# Patient Record
Sex: MALE | Race: WHITE | Employment: OTHER | URBAN - METROPOLITAN AREA
[De-identification: names, ages, dates, MRNs, and addresses within clinical notes are randomized per-mention and may not be internally consistent; named-entity substitution may affect disease eponyms.]

---

## 2018-01-01 PROBLEM — J18.9 PNEUMONIA: Status: ACTIVE | Noted: 2018-01-01

## 2018-04-10 ENCOUNTER — OFFICE VISIT (OUTPATIENT)
Dept: CARDIOLOGY CLINIC | Age: 83
End: 2018-04-10
Payer: MEDICARE

## 2018-04-10 VITALS
HEART RATE: 62 BPM | SYSTOLIC BLOOD PRESSURE: 108 MMHG | BODY MASS INDEX: 28.43 KG/M2 | RESPIRATION RATE: 18 BRPM | HEIGHT: 70 IN | DIASTOLIC BLOOD PRESSURE: 62 MMHG | WEIGHT: 198.6 LBS

## 2018-04-10 DIAGNOSIS — I50.22 CHRONIC SYSTOLIC CHF (CONGESTIVE HEART FAILURE) (HCC): Primary | ICD-10-CM

## 2018-04-10 DIAGNOSIS — Z78.9 STATIN INTOLERANCE: ICD-10-CM

## 2018-04-10 DIAGNOSIS — I25.10 CORONARY ARTERY DISEASE INVOLVING NATIVE CORONARY ARTERY OF NATIVE HEART WITHOUT ANGINA PECTORIS: ICD-10-CM

## 2018-04-10 DIAGNOSIS — I25.2 HISTORY OF MI (MYOCARDIAL INFARCTION): ICD-10-CM

## 2018-04-10 DIAGNOSIS — Z95.1 S/P CABG (CORONARY ARTERY BYPASS GRAFT): ICD-10-CM

## 2018-04-10 DIAGNOSIS — I73.9 PVD (PERIPHERAL VASCULAR DISEASE) (HCC): ICD-10-CM

## 2018-04-10 DIAGNOSIS — E78.00 PURE HYPERCHOLESTEROLEMIA: ICD-10-CM

## 2018-04-10 DIAGNOSIS — I72.3 ILIAC ARTERY ANEURYSM, RIGHT (HCC): ICD-10-CM

## 2018-04-10 PROCEDURE — G8427 DOCREV CUR MEDS BY ELIG CLIN: HCPCS | Performed by: INTERNAL MEDICINE

## 2018-04-10 PROCEDURE — G8598 ASA/ANTIPLAT THER USED: HCPCS | Performed by: INTERNAL MEDICINE

## 2018-04-10 PROCEDURE — 1036F TOBACCO NON-USER: CPT | Performed by: INTERNAL MEDICINE

## 2018-04-10 PROCEDURE — 99214 OFFICE O/P EST MOD 30 MIN: CPT | Performed by: INTERNAL MEDICINE

## 2018-04-10 PROCEDURE — 1123F ACP DISCUSS/DSCN MKR DOCD: CPT | Performed by: INTERNAL MEDICINE

## 2018-04-10 PROCEDURE — 4040F PNEUMOC VAC/ADMIN/RCVD: CPT | Performed by: INTERNAL MEDICINE

## 2018-04-10 PROCEDURE — 93000 ELECTROCARDIOGRAM COMPLETE: CPT | Performed by: INTERNAL MEDICINE

## 2018-04-10 PROCEDURE — G8417 CALC BMI ABV UP PARAM F/U: HCPCS | Performed by: INTERNAL MEDICINE

## 2018-06-01 ENCOUNTER — HOSPITAL ENCOUNTER (OUTPATIENT)
Dept: CT IMAGING | Age: 83
Discharge: HOME | End: 2018-06-03
Payer: MEDICARE

## 2018-06-01 DIAGNOSIS — R26.89 BALANCE DISORDER: ICD-10-CM

## 2018-06-01 PROCEDURE — 70450 CT HEAD/BRAIN W/O DYE: CPT

## 2018-06-06 ENCOUNTER — NURSE ONLY (OUTPATIENT)
Dept: NON INVASIVE DIAGNOSTICS | Age: 83
End: 2018-06-06
Payer: MEDICARE

## 2018-06-06 DIAGNOSIS — Z95.810 AUTOMATIC IMPLANTABLE CARDIOVERTER-DEFIBRILLATOR IN SITU: Primary | ICD-10-CM

## 2018-06-06 PROCEDURE — 93295 DEV INTERROG REMOTE 1/2/MLT: CPT | Performed by: INTERNAL MEDICINE

## 2018-06-06 PROCEDURE — 93296 REM INTERROG EVL PM/IDS: CPT | Performed by: INTERNAL MEDICINE

## 2018-06-08 ENCOUNTER — HOSPITAL ENCOUNTER (EMERGENCY)
Age: 83
Discharge: HOME | End: 2018-06-08
Attending: EMERGENCY MEDICINE
Payer: MEDICARE

## 2018-06-08 ENCOUNTER — APPOINTMENT (OUTPATIENT)
Dept: ULTRASOUND IMAGING | Age: 83
End: 2018-06-08
Payer: MEDICARE

## 2018-06-08 ENCOUNTER — APPOINTMENT (OUTPATIENT)
Dept: GENERAL RADIOLOGY | Age: 83
End: 2018-06-08
Payer: MEDICARE

## 2018-06-08 VITALS
HEART RATE: 62 BPM | WEIGHT: 197 LBS | TEMPERATURE: 97.5 F | BODY MASS INDEX: 28.2 KG/M2 | RESPIRATION RATE: 15 BRPM | OXYGEN SATURATION: 97 % | SYSTOLIC BLOOD PRESSURE: 125 MMHG | HEIGHT: 70 IN | DIASTOLIC BLOOD PRESSURE: 64 MMHG

## 2018-06-08 DIAGNOSIS — S39.012A LUMBAR STRAIN, INITIAL ENCOUNTER: Primary | ICD-10-CM

## 2018-06-08 LAB
ALBUMIN SERPL-MCNC: 3.6 G/DL (ref 3.5–5.2)
ALP BLD-CCNC: 43 U/L (ref 40–129)
ALT SERPL-CCNC: 11 U/L (ref 0–40)
ANION GAP SERPL CALCULATED.3IONS-SCNC: 9 MMOL/L (ref 7–16)
AST SERPL-CCNC: 14 U/L (ref 0–39)
BACTERIA: ABNORMAL /HPF
BASOPHILS ABSOLUTE: 0.02 E9/L (ref 0–0.2)
BASOPHILS RELATIVE PERCENT: 0.2 % (ref 0–2)
BILIRUB SERPL-MCNC: 0.6 MG/DL (ref 0–1.2)
BILIRUBIN URINE: NEGATIVE
BLOOD, URINE: ABNORMAL
BUN BLDV-MCNC: 22 MG/DL (ref 8–23)
CALCIUM SERPL-MCNC: 9.1 MG/DL (ref 8.6–10.2)
CHLORIDE BLD-SCNC: 104 MMOL/L (ref 98–107)
CLARITY: CLEAR
CO2: 24 MMOL/L (ref 22–29)
COLOR: YELLOW
CREAT SERPL-MCNC: 1.2 MG/DL (ref 0.7–1.2)
EOSINOPHILS ABSOLUTE: 0.15 E9/L (ref 0.05–0.5)
EOSINOPHILS RELATIVE PERCENT: 1.3 % (ref 0–6)
EPITHELIAL CELLS, UA: ABNORMAL /HPF
GFR AFRICAN AMERICAN: >60
GFR NON-AFRICAN AMERICAN: 58 ML/MIN/1.73
GLUCOSE BLD-MCNC: 102 MG/DL (ref 74–109)
GLUCOSE URINE: NEGATIVE MG/DL
HCT VFR BLD CALC: 37.4 % (ref 37–54)
HEMOGLOBIN: 12.4 G/DL (ref 12.5–16.5)
IMMATURE GRANULOCYTES #: 0.05 E9/L
IMMATURE GRANULOCYTES %: 0.4 % (ref 0–5)
KETONES, URINE: NEGATIVE MG/DL
LEUKOCYTE ESTERASE, URINE: NEGATIVE
LYMPHOCYTES ABSOLUTE: 5.2 E9/L (ref 1.5–4)
LYMPHOCYTES RELATIVE PERCENT: 45.4 % (ref 20–42)
MCH RBC QN AUTO: 29.2 PG (ref 26–35)
MCHC RBC AUTO-ENTMCNC: 33.2 % (ref 32–34.5)
MCV RBC AUTO: 88.2 FL (ref 80–99.9)
MONOCYTES ABSOLUTE: 1.03 E9/L (ref 0.1–0.95)
MONOCYTES RELATIVE PERCENT: 9 % (ref 2–12)
NEUTROPHILS ABSOLUTE: 5.01 E9/L (ref 1.8–7.3)
NEUTROPHILS RELATIVE PERCENT: 43.7 % (ref 43–80)
NITRITE, URINE: NEGATIVE
PDW BLD-RTO: 14.8 FL (ref 11.5–15)
PH UA: 5 (ref 5–9)
PLATELET # BLD: 188 E9/L (ref 130–450)
PMV BLD AUTO: 8 FL (ref 7–12)
POTASSIUM SERPL-SCNC: 4.5 MMOL/L (ref 3.5–5)
PRO-BNP: 1298 PG/ML (ref 0–450)
PROTEIN UA: NEGATIVE MG/DL
RBC # BLD: 4.24 E12/L (ref 3.8–5.8)
RBC UA: ABNORMAL /HPF (ref 0–2)
SODIUM BLD-SCNC: 137 MMOL/L (ref 132–146)
SPECIFIC GRAVITY UA: 1.02 (ref 1–1.03)
TOTAL PROTEIN: 6.7 G/DL (ref 6.4–8.3)
TROPONIN: <0.01 NG/ML (ref 0–0.03)
UROBILINOGEN, URINE: 0.2 E.U./DL
WBC # BLD: 11.5 E9/L (ref 4.5–11.5)
WBC UA: ABNORMAL /HPF (ref 0–5)

## 2018-06-08 PROCEDURE — 83880 ASSAY OF NATRIURETIC PEPTIDE: CPT

## 2018-06-08 PROCEDURE — 96374 THER/PROPH/DIAG INJ IV PUSH: CPT

## 2018-06-08 PROCEDURE — 84484 ASSAY OF TROPONIN QUANT: CPT

## 2018-06-08 PROCEDURE — 71045 X-RAY EXAM CHEST 1 VIEW: CPT

## 2018-06-08 PROCEDURE — 85025 COMPLETE CBC W/AUTO DIFF WBC: CPT

## 2018-06-08 PROCEDURE — 6360000002 HC RX W HCPCS: Performed by: STUDENT IN AN ORGANIZED HEALTH CARE EDUCATION/TRAINING PROGRAM

## 2018-06-08 PROCEDURE — 81001 URINALYSIS AUTO W/SCOPE: CPT

## 2018-06-08 PROCEDURE — 80053 COMPREHEN METABOLIC PANEL: CPT

## 2018-06-08 PROCEDURE — 76775 US EXAM ABDO BACK WALL LIM: CPT

## 2018-06-08 PROCEDURE — 99285 EMERGENCY DEPT VISIT HI MDM: CPT

## 2018-06-08 PROCEDURE — 36415 COLL VENOUS BLD VENIPUNCTURE: CPT

## 2018-06-08 RX ORDER — FUROSEMIDE 10 MG/ML
20 INJECTION INTRAMUSCULAR; INTRAVENOUS ONCE
Status: COMPLETED | OUTPATIENT
Start: 2018-06-08 | End: 2018-06-08

## 2018-06-08 RX ADMIN — FUROSEMIDE 20 MG: 10 INJECTION, SOLUTION INTRAVENOUS at 12:15

## 2018-06-08 ASSESSMENT — ENCOUNTER SYMPTOMS
ABDOMINAL PAIN: 0
VOMITING: 0
NAUSEA: 0
DIARRHEA: 0
SHORTNESS OF BREATH: 1
STRIDOR: 0
BACK PAIN: 1
WHEEZING: 0
COUGH: 0
COLOR CHANGE: 0
CONSTIPATION: 0
SORE THROAT: 0

## 2018-08-02 ENCOUNTER — HOSPITAL ENCOUNTER (OUTPATIENT)
Dept: GENERAL RADIOLOGY | Age: 83
Discharge: HOME | End: 2018-08-04
Payer: MEDICARE

## 2018-08-02 DIAGNOSIS — Z13.820 SCREENING FOR OSTEOPOROSIS: ICD-10-CM

## 2018-08-02 PROCEDURE — 77080 DXA BONE DENSITY AXIAL: CPT

## 2018-08-08 ENCOUNTER — TELEPHONE (OUTPATIENT)
Dept: NON INVASIVE DIAGNOSTICS | Age: 83
End: 2018-08-08

## 2018-09-05 ENCOUNTER — NURSE ONLY (OUTPATIENT)
Dept: NON INVASIVE DIAGNOSTICS | Age: 83
End: 2018-09-05
Payer: MEDICARE

## 2018-09-05 DIAGNOSIS — Z95.810 AUTOMATIC IMPLANTABLE CARDIOVERTER-DEFIBRILLATOR IN SITU: Primary | ICD-10-CM

## 2018-09-05 DIAGNOSIS — I47.29 NSVT (NONSUSTAINED VENTRICULAR TACHYCARDIA): ICD-10-CM

## 2018-09-05 DIAGNOSIS — I25.5 ISCHEMIC CARDIOMYOPATHY: ICD-10-CM

## 2018-09-05 PROCEDURE — 93295 DEV INTERROG REMOTE 1/2/MLT: CPT | Performed by: INTERNAL MEDICINE

## 2018-09-05 PROCEDURE — 93296 REM INTERROG EVL PM/IDS: CPT | Performed by: INTERNAL MEDICINE

## 2018-09-05 PROCEDURE — 93297 REM INTERROG DEV EVAL ICPMS: CPT | Performed by: INTERNAL MEDICINE

## 2018-09-06 ENCOUNTER — TELEPHONE (OUTPATIENT)
Dept: NON INVASIVE DIAGNOSTICS | Age: 83
End: 2018-09-06

## 2018-09-06 NOTE — TELEPHONE ENCOUNTER
----- Message from Blu Zavaleta RN sent at 9/6/2018  7:55 AM EDT -----  Successful transmission received. Please call patient and give next appointment.

## 2018-09-07 ENCOUNTER — HOSPITAL ENCOUNTER (OUTPATIENT)
Dept: CT IMAGING | Age: 83
Discharge: HOME | End: 2018-09-09
Payer: MEDICARE

## 2018-09-07 DIAGNOSIS — J84.9 ILD (INTERSTITIAL LUNG DISEASE) (HCC): ICD-10-CM

## 2018-09-07 DIAGNOSIS — J84.112 IPF (IDIOPATHIC PULMONARY FIBROSIS) (HCC): ICD-10-CM

## 2018-09-07 PROCEDURE — 71250 CT THORAX DX C-: CPT

## 2018-10-04 ENCOUNTER — OFFICE VISIT (OUTPATIENT)
Dept: VASCULAR SURGERY | Age: 83
End: 2018-10-04
Payer: MEDICARE

## 2018-10-04 DIAGNOSIS — I72.3 ILIAC ARTERY ANEURYSM, BILATERAL (HCC): ICD-10-CM

## 2018-10-04 DIAGNOSIS — I77.819 DILATATION OF AORTA (HCC): ICD-10-CM

## 2018-10-04 PROCEDURE — 99204 OFFICE O/P NEW MOD 45 MIN: CPT | Performed by: SURGERY

## 2018-10-04 PROCEDURE — G8417 CALC BMI ABV UP PARAM F/U: HCPCS | Performed by: SURGERY

## 2018-10-04 PROCEDURE — 4040F PNEUMOC VAC/ADMIN/RCVD: CPT | Performed by: SURGERY

## 2018-10-04 PROCEDURE — G8427 DOCREV CUR MEDS BY ELIG CLIN: HCPCS | Performed by: SURGERY

## 2018-10-04 PROCEDURE — 1036F TOBACCO NON-USER: CPT | Performed by: SURGERY

## 2018-10-04 PROCEDURE — G8598 ASA/ANTIPLAT THER USED: HCPCS | Performed by: SURGERY

## 2018-10-04 PROCEDURE — G8482 FLU IMMUNIZE ORDER/ADMIN: HCPCS | Performed by: SURGERY

## 2018-10-04 PROCEDURE — 1123F ACP DISCUSS/DSCN MKR DOCD: CPT | Performed by: SURGERY

## 2018-10-04 PROCEDURE — 1101F PT FALLS ASSESS-DOCD LE1/YR: CPT | Performed by: SURGERY

## 2018-10-04 NOTE — PROGRESS NOTES
- 2 Glasses of wine, 1 - 2 Cans of beer per week      Comment: socially and a glass of wine every night    Drug use: No    Sexual activity: Not on file     Other Topics Concern    Not on file     Social History Narrative    No narrative on file       Review of Systems:  Skin:  No abnormal pigmentation or rash  Eyes:  No blurring, diplopia or vision loss  Ears/Nose/Throat:  No hearing loss or vertigo  Respiratory:  No cough, pleuritic chest pain, dyspnea, or wheezing  Cardiovascular: No angina, palpitations   Gastrointestinal:  No nausea or vomiting; no abdominal pain or rectal bleeding  Musculoskeletal:  No arthritis or weakness  Neurologic:  No paralysis, paresis, paresthesia, seizures or headaches  Hematologic/Lymphatic/Immunologic:  No anemia, abnormal bleeding/bruising, fever, chills or night sweats. Endocrine:  No heat or cold intolerance. No polyphagia, polydipsia or polyuria. Physical Exam:  General appearance:  Alert, awake, oriented x 3. No distress. Skin:  Warm and dry  Head:  Normocephalic. No masses, lesions or tenderness  Eyes:  Conjunctivae appear normal; PERRL  Ears:  External ears normal  Nose/Sinuses:  Septum midline, mucosa normal; no drainage  Oropharynx:  Clear, no exudate noted  Neck:  No jugular venous distention, lymphadenopathy or thyromegaly. No evidence of carotid bruit  Lungs:  Clear to ausculation bilaterally. No rhonchi, crackles, wheezes  Heart:  Regular rate and rhythm. No rub or murmur  Abdomen:  Soft, non-tender. No masses, organomegaly. Musculoskeletal : No joint effusions, tenderness swelling    Neuro: Speech is intact. Moving all extremities. No focal motor or sensory deficits      Extremities:  Both feet are warm to touch.  The color of both feet is normal.        Pulses Right  Left    Brachial 3 3    Radial    3=normal   Femoral 2 2  2=diminished   Popliteal    1=barely palpable   Dorsalis pedis    0=absent   Posterior tibial    4=aneurysmal             Other

## 2018-12-05 ENCOUNTER — NURSE ONLY (OUTPATIENT)
Dept: NON INVASIVE DIAGNOSTICS | Age: 83
End: 2018-12-05
Payer: MEDICARE

## 2018-12-05 DIAGNOSIS — Z95.810 AUTOMATIC IMPLANTABLE CARDIOVERTER-DEFIBRILLATOR IN SITU: Primary | ICD-10-CM

## 2018-12-05 DIAGNOSIS — I25.5 ISCHEMIC CARDIOMYOPATHY: ICD-10-CM

## 2018-12-05 PROCEDURE — 93295 DEV INTERROG REMOTE 1/2/MLT: CPT | Performed by: INTERNAL MEDICINE

## 2018-12-05 PROCEDURE — 93296 REM INTERROG EVL PM/IDS: CPT | Performed by: INTERNAL MEDICINE

## 2018-12-05 PROCEDURE — 93297 REM INTERROG DEV EVAL ICPMS: CPT | Performed by: INTERNAL MEDICINE

## 2018-12-11 NOTE — PROGRESS NOTES
See [employer] [facility] report. Remote monitoring reviewed over a 90 day period. End of 90 day monitoring period date of service 12. 5.2018.

## 2018-12-12 ENCOUNTER — TELEPHONE (OUTPATIENT)
Dept: NON INVASIVE DIAGNOSTICS | Age: 83
End: 2018-12-12

## 2018-12-12 NOTE — TELEPHONE ENCOUNTER
----- Message from Jayde Ornelas RN sent at 12/11/2018  5:19 PM EST -----  Successful transmission received. Please call patient and give next appointment.

## 2019-01-10 ENCOUNTER — HOSPITAL ENCOUNTER (OUTPATIENT)
Age: 84
Discharge: HOME | End: 2019-01-10
Payer: MEDICARE

## 2019-01-10 LAB
C-REACTIVE PROTEIN: 3 MG/DL (ref 0–0.4)
SEDIMENTATION RATE, ERYTHROCYTE: 3 MM/HR (ref 0–15)

## 2019-01-10 PROCEDURE — 85651 RBC SED RATE NONAUTOMATED: CPT

## 2019-01-10 PROCEDURE — 86140 C-REACTIVE PROTEIN: CPT

## 2019-01-10 PROCEDURE — 36415 COLL VENOUS BLD VENIPUNCTURE: CPT

## 2019-02-05 ENCOUNTER — HOSPITAL ENCOUNTER (OUTPATIENT)
Age: 84
Discharge: HOME | End: 2019-02-05
Payer: MEDICARE

## 2019-02-05 DIAGNOSIS — Z51.81 THERAPEUTIC DRUG MONITORING: ICD-10-CM

## 2019-02-05 LAB
ALBUMIN SERPL-MCNC: 3.6 G/DL (ref 3.5–5.2)
ALP BLD-CCNC: 38 U/L (ref 40–129)
ALT SERPL-CCNC: 18 U/L (ref 0–40)
AST SERPL-CCNC: 24 U/L (ref 0–39)
BILIRUB SERPL-MCNC: 0.5 MG/DL (ref 0–1.2)
BILIRUBIN DIRECT: 0.2 MG/DL (ref 0–0.3)
BILIRUBIN, INDIRECT: 0.3 MG/DL (ref 0–1)
C-REACTIVE PROTEIN: 2.4 MG/DL (ref 0–0.4)
SEDIMENTATION RATE, ERYTHROCYTE: 3 MM/HR (ref 0–15)
TOTAL PROTEIN: 6.7 G/DL (ref 6.4–8.3)

## 2019-02-05 PROCEDURE — 36415 COLL VENOUS BLD VENIPUNCTURE: CPT

## 2019-02-05 PROCEDURE — 85651 RBC SED RATE NONAUTOMATED: CPT

## 2019-02-05 PROCEDURE — 80076 HEPATIC FUNCTION PANEL: CPT

## 2019-02-05 PROCEDURE — 86140 C-REACTIVE PROTEIN: CPT

## 2019-03-07 ENCOUNTER — HOSPITAL ENCOUNTER (OUTPATIENT)
Age: 84
Discharge: HOME | End: 2019-03-07
Payer: MEDICARE

## 2019-03-07 DIAGNOSIS — Z51.81 THERAPEUTIC DRUG MONITORING: ICD-10-CM

## 2019-03-07 LAB
ALBUMIN SERPL-MCNC: 3.9 G/DL (ref 3.5–5.2)
ALP BLD-CCNC: 40 U/L (ref 40–129)
ALT SERPL-CCNC: 17 U/L (ref 0–40)
AST SERPL-CCNC: 18 U/L (ref 0–39)
BILIRUB SERPL-MCNC: 0.7 MG/DL (ref 0–1.2)
BILIRUBIN DIRECT: 0.2 MG/DL (ref 0–0.3)
BILIRUBIN, INDIRECT: 0.5 MG/DL (ref 0–1)
C-REACTIVE PROTEIN: 2 MG/DL (ref 0–0.4)
SEDIMENTATION RATE, ERYTHROCYTE: 15 MM/HR (ref 0–15)
TOTAL PROTEIN: 7.4 G/DL (ref 6.4–8.3)

## 2019-03-07 PROCEDURE — 86140 C-REACTIVE PROTEIN: CPT

## 2019-03-07 PROCEDURE — 36415 COLL VENOUS BLD VENIPUNCTURE: CPT

## 2019-03-07 PROCEDURE — 85651 RBC SED RATE NONAUTOMATED: CPT

## 2019-03-07 PROCEDURE — 80076 HEPATIC FUNCTION PANEL: CPT

## 2019-03-12 ENCOUNTER — NURSE ONLY (OUTPATIENT)
Dept: NON INVASIVE DIAGNOSTICS | Age: 84
End: 2019-03-12
Payer: MEDICARE

## 2019-03-12 DIAGNOSIS — I25.5 ISCHEMIC CARDIOMYOPATHY: ICD-10-CM

## 2019-03-12 DIAGNOSIS — Z95.810 AUTOMATIC IMPLANTABLE CARDIOVERTER-DEFIBRILLATOR IN SITU: Primary | ICD-10-CM

## 2019-03-12 DIAGNOSIS — I47.29 NSVT (NONSUSTAINED VENTRICULAR TACHYCARDIA): ICD-10-CM

## 2019-03-12 PROCEDURE — 93296 REM INTERROG EVL PM/IDS: CPT | Performed by: INTERNAL MEDICINE

## 2019-03-12 PROCEDURE — 93297 REM INTERROG DEV EVAL ICPMS: CPT | Performed by: INTERNAL MEDICINE

## 2019-03-12 PROCEDURE — 93295 DEV INTERROG REMOTE 1/2/MLT: CPT | Performed by: INTERNAL MEDICINE

## 2019-03-18 ENCOUNTER — TELEPHONE (OUTPATIENT)
Dept: NON INVASIVE DIAGNOSTICS | Age: 84
End: 2019-03-18

## 2019-03-20 ENCOUNTER — OFFICE VISIT (OUTPATIENT)
Dept: NON INVASIVE DIAGNOSTICS | Age: 84
End: 2019-03-20
Payer: MEDICARE

## 2019-03-20 VITALS
BODY MASS INDEX: 27.92 KG/M2 | DIASTOLIC BLOOD PRESSURE: 62 MMHG | HEART RATE: 76 BPM | HEIGHT: 70 IN | WEIGHT: 195 LBS | SYSTOLIC BLOOD PRESSURE: 94 MMHG | RESPIRATION RATE: 16 BRPM

## 2019-03-20 DIAGNOSIS — Z95.810 ICD (IMPLANTABLE CARDIOVERTER-DEFIBRILLATOR) IN PLACE: Primary | ICD-10-CM

## 2019-03-20 PROCEDURE — 4040F PNEUMOC VAC/ADMIN/RCVD: CPT | Performed by: NURSE PRACTITIONER

## 2019-03-20 PROCEDURE — G8482 FLU IMMUNIZE ORDER/ADMIN: HCPCS | Performed by: NURSE PRACTITIONER

## 2019-03-20 PROCEDURE — 99213 OFFICE O/P EST LOW 20 MIN: CPT | Performed by: NURSE PRACTITIONER

## 2019-03-20 PROCEDURE — 93290 INTERROG DEV EVAL ICPMS IP: CPT | Performed by: NURSE PRACTITIONER

## 2019-03-20 PROCEDURE — G8427 DOCREV CUR MEDS BY ELIG CLIN: HCPCS | Performed by: NURSE PRACTITIONER

## 2019-03-20 PROCEDURE — 1123F ACP DISCUSS/DSCN MKR DOCD: CPT | Performed by: NURSE PRACTITIONER

## 2019-03-20 PROCEDURE — G8598 ASA/ANTIPLAT THER USED: HCPCS | Performed by: NURSE PRACTITIONER

## 2019-03-20 PROCEDURE — G8417 CALC BMI ABV UP PARAM F/U: HCPCS | Performed by: NURSE PRACTITIONER

## 2019-03-20 PROCEDURE — 93283 PRGRMG EVAL IMPLANTABLE DFB: CPT | Performed by: NURSE PRACTITIONER

## 2019-03-20 PROCEDURE — 1036F TOBACCO NON-USER: CPT | Performed by: NURSE PRACTITIONER

## 2019-03-20 PROCEDURE — 1101F PT FALLS ASSESS-DOCD LE1/YR: CPT | Performed by: NURSE PRACTITIONER

## 2019-03-20 RX ORDER — NINTEDANIB 150 MG/1
CAPSULE ORAL
Refills: 12 | COMMUNITY
Start: 2019-03-04 | End: 2021-01-01

## 2019-03-20 RX ORDER — CARVEDILOL 3.12 MG/1
3.12 TABLET ORAL 2 TIMES DAILY WITH MEALS
COMMUNITY
End: 2019-04-22 | Stop reason: SDUPTHER

## 2019-03-26 ENCOUNTER — TELEPHONE (OUTPATIENT)
Dept: VASCULAR SURGERY | Age: 84
End: 2019-03-26

## 2019-04-05 ENCOUNTER — HOSPITAL ENCOUNTER (OUTPATIENT)
Age: 84
Discharge: HOME | End: 2019-04-05
Payer: MEDICARE

## 2019-04-05 DIAGNOSIS — Z51.81 THERAPEUTIC DRUG MONITORING: ICD-10-CM

## 2019-04-05 LAB
ALBUMIN SERPL-MCNC: 3.9 G/DL (ref 3.5–5.2)
ALP BLD-CCNC: 43 U/L (ref 40–129)
ALT SERPL-CCNC: 17 U/L (ref 0–40)
AST SERPL-CCNC: 19 U/L (ref 0–39)
BILIRUB SERPL-MCNC: 0.7 MG/DL (ref 0–1.2)
BILIRUBIN DIRECT: 0.2 MG/DL (ref 0–0.3)
BILIRUBIN, INDIRECT: 0.5 MG/DL (ref 0–1)
TOTAL PROTEIN: 7.4 G/DL (ref 6.4–8.3)

## 2019-04-05 PROCEDURE — 80076 HEPATIC FUNCTION PANEL: CPT

## 2019-04-05 PROCEDURE — 36415 COLL VENOUS BLD VENIPUNCTURE: CPT

## 2019-06-03 ENCOUNTER — HOSPITAL ENCOUNTER (OUTPATIENT)
Age: 84
Discharge: HOME | End: 2019-06-03
Payer: MEDICARE

## 2019-06-03 LAB
C-REACTIVE PROTEIN: 0.7 MG/DL (ref 0–0.4)
SEDIMENTATION RATE, ERYTHROCYTE: 17 MM/HR (ref 0–15)

## 2019-06-03 PROCEDURE — 36415 COLL VENOUS BLD VENIPUNCTURE: CPT

## 2019-06-03 PROCEDURE — 86140 C-REACTIVE PROTEIN: CPT

## 2019-06-03 PROCEDURE — 85651 RBC SED RATE NONAUTOMATED: CPT

## 2019-06-11 ENCOUNTER — NURSE ONLY (OUTPATIENT)
Dept: NON INVASIVE DIAGNOSTICS | Age: 84
End: 2019-06-11
Payer: MEDICARE

## 2019-06-11 DIAGNOSIS — Z95.810 ICD (IMPLANTABLE CARDIOVERTER-DEFIBRILLATOR) IN PLACE: Primary | ICD-10-CM

## 2019-06-11 DIAGNOSIS — I50.22 CHRONIC SYSTOLIC CHF (CONGESTIVE HEART FAILURE) (HCC): ICD-10-CM

## 2019-06-11 DIAGNOSIS — I25.5 ISCHEMIC CARDIOMYOPATHY: ICD-10-CM

## 2019-06-11 PROCEDURE — 93297 REM INTERROG DEV EVAL ICPMS: CPT | Performed by: INTERNAL MEDICINE

## 2019-06-11 PROCEDURE — 93296 REM INTERROG EVL PM/IDS: CPT | Performed by: INTERNAL MEDICINE

## 2019-06-11 PROCEDURE — 93295 DEV INTERROG REMOTE 1/2/MLT: CPT | Performed by: INTERNAL MEDICINE

## 2019-06-14 ENCOUNTER — OFFICE VISIT (OUTPATIENT)
Dept: CARDIOLOGY CLINIC | Age: 84
End: 2019-06-14
Payer: MEDICARE

## 2019-06-14 VITALS
WEIGHT: 187 LBS | HEIGHT: 70 IN | SYSTOLIC BLOOD PRESSURE: 118 MMHG | DIASTOLIC BLOOD PRESSURE: 62 MMHG | BODY MASS INDEX: 26.77 KG/M2 | RESPIRATION RATE: 16 BRPM | HEART RATE: 65 BPM

## 2019-06-14 DIAGNOSIS — I25.5 ISCHEMIC CARDIOMYOPATHY: Primary | ICD-10-CM

## 2019-06-14 DIAGNOSIS — I50.22 CHRONIC SYSTOLIC CHF (CONGESTIVE HEART FAILURE) (HCC): ICD-10-CM

## 2019-06-14 DIAGNOSIS — I25.10 CORONARY ARTERY DISEASE INVOLVING NATIVE CORONARY ARTERY OF NATIVE HEART WITHOUT ANGINA PECTORIS: ICD-10-CM

## 2019-06-14 DIAGNOSIS — Z78.9 STATIN INTOLERANCE: ICD-10-CM

## 2019-06-14 DIAGNOSIS — I47.29 NSVT (NONSUSTAINED VENTRICULAR TACHYCARDIA): ICD-10-CM

## 2019-06-14 DIAGNOSIS — I77.819 DILATATION OF AORTA (HCC): ICD-10-CM

## 2019-06-14 DIAGNOSIS — I51.9 LV DYSFUNCTION: ICD-10-CM

## 2019-06-14 PROCEDURE — 93000 ELECTROCARDIOGRAM COMPLETE: CPT | Performed by: INTERNAL MEDICINE

## 2019-06-14 PROCEDURE — 1036F TOBACCO NON-USER: CPT | Performed by: INTERNAL MEDICINE

## 2019-06-14 PROCEDURE — G8417 CALC BMI ABV UP PARAM F/U: HCPCS | Performed by: INTERNAL MEDICINE

## 2019-06-14 PROCEDURE — G8598 ASA/ANTIPLAT THER USED: HCPCS | Performed by: INTERNAL MEDICINE

## 2019-06-14 PROCEDURE — 99214 OFFICE O/P EST MOD 30 MIN: CPT | Performed by: INTERNAL MEDICINE

## 2019-06-14 PROCEDURE — 1123F ACP DISCUSS/DSCN MKR DOCD: CPT | Performed by: INTERNAL MEDICINE

## 2019-06-14 PROCEDURE — G8427 DOCREV CUR MEDS BY ELIG CLIN: HCPCS | Performed by: INTERNAL MEDICINE

## 2019-06-14 PROCEDURE — 4040F PNEUMOC VAC/ADMIN/RCVD: CPT | Performed by: INTERNAL MEDICINE

## 2019-06-14 RX ORDER — ROSUVASTATIN CALCIUM 5 MG/1
5 TABLET, COATED ORAL DAILY
Qty: 90 TABLET | Refills: 3 | Status: SHIPPED | OUTPATIENT
Start: 2019-06-14 | End: 2020-01-21 | Stop reason: SDUPTHER

## 2019-06-14 RX ORDER — ROSUVASTATIN CALCIUM 5 MG/1
5 TABLET, COATED ORAL DAILY
COMMUNITY
End: 2019-06-14 | Stop reason: SDUPTHER

## 2019-06-14 NOTE — PROGRESS NOTES
Patient Active Problem List   Diagnosis    History of MI (myocardial infarction)    Coronary artery disease involving native coronary artery of native heart without angina pectoris    Hyperlipemia    Statin intolerance    [facility]    Ischemic cardiomyopathy    S/P CABG (coronary artery bypass graft)    Arrhythmia    Syncope    [facility]onsust[facility]    Chronic systolic CHF (congestive heart failure) ([facility])    Automatic implantable cardioverter-defibrillator in situ    Pneumonia    SOB (shortness of breath)    LV dysfunction    Dilatation of aorta ([facility])    Iliac artery aneurysm, bilateral ([facility])       Current Outpatient Medications   Medication Sig Dispense Refill    rosuvastatin (CRESTOR) 5 MG tablet Take 1 tablet by mouth daily 90 tablet 3    potassium chloride (KLOR-CON M) 20 MEQ extended release tablet Take 1 tablet by mouth daily 90 tablet 0    carvedilol (COREG) 6.25 MG tablet Take 1 tablet by mouth 2 times daily (with meals) 180 tablet 0    montelukast (SINGULAIR) 10 MG tablet Take 1 tablet by mouth nightly 90 tablet 1    furosemide (LASIX) 20 MG tablet Take 1 tablet by mouth daily 90 tablet 1    OFEV 150 MG CAPS TAKE 1 CAPSULE BY MOUTH TWICE A DAY  EVERY 12 HOURS  AS DIRECTED WITH FOOD  12    vitamin D (CHOLECALCIFEROL) 1000 UNIT TABS tablet Take 1,000 Units by mouth daily      OXYGEN Inhale 2 L into the lungs as needed      losartan (COZAAR) 25 MG tablet Take 1 tablet by mouth daily (Patient taking differently: Take 12.5 mg by mouth daily ) 90 tablet 0    albuterol (PROVENTIL) (2.5 MG/3ML) 0.083% nebulizer solution Take 3 mLs by nebulization 4 times daily DX: COPD J44.9 360 each 6    albuterol sulfate HFA (VENTOLIN HFA) 108 (90 Base) MCG/ACT inhaler Inhale 2 puffs into the lungs every 6 hours as needed for Wheezing or Shortness of Breath 54 g 3    Fluticasone Furoate-Vilanterol (BREO ELLIPTA) 200-25 MCG/INH AEPB One inhalation, once a day 84 each 3    calcium carbonate 600 MG TABS tablet Take 1 tablet by mouth daily      predniSONE (DELTASONE) 5 MG tablet Take 7.5 mg by mouth       Multiple Vitamins-Minerals (MULTIVITAMIN ADULT) TABS Take by mouth daily      docusate sodium (COLACE) 100 MG capsule Take 100 mg by mouth daily      Coenzyme Q10 (COQ10 PO) Take 100 mg by mouth daily       TURMERIC PO Take 200 mg by mouth daily       aspirin 81 MG EC tablet Take 81 mg by mouth daily. No current facility-administered medications for this visit. CC:    Patient is seen in follow up for:  1. Ischemic cardiomyopathy    2. Coronary artery disease involving native coronary artery of native heart without angina pectoris    3. LV dysfunction    4. NSVT (nonsustained ventricular tachycardia) ([facility]    5. Chronic systolic CHF (congestive heart failure) ([facility] [address]    6. Dilatation of aorta ([facility])        HPI:  Patient is doing fairly well from a cardiac standpoint. Patient denies any  chest pain, lightheadedness or dizziness. Has chronic shortness of breath secondary to idiopathic pulmonary fibrosis. Also has muscle aching secondary to polymyalgia rheumatica  Difficulties tolerating Crestor 20 mg due to muscle aching. ROS:   General: No unusual weight gain, limited exercise tolerance  Skin: No rash or itching  EENT: No vision changes or nosebleeds  Cardiovascular: No orthopnea or paroxysmal nocturnal dyspnea  Respiratory: (+) cough.  No hemoptysis  Gastrointestinal: No hematemesis or recent changes in bowel habits  Genitourinary: No hematuria, urgency or frequency  Musculoskeletal: No muscular weakness or joint swelling   Neurologic / Psychiatric: No incoordination or convulsions  Allergic / Immunologic/ Lymphatic / Endocrine: No anemia or bleeding tendency    Social History     Socioeconomic History    Marital status:      Spouse name: Not on file    Number of children: Not on file    Years of education: Not on file    Highest would prompt earlier evaluation. Follow-up office visit in 12 mos.

## 2019-06-19 ENCOUNTER — HOSPITAL ENCOUNTER (EMERGENCY)
Age: 84
Discharge: HOME | End: 2019-06-19
Attending: EMERGENCY MEDICINE
Payer: MEDICARE

## 2019-06-19 ENCOUNTER — APPOINTMENT (OUTPATIENT)
Dept: GENERAL RADIOLOGY | Age: 84
End: 2019-06-19
Payer: MEDICARE

## 2019-06-19 ENCOUNTER — APPOINTMENT (OUTPATIENT)
Dept: CT IMAGING | Age: 84
End: 2019-06-19
Payer: MEDICARE

## 2019-06-19 VITALS
TEMPERATURE: 96.7 F | OXYGEN SATURATION: 95 % | BODY MASS INDEX: 25.54 KG/M2 | HEART RATE: 74 BPM | SYSTOLIC BLOOD PRESSURE: 108 MMHG | DIASTOLIC BLOOD PRESSURE: 59 MMHG | RESPIRATION RATE: 15 BRPM | WEIGHT: 178 LBS

## 2019-06-19 DIAGNOSIS — R05.9 COUGH: Primary | ICD-10-CM

## 2019-06-19 DIAGNOSIS — Z99.81 OXYGEN DEPENDENT: ICD-10-CM

## 2019-06-19 LAB
ALBUMIN SERPL-MCNC: 3.6 G/DL (ref 3.5–5.2)
ALP BLD-CCNC: 39 U/L (ref 40–129)
ALT SERPL-CCNC: 16 U/L (ref 0–40)
ANION GAP SERPL CALCULATED.3IONS-SCNC: 10 MMOL/L (ref 7–16)
AST SERPL-CCNC: 22 U/L (ref 0–39)
BASOPHILS ABSOLUTE: 0.02 E9/L (ref 0–0.2)
BASOPHILS RELATIVE PERCENT: 0.2 % (ref 0–2)
BILIRUB SERPL-MCNC: 0.4 MG/DL (ref 0–1.2)
BUN BLDV-MCNC: 29 MG/DL (ref 8–23)
CALCIUM SERPL-MCNC: 9.5 MG/DL (ref 8.6–10.2)
CHLORIDE BLD-SCNC: 104 MMOL/L (ref 98–107)
CO2: 27 MMOL/L (ref 22–29)
CREAT SERPL-MCNC: 1.4 MG/DL (ref 0.7–1.2)
D DIMER: 541 NG/ML DDU
EOSINOPHILS ABSOLUTE: 0.09 E9/L (ref 0.05–0.5)
EOSINOPHILS RELATIVE PERCENT: 0.9 % (ref 0–6)
GFR AFRICAN AMERICAN: 58
GFR NON-AFRICAN AMERICAN: 48 ML/MIN/1.73
GLUCOSE BLD-MCNC: 107 MG/DL (ref 74–99)
HCT VFR BLD CALC: 37.8 % (ref 37–54)
HEMOGLOBIN: 12.5 G/DL (ref 12.5–16.5)
IMMATURE GRANULOCYTES #: 0.08 E9/L
IMMATURE GRANULOCYTES %: 0.8 % (ref 0–5)
LYMPHOCYTES ABSOLUTE: 2.88 E9/L (ref 1.5–4)
LYMPHOCYTES RELATIVE PERCENT: 28.2 % (ref 20–42)
MCH RBC QN AUTO: 31.1 PG (ref 26–35)
MCHC RBC AUTO-ENTMCNC: 33.1 % (ref 32–34.5)
MCV RBC AUTO: 94 FL (ref 80–99.9)
MONOCYTES ABSOLUTE: 0.72 E9/L (ref 0.1–0.95)
MONOCYTES RELATIVE PERCENT: 7 % (ref 2–12)
NEUTROPHILS ABSOLUTE: 6.43 E9/L (ref 1.8–7.3)
NEUTROPHILS RELATIVE PERCENT: 62.9 % (ref 43–80)
PDW BLD-RTO: 15.7 FL (ref 11.5–15)
PLATELET # BLD: 219 E9/L (ref 130–450)
PMV BLD AUTO: 8.3 FL (ref 7–12)
POTASSIUM REFLEX MAGNESIUM: 4.4 MMOL/L (ref 3.5–5)
PRO-BNP: 1543 PG/ML (ref 0–450)
RBC # BLD: 4.02 E12/L (ref 3.8–5.8)
SODIUM BLD-SCNC: 141 MMOL/L (ref 132–146)
TOTAL PROTEIN: 6.9 G/DL (ref 6.4–8.3)
TROPONIN: <0.01 NG/ML (ref 0–0.03)
WBC # BLD: 10.2 E9/L (ref 4.5–11.5)

## 2019-06-19 PROCEDURE — 36415 COLL VENOUS BLD VENIPUNCTURE: CPT

## 2019-06-19 PROCEDURE — 83880 ASSAY OF NATRIURETIC PEPTIDE: CPT

## 2019-06-19 PROCEDURE — 93005 ELECTROCARDIOGRAM TRACING: CPT | Performed by: EMERGENCY MEDICINE

## 2019-06-19 PROCEDURE — 6360000004 HC RX CONTRAST MEDICATION: Performed by: RADIOLOGY

## 2019-06-19 PROCEDURE — 96374 THER/PROPH/DIAG INJ IV PUSH: CPT

## 2019-06-19 PROCEDURE — 85025 COMPLETE CBC W/AUTO DIFF WBC: CPT

## 2019-06-19 PROCEDURE — 2580000003 HC RX 258: Performed by: RADIOLOGY

## 2019-06-19 PROCEDURE — 71275 CT ANGIOGRAPHY CHEST: CPT

## 2019-06-19 PROCEDURE — 6370000000 HC RX 637 (ALT 250 FOR IP): Performed by: EMERGENCY MEDICINE

## 2019-06-19 PROCEDURE — 85378 FIBRIN DEGRADE SEMIQUANT: CPT

## 2019-06-19 PROCEDURE — 80053 COMPREHEN METABOLIC PANEL: CPT

## 2019-06-19 PROCEDURE — 84484 ASSAY OF TROPONIN QUANT: CPT

## 2019-06-19 PROCEDURE — 6360000002 HC RX W HCPCS: Performed by: EMERGENCY MEDICINE

## 2019-06-19 PROCEDURE — 99285 EMERGENCY DEPT VISIT HI MDM: CPT

## 2019-06-19 PROCEDURE — 71045 X-RAY EXAM CHEST 1 VIEW: CPT

## 2019-06-19 PROCEDURE — 94640 AIRWAY INHALATION TREATMENT: CPT

## 2019-06-19 RX ORDER — BENZONATATE 100 MG/1
100 CAPSULE ORAL 3 TIMES DAILY PRN
Qty: 21 CAPSULE | Refills: 0 | Status: SHIPPED | OUTPATIENT
Start: 2019-06-19 | End: 2019-06-26

## 2019-06-19 RX ORDER — IPRATROPIUM BROMIDE AND ALBUTEROL SULFATE 2.5; .5 MG/3ML; MG/3ML
1 SOLUTION RESPIRATORY (INHALATION) ONCE
Status: COMPLETED | OUTPATIENT
Start: 2019-06-19 | End: 2019-06-19

## 2019-06-19 RX ORDER — SODIUM CHLORIDE 0.9 % (FLUSH) 0.9 %
10 SYRINGE (ML) INJECTION
Status: COMPLETED | OUTPATIENT
Start: 2019-06-19 | End: 2019-06-19

## 2019-06-19 RX ORDER — ALBUTEROL SULFATE 2.5 MG/3ML
2.5 SOLUTION RESPIRATORY (INHALATION) EVERY 6 HOURS PRN
Qty: 120 EACH | Refills: 0 | Status: SHIPPED | OUTPATIENT
Start: 2019-06-19 | End: 2019-08-06 | Stop reason: SDUPTHER

## 2019-06-19 RX ORDER — METHYLPREDNISOLONE SODIUM SUCCINATE 125 MG/2ML
125 INJECTION, POWDER, LYOPHILIZED, FOR SOLUTION INTRAMUSCULAR; INTRAVENOUS ONCE
Status: COMPLETED | OUTPATIENT
Start: 2019-06-19 | End: 2019-06-19

## 2019-06-19 RX ADMIN — METHYLPREDNISOLONE SODIUM SUCCINATE 125 MG: 125 INJECTION, POWDER, FOR SOLUTION INTRAMUSCULAR; INTRAVENOUS at 08:00

## 2019-06-19 RX ADMIN — Medication 10 ML: at 09:33

## 2019-06-19 RX ADMIN — IPRATROPIUM BROMIDE AND ALBUTEROL SULFATE 1 AMPULE: .5; 3 SOLUTION RESPIRATORY (INHALATION) at 08:10

## 2019-06-19 RX ADMIN — IOPAMIDOL 60 ML: 755 INJECTION, SOLUTION INTRAVENOUS at 09:33

## 2019-06-19 ASSESSMENT — ENCOUNTER SYMPTOMS
BACK PAIN: 0
NAUSEA: 0
SHORTNESS OF BREATH: 1
VOMITING: 0
DIARRHEA: 0
COLOR CHANGE: 0
COUGH: 1
CONSTIPATION: 0
RHINORRHEA: 0
ABDOMINAL PAIN: 0
SORE THROAT: 0
BLOOD IN STOOL: 0

## 2019-06-19 NOTE — ED PROVIDER NOTES
Patient is an 51-year-old male presenting to the emergency department with complaints of shortness of breath and cough. He says he has history of asthma and wears nasal cannula at home. He says that he usually only wears it at night and when he needs it, but when he is just sitting at home he is okay without it. He says that for the past for 5 days he has had increased shortness of breath and cough. He feels that he has needed his nasal cannula oxygen more over the past few days. He says that he is coughing up clear mucus. He takes breathing treatments at home seems to help break up some mucus make him feel better. For the past couple of days he has been taking steroids but he does not believe those have been helping. He denies any chest pain but says that over the past few days he will occasionally have a slight twitch in his right chest that would last a second at a time. This twitch is not worsened by anything in particular. He denies any hemoptysis, fever/chills, palpitations, abdominal pain, urinary symptoms, or change in bowel habits. He has not noticed any leg swelling or change in his weight. He denies any history of DVT/PE. He does not take any anticoagulation and says he only takes aspirin. Review of Systems   Constitutional: Negative for chills and fever. HENT: Negative for congestion, rhinorrhea and sore throat. Respiratory: Positive for cough and shortness of breath. Cardiovascular: Negative for chest pain and palpitations. Gastrointestinal: Negative for abdominal pain, blood in stool, constipation, diarrhea, nausea and vomiting. Genitourinary: Negative for difficulty urinating, dysuria and hematuria. Musculoskeletal: Negative for back pain and neck pain. Skin: Negative for color change, rash and wound. Neurological: Negative for dizziness, syncope, weakness, light-headedness and headaches. Psychiatric/Behavioral: Negative for confusion.        Physical Exam viral illness exacerbating his chronic lung condition. He is on a prednisone taper at home. He has azithromycin at home he was encouraged to begin taking that as well. He has albuterol nebulized at home we will give him a prescription for that in case he runs out. He was also given Tessalon Perles. He should follow closely with his pulmonologist, PCP, or return here if he has any worsening symptoms. ED Course as of Jun 19 1051 Wed Jun 19, 2019   1049 Patient ambulated and maintain his oxygen saturation on his baseline home oxygen. He felt well with ambulation.    [EM]      ED Course User Index  [EM] Renato Landrys, DO       ----------------------------------------------- PAST HISTORY --------------------------------------------  Past Medical History:  has a past medical history of Aneurysm ([facility] [address] Asthma, CAD (coronary artery disease), Cardiomyopathy ([facility] [address] CHF (congestive heart failure) ([facility] [address] Dilatation of aorta ([facility] [address] Hyperlipidemia, Iliac artery aneurysm, bilateral ([address] Inferoposterior myocardial infarction ([facility] [address] and NSVT (nonsustained ventricular tachycardia) ([facility] [address] Past Surgical History:  has a past surgical history that includes Cardiac catheterization (4-); Coronary artery bypass graft ( 4/16/2014); Coronary angioplasty (4/15/2014); and Cardiac defibrillator placement. Social History:  reports that he quit smoking about 39 years ago. His smoking use included cigarettes. He started smoking about 69 years ago. He has a 30.00 pack-year smoking history. He has never used smokeless tobacco. He reports that he drinks alcohol. He reports that he does not use drugs. Family History: family history includes High Cholesterol in his sister; Hypertension in his mother. The patients home medications have been reviewed. Allergies: Patient has no known allergies.     ------------------------------------------------ RESULTS ---------------------------------------------------    LABS:  Results for orders placed or performed during the hospital encounter of 06/19/19   CBC Auto Differential   Result Value Ref Range    WBC 10.2 4.5 - 11.5 E9/L    RBC 4.02 3.80 - 5.80 E12/L    Hemoglobin 12.5 12.5 - 16.5 g/dL    Hematocrit 37.8 37.0 - 54.0 %    MCV 94.0 80.0 - 99.9 fL    MCH 31.1 26.0 - 35.0 pg    MCHC 33.1 32.0 - 34.5 %    RDW 15.7 (H) 11.5 - 15.0 fL    Platelets 662 016 - 042 E9/L    MPV 8.3 7.0 - 12.0 fL    Neutrophils % 62.9 43.0 - 80.0 %    Immature Granulocytes % 0.8 0.0 - 5.0 %    Lymphocytes % 28.2 20.0 - 42.0 %    Monocytes % 7.0 2.0 - 12.0 %    Eosinophils % 0.9 0.0 - 6.0 %    Basophils % 0.2 0.0 - 2.0 %    Neutrophils # 6.43 1.80 - 7.30 E9/L    Immature Granulocytes # 0.08 E9/L    Lymphocytes # 2.88 1.50 - 4.00 E9/L    Monocytes # 0.72 0.10 - 0.95 E9/L    Eosinophils # 0.09 0.05 - 0.50 E9/L    Basophils # 0.02 0.00 - 0.20 E9/L   Comprehensive Metabolic Panel w/ Reflex to MG   Result Value Ref Range    Sodium 141 132 - 146 mmol/L    Potassium reflex Magnesium 4.4 3.5 - 5.0 mmol/L    Chloride 104 98 - 107 mmol/L    CO2 27 22 - 29 mmol/L    Anion Gap 10 7 - 16 mmol/L    Glucose 107 (H) 74 - 99 mg/dL    BUN 29 (H) 8 - 23 mg/dL    CREATININE 1.4 (H) 0.7 - 1.2 mg/dL    GFR Non-African American 48 >=60 mL/min/1.73    GFR African American 58     Calcium 9.5 8.6 - 10.2 mg/dL    Total Protein 6.9 6.4 - 8.3 g/dL    Alb 3.6 3.5 - 5.2 g/dL    Total Bilirubin 0.4 0.0 - 1.2 mg/dL    Alkaline Phosphatase 39 (L) 40 - 129 U/L    ALT 16 0 - 40 U/L    AST 22 0 - 39 U/L   D-Dimer, Quantitative   Result Value Ref Range    D-Dimer, Quant 541 ng/mL DDU   Troponin   Result Value Ref Range    Troponin <0.01 0.00 - 0.03 ng/mL   Brain Natriuretic Peptide   Result Value Ref Range    Pro-BNP 1,543 (H) 0 - 450 pg/mL       RADIOLOGY:    All Radiology results interpreted by Radiologist unless otherwise noted.   CTA PULMONARY W CONTRAST   Final Result   No patient's ED course included: a personal history and physicial examination, re-evaluation prior to disposition and IV medications    This patient has remained hemodynamically stable during their ED course. DISPOSITION:  Disposition: Discharge to home. Patient condition is stable. END OF PROVIDER NOTE.             Wicho Amin DO  Resident  06/19/19 1055

## 2019-06-19 NOTE — ED NOTES
Ambulated pt down the fox. O2 sat 95% on 2L and 91-92% on RA.       John Izquierdo RN  06/19/19 5317

## 2019-06-20 PROBLEM — Z79.52 LONG TERM CURRENT USE OF SYSTEMIC STEROIDS: Status: ACTIVE | Noted: 2018-07-11

## 2019-06-20 PROBLEM — J84.112 IPF (IDIOPATHIC PULMONARY FIBROSIS) (HCC): Status: ACTIVE | Noted: 2018-09-06

## 2019-06-20 PROBLEM — M35.3 PMR (POLYMYALGIA RHEUMATICA) (HCC): Status: ACTIVE | Noted: 2018-07-11

## 2019-06-20 LAB
EKG ATRIAL RATE: 72 BPM
EKG P AXIS: -23 DEGREES
EKG P-R INTERVAL: 274 MS
EKG Q-T INTERVAL: 394 MS
EKG QRS DURATION: 134 MS
EKG QTC CALCULATION (BAZETT): 431 MS
EKG R AXIS: -15 DEGREES
EKG T AXIS: 42 DEGREES
EKG VENTRICULAR RATE: 72 BPM

## 2019-06-20 PROCEDURE — 93010 ELECTROCARDIOGRAM REPORT: CPT | Performed by: INTERNAL MEDICINE

## 2019-06-25 NOTE — PROGRESS NOTES
See [employer] [facility] report. Remote monitoring reviewed over a 90 day period. End of 90 day monitoring period date of service 6.11.2019.

## 2019-07-16 ENCOUNTER — CARE COORDINATION (OUTPATIENT)
Dept: CARE COORDINATION | Age: 84
End: 2019-07-16

## 2019-07-17 ENCOUNTER — CARE COORDINATION (OUTPATIENT)
Dept: CARE COORDINATION | Age: 84
End: 2019-07-17

## 2019-08-02 ENCOUNTER — HOSPITAL ENCOUNTER (OUTPATIENT)
Age: 84
Discharge: HOME | End: 2019-08-02
Payer: MEDICARE

## 2019-08-02 LAB
ALBUMIN SERPL-MCNC: 4.2 G/DL (ref 3.5–5.2)
ALP BLD-CCNC: 40 U/L (ref 40–129)
ALT SERPL-CCNC: 13 U/L (ref 0–40)
AST SERPL-CCNC: 18 U/L (ref 0–39)
BILIRUB SERPL-MCNC: 0.7 MG/DL (ref 0–1.2)
BILIRUBIN DIRECT: 0.2 MG/DL (ref 0–0.3)
BILIRUBIN, INDIRECT: 0.5 MG/DL (ref 0–1)
C-REACTIVE PROTEIN: 0.7 MG/DL (ref 0–0.4)
SEDIMENTATION RATE, ERYTHROCYTE: 12 MM/HR (ref 0–15)
TOTAL PROTEIN: 7.4 G/DL (ref 6.4–8.3)

## 2019-08-02 PROCEDURE — 36415 COLL VENOUS BLD VENIPUNCTURE: CPT

## 2019-08-02 PROCEDURE — 80076 HEPATIC FUNCTION PANEL: CPT

## 2019-08-02 PROCEDURE — 86140 C-REACTIVE PROTEIN: CPT

## 2019-08-02 PROCEDURE — 85651 RBC SED RATE NONAUTOMATED: CPT

## 2019-08-05 ENCOUNTER — CARE COORDINATION (OUTPATIENT)
Dept: CARE COORDINATION | Age: 84
End: 2019-08-05

## 2019-08-08 ENCOUNTER — HOSPITAL ENCOUNTER (OUTPATIENT)
Age: 84
Discharge: HOME | End: 2019-08-08
Payer: MEDICARE

## 2019-08-08 DIAGNOSIS — J84.9 ILD (INTERSTITIAL LUNG DISEASE) (HCC): ICD-10-CM

## 2019-08-08 DIAGNOSIS — J84.112 IPF (IDIOPATHIC PULMONARY FIBROSIS) (HCC): ICD-10-CM

## 2019-08-08 DIAGNOSIS — J84.10 PULMONARY FIBROSIS (HCC): ICD-10-CM

## 2019-08-08 LAB
ALBUMIN SERPL-MCNC: 3.9 G/DL (ref 3.5–5.2)
ALP BLD-CCNC: 40 U/L (ref 40–129)
ALT SERPL-CCNC: 12 U/L (ref 0–40)
ANION GAP SERPL CALCULATED.3IONS-SCNC: 10 MMOL/L (ref 7–16)
AST SERPL-CCNC: 18 U/L (ref 0–39)
BASOPHILS ABSOLUTE: 0.02 E9/L (ref 0–0.2)
BASOPHILS RELATIVE PERCENT: 0.2 % (ref 0–2)
BILIRUB SERPL-MCNC: 0.7 MG/DL (ref 0–1.2)
BUN BLDV-MCNC: 22 MG/DL (ref 8–23)
CALCIUM SERPL-MCNC: 9.1 MG/DL (ref 8.6–10.2)
CHLORIDE BLD-SCNC: 107 MMOL/L (ref 98–107)
CHOLESTEROL, TOTAL: 180 MG/DL (ref 0–199)
CO2: 25 MMOL/L (ref 22–29)
CREAT SERPL-MCNC: 1.3 MG/DL (ref 0.7–1.2)
EOSINOPHILS ABSOLUTE: 0.16 E9/L (ref 0.05–0.5)
EOSINOPHILS RELATIVE PERCENT: 1.4 % (ref 0–6)
GFR AFRICAN AMERICAN: >60
GFR NON-AFRICAN AMERICAN: 53 ML/MIN/1.73
GLUCOSE BLD-MCNC: 99 MG/DL (ref 74–99)
HCT VFR BLD CALC: 38.6 % (ref 37–54)
HDLC SERPL-MCNC: 57 MG/DL
HEMOGLOBIN: 12.3 G/DL (ref 12.5–16.5)
IMMATURE GRANULOCYTES #: 0.05 E9/L
IMMATURE GRANULOCYTES %: 0.4 % (ref 0–5)
LDL CHOLESTEROL CALCULATED: 101 MG/DL (ref 0–99)
LYMPHOCYTES ABSOLUTE: 5.28 E9/L (ref 1.5–4)
LYMPHOCYTES RELATIVE PERCENT: 45.7 % (ref 20–42)
MCH RBC QN AUTO: 29.9 PG (ref 26–35)
MCHC RBC AUTO-ENTMCNC: 31.9 % (ref 32–34.5)
MCV RBC AUTO: 93.9 FL (ref 80–99.9)
MONOCYTES ABSOLUTE: 0.69 E9/L (ref 0.1–0.95)
MONOCYTES RELATIVE PERCENT: 6 % (ref 2–12)
NEUTROPHILS ABSOLUTE: 5.36 E9/L (ref 1.8–7.3)
NEUTROPHILS RELATIVE PERCENT: 46.3 % (ref 43–80)
PDW BLD-RTO: 16.2 FL (ref 11.5–15)
PLATELET # BLD: 192 E9/L (ref 130–450)
PMV BLD AUTO: 8.1 FL (ref 7–12)
POTASSIUM SERPL-SCNC: 4 MMOL/L (ref 3.5–5)
RBC # BLD: 4.11 E12/L (ref 3.8–5.8)
SODIUM BLD-SCNC: 142 MMOL/L (ref 132–146)
TOTAL PROTEIN: 7.1 G/DL (ref 6.4–8.3)
TRIGL SERPL-MCNC: 110 MG/DL (ref 0–149)
VLDLC SERPL CALC-MCNC: 22 MG/DL
WBC # BLD: 11.6 E9/L (ref 4.5–11.5)

## 2019-08-08 PROCEDURE — 85025 COMPLETE CBC W/AUTO DIFF WBC: CPT

## 2019-08-08 PROCEDURE — 80053 COMPREHEN METABOLIC PANEL: CPT

## 2019-08-08 PROCEDURE — 36415 COLL VENOUS BLD VENIPUNCTURE: CPT

## 2019-08-08 PROCEDURE — 80061 LIPID PANEL: CPT

## 2019-08-15 ENCOUNTER — CARE COORDINATION (OUTPATIENT)
Dept: CARE COORDINATION | Age: 84
End: 2019-08-15

## 2019-08-15 ASSESSMENT — ENCOUNTER SYMPTOMS: DYSPNEA ASSOCIATED WITH: EXERTION

## 2019-09-10 ENCOUNTER — NURSE ONLY (OUTPATIENT)
Dept: NON INVASIVE DIAGNOSTICS | Age: 84
End: 2019-09-10
Payer: MEDICARE

## 2019-09-10 DIAGNOSIS — Z95.810 ICD (IMPLANTABLE CARDIOVERTER-DEFIBRILLATOR) IN PLACE: Primary | ICD-10-CM

## 2019-09-10 DIAGNOSIS — I25.5 ISCHEMIC CARDIOMYOPATHY: ICD-10-CM

## 2019-09-10 DIAGNOSIS — I47.29 NSVT (NONSUSTAINED VENTRICULAR TACHYCARDIA): ICD-10-CM

## 2019-09-10 PROCEDURE — 93295 DEV INTERROG REMOTE 1/2/MLT: CPT | Performed by: INTERNAL MEDICINE

## 2019-09-10 PROCEDURE — 93296 REM INTERROG EVL PM/IDS: CPT | Performed by: INTERNAL MEDICINE

## 2019-09-10 PROCEDURE — 93297 REM INTERROG DEV EVAL ICPMS: CPT | Performed by: INTERNAL MEDICINE

## 2019-09-17 ENCOUNTER — CARE COORDINATION (OUTPATIENT)
Dept: CARE COORDINATION | Age: 84
End: 2019-09-17

## 2019-09-24 ENCOUNTER — TELEPHONE (OUTPATIENT)
Dept: NON INVASIVE DIAGNOSTICS | Age: 84
End: 2019-09-24

## 2019-09-24 NOTE — TELEPHONE ENCOUNTER
----- Message from Natividad Mcgee RN sent at 9/24/2019 11:35 AM EDT -----  Successful transmission received. Please call patient and give next appointment.

## 2019-09-26 ENCOUNTER — CARE COORDINATION (OUTPATIENT)
Dept: CARE COORDINATION | Age: 84
End: 2019-09-26

## 2019-11-05 ENCOUNTER — HOSPITAL ENCOUNTER (OUTPATIENT)
Age: 84
Discharge: HOME | End: 2019-11-05
Payer: MEDICARE

## 2019-11-05 DIAGNOSIS — J84.9 ILD (INTERSTITIAL LUNG DISEASE) (HCC): ICD-10-CM

## 2019-11-05 DIAGNOSIS — J84.10 PULMONARY FIBROSIS (HCC): ICD-10-CM

## 2019-11-05 DIAGNOSIS — J84.112 IPF (IDIOPATHIC PULMONARY FIBROSIS) (HCC): ICD-10-CM

## 2019-11-05 LAB
ALBUMIN SERPL-MCNC: 3.9 G/DL (ref 3.5–5.2)
ALP BLD-CCNC: 38 U/L (ref 40–129)
ALT SERPL-CCNC: 13 U/L (ref 0–40)
AST SERPL-CCNC: 19 U/L (ref 0–39)
BILIRUB SERPL-MCNC: 0.5 MG/DL (ref 0–1.2)
BILIRUBIN DIRECT: 0.2 MG/DL (ref 0–0.3)
BILIRUBIN, INDIRECT: 0.3 MG/DL (ref 0–1)
TOTAL PROTEIN: 7.3 G/DL (ref 6.4–8.3)

## 2019-11-05 PROCEDURE — 80076 HEPATIC FUNCTION PANEL: CPT

## 2019-11-05 PROCEDURE — 36415 COLL VENOUS BLD VENIPUNCTURE: CPT

## 2019-11-19 ENCOUNTER — TELEPHONE (OUTPATIENT)
Dept: CARDIOLOGY CLINIC | Age: 84
End: 2019-11-19

## 2019-12-10 ENCOUNTER — NURSE ONLY (OUTPATIENT)
Dept: NON INVASIVE DIAGNOSTICS | Age: 84
End: 2019-12-10
Payer: MEDICARE

## 2019-12-10 DIAGNOSIS — I50.22 CHRONIC SYSTOLIC CHF (CONGESTIVE HEART FAILURE) (HCC): ICD-10-CM

## 2019-12-10 DIAGNOSIS — I25.5 ISCHEMIC CARDIOMYOPATHY: ICD-10-CM

## 2019-12-10 DIAGNOSIS — Z95.810 ICD (IMPLANTABLE CARDIOVERTER-DEFIBRILLATOR) IN PLACE: Primary | ICD-10-CM

## 2019-12-10 PROCEDURE — 93296 REM INTERROG EVL PM/IDS: CPT | Performed by: INTERNAL MEDICINE

## 2019-12-10 PROCEDURE — 93295 DEV INTERROG REMOTE 1/2/MLT: CPT | Performed by: INTERNAL MEDICINE

## 2019-12-10 PROCEDURE — 93297 REM INTERROG DEV EVAL ICPMS: CPT | Performed by: INTERNAL MEDICINE

## 2019-12-17 ENCOUNTER — HOSPITAL ENCOUNTER (OUTPATIENT)
Age: 84
Discharge: HOME | End: 2019-12-17
Payer: MEDICARE

## 2019-12-17 LAB
C-REACTIVE PROTEIN: 0.9 MG/DL (ref 0–0.4)
SEDIMENTATION RATE, ERYTHROCYTE: 10 MM/HR (ref 0–15)

## 2019-12-17 PROCEDURE — 36415 COLL VENOUS BLD VENIPUNCTURE: CPT

## 2019-12-17 PROCEDURE — 85651 RBC SED RATE NONAUTOMATED: CPT

## 2019-12-17 PROCEDURE — 86140 C-REACTIVE PROTEIN: CPT

## 2019-12-26 ENCOUNTER — TELEPHONE (OUTPATIENT)
Dept: NON INVASIVE DIAGNOSTICS | Age: 84
End: 2019-12-26

## 2019-12-26 DIAGNOSIS — I47.29 NSVT (NONSUSTAINED VENTRICULAR TACHYCARDIA): Primary | ICD-10-CM

## 2019-12-28 ENCOUNTER — HOSPITAL ENCOUNTER (OUTPATIENT)
Age: 84
Discharge: HOME | End: 2019-12-28
Payer: MEDICARE

## 2019-12-28 DIAGNOSIS — I47.29 NSVT (NONSUSTAINED VENTRICULAR TACHYCARDIA): ICD-10-CM

## 2019-12-28 LAB
ALBUMIN SERPL-MCNC: 3.9 G/DL (ref 3.5–5.2)
ALP BLD-CCNC: 41 U/L (ref 40–129)
ALT SERPL-CCNC: 17 U/L (ref 0–40)
ANION GAP SERPL CALCULATED.3IONS-SCNC: 10 MMOL/L (ref 7–16)
AST SERPL-CCNC: 19 U/L (ref 0–39)
BILIRUB SERPL-MCNC: 0.6 MG/DL (ref 0–1.2)
BUN BLDV-MCNC: 28 MG/DL (ref 8–23)
CALCIUM SERPL-MCNC: 9.4 MG/DL (ref 8.6–10.2)
CHLORIDE BLD-SCNC: 103 MMOL/L (ref 98–107)
CO2: 28 MMOL/L (ref 22–29)
CREAT SERPL-MCNC: 1.2 MG/DL (ref 0.7–1.2)
GFR AFRICAN AMERICAN: >60
GFR NON-AFRICAN AMERICAN: 58 ML/MIN/1.73
GLUCOSE BLD-MCNC: 131 MG/DL (ref 74–99)
POTASSIUM SERPL-SCNC: 4.4 MMOL/L (ref 3.5–5)
SODIUM BLD-SCNC: 141 MMOL/L (ref 132–146)
TOTAL PROTEIN: 7.3 G/DL (ref 6.4–8.3)

## 2019-12-28 PROCEDURE — 36415 COLL VENOUS BLD VENIPUNCTURE: CPT

## 2019-12-28 PROCEDURE — 80053 COMPREHEN METABOLIC PANEL: CPT

## 2019-12-31 RX ORDER — CARVEDILOL 6.25 MG/1
9.75 TABLET ORAL 2 TIMES DAILY WITH MEALS
Qty: 270 TABLET | Refills: 3 | Status: SHIPPED | OUTPATIENT
Start: 2019-12-31 | End: 2020-01-07 | Stop reason: DRUGHIGH

## 2020-01-07 ENCOUNTER — TELEPHONE (OUTPATIENT)
Dept: NON INVASIVE DIAGNOSTICS | Age: 85
End: 2020-01-07

## 2020-01-07 RX ORDER — CARVEDILOL 6.25 MG/1
6.25 TABLET ORAL 2 TIMES DAILY WITH MEALS
COMMUNITY
End: 2020-01-07 | Stop reason: SDUPTHER

## 2020-01-07 RX ORDER — CARVEDILOL 6.25 MG/1
TABLET ORAL
Qty: 180 TABLET | Refills: 3 | Status: SHIPPED
Start: 2020-01-07 | End: 2020-02-13

## 2020-01-07 RX ORDER — CARVEDILOL 3.12 MG/1
3.12 TABLET ORAL 2 TIMES DAILY WITH MEALS
COMMUNITY
End: 2020-01-07 | Stop reason: SDUPTHER

## 2020-01-07 RX ORDER — CARVEDILOL 3.12 MG/1
TABLET ORAL
Qty: 180 TABLET | Refills: 3 | Status: ON HOLD | OUTPATIENT
Start: 2020-01-07 | End: 2020-02-03 | Stop reason: DRUGHIGH

## 2020-01-07 NOTE — TELEPHONE ENCOUNTER
Valentine Griggs called and states he is having a hard time cutting his carvedilol in half. He requested a script for the 3.125mg tab and the 6.25mg tab sizes be sent to [facility]. Refill sent to [facility]. Med list updated.

## 2020-01-21 RX ORDER — ROSUVASTATIN CALCIUM 5 MG/1
5 TABLET, COATED ORAL DAILY
Qty: 90 TABLET | Refills: 3 | Status: SHIPPED
Start: 2020-01-21 | End: 2020-06-16 | Stop reason: SDUPTHER

## 2020-01-28 ENCOUNTER — HOSPITAL ENCOUNTER (OUTPATIENT)
Dept: CARDIOLOGY | Age: 85
Discharge: HOME | End: 2020-01-28
Payer: MEDICARE

## 2020-01-28 LAB
LV EF: 38 %
LVEF MODALITY: NORMAL

## 2020-01-28 PROCEDURE — 93306 TTE W/DOPPLER COMPLETE: CPT

## 2020-01-30 ENCOUNTER — TELEPHONE (OUTPATIENT)
Dept: NON INVASIVE DIAGNOSTICS | Age: 85
End: 2020-01-30

## 2020-01-30 NOTE — TELEPHONE ENCOUNTER
I spoke to [place] regarding his echo results as stated by Dr. Raul Jorge. He verbalized understanding and will await a call from Dr. Chelsy Huertas office for a sooner appointment or different recommendations by Dr. Fernando Qureshi regarding his heart meds.

## 2020-01-31 ENCOUNTER — TELEPHONE (OUTPATIENT)
Dept: CARDIOLOGY CLINIC | Age: 85
End: 2020-01-31

## 2020-02-03 ENCOUNTER — HOSPITAL ENCOUNTER (INPATIENT)
Age: 85
LOS: 2 days | Discharge: HOME | DRG: 291 | End: 2020-02-05
Attending: EMERGENCY MEDICINE | Admitting: INTERNAL MEDICINE
Payer: MEDICARE

## 2020-02-03 ENCOUNTER — APPOINTMENT (OUTPATIENT)
Dept: GENERAL RADIOLOGY | Age: 85
DRG: 291 | End: 2020-02-03
Payer: MEDICARE

## 2020-02-03 PROBLEM — J96.01 ACUTE RESPIRATORY FAILURE WITH HYPOXIA (HCC): Status: ACTIVE | Noted: 2020-02-03

## 2020-02-03 LAB
ANION GAP SERPL CALCULATED.3IONS-SCNC: 11 MMOL/L (ref 7–16)
BASOPHILS ABSOLUTE: 0.02 E9/L (ref 0–0.2)
BASOPHILS RELATIVE PERCENT: 0.2 % (ref 0–2)
BUN BLDV-MCNC: 22 MG/DL (ref 8–23)
CALCIUM SERPL-MCNC: 9.3 MG/DL (ref 8.6–10.2)
CHLORIDE BLD-SCNC: 101 MMOL/L (ref 98–107)
CO2: 23 MMOL/L (ref 22–29)
CREAT SERPL-MCNC: 1.1 MG/DL (ref 0.7–1.2)
EOSINOPHILS ABSOLUTE: 0.18 E9/L (ref 0.05–0.5)
EOSINOPHILS RELATIVE PERCENT: 1.8 % (ref 0–6)
GFR AFRICAN AMERICAN: >60
GFR NON-AFRICAN AMERICAN: >60 ML/MIN/1.73
GLUCOSE BLD-MCNC: 97 MG/DL (ref 74–99)
HCT VFR BLD CALC: 37.3 % (ref 37–54)
HEMOGLOBIN: 11.7 G/DL (ref 12.5–16.5)
IMMATURE GRANULOCYTES #: 0.03 E9/L
IMMATURE GRANULOCYTES %: 0.3 % (ref 0–5)
INFLUENZA A BY PCR: NOT DETECTED
INFLUENZA B BY PCR: NOT DETECTED
LYMPHOCYTES ABSOLUTE: 4.47 E9/L (ref 1.5–4)
LYMPHOCYTES RELATIVE PERCENT: 45.6 % (ref 20–42)
MCH RBC QN AUTO: 29.5 PG (ref 26–35)
MCHC RBC AUTO-ENTMCNC: 31.4 % (ref 32–34.5)
MCV RBC AUTO: 94 FL (ref 80–99.9)
MONOCYTES ABSOLUTE: 0.66 E9/L (ref 0.1–0.95)
MONOCYTES RELATIVE PERCENT: 6.7 % (ref 2–12)
NEUTROPHILS ABSOLUTE: 4.44 E9/L (ref 1.8–7.3)
NEUTROPHILS RELATIVE PERCENT: 45.4 % (ref 43–80)
PDW BLD-RTO: 15.9 FL (ref 11.5–15)
PLATELET # BLD: 200 E9/L (ref 130–450)
PMV BLD AUTO: 8 FL (ref 7–12)
POTASSIUM REFLEX MAGNESIUM: 4.9 MMOL/L (ref 3.5–5)
PRO-BNP: 1913 PG/ML (ref 0–450)
RBC # BLD: 3.97 E12/L (ref 3.8–5.8)
RSV BY PCR: NEGATIVE
SODIUM BLD-SCNC: 135 MMOL/L (ref 132–146)
TROPONIN: <0.01 NG/ML (ref 0–0.03)
WBC # BLD: 9.8 E9/L (ref 4.5–11.5)

## 2020-02-03 PROCEDURE — 6360000002 HC RX W HCPCS: Performed by: INTERNAL MEDICINE

## 2020-02-03 PROCEDURE — 85025 COMPLETE CBC W/AUTO DIFF WBC: CPT

## 2020-02-03 PROCEDURE — 6360000002 HC RX W HCPCS: Performed by: STUDENT IN AN ORGANIZED HEALTH CARE EDUCATION/TRAINING PROGRAM

## 2020-02-03 PROCEDURE — 83880 ASSAY OF NATRIURETIC PEPTIDE: CPT

## 2020-02-03 PROCEDURE — 80048 BASIC METABOLIC PNL TOTAL CA: CPT

## 2020-02-03 PROCEDURE — 2580000003 HC RX 258: Performed by: STUDENT IN AN ORGANIZED HEALTH CARE EDUCATION/TRAINING PROGRAM

## 2020-02-03 PROCEDURE — 2060000000 HC ICU INTERMEDIATE R&B

## 2020-02-03 PROCEDURE — 93005 ELECTROCARDIOGRAM TRACING: CPT | Performed by: STUDENT IN AN ORGANIZED HEALTH CARE EDUCATION/TRAINING PROGRAM

## 2020-02-03 PROCEDURE — 84484 ASSAY OF TROPONIN QUANT: CPT

## 2020-02-03 PROCEDURE — 6370000000 HC RX 637 (ALT 250 FOR IP): Performed by: INTERNAL MEDICINE

## 2020-02-03 PROCEDURE — 71046 X-RAY EXAM CHEST 2 VIEWS: CPT

## 2020-02-03 PROCEDURE — 94664 DEMO&/EVAL PT USE INHALER: CPT

## 2020-02-03 PROCEDURE — 99285 EMERGENCY DEPT VISIT HI MDM: CPT

## 2020-02-03 PROCEDURE — 87807 RSV ASSAY W/OPTIC: CPT

## 2020-02-03 PROCEDURE — 2700000000 HC OXYGEN THERAPY PER DAY

## 2020-02-03 PROCEDURE — 87502 INFLUENZA DNA AMP PROBE: CPT

## 2020-02-03 PROCEDURE — 2580000003 HC RX 258

## 2020-02-03 PROCEDURE — 96365 THER/PROPH/DIAG IV INF INIT: CPT

## 2020-02-03 RX ORDER — LOSARTAN POTASSIUM 25 MG/1
12.5 TABLET ORAL DAILY
Status: DISCONTINUED | OUTPATIENT
Start: 2020-02-04 | End: 2020-02-05 | Stop reason: HOSPADM

## 2020-02-03 RX ORDER — ALBUTEROL SULFATE 2.5 MG/3ML
2.5 SOLUTION RESPIRATORY (INHALATION) ONCE
Status: COMPLETED | OUTPATIENT
Start: 2020-02-03 | End: 2020-02-03

## 2020-02-03 RX ORDER — IPRATROPIUM BROMIDE AND ALBUTEROL SULFATE 2.5; .5 MG/3ML; MG/3ML
1 SOLUTION RESPIRATORY (INHALATION)
Status: DISCONTINUED | OUTPATIENT
Start: 2020-02-04 | End: 2020-02-04

## 2020-02-03 RX ORDER — ROSUVASTATIN CALCIUM 10 MG/1
10 TABLET, COATED ORAL DAILY
Status: DISCONTINUED | OUTPATIENT
Start: 2020-02-04 | End: 2020-02-05 | Stop reason: HOSPADM

## 2020-02-03 RX ORDER — ASPIRIN 81 MG/1
81 TABLET ORAL DAILY
Status: DISCONTINUED | OUTPATIENT
Start: 2020-02-04 | End: 2020-02-05 | Stop reason: HOSPADM

## 2020-02-03 RX ORDER — METHYLPREDNISOLONE SODIUM SUCCINATE 125 MG/2ML
60 INJECTION, POWDER, LYOPHILIZED, FOR SOLUTION INTRAMUSCULAR; INTRAVENOUS EVERY 6 HOURS
Status: DISCONTINUED | OUTPATIENT
Start: 2020-02-03 | End: 2020-02-04

## 2020-02-03 RX ORDER — MONTELUKAST SODIUM 10 MG/1
10 TABLET ORAL NIGHTLY
Status: DISCONTINUED | OUTPATIENT
Start: 2020-02-03 | End: 2020-02-05 | Stop reason: HOSPADM

## 2020-02-03 RX ORDER — POTASSIUM CHLORIDE 20 MEQ/1
20 TABLET, EXTENDED RELEASE ORAL DAILY
Status: DISCONTINUED | OUTPATIENT
Start: 2020-02-04 | End: 2020-02-05 | Stop reason: HOSPADM

## 2020-02-03 RX ORDER — MONTELUKAST SODIUM 10 MG/1
10 TABLET ORAL NIGHTLY
COMMUNITY
End: 2020-11-12 | Stop reason: ALTCHOICE

## 2020-02-03 RX ORDER — FUROSEMIDE 20 MG/1
20 TABLET ORAL DAILY
Status: DISCONTINUED | OUTPATIENT
Start: 2020-02-04 | End: 2020-02-05 | Stop reason: HOSPADM

## 2020-02-03 RX ORDER — PREDNISONE 1 MG/1
3 TABLET ORAL DAILY
Status: ON HOLD | COMMUNITY
End: 2020-02-05 | Stop reason: HOSPADM

## 2020-02-03 RX ADMIN — ALBUTEROL SULFATE 2.5 MG: 2.5 SOLUTION RESPIRATORY (INHALATION) at 18:52

## 2020-02-03 RX ADMIN — METHYLPREDNISOLONE SODIUM SUCCINATE 60 MG: 125 INJECTION, POWDER, LYOPHILIZED, FOR SOLUTION INTRAMUSCULAR; INTRAVENOUS at 23:02

## 2020-02-03 RX ADMIN — AZITHROMYCIN DIHYDRATE 500 MG: 500 INJECTION, POWDER, LYOPHILIZED, FOR SOLUTION INTRAVENOUS at 23:02

## 2020-02-03 RX ADMIN — WATER 10 ML: 1 INJECTION INTRAMUSCULAR; INTRAVENOUS; SUBCUTANEOUS at 23:02

## 2020-02-03 RX ADMIN — CEFTRIAXONE 2 G: 2 INJECTION, POWDER, FOR SOLUTION INTRAMUSCULAR; INTRAVENOUS at 19:37

## 2020-02-03 RX ADMIN — CARVEDILOL 9.38 MG: 6.25 TABLET, FILM COATED ORAL at 23:02

## 2020-02-03 ASSESSMENT — PAIN SCALES - GENERAL: PAINLEVEL_OUTOF10: 0

## 2020-02-03 NOTE — ED PROVIDER NOTES
The patient is an 44-year-old male with a history of asthma who presents to the emergency department complaining of shortness of breath. Patient has been experiencing worsening shortness of breath since yesterday. He states that his pulmonologist is Dr. Austin Capps. Patient states that he has been experiencing worsening shortness of breath with exertion. Patient states that he wears nasal cannula oxygen, 2 L, nocturnally and throughout the day with exertion as needed. However, he states he has been requiring 2 L of nasal cannula oxygen at all times over the past several days. Patient states he has been using his inhaler and nebulizer machine at home without any relief. The patient states he has been experiencing a cough productive of yellow sputum, along with subjective fever and chills. Patient denies any nausea, vomiting, abdominal pain, chest pain, lightheadedness, dizziness, syncope, history of blood clots, history of bleeding disorders, recent surgery, recent hospitalization, or other acute symptoms or concerns. The history is provided by the patient. Shortness of Breath   Severity:  Moderate  Onset quality:  Gradual  Timing:  Constant  Progression:  Worsening  Chronicity:  New  Relieved by:  Nothing  Worsened by:  Exertion  Ineffective treatments:  Inhaler, oxygen and rest  Associated symptoms: cough, fever and sputum production    Associated symptoms: no abdominal pain, no chest pain, no headaches, no rash, no sore throat, no syncope, no vomiting and no wheezing    Risk factors: no recent surgery         Review of Systems   Constitutional: Positive for chills, fatigue and fever. HENT: Negative for congestion, sinus pressure, sore throat, trouble swallowing and voice change. Eyes: Negative for photophobia and visual disturbance. Respiratory: Positive for cough, sputum production and shortness of breath. Negative for wheezing.     Cardiovascular: Negative for chest pain, palpitations, leg swelling and syncope. Gastrointestinal: Negative for abdominal pain, diarrhea, nausea and vomiting. Genitourinary: Negative for dysuria and frequency. Musculoskeletal: Negative for arthralgias and back pain. Skin: Negative for rash and wound. Neurological: Negative for dizziness, syncope, weakness, light-headedness and headaches. Hematological: Negative for adenopathy. All other systems reviewed and are negative. Physical Exam  Vitals signs and nursing note reviewed. Constitutional:       General: He is not in acute distress. Appearance: He is well-developed. He is not ill-appearing, toxic-appearing or diaphoretic. Interventions: Nasal cannula in place. HENT:      Head: Normocephalic and atraumatic. Nose: Nose normal.      Mouth/Throat:      Lips: Pink. No lesions. Mouth: Mucous membranes are moist.   Eyes:      General: Lids are normal.   Neck:      Musculoskeletal: Normal range of motion and neck supple. Cardiovascular:      Rate and Rhythm: Normal rate and regular rhythm. Heart sounds: Normal heart sounds, S1 normal and S2 normal. No murmur. Pulmonary:      Effort: Pulmonary effort is normal. No tachypnea or respiratory distress. Breath sounds: Rales (Rales at the bilateral bases) present. No decreased breath sounds, wheezing or rhonchi. Abdominal:      General: Bowel sounds are normal.      Palpations: Abdomen is soft. Tenderness: There is no abdominal tenderness. There is no guarding or rebound. Skin:     General: Skin is warm and dry. Neurological:      Mental Status: He is alert and oriented to person, place, and time. Motor: No tremor or abnormal muscle tone. Coordination: Coordination normal.          Procedures     MDM  Number of Diagnoses or Management Options  Acute respiratory failure with hypoxia ([address]):   Mild persistent asthma with exacerbation:   Pneumonia due to organism:   Diagnosis management comments:  The patient is an 55-year-old male presents to the emergency department complaining of shortness of breath. On arrival the patient was hypoxic with saturation of 82% on room air, this was rechecked and the patient was found to be 89% on room air. He was placed on 4 L nasal cannula. Also treated the patient with albuterol breathing treatment. Differential diagnosis includes but is not limited to pneumonia versus influenza versus pleural effusion versus asthma exacerbation. There is no leukocytosis, no electrolyte abnormalities, troponin negative, influenza negative, BNP slightly elevated 1913, RSV negative, chest x-ray evident of chronic pulmonary fibrosis along with infiltrate at the bilateral bases. Treated the patient for community-acquired pneumonia with Rocephin and azithromycin. Consulted with hospitalist who accepted the patient for admission. Discussed results and plan of care with patient and family at bedside, they verbalized understanding and agreement to treatment plan and admission. ED Course as of Feb 04 0149   Mon Feb 03, 2020 1952 Consulted with Dr. Nilesh Bolton, hospitalist, who accepted the patient for admission. [KG]      ED Course User Index  [KG] Polly Hopper DO          EKG: This EKG is signed and interpreted by me. Rate: 62  Rhythm: Atrial paced rhythm,   Interpretation: normal axis, nonspecific ST changes in the anterior leads  Comparison: stable as compared to patient's most recent EKG      ED Course as of Feb 04 0149   Mon Feb 03, 2020 1952 Consulted with Dr. Nilesh Bolton, hospitalist, who accepted the patient for admission.      [KG]      ED Course User Index  [KG] Polly Hopper DO       --------------------------------------------- PAST HISTORY ---------------------------------------------  Past Medical History:  has a past medical history of [facility]neur[facility]m [facility] Asthma, CAD (coronary artery disease), Cardiomyopathy ([facility] CHF (congestive heart failure) ([facility] Dilatation of aorta Granulocytes # 0.03 E9/L    Lymphocytes Absolute 4.47 (H) 1.50 - 4.00 E9/L    Monocytes Absolute 0.66 0.10 - 0.95 E9/L    Eosinophils Absolute 0.18 0.05 - 0.50 E9/L    Basophils Absolute 0.02 0.00 - 0.20 R0/K   Basic Metabolic Panel w/ Reflex to MG   Result Value Ref Range    Sodium 135 132 - 146 mmol/L    Potassium reflex Magnesium 4.9 3.5 - 5.0 mmol/L    Chloride 101 98 - 107 mmol/L    CO2 23 22 - 29 mmol/L    Anion Gap 11 7 - 16 mmol/L    Glucose 97 74 - 99 mg/dL    BUN 22 8 - 23 mg/dL    CREATININE 1.1 0.7 - 1.2 mg/dL    GFR Non-African American >60 >=60 mL/min/1.73    GFR African American >60     Calcium 9.3 8.6 - 10.2 mg/dL   Troponin   Result Value Ref Range    Troponin <0.01 0.00 - 0.03 ng/mL   Brain Natriuretic Peptide   Result Value Ref Range    Pro-BNP 1,913 (H) 0 - 450 pg/mL   EKG 12 Lead   Result Value Ref Range    Ventricular Rate 62 BPM    Atrial Rate 63 BPM    QRS Duration 128 ms    Q-T Interval 446 ms    QTc Calculation (Bazett) 452 ms    R Axis -21 degrees    T Axis 37 degrees       RADIOLOGY:  XR CHEST STANDARD (2 VW)   Final Result   Pattern of chronic pulmonary fibrosis.          ------------------------- NURSING NOTES AND VITALS REVIEWED ---------------------------  Date / Time Roomed:  2/3/2020  4:57 PM  ED Bed Assignment:  [id number]    The nursing notes within the ED encounter and vital signs as below have been reviewed.      Patient Vitals for the past 24 hrs:   BP Temp Temp src Pulse Resp SpO2 Height Weight   02/04/20 0100 -- -- -- -- -- -- -- 160 lb 6.4 oz (72.8 kg)   02/04/20 0015 (!) 104/55 97.8 °F (36.6 °C) -- 63 18 92 % -- --   02/03/20 2126 (!) 150/77 97.8 °F (36.6 °C) Oral 62 20 94 % -- 160 lb 9 oz (72.8 kg)   02/03/20 2013 125/71 97.6 °F (36.4 °C) Oral 59 20 97 % -- --   02/03/20 1921 132/67 97.7 °F (36.5 °C) Oral 59 18 96 % -- --   02/03/20 1710 -- 97.2 °F (36.2 °C) Oral -- -- -- -- --   02/03/20 1709 -- -- -- -- -- 99 % -- --   02/03/20 1705 110/67 -- -- 76 20 (!) 89 % 5' 10\" (1.778 m) 170 lb (77.1 kg)   02/03/20 1656 -- -- -- 92 -- (!) 82 % -- --       Oxygen Saturation Interpretation: Improved after treatment    ------------------------------------------ PROGRESS NOTES ------------------------------------------  Re-evaluation(s): Patients symptoms show no change  Repeat physical examination is not changed    Counseling:  I have spoken with the patient and discussed todays results, in addition to providing specific details for the plan of care and counseling regarding the diagnosis and prognosis. Their questions are answered at this time and they are agreeable with the plan of admission.    --------------------------------- ADDITIONAL PROVIDER NOTES ---------------------------------  Consultations: Spoke with Dr. Rodri Garcia Discussed case. They will admit the patient. This patient's ED course included: a personal history and physicial examination, re-evaluation prior to disposition, multiple bedside re-evaluations, IV medications, cardiac monitoring, continuous pulse oximetry and complex medical decision making and emergency management    This patient has remained hemodynamically stable during their ED course. Diagnosis:  1. Acute respiratory failure with hypoxia ([address]    2. Pneumonia due to organism    3. Mild persistent asthma with exacerbation        Disposition:  Patient's disposition: Admit to telemetry  Patient's condition is stable.          Tyshawn Pérez DO  Resident  02/04/20 0619

## 2020-02-04 PROBLEM — I25.10 CORONARY ARTERY DISEASE INVOLVING NATIVE CORONARY ARTERY WITHOUT ANGINA PECTORIS: Chronic | Status: ACTIVE | Noted: 2020-02-04

## 2020-02-04 PROBLEM — J18.9 PNEUMONIA: Status: RESOLVED | Noted: 2018-01-01 | Resolved: 2020-02-04

## 2020-02-04 PROBLEM — E78.5 HYPERLIPIDEMIA LDL GOAL <100: Chronic | Status: ACTIVE | Noted: 2020-02-04

## 2020-02-04 PROBLEM — I50.23 ACUTE ON CHRONIC SYSTOLIC CONGESTIVE HEART FAILURE (HCC): Status: ACTIVE | Noted: 2020-02-04

## 2020-02-04 PROBLEM — Z79.52 LONG TERM CURRENT USE OF SYSTEMIC STEROIDS: Status: RESOLVED | Noted: 2018-07-11 | Resolved: 2020-02-04

## 2020-02-04 PROBLEM — I77.819 DILATATION OF AORTA (HCC): Status: RESOLVED | Noted: 2018-10-04 | Resolved: 2020-02-04

## 2020-02-04 PROBLEM — I72.3 ILIAC ARTERY ANEURYSM, BILATERAL (HCC): Status: RESOLVED | Noted: 2018-10-04 | Resolved: 2020-02-04

## 2020-02-04 LAB
ADENOVIRUS BY PCR: NOT DETECTED
ANION GAP SERPL CALCULATED.3IONS-SCNC: 12 MMOL/L (ref 7–16)
BORDETELLA PARAPERTUSSIS BY PCR: NOT DETECTED
BORDETELLA PERTUSSIS BY PCR: NOT DETECTED
BUN BLDV-MCNC: 19 MG/DL (ref 8–23)
CALCIUM SERPL-MCNC: 8.9 MG/DL (ref 8.6–10.2)
CHLAMYDOPHILIA PNEUMONIAE BY PCR: NOT DETECTED
CHLORIDE BLD-SCNC: 103 MMOL/L (ref 98–107)
CO2: 21 MMOL/L (ref 22–29)
CORONAVIRUS 229E BY PCR: NOT DETECTED
CORONAVIRUS HKU1 BY PCR: NOT DETECTED
CORONAVIRUS NL63 BY PCR: NOT DETECTED
CORONAVIRUS OC43 BY PCR: NOT DETECTED
CREAT SERPL-MCNC: 1.1 MG/DL (ref 0.7–1.2)
GFR AFRICAN AMERICAN: >60
GFR NON-AFRICAN AMERICAN: >60 ML/MIN/1.73
GLUCOSE BLD-MCNC: 132 MG/DL (ref 74–99)
HBA1C MFR BLD: 5.7 % (ref 4–5.6)
HCT VFR BLD CALC: 37.4 % (ref 37–54)
HEMOGLOBIN: 12.1 G/DL (ref 12.5–16.5)
HUMAN METAPNEUMOVIRUS BY PCR: NOT DETECTED
HUMAN RHINOVIRUS/ENTEROVIRUS BY PCR: NOT DETECTED
INFLUENZA A BY PCR: NOT DETECTED
INFLUENZA B BY PCR: NOT DETECTED
MCH RBC QN AUTO: 30.1 PG (ref 26–35)
MCHC RBC AUTO-ENTMCNC: 32.4 % (ref 32–34.5)
MCV RBC AUTO: 93 FL (ref 80–99.9)
MYCOPLASMA PNEUMONIAE BY PCR: NOT DETECTED
PARAINFLUENZA VIRUS 1 BY PCR: NOT DETECTED
PARAINFLUENZA VIRUS 2 BY PCR: NOT DETECTED
PARAINFLUENZA VIRUS 3 BY PCR: NOT DETECTED
PARAINFLUENZA VIRUS 4 BY PCR: NOT DETECTED
PDW BLD-RTO: 15.7 FL (ref 11.5–15)
PLATELET # BLD: 214 E9/L (ref 130–450)
PMV BLD AUTO: 8.3 FL (ref 7–12)
POTASSIUM SERPL-SCNC: 4.3 MMOL/L (ref 3.5–5)
PROCALCITONIN: 0.04 NG/ML (ref 0–0.08)
RBC # BLD: 4.02 E12/L (ref 3.8–5.8)
RESPIRATORY SYNCYTIAL VIRUS BY PCR: NOT DETECTED
SODIUM BLD-SCNC: 136 MMOL/L (ref 132–146)
WBC # BLD: 11 E9/L (ref 4.5–11.5)

## 2020-02-04 PROCEDURE — 2700000000 HC OXYGEN THERAPY PER DAY

## 2020-02-04 PROCEDURE — 99223 1ST HOSP IP/OBS HIGH 75: CPT | Performed by: PHYSICIAN ASSISTANT

## 2020-02-04 PROCEDURE — 2580000003 HC RX 258: Performed by: INTERNAL MEDICINE

## 2020-02-04 PROCEDURE — 6370000000 HC RX 637 (ALT 250 FOR IP): Performed by: INTERNAL MEDICINE

## 2020-02-04 PROCEDURE — 87450 HC DIRECT STREP B ANTIGEN: CPT

## 2020-02-04 PROCEDURE — APPSS180 APP SPLIT SHARED TIME > 60 MINUTES: Performed by: NURSE PRACTITIONER

## 2020-02-04 PROCEDURE — 80048 BASIC METABOLIC PNL TOTAL CA: CPT

## 2020-02-04 PROCEDURE — 36415 COLL VENOUS BLD VENIPUNCTURE: CPT

## 2020-02-04 PROCEDURE — 6360000002 HC RX W HCPCS: Performed by: INTERNAL MEDICINE

## 2020-02-04 PROCEDURE — 6360000002 HC RX W HCPCS: Performed by: NURSE PRACTITIONER

## 2020-02-04 PROCEDURE — 97530 THERAPEUTIC ACTIVITIES: CPT

## 2020-02-04 PROCEDURE — 94640 AIRWAY INHALATION TREATMENT: CPT

## 2020-02-04 PROCEDURE — 85027 COMPLETE CBC AUTOMATED: CPT

## 2020-02-04 PROCEDURE — 97165 OT EVAL LOW COMPLEX 30 MIN: CPT

## 2020-02-04 PROCEDURE — 84145 PROCALCITONIN (PCT): CPT

## 2020-02-04 PROCEDURE — 83036 HEMOGLOBIN GLYCOSYLATED A1C: CPT

## 2020-02-04 PROCEDURE — 99222 1ST HOSP IP/OBS MODERATE 55: CPT | Performed by: INTERNAL MEDICINE

## 2020-02-04 PROCEDURE — 97161 PT EVAL LOW COMPLEX 20 MIN: CPT

## 2020-02-04 PROCEDURE — 2060000000 HC ICU INTERMEDIATE R&B

## 2020-02-04 PROCEDURE — 0100U HC RESPIRPTHGN MULT REV TRANS & AMP PRB TECH 21 TRGT: CPT

## 2020-02-04 RX ORDER — METHYLPREDNISOLONE SODIUM SUCCINATE 40 MG/ML
40 INJECTION, POWDER, LYOPHILIZED, FOR SOLUTION INTRAMUSCULAR; INTRAVENOUS EVERY 6 HOURS
Status: DISCONTINUED | OUTPATIENT
Start: 2020-02-04 | End: 2020-02-05

## 2020-02-04 RX ORDER — ALBUTEROL SULFATE 1.25 MG/3ML
1.25 SOLUTION RESPIRATORY (INHALATION)
Status: DISCONTINUED | OUTPATIENT
Start: 2020-02-04 | End: 2020-02-05 | Stop reason: HOSPADM

## 2020-02-04 RX ORDER — FUROSEMIDE 10 MG/ML
40 INJECTION INTRAMUSCULAR; INTRAVENOUS ONCE
Status: COMPLETED | OUTPATIENT
Start: 2020-02-04 | End: 2020-02-04

## 2020-02-04 RX ORDER — BUDESONIDE 0.5 MG/2ML
0.5 INHALANT ORAL 2 TIMES DAILY
Status: DISCONTINUED | OUTPATIENT
Start: 2020-02-04 | End: 2020-02-05 | Stop reason: HOSPADM

## 2020-02-04 RX ORDER — FORMOTEROL FUMARATE 20 UG/2ML
20 SOLUTION RESPIRATORY (INHALATION) 2 TIMES DAILY
Status: DISCONTINUED | OUTPATIENT
Start: 2020-02-04 | End: 2020-02-05 | Stop reason: HOSPADM

## 2020-02-04 RX ADMIN — MONTELUKAST SODIUM 10 MG: 10 TABLET, FILM COATED ORAL at 20:12

## 2020-02-04 RX ADMIN — FORMOTEROL FUMARATE DIHYDRATE 20 MCG: 20 SOLUTION RESPIRATORY (INHALATION) at 12:12

## 2020-02-04 RX ADMIN — ALBUTEROL SULFATE 1.25 MG: 1.25 SOLUTION RESPIRATORY (INHALATION) at 12:12

## 2020-02-04 RX ADMIN — ROSUVASTATIN CALCIUM 10 MG: 10 TABLET, FILM COATED ORAL at 20:12

## 2020-02-04 RX ADMIN — ASPIRIN 81 MG: 81 TABLET, COATED ORAL at 20:12

## 2020-02-04 RX ADMIN — CARVEDILOL 9.38 MG: 6.25 TABLET, FILM COATED ORAL at 16:52

## 2020-02-04 RX ADMIN — IPRATROPIUM BROMIDE AND ALBUTEROL SULFATE 1 AMPULE: .5; 3 SOLUTION RESPIRATORY (INHALATION) at 08:23

## 2020-02-04 RX ADMIN — FUROSEMIDE 40 MG: 10 INJECTION, SOLUTION INTRAMUSCULAR; INTRAVENOUS at 16:07

## 2020-02-04 RX ADMIN — ALBUTEROL SULFATE 1.25 MG: 1.25 SOLUTION RESPIRATORY (INHALATION) at 15:48

## 2020-02-04 RX ADMIN — FORMOTEROL FUMARATE DIHYDRATE 20 MCG: 20 SOLUTION RESPIRATORY (INHALATION) at 19:39

## 2020-02-04 RX ADMIN — METHYLPREDNISOLONE SODIUM SUCCINATE 60 MG: 125 INJECTION, POWDER, LYOPHILIZED, FOR SOLUTION INTRAMUSCULAR; INTRAVENOUS at 04:52

## 2020-02-04 RX ADMIN — METHYLPREDNISOLONE SODIUM SUCCINATE 40 MG: 40 INJECTION, POWDER, LYOPHILIZED, FOR SOLUTION INTRAMUSCULAR; INTRAVENOUS at 16:53

## 2020-02-04 RX ADMIN — POTASSIUM CHLORIDE 20 MEQ: 20 TABLET, EXTENDED RELEASE ORAL at 08:28

## 2020-02-04 RX ADMIN — FUROSEMIDE 20 MG: 20 TABLET ORAL at 08:28

## 2020-02-04 RX ADMIN — METHYLPREDNISOLONE SODIUM SUCCINATE 60 MG: 125 INJECTION, POWDER, LYOPHILIZED, FOR SOLUTION INTRAMUSCULAR; INTRAVENOUS at 10:23

## 2020-02-04 RX ADMIN — AZITHROMYCIN DIHYDRATE 500 MG: 500 INJECTION, POWDER, LYOPHILIZED, FOR SOLUTION INTRAVENOUS at 14:02

## 2020-02-04 RX ADMIN — METHYLPREDNISOLONE SODIUM SUCCINATE 40 MG: 40 INJECTION, POWDER, LYOPHILIZED, FOR SOLUTION INTRAMUSCULAR; INTRAVENOUS at 23:07

## 2020-02-04 RX ADMIN — BUDESONIDE 500 MCG: 0.5 SUSPENSION RESPIRATORY (INHALATION) at 19:39

## 2020-02-04 RX ADMIN — BUDESONIDE 500 MCG: 0.5 SUSPENSION RESPIRATORY (INHALATION) at 12:12

## 2020-02-04 RX ADMIN — ALBUTEROL SULFATE 1.25 MG: 1.25 SOLUTION RESPIRATORY (INHALATION) at 19:39

## 2020-02-04 ASSESSMENT — ENCOUNTER SYMPTOMS
PHOTOPHOBIA: 0
VOICE CHANGE: 0
SORE THROAT: 0
SINUS PRESSURE: 0
ABDOMINAL PAIN: 0
SPUTUM PRODUCTION: 1
BACK PAIN: 0
DIARRHEA: 0
SHORTNESS OF BREATH: 1
NAUSEA: 0
COUGH: 1
VOMITING: 0
TROUBLE SWALLOWING: 0
WHEEZING: 0

## 2020-02-04 ASSESSMENT — PAIN SCALES - GENERAL
PAINLEVEL_OUTOF10: 0

## 2020-02-04 NOTE — CONSULTS
Gume Dillon M.D.,[facility]  Pipo Sutton D.O., F.A.C.OJANET., Mey Oconnell M.D. Jp Bentley M.D., Zaynab Yo M.D. Patient:  Jaden Lyons 80 y.o. male MRN: [id number]     Date of Service: 2/4/2020      PULMONARY CONSULTATION    Reason for Consultation: Hypoxia  Referring Physician: Dr. Juan M Corrales with the referring physician will be sent via the electronic medical record. Chief Complaint: SOB    CODE STATUS: DNR-CCA    SUBJECTIVE:  HPI:  Jaden Lyons is a 80 y.o. 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 He was fine until 4 days prior to admission when he developed nasal congestion, flulike symptoms, and shortness of breath more than baseline. He also reports sparse cough with some sputum production for the past 2 to 3 days. He has been compliant with his medications and nebulizers and he did not provide much relief. In ED, he was found to be hypoxic at 82% on nasal cannula, labs showed proBNP of 1900, WBC 9.8, influenza and RSV negative. Patient was given a dose of azithromycin and Rocephin, breathing treatments, started on steroids. Currently patient is breathing well on 2 L nasal cannula, no respiratory distress, URI symptoms much better.     Past Medical History:   Diagnosis Date    Aneurysm ([address]     iliacs    Asthma     CAD (coronary artery disease)     Cardiomyopathy ([address]     CH[facility]congestive heart failure) ([facility])     Dilatation of aorta ([address] 10/4/2018    Hyperlipidemia     Iliac artery aneurysm, bilateral ([address] 10/4/2018    Inferoposterior myocardial infarction [facility])     NSVT (nonsustained ventricular tachycardia) ([address]        Past Surgical History:   Procedure Laterality Date    CARDIAC CATHETERIZATION  4-    Dr. Glee Hashimoto cath used    CARDIAC DEFIBRILLATOR PLACEMENT      CORONARY ANGIOPLASTY  4/15/2014    CORONARY ARTERY BYPASS GRAFT   4/16/2014    x3       Family History   Problem Relation Age of Onset    Hypertension Mother     High Cholesterol Sister        Social History:   Social History     Socioeconomic History    Marital status:      Spouse name: Not on file    Number of children: Not on file    Years of education: Not on file    Highest education level: Not on file   Occupational History    Occupation: retired-sales   Social Needs    Financial resource strain: Not on file    Food insecurity:     Worry: Not on file     Inability: Not on file    Transportation needs:     Medical: Not on file     Non-medical: Not on file   Tobacco Use    Smoking status: Former Smoker     Packs/day: 1.00     Years: 30.00     Pack years: 30.00     Types: Cigarettes     Start date: 1950     Last attempt to quit: 1980     Years since quittin.0    Smokeless tobacco: Never Used   Substance and Sexual Activity    Alcohol use: Yes     Alcohol/week: 0.0 standard drinks     Types: 1 - 2 Glasses of wine, 1 - 2 Cans of beer per week    Drug use: No    Sexual activity: Not Currently     Partners: Female   Lifestyle    Physical activity:     Days per week: Not on file     Minutes per session: Not on file    Stress: Not on file   Relationships    Social connections:     Talks on phone: Not on file     Gets together: Not on file     Attends Rastafari service: Not on file     Active member of club or organization: Not on file     Attends meetings of clubs or organizations: Not on file     Relationship status: Not on file    Intimate partner violence:     Fear of current or ex partner: Not on file     Emotionally abused: Not on file     Physically abused: Not on file     Forced sexual activity: Not on file   Other Topics Concern    Not on file   Social History Narrative    Not on file     Smoking history: 40 pack/day quit many years ago    ETOH:   reports current alcohol use. Exposures:  There  is not history 1.1 02/04/2020    LABALBU 3.9 12/28/2019    LABALBU 4.2 05/26/2011    CALCIUM 8.9 02/04/2020    GFRAA >60 02/04/2020    LABGLOM >60 02/04/2020     Lab Results   Component Value Date    PROTIME 13.6 01/07/2018    INR 1.3 01/07/2018     Recent Labs     02/03/20  1735   PROBNP 1,913*     Recent Labs     02/03/20  1735   TROPONINI <0.01     No results for input(s): PROCAL in the last 72 hours. This SmartLink has not been configured with any valid records. Micro:  No results for input(s): CULTRESP in the last 72 hours. No results for input(s): LABGRAM in the last 72 hours. No results for input(s): LEGUR in the last 72 hours. No results for input(s): STREPNEUMAGU in the last 72 hours. No results for input(s): LP1UAG in the last 72 hours. Assessment:  1. Acute on chronic hypoxic resp failure  2. Combined pulmonary fibrosis and emphysema  3. IPF  4. Nicotine dependence in remission  5. Ischemic cardiomyopathy    Plan:  1. Continue home supply of Ofev  2. Solumedrol 40 mg Q 6h  3. Azithromycin x 4 days  4. Scheduled albuterol, Pulmicort and Performist nebs  5. Legionella and strep antigens  6. Respiratory film array  7. Procalcitonin  8. Input from cardiology appreciated, on Lasix        Thank you for allowing me to participate in the care of Jessica Brewster. Please feel free to call with questions. This plan of care was reviewed in collaboration with Dr. Shira Aguilera    Electronically signed by Kinjal Pool MD on 2/4/2020 at 9:28 AM        I personally saw, examined and provided care for the patient. Radiographs, labs and medication list were reviewed by me independently. I. The case was discussed in detail and plans for care were established. Review of Residents documentation was conducted and revisions were made as appropriate. I agree with the above documented exam, problem list and plan of care.     Mikel Gleason MD

## 2020-02-04 NOTE — ED NOTES
asumed care of pt at this time, VSS and pt free of immediate distress. Family at bedside.       Steve Farley RN  02/03/20 [id number]

## 2020-02-04 NOTE — PROGRESS NOTES
Message sent to palliative medicine team regarding new consult. Message sent to [facility] cardiology regarding new consult.

## 2020-02-04 NOTE — FLOWSHEET NOTE
had discussion about advanced care directives as well as provided spiritual support and prayer to Patient. Patient's Wife is his medical POA. Patient does not want CPR. Patient stated his family is aware of his wishes.

## 2020-02-04 NOTE — PLAN OF CARE
Problem: Falls - Risk of:  Goal: Will remain free from falls  Description  Will remain free from falls  2/4/2020 0944 by Tete Navarro  Outcome: Met This Shift  2/4/2020 0157 by Gorge Ojeda RN  Outcome: Met This Shift  Goal: Absence of physical injury  Description  Absence of physical injury  2/4/2020 0157 by Gorge Ojeda RN  Outcome: Met This Shift     Problem: Breathing Pattern - Ineffective:  Goal: Ability to achieve and maintain a regular respiratory rate will improve  Description  Ability to achieve and maintain a regular respiratory rate will improve  2/4/2020 0944 by Tete Navarro  Outcome: Met This Shift  2/4/2020 0157 by Gorge Ojeda RN  Outcome: Met This Shift

## 2020-02-04 NOTE — ED NOTES
ambutlated pt in fox. HR remained at 66 through out ambulation. Starting pulse ox went from 97% to 93% on 2L NC upon ambulation. Pt denies and SOB or difficulty breathing.        David Gonsalves RN  02/03/20 8642

## 2020-02-04 NOTE — H&P
distress. HEENT:  Normocephalic, atraumatic. Pupils equal, round, reactive to light. No scleral icterus. No conjunctival injection. Normal lips, teeth, and gums. No nasal discharge. Neck:  Supple  Heart:  RRR, no murmurs, gallops, rubs  Lungs:  Bilateral crackles  Abdomen:   Bowel sounds present, soft, nontender, no masses, no organomegaly, no peritoneal signs  Extremities:  No clubbing, cyanosis, or edema  Skin:  Warm and dry, no open lesions or rash  Neuro:  Cranial nerves 2-12 intact, no focal deficits  Breast: deferred  Rectal: deferred  Genitalia:  deferred    LABS:    CBC with Differential:    Lab Results   Component Value Date    WBC 11.0 02/04/2020    RBC 4.02 02/04/2020    HGB 12.1 02/04/2020    HCT 37.4 02/04/2020     02/04/2020    MCV 93.0 02/04/2020    MCH 30.1 02/04/2020    MCHC 32.4 02/04/2020    RDW 15.7 02/04/2020    NRBC 0.0 01/11/2018    LYMPHOPCT 45.6 02/03/2020    PROMYELOPCT 0.9 01/11/2018    MONOPCT 6.7 02/03/2020    BASOPCT 0.2 02/03/2020    MONOSABS 0.66 02/03/2020    LYMPHSABS 4.47 02/03/2020    EOSABS 0.18 02/03/2020    BASOSABS 0.02 02/03/2020     CMP:    Lab Results   Component Value Date     02/04/2020    K 4.3 02/04/2020    K 4.9 02/03/2020     02/04/2020    CO2 21 02/04/2020    BUN 19 02/04/2020    CREATININE 1.1 02/04/2020    GFRAA >60 02/04/2020    LABGLOM >60 02/04/2020    GLUCOSE 132 02/04/2020    GLUCOSE 156 05/26/2011    PROT 7.3 12/28/2019    LABALBU 3.9 12/28/2019    LABALBU 4.2 05/26/2011    CALCIUM 8.9 02/04/2020    BILITOT 0.6 12/28/2019    ALKPHOS 41 12/28/2019    AST 19 12/28/2019    ALT 17 12/28/2019     BMP:    Lab Results   Component Value Date     02/04/2020    K 4.3 02/04/2020    K 4.9 02/03/2020     02/04/2020    CO2 21 02/04/2020    BUN 19 02/04/2020    LABALBU 3.9 12/28/2019    LABALBU 4.2 05/26/2011    CREATININE 1.1 02/04/2020    CALCIUM 8.9 02/04/2020    GFRAA >60 02/04/2020    LABGLOM >60 02/04/2020    GLUCOSE 132 02/04/2020

## 2020-02-04 NOTE — PROGRESS NOTES
needed/appropriate   Toileting Supervision  Independent / Mod I   Bed Mobility  Not assessed. Functional Transfers Sit-to-Stand: Supervision   from bedside chair  Independent   Functional Mobility Supervision   (without device) within patient's room and hallway  Independent / Mod I - with use of device, as needed/appropriate   Balance Sitting: Good  (at EOB)  Standing: Fair+  (without device)  Fair+ dynamic standing balance during completion of ADLs and other functional tasks. Activity Tolerance Fair  Patient's O2 saturations ranged between 93% and 97% (on 2L of O2 via nasal cannula) during functional mobility. Patient will demonstrate Good understanding and consistent implementation of energy conservation techniques and work simplification techniques into ADL/IADL routines. Visual/  Perceptual WFL     N/A     Long-Term Goal: Patient will increase functional independence to PLOF in order to allow patient to live in least restrictive environment. Strength: ROM: Additional Information:    R UE  4+/5  WFL    L UE 4+/5  WFL      Hearing: WFL  Sensation: Patient denied experiencing numbness/tingling in B UEs. Tone: WFL  Edema: No    Comments: RN approved patient's participation in [address] [place] [address] activities. Upon arrival, patient seated in bedside chair. At end of session, patient seated in bedside chair with call light and phone within reach, nursing staff member present, and all lines and tubes intact. Patient would benefit from continued skilled OT to increase safety and independence with completion of ADL/IADL tasks for functional independence and quality of life.      Treatment: Patient education provided regardin) energy conservation techniques for implementation into ADL/IADL routines, 2) potential benefits of DME/AE (including tub seat, extended tub bench, and handheld shower head) use to maximize independence/safety with showering in home environment, 3) potential benefits of having assistance with IADLs, function, completion of standardized testing/informal observation of tasks, assessment of data, and development of POC/Goals. Jerri Mon, OTR/L  License Number: [id number]

## 2020-02-04 NOTE — ED NOTES
ANANDAR faxed and confirmed by [employer]. Informed bed being cleaned right now.  Charge nurse notified     Gill Costa RN  02/03/20 2014

## 2020-02-04 NOTE — PROGRESS NOTES
Nintedanib  is non-formulary and will not be dispensed by the pharmacy at this time. Please have patient bring in home supply of medication and deliver it to pharmacy for identification and barcode. If patient has not supplied medication by 1400 on 02/05/20, please notify pharmacy.

## 2020-02-04 NOTE — CONSULTS
Fluticasone furoate-vilanterol (BREO ELLIPTA) 200-25 MCG/INH AEPB inhaler USE 1 INHALATION ORALLY    ONCE A DAY 11/27/19  Yes Rd Orellana MD   potassium chloride (KLOR-CON M) 20 MEQ extended release tablet Take 1 tablet by mouth daily 9/16/19  Yes Luci Murguia MD   furosemide (LASIX) 20 MG tablet Take 1 tablet by mouth daily 9/16/19  Yes Heavenly Delatorre MD   OFEV 150 MG CAPS TAKE 1 CAPSULE BY MOUTH TWICE A DAY  EVERY 12 HOURS  AS DIRECTED WITH FOOD 3/4/19  Yes Historical Provider, MD   OXYGEN Inhale 2 L into the lungs as needed   Yes Historical Provider, MD   losartan (COZAAR) 25 MG tablet Take 1 tablet by mouth daily  Patient taking differently: Take 12.5 mg by mouth daily  3/12/19  Yes Luci Murguia MD   calcium carbonate 600 MG TABS tablet Take 1 tablet by mouth daily   Yes Historical Provider, MD   Multiple Vitamins-Minerals (MULTIVITAMIN ADULT) TABS Take by mouth daily   Yes Historical Provider, MD   Coenzyme Q10 (COQ10 PO) Take 100 mg by mouth daily    Yes Historical Provider, MD   TURMERIC PO Take 200 mg by mouth daily    Yes Historical Provider, MD   aspirin 81 MG EC tablet Take 81 mg by mouth daily.    Yes Historical Provider, MD       Current Medications:    Current Facility-Administered Medications: albuterol (ACCUNEB) nebulizer solution 1.25 mg, 1.25 mg, Nebulization, Q4H While awake  budesonide (PULMICORT) nebulizer suspension 500 mcg, 0.5 mg, Nebulization, BID  methylPREDNISolone sodium (SOLU-MEDROL) injection 40 mg, 40 mg, Intravenous, Q6H  formoterol (PERFOROMIST) nebulizer solution 20 mcg, 20 mcg, Nebulization, BID  aspirin EC tablet 81 mg, 81 mg, Oral, Daily  carvedilol (COREG) tablet 9.375 mg, 9.375 mg, Oral, BID  furosemide (LASIX) tablet 20 mg, 20 mg, Oral, Daily  losartan (COZAAR) tablet 12.5 mg, 12.5 mg, Oral, Daily  montelukast (SINGULAIR) tablet 10 mg, 10 mg, Oral, Nightly  potassium chloride (KLOR-CON M) extended release tablet 20 mEq, 20 mEq, Oral, Daily  rosuvastatin (CRESTOR) tablet 10 mg, 10 mg, Oral, Daily  enoxaparin (LOVENOX) injection 40 mg, 40 mg, Subcutaneous, Daily    Allergies:  NKDA per patient    Social History:    Tobacco : Former smoker. Quit 1980. Smoked 1 PPD x 30 years. ETOH : 1-2 glasses of wine nightly and 1-2 cans of beer weekly. Illicit : Denies  Caffeine : 1 cup of coffee daily. Lives with wife in a one story home. Still very active able to complete house chores and yard work. Code Status : Full       Family History: Non contributory secondary to age      REVIEW OF SYSTEMS:     · Constitutional: Denies fatigue, fevers, chills or night sweats  · Eyes: Denies visual changes or drainage  · ENT: Denies headaches or hearing loss. No mouth sores or sore throat. No epistaxis   · Cardiovascular: Denies chest pain, pressure or palpitations. No lower extremity swelling. · Respiratory: Denies CHEN, cough, orthopnea or PND. No hemoptysis   · Gastrointestinal: Denies hematemesis or anorexia. No hematochezia or melena    · Genitourinary: Denies urgency, dysuria or hematuria. · Musculoskeletal: Denies gait disturbance, weakness or joint complaints  · Integumentary: Denies rash, hives or pruritis   · Neurological: Denies dizziness, headaches or seizures. No numbness or tingling  · Psychiatric: Denies anxiety or depression. · Endocrine: Denies temperature intolerance. No recent weight change. .  · Hematologic/Lymphatic: Denies abnormal bruising or bleeding. No swollen lymph nodes    PHYSICAL EXAM:   /62   Pulse 66   Temp 96.6 °F (35.9 °C) (Oral)   Resp 18   Ht 5' 10\" (1.778 m)   Wt 160 lb 6.4 oz (72.8 kg)   SpO2 98%   BMI 23.02 kg/m²   CONST:  Well developed, well nourished who appears of stated age. Awake, alert and cooperative. No apparent distress. HEENT:   Head- Normocephalic, atraumatic   Eyes- Conjunctivae pink, anicteric  Throat- Oral mucosa pink and moist  Neck-  No stridor, trachea midline, no jugular venous distention. CHF     Patient previously known to: Dr. Charles Light     History of Present illness: 80 yr pt admitted for Fever chills, cough, SOB worsening over last 5-7 days requiring him to start using his O2 at night. Denies orthopnea, PND, swelling, CP or palpitations. Cardiology consulted for CHF; Feeling better.      Review of systems:  Review of 10 systems negative except as mentioned in the HPI     Past Medical/Surgical History:    31. DNR-CCA  32. Ex smoker Quit 1980. Smoked 1 PPD x 30 years  35. COPD/Pulmonary fibrosis of chronic O2  34. PMR on chronic prednisone  35. Asthma  36. ? T2DM  37. HTN  38. HLD  39. CKD  40. Hx R iliac artery aneurysm  41. Inferior STEMI 4/14/2014  42. S/p CABG(04/16/2017) and right thrombectomy (LIMA to LAD, SVG to OM, SVG to RCA with vein angioplasty of posterolateral branch of RCA)  43. Post CABG paroxysmal AF  44. TTE 05/2014:  EF 35% to 40%, mildly dilated LV and right atrium, trace MR, trace TR, left-sided pleural effusion. [address] nuclear stress test July 21, 2014:  EF 36%, no ischemia.  A large, fixed inferolateral defect. 46. NSVT and syncope with EP study noting inducible VT - Follows with Dr. Bonny Isaac S/p ICD 07/22/2014 - [employer]  47. 1/10/2015 p-BNP 2040, Bun/Cr 24/1.7  48. 5/1/2015 p-BNP 1050, Bun/Cr 26/1.3  49. 9/4/2015 p-, Bun/Cr 23/1.3  50. 6/14/2016 p-BNP 1359, Bun/Cr 24/1.2  51. 2/20/2017 p-,  Bun/Cr 25/1.2  52. 5/23/2017 p-  53. 1/7/2018 p-BNP 4164, troponin <0.01, Bun/Cr 29/1.3  54. 1/7/2018 TTE Dr Charles Light: Left ventricle is mildly enlarged. Moderate left ventricular concentric hypertrophy noted. Inferior wall hypokinesis. Mild TR. EF visually estimated at 40%. 55. 6/8/2018 p-BNP 1298, Bun/Cr 22/1.2  56. 6/14/2019 Office visit Dr Charles Light.  Trail of low dose Crestor 5 mg QD (previous reported myalgias on statin therapy)  57. 6/19/2019 p-BNP 1543, Bun/Cr 29/1.4, troponin <0.01, d-dimer 541  58. 8/8/2019 T Chol 180, Triglycerides 110, HDL 57, LDL dysfunction.   Moderately dilated left atrium by volume index.   Mild mitral regurgitation.   Mild tricuspid regurgitation.   PASP is estimated at 31 mmHg.      ----------------------------------------------------------------   Electronically signed by Servando Rios MD(Interpreting   physician) on 01/28/2020 04:54 PM   ----------------------------------------------------------------        Above ELGIN exam, assessment reviewed and reflect my work. I personally saw, examined, and evaluated the patient today.     I personally reviewed the medications, rhythm strips, pertinent labs and test reports. I directly participated in the medical-decision making, ordering pertinent tests and medication adjustment.     Physical exam:          Vitals:     02/04/20 1217   BP:     Pulse:     Resp: 18   Temp:     SpO2: 97%         In general, this is a well developed, well nourished who appears stated age. awake, alert, cooperative, no apparent distress     HEENT: eyes -conjunctivae pink,   Neck-  no stridor, no carotid bruit. no jugular venous distention   RESPIRATORY: Chest symmetrical and non-tender to palpation. No accessory muscles use. Lung auscultation - few rhonchi and fine rales  CARDIOVASCULAR:     Heart Inspection shows no noted pulsations  Heart Palpation - no palpable thrills  Heart Ausculation - Regular rate and rhythm, 1/6 systolic murmur, No s3 or rub.   + lower extremity edema, no varicosities. Distal pulses palpable, no clubbing or cyanosis   ABDOMEN: Soft, nontender,  Bowel sounds present. MS: n/a.   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           Mai Fuentes MD  2/4/2020  2:10 PM  [facility]) Cardiology

## 2020-02-04 NOTE — CARE COORDINATION
Social Work/Discharge Planning:  Social Work consult noted. Met with patient and completed initial assessment. Explained Social Work role and discussed transition of care/discharge planning. Patient lives with his wife in a one story house with three steps to enter. PTA patient independent with no adaptive device. He has a cane, nebulizer and wears two liters of oxygen at night or when exercising supplied through [address]. Patient has a home health care history with [address]. Patient PCP is Dr. Kymberly Spicer and pharmacy is [facility]. Patient plans to return home and he denies any home care needs at this time. Will continue to follow and assist with discharge planning.   Electronically signed by RINA Fischer on 2/4/2020 at 11:56 AM

## 2020-02-05 VITALS
HEART RATE: 62 BPM | SYSTOLIC BLOOD PRESSURE: 104 MMHG | BODY MASS INDEX: 22.76 KG/M2 | WEIGHT: 159 LBS | OXYGEN SATURATION: 95 % | HEIGHT: 70 IN | RESPIRATION RATE: 18 BRPM | TEMPERATURE: 97.5 F | DIASTOLIC BLOOD PRESSURE: 56 MMHG

## 2020-02-05 LAB
ANION GAP SERPL CALCULATED.3IONS-SCNC: 9 MMOL/L (ref 7–16)
BUN BLDV-MCNC: 28 MG/DL (ref 8–23)
CALCIUM SERPL-MCNC: 9 MG/DL (ref 8.6–10.2)
CHLORIDE BLD-SCNC: 105 MMOL/L (ref 98–107)
CO2: 25 MMOL/L (ref 22–29)
CREAT SERPL-MCNC: 1.2 MG/DL (ref 0.7–1.2)
EKG ATRIAL RATE: 63 BPM
EKG Q-T INTERVAL: 446 MS
EKG QRS DURATION: 128 MS
EKG QTC CALCULATION (BAZETT): 452 MS
EKG R AXIS: -21 DEGREES
EKG T AXIS: 37 DEGREES
EKG VENTRICULAR RATE: 62 BPM
GFR AFRICAN AMERICAN: >60
GFR NON-AFRICAN AMERICAN: 58 ML/MIN/1.73
GLUCOSE BLD-MCNC: 183 MG/DL (ref 74–99)
L. PNEUMOPHILA SEROGP 1 UR AG: NORMAL
POTASSIUM REFLEX MAGNESIUM: 4.5 MMOL/L (ref 3.5–5)
PRO-BNP: 1833 PG/ML (ref 0–450)
SODIUM BLD-SCNC: 139 MMOL/L (ref 132–146)
STREP PNEUMONIAE ANTIGEN, URINE: NORMAL
T4 FREE: 1.01 NG/DL (ref 0.93–1.7)
TSH SERPL DL<=0.05 MIU/L-ACNC: 0.69 UIU/ML (ref 0.27–4.2)

## 2020-02-05 PROCEDURE — 97530 THERAPEUTIC ACTIVITIES: CPT

## 2020-02-05 PROCEDURE — 94640 AIRWAY INHALATION TREATMENT: CPT

## 2020-02-05 PROCEDURE — 2580000003 HC RX 258: Performed by: INTERNAL MEDICINE

## 2020-02-05 PROCEDURE — 6360000002 HC RX W HCPCS: Performed by: INTERNAL MEDICINE

## 2020-02-05 PROCEDURE — 6370000000 HC RX 637 (ALT 250 FOR IP): Performed by: CLINICAL NURSE SPECIALIST

## 2020-02-05 PROCEDURE — 36415 COLL VENOUS BLD VENIPUNCTURE: CPT

## 2020-02-05 PROCEDURE — 6370000000 HC RX 637 (ALT 250 FOR IP): Performed by: INTERNAL MEDICINE

## 2020-02-05 PROCEDURE — 99232 SBSQ HOSP IP/OBS MODERATE 35: CPT | Performed by: INTERNAL MEDICINE

## 2020-02-05 PROCEDURE — 83880 ASSAY OF NATRIURETIC PEPTIDE: CPT

## 2020-02-05 PROCEDURE — 84439 ASSAY OF FREE THYROXINE: CPT

## 2020-02-05 PROCEDURE — 84443 ASSAY THYROID STIM HORMONE: CPT

## 2020-02-05 PROCEDURE — 93010 ELECTROCARDIOGRAM REPORT: CPT | Performed by: INTERNAL MEDICINE

## 2020-02-05 PROCEDURE — 80048 BASIC METABOLIC PNL TOTAL CA: CPT

## 2020-02-05 RX ORDER — AZITHROMYCIN 500 MG/1
500 TABLET, FILM COATED ORAL DAILY
Qty: 3 TABLET | Refills: 0 | Status: SHIPPED | OUTPATIENT
Start: 2020-02-05 | End: 2020-02-08

## 2020-02-05 RX ORDER — PREDNISONE 20 MG/1
60 TABLET ORAL DAILY
Status: DISCONTINUED | OUTPATIENT
Start: 2020-02-05 | End: 2020-02-05 | Stop reason: HOSPADM

## 2020-02-05 RX ORDER — PREDNISONE 10 MG/1
TABLET ORAL
Qty: 20 TABLET | Refills: 0 | Status: SHIPPED | OUTPATIENT
Start: 2020-02-05 | End: 2020-02-05 | Stop reason: SDUPTHER

## 2020-02-05 RX ORDER — PREDNISONE 10 MG/1
TABLET ORAL
Qty: 14 TABLET | Refills: 0 | Status: SHIPPED | OUTPATIENT
Start: 2020-02-05 | End: 2020-04-13

## 2020-02-05 RX ORDER — ALBUTEROL SULFATE 90 UG/1
2 AEROSOL, METERED RESPIRATORY (INHALATION) EVERY 6 HOURS PRN
Qty: 1 INHALER | Refills: 0 | Status: SHIPPED | OUTPATIENT
Start: 2020-02-05 | End: 2021-01-01

## 2020-02-05 RX ADMIN — ALBUTEROL SULFATE 1.25 MG: 1.25 SOLUTION RESPIRATORY (INHALATION) at 07:44

## 2020-02-05 RX ADMIN — BUDESONIDE 500 MCG: 0.5 SUSPENSION RESPIRATORY (INHALATION) at 07:44

## 2020-02-05 RX ADMIN — POTASSIUM CHLORIDE 20 MEQ: 20 TABLET, EXTENDED RELEASE ORAL at 09:47

## 2020-02-05 RX ADMIN — PREDNISONE 60 MG: 20 TABLET ORAL at 12:01

## 2020-02-05 RX ADMIN — FORMOTEROL FUMARATE DIHYDRATE 20 MCG: 20 SOLUTION RESPIRATORY (INHALATION) at 07:44

## 2020-02-05 RX ADMIN — AZITHROMYCIN DIHYDRATE 500 MG: 500 INJECTION, POWDER, LYOPHILIZED, FOR SOLUTION INTRAVENOUS at 09:57

## 2020-02-05 RX ADMIN — LOSARTAN POTASSIUM 12.5 MG: 25 TABLET, FILM COATED ORAL at 09:47

## 2020-02-05 RX ADMIN — ALBUTEROL SULFATE 1.25 MG: 1.25 SOLUTION RESPIRATORY (INHALATION) at 12:04

## 2020-02-05 RX ADMIN — ALBUTEROL SULFATE 1.25 MG: 1.25 SOLUTION RESPIRATORY (INHALATION) at 16:00

## 2020-02-05 RX ADMIN — CARVEDILOL 9.38 MG: 6.25 TABLET, FILM COATED ORAL at 16:31

## 2020-02-05 RX ADMIN — METHYLPREDNISOLONE SODIUM SUCCINATE 40 MG: 40 INJECTION, POWDER, LYOPHILIZED, FOR SOLUTION INTRAMUSCULAR; INTRAVENOUS at 09:46

## 2020-02-05 RX ADMIN — CARVEDILOL 9.38 MG: 6.25 TABLET, FILM COATED ORAL at 09:47

## 2020-02-05 RX ADMIN — METHYLPREDNISOLONE SODIUM SUCCINATE 40 MG: 40 INJECTION, POWDER, LYOPHILIZED, FOR SOLUTION INTRAMUSCULAR; INTRAVENOUS at 05:03

## 2020-02-05 RX ADMIN — FUROSEMIDE 20 MG: 20 TABLET ORAL at 09:47

## 2020-02-05 ASSESSMENT — PAIN SCALES - GENERAL: PAINLEVEL_OUTOF10: 0

## 2020-02-05 NOTE — PROGRESS NOTES
Lissa Gonzales M.D.,[facility]  Alber Natarajan D.O., FURIELOJazminI., Donovan Rader M.D. Tala Vasquez M.D., Ilene Freitas M.D. Cornelia Zavaleta D.O. Daily Pulmonary Progress Note    Patient:  Violeta Jeffrey 80 y.o. male MRN: [id number]     Date of Service: 2/5/2020      Synopsis     We are following patient for acute on chronic respiratory failure    \"CC\" SOB     Code status:[id number]      Subjective      Patient was seen and examined. He is up in chair he appears in no acute distress and he is on room air 93% saturation. He will need to use his oxygen at 2 L with any activity as he desats to mid 80s recovers on 2 L to 9 days. .  No wheezing upon auscultation. And he feels much less short of breath today. I did discuss his discharge plan he will need to continue antibiotics, taper steroid and follow-up in the office in 2 weeks with the nurse practitioner. Review of Systems:  Constitutional: Denies fever, weight loss, night sweats, and fatigue  Skin: Denies pigmentation, dark lesions, and rashes   HEENT: Denies hearing loss, tinnitus, ear drainage, epistaxis, sore throat, and hoarseness. Cardiovascular: Denies palpitations, chest pain, and chest pressure. Respiratory: Denies cough, dyspnea at rest, hemoptysis, apnea, and choking.   Gastrointestinal: Denies nausea, vomiting, poor appetite, diarrhea, heartburn or reflux  Genitourinary: Denies dysuria, frequency, urgency or hematuria  Musculoskeletal: Denies myalgias, muscle weakness, and bone pain    24-hour events:  none    Objective   Vitals: BP (!) 109/56   Pulse 67   Temp 97 °F (36.1 °C) (Axillary)   Resp 18   Ht 5' 10\" (1.778 m)   Wt 159 lb (72.1 kg)   SpO2 93%   BMI 22.81 kg/m²     I/O:    Intake/Output Summary (Last 24 hours) at 2/5/2020 1127  Last data filed at 2/5/2020 1057  Gross per 24 hour   Intake 790 ml   Output 1590 ml   Net -800 ml                        CURRENT MEDS :  Scheduled Meds:   albuterol  1.25 mg Nebulization Q4H While awake    budesonide  0.5 mg Nebulization BID    methylPREDNISolone  40 mg Intravenous Q6H    formoterol  20 mcg Nebulization BID    azithromycin  500 mg Intravenous Daily    Nintedanib Esylate  150 mg Oral BID    aspirin  81 mg Oral Daily    carvedilol  9.375 mg Oral BID    furosemide  20 mg Oral Daily    losartan  12.5 mg Oral Daily    montelukast  10 mg Oral Nightly    potassium chloride  20 mEq Oral Daily    rosuvastatin  10 mg Oral Daily    enoxaparin  40 mg Subcutaneous Daily       Physical Exam:  General Appearance: appears comfortable in no acute distress. HEENT: Normocephalic atraumatic without obvious abnormality   Neck: Lips, mucosa, and tongue normal.  Supple, symmetrical, trachea midline, no adenopathy;thyroid:  no enlargement/tenderness/nodules or JVD. Lung: Breath sounds CTA. Respirations   unlabored. Symmetrical expansion. Heart: RRR, normal S1, S2. No MRG  Abdomen: Soft, NT, ND. BS present x 4 quadrants. No bruit or organomegaly. Extremities: Pedal pulses 2+ symmetric b/l. Extremities normal, no cyanosis, clubbing, or edema. Musculokeletal: No joint swelling, no muscle tenderness. ROM normal in all joints of extremities. Neurologic: Mental status: Alert and Oriented X3 .     Pertinent/ New Labs and Imaging Studies     Labs:  Lab Results   Component Value Date    WBC 11.0 02/04/2020    HGB 12.1 02/04/2020    HCT 37.4 02/04/2020    MCV 93.0 02/04/2020    MCH 30.1 02/04/2020    MCHC 32.4 02/04/2020    RDW 15.7 02/04/2020     02/04/2020    MPV 8.3 02/04/2020     Lab Results   Component Value Date     02/05/2020    K 4.5 02/05/2020     02/05/2020    CO2 25 02/05/2020    BUN 28 02/05/2020    CREATININE 1.2 02/05/2020    LABALBU 3.9 12/28/2019    LABALBU 4.2 05/26/2011    CALCIUM 9.0 02/05/2020    GFRAA >60 02/05/2020    LABGLOM 58 02/05/2020     Lab Results   Component Value Date    PROTIME 13.6 01/07/2018    INR 1.3 01/07/2018     Recent

## 2020-02-05 NOTE — PROGRESS NOTES
aspirin  81 mg Oral Daily    carvedilol  9.375 mg Oral BID    furosemide  20 mg Oral Daily    losartan  12.5 mg Oral Daily    montelukast  10 mg Oral Nightly    potassium chloride  20 mEq Oral Daily    rosuvastatin  10 mg Oral Daily    enoxaparin  40 mg Subcutaneous Daily       IV Infusions (if any):        PHYSICAL EXAM:     CONSTITUTIONAL:   BP (!) 109/56   Pulse 67   Temp 97 °F (36.1 °C) (Axillary)   Resp 18   Ht 5' 10\" (1.778 m)   Wt 159 lb (72.1 kg)   SpO2 93%   BMI 22.81 kg/m²   Pulse  Av.2  Min: 64  Max: 70  Systolic (13YRB), UTS:180 , Min:109 , MJO:068    Diastolic (53LRL), XOE:54, Min:52, Max:66      In general, this is a well developed, well nourished who appears stated age. awake, alert, cooperative, no apparent distress     HEENT: eyes -conjunctivae pink,   Neck-  no stridor, no carotid bruit. no jugular venous distention   RESPIRATORY: Chest symmetrical and non-tender to palpation.  No accessory muscles use.   Lung auscultation - few rhonchi and fine rales  CARDIOVASCULAR:     Heart Inspection shows no noted pulsations  Heart Palpation - no palpable thrills  Heart Ausculation - Regular rate and rhythm, 1/6 systolic murmur, No s3 or rub.   No lower extremity edema, no varicosities. Distal pulses palpable, no clubbing or cyanosis   ABDOMEN: Soft, nontender,  Bowel sounds present. MS: n/a.   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

## 2020-02-05 NOTE — CONSULTS
20 mEq Oral Daily    rosuvastatin  10 mg Oral Daily    enoxaparin  40 mg Subcutaneous Daily           Code Status: DNR-CCA     The patient is ordered:  Diet (sodium restriction): General  Sodium/fluid restriction daily ordered (fluid restriction recommended if patient is hyponatremic and/or diuretic is initiated or increased): No  FR: not ordered  Daily Weights:   Patient Vitals for the past 96 hrs (Last 3 readings):   Weight   02/05/20 0145 159 lb (72.1 kg)   02/04/20 0100 160 lb 6.4 oz (72.8 kg)   02/03/20 2126 160 lb 9 oz (72.8 kg)     I/O:     Intake/Output Summary (Last 24 hours) at 2/5/2020 0958  Last data filed at 2/5/2020 4504  Gross per 24 hour   Intake 360 ml   Output 1590 ml   Net -1230 ml        I provided the patient with the following:   The [address] [place] [address] Dr, \"Caring for Your Heart: Living Well with Heart Failure\"    The Heart Failure Zones    Sodium-Free Flavoring Tips    Low-Sodium Nutrition Therapy   ? The Heart Failure Booklet was given to the patient, which addresses:   Signs and symptoms of HF to report    Home Self Management- activity, weight tracking, taking medications as prescribed, meals/diet planning (sodium and fluid restriction), how to read food labels, keeping physician follow ups, smoking cessation, follow the Heart Failure Zones      Instructed to call 911 if you have any of the following symptoms: ?   Struggling to breathe unrelieved with rest,    Having chest pain, confusion or can't think clearly    Have confusion or cant think clearly  Discharge Plan: I placed HF Home Instructions in the discharge instructions. Readmission Risk Score: 13       Reminder: Discharge Instructions: activity, daily weights, diet, S/S, discharge medications & when to call the doctor.   Folloe-up made and patient aware  Heart failure added to education and care plan  Electronically signed by Ranjan Vivar RN on 2/5/2020 at 10:40 AM

## 2020-02-05 NOTE — DISCHARGE SUMMARY
Physician Discharge Summary     Patient ID:  Laura Kahn  [id number]  80 y.o.  1935    Admit date: 2/3/2020    Discharge date and time:  2/5/2020    Admission Diagnoses:   Patient Active Problem List   Diagnosis    Automatic implantable cardioverter-defibrillator in situ    [facility] pulmonary fibrosis[facility]    Peripheral vascular disease ([facility]    PMR (polymyalgia rheumatica) ([facility])    Acute respiratory failure with hypoxia ([facility]    Coronary artery disease involving native coronary artery without angina pectoris    Hyperlipidemia LDL goal <100    Acute on chronic systolic congestive heart failure ([facility]       Discharge Diagnoses: as above    Consults: cardiology and pulmonary/intensive care    Procedures: see chart    Hospital Course: patient was admitted with shortness of breath. He was found to suffer acute on chronic respiratory failure secondary to COPD exacerbation, CHF, and pneumonia. He was diuresed. He was treated with supplemental oxygen, steroids and antibiotics. He improved. He was discharged in stable condition.       Discharge Exam:  See progress note from today    Condition:  stable    Disposition: home    Patient Instructions:   Current Discharge Medication List      START taking these medications    Details   azithromycin (ZITHROMAX) 500 MG tablet Take 1 tablet by mouth daily for 3 days  Qty: 3 tablet, Refills: 0         CONTINUE these medications which have CHANGED    Details   predniSONE (DELTASONE) 10 MG tablet 40 mg for 2 days ,  20 mg for 2 days , 10 mg for 2 days then resume your daily dose of prednisone 3 mg daily  Qty: 14 tablet, Refills: 0      albuterol sulfate HFA (VENTOLIN HFA) 108 (90 Base) MCG/ACT inhaler Inhale 2 puffs into the lungs every 6 hours as needed for Wheezing or Shortness of Breath  Qty: 1 Inhaler, Refills: 0    Associated Diagnoses: ILD (interstitial lung disease) ([facility]         CONTINUE these medications which have NOT CHANGED    Details montelukast (SINGULAIR) 10 MG tablet Take 10 mg by mouth nightly      rosuvastatin (CRESTOR) 5 MG tablet Take 1 tablet by mouth daily  Qty: 90 tablet, Refills: 3      carvedilol (COREG) 6.25 MG tablet Take one tab twice daily with (3.125mg twice daily) total dose 9.375mg twice daily  Qty: 180 tablet, Refills: 3      Fluticasone furoate-vilanterol (BREO ELLIPTA) 200-25 MCG/INH AEPB inhaler USE 1 INHALATION ORALLY    ONCE A DAY  Qty: 180 each, Refills: 3    Associated Diagnoses: Pulmonary fibrosis (HCC)      potassium chloride (KLOR-CON M) 20 MEQ extended release tablet Take 1 tablet by mouth daily  Qty: 90 tablet, Refills: 0      furosemide (LASIX) 20 MG tablet Take 1 tablet by mouth daily  Qty: 90 tablet, Refills: 0      OFEV 150 MG CAPS TAKE 1 CAPSULE BY MOUTH TWICE A DAY  EVERY 12 HOURS  AS DIRECTED WITH FOOD  Refills: 12      OXYGEN Inhale 2 L into the lungs as needed      losartan (COZAAR) 25 MG tablet Take 1 tablet by mouth daily  Qty: 90 tablet, Refills: 0      calcium carbonate 600 MG TABS tablet Take 1 tablet by mouth daily      Multiple Vitamins-Minerals (MULTIVITAMIN ADULT) TABS Take by mouth daily      Coenzyme Q10 (COQ10 PO) Take 100 mg by mouth daily       TURMERIC PO Take 200 mg by mouth daily       aspirin 81 MG EC tablet Take 81 mg by mouth daily. STOP taking these medications       vitamin D (CHOLECALCIFEROL) 1000 UNIT TABS tablet Comments:   Reason for Stopping:         docusate sodium (COLACE) 100 MG capsule Comments:   Reason for Stopping:             Activity: activity as tolerated  Diet: low fat, low cholesterol diet    Follow up with dr Retha Bernheim in 1 week. Follow up with dr Stacy Contreras in 2-3 weeks. Follow up with dr Belia samuels in 2-3 weeks. Follow up with the chf clinic as scheduled.      Note that over 30 minutes was spent in preparing discharge papers, discussing discharge with patient, medication review, etc.    Signed:  Claudia Franz    2/5/2020  2:42 PM

## 2020-02-05 NOTE — CARE COORDINATION
CASE MANAGEMENT. .. Met with patient to discuss his hospital stay and discharge plans. Mr Princess Carcamo has no questions for me and states that he is ready go home today. Per cardio notes, patient is ok for discharge. Per pulm, iv solumedrol stopped and po prednisone started. Nursing to check with pulm regarding discharge. Official discharge order is still needed. Mr Princess Carcamo has no home going needs. States that he will have transportation home. Will cont to follow.

## 2020-02-05 NOTE — PROGRESS NOTES
Left message on voicemail of Dr Akash Bueno regarding ok for discharge from pulmonary and cardiology standpoint

## 2020-02-05 NOTE — PROGRESS NOTES
Physical Therapy  Facility/Department: [address] INTERMEDIATE 1  Daily Treatment Note  NAME: Kassidy Lawrence  : 1935  MRN: [id number]    Date of Service: 2020    Patient Diagnosis(es): The primary encounter diagnosis was Acute respiratory failure with hypoxia ([address] Diagnoses of Pneumonia due to organism and Mild persistent asthma with exacerbation were also pertinent to this visit. has a past medical history of Aneurysm ([address] Asthma, CAD (coronary artery disease), Cardiomyopathy ([address] CHF (congestive heart failure) ([address] Dilatation of aorta ([address] Hyperlipidemia, Iliac artery aneurysm, bilateral ([address] Inferoposterior myocardial infarction ([address] and NSVT (nonsustained ventricular tachycardia) ([address] has a past surgical history that includes Cardiac catheterization (4-); Coronary artery bypass graft ( 2014); Coronary angioplasty (4/15/2014); and Cardiac defibrillator placement. Evaluating Therapist: Michel Carroll PT        Room #: 0703/4293-E  DIAGNOSIS: acute respiratory failure  PRECAUTIONS: fall risk, O2     Social:  Pt lives with his wife in a 1 floor plan 3 steps and 1 rails to enter. Bed and bath on first floor. Pt reported he showers in the basement. Prior to admission pt walked with no device in the home and uses a cane for longer distance. Pt reported being independent with all functional mobility.        Initial Evaluation  Date:  Treatment      Short Term/ Long Term   Goals   Was pt agreeable to Eval/treatment? yes yes      Does pt have pain? None reported  no c/o pain     Bed Mobility  NT - pt sitting up in the chair  independent independent   Transfers Sit to stand: independent  Stand to sit: independent  Stand pivot: independent  independent independent   Ambulation    75 feet and 85 feet with no device Supervision/independent  85 feet x2 with no device supervision/independnet 150+ feet with no device independent.     Stair negotiation: ascended and descended

## 2020-02-06 ENCOUNTER — CARE COORDINATION (OUTPATIENT)
Dept: CASE MANAGEMENT | Age: 85
End: 2020-02-06

## 2020-02-06 NOTE — LETTER
Whats most important for you to know is that your Medicare rights and benefits wont change because your health care provider is participating in [facility]. Medicare will keep covering all of your medically necessary services. Even though Medicare will pay your doctor in a different way under BPCI Advanced, how much you have to pay wont change. Health care providers and suppliers who are enrolled in Medicare will submit their Medicare claims like they always have. Youll have all the same Medicare rights and protections, including the right to choose which hospital, doctor, or other health care provider you see. If you dont want to get care from a health care provider whos participating in [facility], then youll have to choose a different health care provider whos not participating in the Model. How can I give feedback about my health care? Medicare might ask you to take a voluntary survey about the services and care you received from [address] [place] [address] during your hospital stay or outpatient procedure and for a specific period of time afterwards. You can decide whether you want to take the voluntary survey, but if you do, itll help Medicare make BPCI Advanced and the care of other Medicare patients better. If you have concerns or complaints about your care, you can:   · Talk to your doctor or health care provider. · Contact your Beneficiary and Family Centered Care Quality Improvement   Organization [facility]). You can get your [employer]-QIOs phone number  at Medicare.gov/contacts or by calling 1-800-MEDICARE. [employer] users can  call 2-532.158.1071. Where can I learn more about BPCI Advanced? Learn more about BPCI Advanced at https://innovation.cms.gov/initiatives/bpci-advanced/:  · A list of all the hospitals and physician group practices in the country participating in [address] [place] [address].   · All of the inpatient and outpatient Clinical Episodes that are currently

## 2020-02-06 NOTE — CARE COORDINATION
[facility] Trans[facility] Initial Follow Up Call    Call within 2 business days of discharge: Yes    Patient: Fernanda Rueda Patient : 1935   MRN: [id number]  Reason for Admission: SOB   Discharge Date: 20 RARS: Readmission Risk Score: 14      Last Discharge [facility]       Complaint Diagnosis Description Type Department Provider    2/3/20 Shortness of Breath Acute respiratory failure with hypoxia ([address] . .. ED to Hosp-Admission (Discharged) (ADMITTED) OLY Gallardo MD; Berry Guan. .. Spoke with: Beto Will     Facility: [id number] [place] [address]      Non-face-to-face services provided:  Scheduled appointment with PCP-[facility] plans to call and schedule follow up appt with PCP  Scheduled appointment with Specialist-reports that he will call Dr. Nuzhat Rascon to schedule   Obtained and reviewed discharge summary and/or continuity of care documents    Care Transitions 24 Hour Call    Do you have any ongoing symptoms?:  No  Do you have a copy of your discharge instructions?:  Yes  Do you have all of your prescriptions and are they filled?:  Yes  Have you been contacted by a [facility] Pharmacist?:  No  Have you scheduled your follow up appointment?:  No  Were you discharged with any Home Care or Post Acute Services:  No  Do you feel like you have everything you need to keep you well at home?:  Yes  Care Transitions Interventions  No Identified Needs                             Spoke with Beto Solis for initial BPCI care transition call post hospital discharge. Explained the role of Care Transition Nurse and the BPCI-A program. CMS BPCI-A letter reviewed and will be mailed to the patient, Beto Solis is agreeable to follow up post discharge from the hospital.     Beto Solis reports that he is doing \"much better\" today. He stated that he is wearing 2L O2 and is getting around without shortness of breath. He stated that he has not needed his rescue inhaler today.  Med review

## 2020-02-12 ENCOUNTER — CARE COORDINATION (OUTPATIENT)
Dept: CASE MANAGEMENT | Age: 85
End: 2020-02-12

## 2020-02-12 NOTE — CARE COORDINATION
[facility] Transitions Follow Up Call    2020    Patient: Emily Whitten  Patient : 1935   MRN: [id number]  Reason for Admission: Acute respiratory failure with hypoxia/pneumonia  Discharge Date: 20 RARS: Readmission Risk Score: 14         Spoke with: Wife [facility] Transitions Subsequent and Final Call    Subsequent and Final Calls  Do you have any ongoing symptoms?:  No  Have your medications changed?:  No  Do you have any questions related to your medications?:  No  Do you currently have any active services?:  No  Do you have any needs or concerns that I can assist you with?:  No  Identified Barriers:  None  Care Transitions Interventions                          Other Interventions:          CTN spoke to [facility] who stated pt is out at the store right now. Stated he is moving slower and appetite has been decreased but slowly improving. Stated he is wearing supplemental 02 at 2 lpm continuously. Pt has Cardiology appt tomorrow. Stated he manages his own medications along with hers. Stated he is using inhalers prn. Stated he is trying to gain a little weight. Pt weighs himself at home but wife stated she doesn't think he checks it daily. Will continue to follow for BPCI-A.     Murtaza Al RN BSN   Care Transitions Nurse  [phone]     Follow Up  Future Appointments   Date Time Provider Nahed Ford   2020  2:00 PM Jaiden Flores MD [facility]   3/10/2020  8:00 AM SCHEDULE, REMOTE CHECK PHILIP ELECTRO PHYS [facility]   10/1/2020  1:00 PM Citlaly William MD [facility]       Murtaza Al RN

## 2020-02-13 ENCOUNTER — OFFICE VISIT (OUTPATIENT)
Dept: CARDIOLOGY CLINIC | Age: 85
End: 2020-02-13
Payer: MEDICARE

## 2020-02-13 VITALS
HEIGHT: 70 IN | RESPIRATION RATE: 18 BRPM | WEIGHT: 178.4 LBS | DIASTOLIC BLOOD PRESSURE: 64 MMHG | BODY MASS INDEX: 25.54 KG/M2 | HEART RATE: 64 BPM | SYSTOLIC BLOOD PRESSURE: 118 MMHG

## 2020-02-13 PROCEDURE — G8482 FLU IMMUNIZE ORDER/ADMIN: HCPCS | Performed by: INTERNAL MEDICINE

## 2020-02-13 PROCEDURE — 1111F DSCHRG MED/CURRENT MED MERGE: CPT | Performed by: INTERNAL MEDICINE

## 2020-02-13 PROCEDURE — 1036F TOBACCO NON-USER: CPT | Performed by: INTERNAL MEDICINE

## 2020-02-13 PROCEDURE — 93000 ELECTROCARDIOGRAM COMPLETE: CPT | Performed by: INTERNAL MEDICINE

## 2020-02-13 PROCEDURE — 99213 OFFICE O/P EST LOW 20 MIN: CPT | Performed by: INTERNAL MEDICINE

## 2020-02-13 PROCEDURE — 1123F ACP DISCUSS/DSCN MKR DOCD: CPT | Performed by: INTERNAL MEDICINE

## 2020-02-13 PROCEDURE — G8417 CALC BMI ABV UP PARAM F/U: HCPCS | Performed by: INTERNAL MEDICINE

## 2020-02-13 PROCEDURE — 4040F PNEUMOC VAC/ADMIN/RCVD: CPT | Performed by: INTERNAL MEDICINE

## 2020-02-13 PROCEDURE — G8427 DOCREV CUR MEDS BY ELIG CLIN: HCPCS | Performed by: INTERNAL MEDICINE

## 2020-02-13 RX ORDER — CARVEDILOL 6.25 MG/1
9.36 TABLET ORAL 2 TIMES DAILY WITH MEALS
COMMUNITY
End: 2020-06-16 | Stop reason: SDUPTHER

## 2020-02-14 NOTE — PROGRESS NOTES
Patient Active Problem List   Diagnosis    Automatic implantable cardioverter-defibrillator in situ    IPF (idiopathic pulmonary fibrosis) ([facility]    Peripheral vascular disease ([facility]    PMR (polymyalgia rheumatica) ([facility]    Acute respiratory failure with hypoxia ([facility]    Coronary artery disease involving native coronary artery without angina pectoris    Hyperlipidemia LDL goal <100    Acute on chronic systolic congestive heart failure ([facility])       Current Outpatient Medications   Medication Sig Dispense Refill    carvedilol (COREG) 6.25 MG tablet Take 6.25 mg by mouth 2 times daily (with meals) 9.375 bid      predniSONE (DELTASONE) 10 MG tablet 40 mg for 2 days ,  20 mg for 2 days , 10 mg for 2 days then resume your daily dose of prednisone 3 mg daily 14 tablet 0    albuterol sulfate HFA (VENTOLIN HFA) 108 (90 Base) MCG/ACT inhaler Inhale 2 puffs into the lungs every 6 hours as needed for Wheezing or Shortness of Breath 1 Inhaler 0    montelukast (SINGULAIR) 10 MG tablet Take 10 mg by mouth nightly      rosuvastatin (CRESTOR) 5 MG tablet Take 1 tablet by mouth daily (Patient taking differently: Take 10 mg by mouth daily ) 90 tablet 3    Fluticasone furoate-vilanterol (BREO ELLIPTA) 200-25 MCG/INH AEPB inhaler USE 1 INHALATION ORALLY    ONCE A  each 3    potassium chloride (KLOR-CON M) 20 MEQ extended release tablet Take 1 tablet by mouth daily 90 tablet 0    furosemide (LASIX) 20 MG tablet Take 1 tablet by mouth daily 90 tablet 0    OFEV 150 MG CAPS TAKE 1 CAPSULE BY MOUTH TWICE A DAY  EVERY 12 HOURS  AS DIRECTED WITH FOOD  12    OXYGEN Inhale 2 L into the lungs as needed      losartan (COZAAR) 25 MG tablet Take 1 tablet by mouth daily (Patient taking differently: Take 12.5 mg by mouth daily ) 90 tablet 0    calcium carbonate 600 MG TABS tablet Take 1 tablet by mouth daily      Multiple Vitamins-Minerals (MULTIVITAMIN ADULT) TABS Take by mouth daily      Coenzyme Q10 (COQ10 PO) Take 100 mg by mouth daily       TURMERIC PO Take 200 mg by mouth daily       aspirin 81 MG EC tablet Take 81 mg by mouth daily. No current facility-administered medications for this visit. CC:    Patient is seen in continuity of care following recent hospitalization for congestive heart failure and in follow-up for for:  1. Chronic systolic CHF (congestive heart failure) ([facility]    2. Ischemic cardiomyopathy    3. NSVT (nonsustained ventricular tachycardia) ([facility]    4. Coronary artery disease involving native coronary artery of native heart without angina pectoris    5. LV dysfunction    6. Dilatation of aorta (HCC)    7. Statin intolerance        HPI:  Patient is seen in follow-up after recent hospitalization for congestive heart failure. Relates discontinuing his diuretic therapy prior to development of his CHF hospitalization  Patient is doing fairly well from a cardiac standpoint. Patient denies any  chest pain, lightheadedness or dizziness. Has chronic shortness of breath secondary to idiopathic pulmonary fibrosis. Also has muscle aching secondary to polymyalgia rheumatica  Difficulties tolerating Crestor 20 mg due to muscle aching. ROS:   General: No unusual weight gain, limited exercise tolerance  Skin: No rash or itching  EENT: No vision changes or nosebleeds  Cardiovascular: No orthopnea or paroxysmal nocturnal dyspnea  Respiratory: (+) cough.  No hemoptysis  Gastrointestinal: No hematemesis or recent changes in bowel habits  Genitourinary: No hematuria, urgency or frequency  Musculoskeletal: No muscular weakness or joint swelling   Neurologic / Psychiatric: No incoordination or convulsions  Allergic / Immunologic/ Lymphatic / Endocrine: No anemia or bleeding tendency    Social History     Socioeconomic History    Marital status:      Spouse name: Not on file    Number of children: Not on file    Years of education: Not on file    Highest education level: Not on file   Occupational

## 2020-02-18 ENCOUNTER — CARE COORDINATION (OUTPATIENT)
Dept: CASE MANAGEMENT | Age: 85
End: 2020-02-18

## 2020-03-09 ENCOUNTER — CARE COORDINATION (OUTPATIENT)
Dept: CASE MANAGEMENT | Age: 85
End: 2020-03-09

## 2020-03-10 ENCOUNTER — NURSE ONLY (OUTPATIENT)
Dept: NON INVASIVE DIAGNOSTICS | Age: 85
End: 2020-03-10
Payer: MEDICARE

## 2020-03-10 PROCEDURE — 93295 DEV INTERROG REMOTE 1/2/MLT: CPT | Performed by: INTERNAL MEDICINE

## 2020-03-10 PROCEDURE — 93296 REM INTERROG EVL PM/IDS: CPT | Performed by: INTERNAL MEDICINE

## 2020-03-12 NOTE — PROGRESS NOTES
See [facility] report. Remote monitoring reviewed over a 90 day period. End of 90 day monitoring period date of service 3.10.2020.

## 2020-03-23 ENCOUNTER — CARE COORDINATION (OUTPATIENT)
Dept: CASE MANAGEMENT | Age: 85
End: 2020-03-23

## 2020-04-07 ENCOUNTER — CARE COORDINATION (OUTPATIENT)
Dept: CASE MANAGEMENT | Age: 85
End: 2020-04-07

## 2020-04-07 NOTE — CARE COORDINATION
[facility] Transitions Follow Up Call    2020    Patient: Antonio Blind  Patient : 1935   MRN: [id number]  Reason for Admission:   Discharge Date: 20 RARS: Readmission Risk Score: 15         Spoke with: Alex Bahena, patient    Care Transitions Subsequent and Final Call    Subsequent and Final Calls  Have your medications changed?:  No  Do you have any questions related to your medications?:  No  Do you currently have any active services?:  No  Do you have any needs or concerns that I can assist you with?:  No  Identified Barriers:  None  Care Transitions Interventions  Other Interventions: Follow Up:  Contacted patient for BPCI-A follow up. Patient stated he is very pleased with [facility] and how they treat patients. He reports he has a cough with minimal clear/white sputum. MD aware of this. He stated that he spoke with a nurse yesterday and was told to continue to monitor his symptoms. He denies having any chest pain/discomfort, pressure, fever, chills. He stated he does become short of breath with exertion. Stated he is using his oxygen more often. He is using 2 L of oxygen. He is monitoring his O2 sats. Reports when walking his O2 sats drop into the 80's but once he sits his levels return to the 90's. Reports it takes about a minute to recover. He will continue to monitor symptoms and will continue to use the Flonase as advised by the pulmonology nurse yesterday. He and his wife continue to follow CDC precautions. He is aware of when to contact MD office. Will continue to follow.       Future Appointments   Date Time Provider Nahed Ford   2020 10:35 AM SCHEDULE, REMOTE CHECK PHILIP ELECTRO PHYS HP   10/1/2020  1:00 PM Alexander Rider MD [facility]       Annette Garcia RN

## 2020-04-13 ENCOUNTER — APPOINTMENT (OUTPATIENT)
Dept: CT IMAGING | Age: 85
DRG: 192 | End: 2020-04-13
Payer: MEDICARE

## 2020-04-13 ENCOUNTER — HOSPITAL ENCOUNTER (INPATIENT)
Age: 85
LOS: 3 days | Discharge: HOME HEALTH | DRG: 192 | End: 2020-04-16
Attending: EMERGENCY MEDICINE | Admitting: INTERNAL MEDICINE
Payer: MEDICARE

## 2020-04-13 PROBLEM — R09.02 HYPOXIA: Status: ACTIVE | Noted: 2020-04-13

## 2020-04-13 LAB
ADENOVIRUS BY PCR: NOT DETECTED
ALBUMIN SERPL-MCNC: 3.1 G/DL (ref 3.5–5.2)
ALP BLD-CCNC: 57 U/L (ref 40–129)
ALT SERPL-CCNC: 18 U/L (ref 0–40)
ANION GAP SERPL CALCULATED.3IONS-SCNC: 13 MMOL/L (ref 7–16)
AST SERPL-CCNC: 23 U/L (ref 0–39)
B.E.: -0.1 MMOL/L (ref -3–3)
BASOPHILS ABSOLUTE: 0.03 E9/L (ref 0–0.2)
BASOPHILS RELATIVE PERCENT: 0.2 % (ref 0–2)
BILIRUB SERPL-MCNC: 1.1 MG/DL (ref 0–1.2)
BORDETELLA PARAPERTUSSIS BY PCR: NOT DETECTED
BORDETELLA PERTUSSIS BY PCR: NOT DETECTED
BUN BLDV-MCNC: 20 MG/DL (ref 8–23)
CALCIUM SERPL-MCNC: 9 MG/DL (ref 8.6–10.2)
CHLAMYDOPHILIA PNEUMONIAE BY PCR: NOT DETECTED
CHLORIDE BLD-SCNC: 99 MMOL/L (ref 98–107)
CO2: 23 MMOL/L (ref 22–29)
COHB: 1.2 % (ref 0–1.5)
CORONAVIRUS 229E BY PCR: NOT DETECTED
CORONAVIRUS HKU1 BY PCR: NOT DETECTED
CORONAVIRUS NL63 BY PCR: NOT DETECTED
CORONAVIRUS OC43 BY PCR: NOT DETECTED
CREAT SERPL-MCNC: 1.3 MG/DL (ref 0.7–1.2)
CRITICAL: ABNORMAL
DATE ANALYZED: ABNORMAL
DATE OF COLLECTION: ABNORMAL
EOSINOPHILS ABSOLUTE: 0.07 E9/L (ref 0.05–0.5)
EOSINOPHILS RELATIVE PERCENT: 0.4 % (ref 0–6)
GFR AFRICAN AMERICAN: >60
GFR NON-AFRICAN AMERICAN: 53 ML/MIN/1.73
GLUCOSE BLD-MCNC: 121 MG/DL (ref 74–99)
HCO3: 22.7 MMOL/L (ref 22–26)
HCT VFR BLD CALC: 32.7 % (ref 37–54)
HEMOGLOBIN: 10.6 G/DL (ref 12.5–16.5)
HHB: 4 % (ref 0–5)
HUMAN METAPNEUMOVIRUS BY PCR: NOT DETECTED
HUMAN RHINOVIRUS/ENTEROVIRUS BY PCR: NOT DETECTED
IMMATURE GRANULOCYTES #: 0.06 E9/L
IMMATURE GRANULOCYTES %: 0.4 % (ref 0–5)
INFLUENZA A BY PCR: NOT DETECTED
INFLUENZA B BY PCR: NOT DETECTED
LAB: ABNORMAL
LYMPHOCYTES ABSOLUTE: 3.78 E9/L (ref 1.5–4)
LYMPHOCYTES RELATIVE PERCENT: 23.1 % (ref 20–42)
Lab: ABNORMAL
MCH RBC QN AUTO: 30.3 PG (ref 26–35)
MCHC RBC AUTO-ENTMCNC: 32.4 % (ref 32–34.5)
MCV RBC AUTO: 93.4 FL (ref 80–99.9)
METHB: 0.1 % (ref 0–1.5)
MODE: ABNORMAL
MONOCYTES ABSOLUTE: 1.13 E9/L (ref 0.1–0.95)
MONOCYTES RELATIVE PERCENT: 6.9 % (ref 2–12)
MYCOPLASMA PNEUMONIAE BY PCR: NOT DETECTED
NEUTROPHILS ABSOLUTE: 11.26 E9/L (ref 1.8–7.3)
NEUTROPHILS RELATIVE PERCENT: 69 % (ref 43–80)
O2 CONTENT: 15.1 ML/DL
O2 SATURATION: 95.9 % (ref 92–98.5)
O2HB: 94.7 % (ref 94–97)
OPERATOR ID: 1893
OVALOCYTES: ABNORMAL
PARAINFLUENZA VIRUS 1 BY PCR: NOT DETECTED
PARAINFLUENZA VIRUS 2 BY PCR: NOT DETECTED
PARAINFLUENZA VIRUS 3 BY PCR: NOT DETECTED
PARAINFLUENZA VIRUS 4 BY PCR: NOT DETECTED
PATIENT TEMP: 37 C
PCO2: 30.9 MMHG (ref 35–45)
PDW BLD-RTO: 16 FL (ref 11.5–15)
PH BLOOD GAS: 7.48 (ref 7.35–7.45)
PLATELET # BLD: 292 E9/L (ref 130–450)
PMV BLD AUTO: 8.5 FL (ref 7–12)
PO2: 82.7 MMHG (ref 75–100)
POIKILOCYTES: ABNORMAL
POTASSIUM SERPL-SCNC: 4.6 MMOL/L (ref 3.5–5)
PRO-BNP: 2904 PG/ML (ref 0–450)
RBC # BLD: 3.5 E12/L (ref 3.8–5.8)
RESPIRATORY SYNCYTIAL VIRUS BY PCR: NOT DETECTED
SARS-COV-2, NAAT: NOT DETECTED
SCHISTOCYTES: ABNORMAL
SODIUM BLD-SCNC: 135 MMOL/L (ref 132–146)
SOURCE, BLOOD GAS: ABNORMAL
THB: 11.3 G/DL (ref 11.5–16.5)
TIME ANALYZED: 1312
TOTAL PROTEIN: 7.1 G/DL (ref 6.4–8.3)
TROPONIN: 0.02 NG/ML (ref 0–0.03)
WBC # BLD: 16.3 E9/L (ref 4.5–11.5)

## 2020-04-13 PROCEDURE — 93005 ELECTROCARDIOGRAM TRACING: CPT | Performed by: PHYSICIAN ASSISTANT

## 2020-04-13 PROCEDURE — 6370000000 HC RX 637 (ALT 250 FOR IP): Performed by: INTERNAL MEDICINE

## 2020-04-13 PROCEDURE — 6360000002 HC RX W HCPCS: Performed by: PHYSICIAN ASSISTANT

## 2020-04-13 PROCEDURE — 0100U HC RESPIRPTHGN MULT REV TRANS & AMP PRB TECH 21 TRGT: CPT

## 2020-04-13 PROCEDURE — 84484 ASSAY OF TROPONIN QUANT: CPT

## 2020-04-13 PROCEDURE — 83880 ASSAY OF NATRIURETIC PEPTIDE: CPT

## 2020-04-13 PROCEDURE — 96365 THER/PROPH/DIAG IV INF INIT: CPT

## 2020-04-13 PROCEDURE — 82805 BLOOD GASES W/O2 SATURATION: CPT

## 2020-04-13 PROCEDURE — 87040 BLOOD CULTURE FOR BACTERIA: CPT

## 2020-04-13 PROCEDURE — 2700000000 HC OXYGEN THERAPY PER DAY

## 2020-04-13 PROCEDURE — 1200000000 HC SEMI PRIVATE

## 2020-04-13 PROCEDURE — 71250 CT THORAX DX C-: CPT

## 2020-04-13 PROCEDURE — U0002 COVID-19 LAB TEST NON-CDC: HCPCS

## 2020-04-13 PROCEDURE — 94760 N-INVAS EAR/PLS OXIMETRY 1: CPT

## 2020-04-13 PROCEDURE — 96367 TX/PROPH/DG ADDL SEQ IV INF: CPT

## 2020-04-13 PROCEDURE — 80053 COMPREHEN METABOLIC PANEL: CPT

## 2020-04-13 PROCEDURE — 85025 COMPLETE CBC W/AUTO DIFF WBC: CPT

## 2020-04-13 PROCEDURE — 2580000003 HC RX 258: Performed by: PHYSICIAN ASSISTANT

## 2020-04-13 PROCEDURE — 6360000002 HC RX W HCPCS: Performed by: INTERNAL MEDICINE

## 2020-04-13 PROCEDURE — 2580000003 HC RX 258: Performed by: INTERNAL MEDICINE

## 2020-04-13 PROCEDURE — 99285 EMERGENCY DEPT VISIT HI MDM: CPT

## 2020-04-13 RX ORDER — SODIUM CHLORIDE 9 MG/ML
INJECTION, SOLUTION INTRAVENOUS EVERY 8 HOURS
Status: DISCONTINUED | OUTPATIENT
Start: 2020-04-13 | End: 2020-04-14

## 2020-04-13 RX ORDER — BUDESONIDE AND FORMOTEROL FUMARATE DIHYDRATE 160; 4.5 UG/1; UG/1
2 AEROSOL RESPIRATORY (INHALATION) 2 TIMES DAILY
Status: DISCONTINUED | OUTPATIENT
Start: 2020-04-13 | End: 2020-04-16 | Stop reason: HOSPADM

## 2020-04-13 RX ORDER — ROSUVASTATIN CALCIUM 5 MG/1
5 TABLET, COATED ORAL NIGHTLY
Status: DISCONTINUED | OUTPATIENT
Start: 2020-04-13 | End: 2020-04-16 | Stop reason: HOSPADM

## 2020-04-13 RX ORDER — LOSARTAN POTASSIUM 25 MG/1
25 TABLET ORAL DAILY
Status: DISCONTINUED | OUTPATIENT
Start: 2020-04-14 | End: 2020-04-16 | Stop reason: HOSPADM

## 2020-04-13 RX ORDER — ACETAMINOPHEN 325 MG/1
650 TABLET ORAL EVERY 6 HOURS PRN
Status: DISCONTINUED | OUTPATIENT
Start: 2020-04-13 | End: 2020-04-16 | Stop reason: HOSPADM

## 2020-04-13 RX ORDER — CALCIUM CARBONATE 500(1250)
1 TABLET ORAL DAILY
Status: DISCONTINUED | OUTPATIENT
Start: 2020-04-13 | End: 2020-04-16 | Stop reason: HOSPADM

## 2020-04-13 RX ORDER — SODIUM CHLORIDE 0.9 % (FLUSH) 0.9 %
10 SYRINGE (ML) INJECTION EVERY 12 HOURS SCHEDULED
Status: DISCONTINUED | OUTPATIENT
Start: 2020-04-13 | End: 2020-04-16 | Stop reason: HOSPADM

## 2020-04-13 RX ORDER — MONTELUKAST SODIUM 10 MG/1
10 TABLET ORAL NIGHTLY
Status: DISCONTINUED | OUTPATIENT
Start: 2020-04-13 | End: 2020-04-16 | Stop reason: HOSPADM

## 2020-04-13 RX ORDER — ALBUTEROL SULFATE 90 UG/1
2 AEROSOL, METERED RESPIRATORY (INHALATION) EVERY 6 HOURS PRN
Status: DISCONTINUED | OUTPATIENT
Start: 2020-04-13 | End: 2020-04-16 | Stop reason: HOSPADM

## 2020-04-13 RX ORDER — PREDNISONE 2.5 MG
2.5 TABLET ORAL DAILY
Status: ON HOLD | COMMUNITY
End: 2020-04-16 | Stop reason: HOSPADM

## 2020-04-13 RX ORDER — PROMETHAZINE HYDROCHLORIDE 25 MG/1
12.5 TABLET ORAL EVERY 6 HOURS PRN
Status: DISCONTINUED | OUTPATIENT
Start: 2020-04-13 | End: 2020-04-13

## 2020-04-13 RX ORDER — FUROSEMIDE 20 MG/1
20 TABLET ORAL DAILY
Status: DISCONTINUED | OUTPATIENT
Start: 2020-04-13 | End: 2020-04-13

## 2020-04-13 RX ORDER — SODIUM CHLORIDE, SODIUM LACTATE, POTASSIUM CHLORIDE, AND CALCIUM CHLORIDE .6; .31; .03; .02 G/100ML; G/100ML; G/100ML; G/100ML
500 INJECTION, SOLUTION INTRAVENOUS ONCE
Status: COMPLETED | OUTPATIENT
Start: 2020-04-13 | End: 2020-04-13

## 2020-04-13 RX ORDER — PREDNISONE 1 MG/1
2.5 TABLET ORAL DAILY
Status: DISCONTINUED | OUTPATIENT
Start: 2020-04-14 | End: 2020-04-14

## 2020-04-13 RX ORDER — POLYETHYLENE GLYCOL 3350 17 G/17G
17 POWDER, FOR SOLUTION ORAL DAILY PRN
Status: DISCONTINUED | OUTPATIENT
Start: 2020-04-13 | End: 2020-04-16 | Stop reason: HOSPADM

## 2020-04-13 RX ORDER — ACETAMINOPHEN 650 MG/1
650 SUPPOSITORY RECTAL EVERY 6 HOURS PRN
Status: DISCONTINUED | OUTPATIENT
Start: 2020-04-13 | End: 2020-04-16 | Stop reason: HOSPADM

## 2020-04-13 RX ORDER — ONDANSETRON 2 MG/ML
4 INJECTION INTRAMUSCULAR; INTRAVENOUS EVERY 6 HOURS PRN
Status: DISCONTINUED | OUTPATIENT
Start: 2020-04-13 | End: 2020-04-13

## 2020-04-13 RX ORDER — SODIUM CHLORIDE 0.9 % (FLUSH) 0.9 %
10 SYRINGE (ML) INJECTION PRN
Status: DISCONTINUED | OUTPATIENT
Start: 2020-04-13 | End: 2020-04-16 | Stop reason: HOSPADM

## 2020-04-13 RX ORDER — ASPIRIN 81 MG/1
81 TABLET ORAL EVERY EVENING
Status: DISCONTINUED | OUTPATIENT
Start: 2020-04-13 | End: 2020-04-16 | Stop reason: HOSPADM

## 2020-04-13 RX ADMIN — ROSUVASTATIN CALCIUM 5 MG: 5 TABLET, FILM COATED ORAL at 22:43

## 2020-04-13 RX ADMIN — PIPERACILLIN AND TAZOBACTAM 3.38 G: 3; .375 INJECTION, POWDER, FOR SOLUTION INTRAVENOUS at 22:44

## 2020-04-13 RX ADMIN — VANCOMYCIN HYDROCHLORIDE 1.5 G: 500 INJECTION, POWDER, LYOPHILIZED, FOR SOLUTION INTRAVENOUS at 15:29

## 2020-04-13 RX ADMIN — SODIUM CHLORIDE, POTASSIUM CHLORIDE, SODIUM LACTATE AND CALCIUM CHLORIDE 500 ML: 600; 310; 30; 20 INJECTION, SOLUTION INTRAVENOUS at 19:04

## 2020-04-13 RX ADMIN — ASPIRIN 81 MG: 81 TABLET, COATED ORAL at 19:07

## 2020-04-13 RX ADMIN — MONTELUKAST SODIUM 10 MG: 10 TABLET, FILM COATED ORAL at 22:44

## 2020-04-13 RX ADMIN — BUDESONIDE AND FORMOTEROL FUMARATE DIHYDRATE 2 PUFF: 160; 4.5 AEROSOL RESPIRATORY (INHALATION) at 20:05

## 2020-04-13 RX ADMIN — ENOXAPARIN SODIUM 40 MG: 40 INJECTION SUBCUTANEOUS at 19:08

## 2020-04-13 RX ADMIN — ALBUTEROL SULFATE 2 PUFF: 90 AEROSOL, METERED RESPIRATORY (INHALATION) at 20:05

## 2020-04-13 RX ADMIN — SODIUM CHLORIDE: 9 INJECTION, SOLUTION INTRAVENOUS at 22:44

## 2020-04-13 RX ADMIN — CARVEDILOL 9.38 MG: 6.25 TABLET, FILM COATED ORAL at 19:07

## 2020-04-13 RX ADMIN — CEFEPIME HYDROCHLORIDE 2 G: 2 INJECTION, POWDER, FOR SOLUTION INTRAVENOUS at 14:23

## 2020-04-13 ASSESSMENT — PAIN SCALES - GENERAL
PAINLEVEL_OUTOF10: 0
PAINLEVEL_OUTOF10: 0

## 2020-04-13 NOTE — ED PROVIDER NOTES
Metabolic Panel   Result Value Ref Range    Sodium 135 132 - 146 mmol/L    Potassium 4.6 3.5 - 5.0 mmol/L    Chloride 99 98 - 107 mmol/L    CO2 23 22 - 29 mmol/L    Anion Gap 13 7 - 16 mmol/L    Glucose 121 (H) 74 - 99 mg/dL    BUN 20 8 - 23 mg/dL    CREATININE 1.3 (H) 0.7 - 1.2 mg/dL    GFR Non-African American 53 >=60 mL/min/1.73    GFR African American >60     Calcium 9.0 8.6 - 10.2 mg/dL    Total Protein 7.1 6.4 - 8.3 g/dL    Alb 3.1 (L) 3.5 - 5.2 g/dL    Total Bilirubin 1.1 0.0 - 1.2 mg/dL    Alkaline Phosphatase 57 40 - 129 U/L    ALT 18 0 - 40 U/L    AST 23 0 - 39 U/L   Brain Natriuretic Peptide   Result Value Ref Range    Pro-BNP 2,904 (H) 0 - 450 pg/mL   Troponin   Result Value Ref Range    Troponin 0.02 0.00 - 0.03 ng/mL   Blood Gas, Arterial   Result Value Ref Range    Date Analyzed [id number]     Time Analyzed 1312     Source: Blood Arterial     pH, Blood Gas 7.484 (H) 7.350 - 7.450    PCO2 30.9 (L) 35.0 - 45.0 mmHg    PO2 82.7 75.0 - 100.0 mmHg    HCO3 22.7 22.0 - 26.0 mmol/L    B.E. -0.1 -3.0 - 3.0 mmol/L    O2 Sat 95.9 92.0 - 98.5 %    O2Hb 94.7 94.0 - 97.0 %    COHb 1.2 0.0 - 1.5 %    MetHb 0.1 0.0 - 1.5 %    O2 Content 15.1 mL/dL    HHb 4.0 0.0 - 5.0 %    tHb (est) 11.3 (L) 11.5 - 16.5 g/dL    Mode NC-  2L     Date Of Collection      Time Collected      Pt Temp 37.0 C     ID [id number]     Lab [id number]     Critical(s) Notified . No Critical Values    EKG 12 Lead   Result Value Ref Range    Ventricular Rate 66 BPM    Atrial Rate 66 BPM    P-R Interval 218 ms    QRS Duration 130 ms    Q-T Interval 400 ms    QTc Calculation (Bazett) 419 ms    R Axis -20 degrees    T Axis 18 degrees     Imaging: All Radiology results interpreted by Radiologist unless otherwise noted. CT Chest WO Contrast   Final Result      1. Consolidative opacity in the right lower lobe is likely   infectious/inflammatory. Given the history of trauma, hemorrhage   cannot be excluded.    2. Grossly stable severe fibrotic changes in the lungs with a   peripheral and basilar predominance as is commonly seen in the usual   interstitial pneumonia. Differential diagnosis includes sequela of   collagen vascular disease. 3. Stable lymphadenopathy in the infracarinal and right hilar regions. 4. Severe calcified coronary atherosclerosis. EKG #1:  Interpreted by emergency department physician unless otherwise noted. Time:  1215    Rate: 66  Rhythm: Sinus. Interpretation: no acute changes      ED Course / Medical Decision Making     Medications   vancomycin 1.5 g in dextrose 5% 300 mL IVPB (1.5 g Intravenous New Bag 4/13/20 1529)   cefepime (MAXIPIME) 2 g IVPB extended (mini-bag) (0 g Intravenous Stopped 4/13/20 1536)        Consult(s):   IP CONSULT TO INTERNAL MEDICINE 1518 spoke with Dr. Mariluz Díaz who does not think patient is a covid r/o after reviewing CT scan. 1609 spoke with Dr. Rico Pelayo who accepted the patient. Procedure(s):   none    Medical Decision Making: Patient presenting with productive cough and worsening shortness of breath x1 week. Patient has oxygen at home as needed but normally does not need it. Patient is in no acute distress, afebrile, nontoxic in appearance. Pt dropped to 88% on RA when oxygen was removed. Patient's has an elevated white count and CT showing right lower lobe pneumonia. Spoke with Dr. Mariluz Díaz and he reviewed the CT and does not think the patient is a covid rule out. Spoke with Dr. Rico Pelayo who will admit the patient. Pt admitted 2 months ago. Patient started on vancomycin and cefepime. Counseling: The emergency provider has spoken with the patient and discussed todays results, in addition to providing specific details for the plan of care and counseling regarding the diagnosis and prognosis. Questions are answered at this time and they are agreeable with the plan. Assessment      1. Acute respiratory failure with hypoxia ([facility]    2.  Pneumonia of right lower lobe due to infectious organism [facility])

## 2020-04-13 NOTE — ED NOTES
SBAR faxed to floor spoke with Chetna Leyva, copy of fax verificarion received and in chart.      Saúl Capone RN  04/13/20 1135

## 2020-04-13 NOTE — PROGRESS NOTES
Database complete. Medications reconciled. Care plans and education initiated. Has pacer/icd, known to Dr. Lolita Price. Wears 2L prn and has been needing it the past week. Pulmonologist is Dr. Chioma Law. Known to Dr. Raheem Grossman at [address] [place] [address].

## 2020-04-13 NOTE — PROGRESS NOTES
-Consulted to dose vancomycin.  -CrCl-40-45 mL/min.  -Patient received loading dose of vancomycin 1500 mg IV x 1 in ED on 4/13 (1529). -Will initiate vancomycin 1000 mg IV q24h to begin on 4/14 at 1500.  -Projected AUC/LAYNE is 456 with this regimen, which falls in goal range AUC/LAYNE of 400-600.  -Will continue to monitor renal function and vancomycin steady state level when appropriate. -COMBINATION OF VANCOMYCIN WITH ZOSYN MAY INCREASE RISK OF ACUTE KIDNEY INJURY COMPARED TO VANCOMYCIN PLUS OTHER ANTI-PSEUDOMONAL BETA-LACTAMS LIKE CEFEPIME OR MEROPENEM.

## 2020-04-14 PROBLEM — N18.30 CKD (CHRONIC KIDNEY DISEASE) STAGE 3, GFR 30-59 ML/MIN (HCC): Chronic | Status: ACTIVE | Noted: 2020-04-14

## 2020-04-14 PROBLEM — J18.9 PNEUMONIA: Status: ACTIVE | Noted: 2020-04-14

## 2020-04-14 PROBLEM — R09.02 HYPOXIA: Status: RESOLVED | Noted: 2020-04-13 | Resolved: 2020-04-14

## 2020-04-14 PROBLEM — J96.01 ACUTE RESPIRATORY FAILURE WITH HYPOXIA (HCC): Status: RESOLVED | Noted: 2020-02-03 | Resolved: 2020-04-14

## 2020-04-14 PROBLEM — J96.21 ACUTE ON CHRONIC RESPIRATORY FAILURE WITH HYPOXIA (HCC): Status: ACTIVE | Noted: 2020-04-14

## 2020-04-14 LAB
ANION GAP SERPL CALCULATED.3IONS-SCNC: 10 MMOL/L (ref 7–16)
BASOPHILS ABSOLUTE: 0.01 E9/L (ref 0–0.2)
BASOPHILS RELATIVE PERCENT: 0.1 % (ref 0–2)
BUN BLDV-MCNC: 17 MG/DL (ref 8–23)
CALCIUM SERPL-MCNC: 8.5 MG/DL (ref 8.6–10.2)
CHLORIDE BLD-SCNC: 101 MMOL/L (ref 98–107)
CO2: 22 MMOL/L (ref 22–29)
CREAT SERPL-MCNC: 1.2 MG/DL (ref 0.7–1.2)
EKG ATRIAL RATE: 66 BPM
EKG P-R INTERVAL: 218 MS
EKG Q-T INTERVAL: 400 MS
EKG QRS DURATION: 130 MS
EKG QTC CALCULATION (BAZETT): 419 MS
EKG R AXIS: -20 DEGREES
EKG T AXIS: 18 DEGREES
EKG VENTRICULAR RATE: 66 BPM
EOSINOPHILS ABSOLUTE: 0.07 E9/L (ref 0.05–0.5)
EOSINOPHILS RELATIVE PERCENT: 0.5 % (ref 0–6)
GFR AFRICAN AMERICAN: >60
GFR NON-AFRICAN AMERICAN: 58 ML/MIN/1.73
GLUCOSE BLD-MCNC: 114 MG/DL (ref 74–99)
HCT VFR BLD CALC: 28.8 % (ref 37–54)
HEMOGLOBIN: 9.4 G/DL (ref 12.5–16.5)
IMMATURE GRANULOCYTES #: 0.08 E9/L
IMMATURE GRANULOCYTES %: 0.6 % (ref 0–5)
LYMPHOCYTES ABSOLUTE: 2.75 E9/L (ref 1.5–4)
LYMPHOCYTES RELATIVE PERCENT: 21.3 % (ref 20–42)
MCH RBC QN AUTO: 30.2 PG (ref 26–35)
MCHC RBC AUTO-ENTMCNC: 32.6 % (ref 32–34.5)
MCV RBC AUTO: 92.6 FL (ref 80–99.9)
MONOCYTES ABSOLUTE: 0.69 E9/L (ref 0.1–0.95)
MONOCYTES RELATIVE PERCENT: 5.3 % (ref 2–12)
NEUTROPHILS ABSOLUTE: 9.34 E9/L (ref 1.8–7.3)
NEUTROPHILS RELATIVE PERCENT: 72.2 % (ref 43–80)
OVALOCYTES: ABNORMAL
PDW BLD-RTO: 16 FL (ref 11.5–15)
PLATELET # BLD: 263 E9/L (ref 130–450)
PMV BLD AUTO: 8.3 FL (ref 7–12)
POIKILOCYTES: ABNORMAL
POLYCHROMASIA: ABNORMAL
POTASSIUM REFLEX MAGNESIUM: 4.1 MMOL/L (ref 3.5–5)
PROCALCITONIN: 0.33 NG/ML (ref 0–0.08)
RBC # BLD: 3.11 E12/L (ref 3.8–5.8)
SCHISTOCYTES: ABNORMAL
SODIUM BLD-SCNC: 133 MMOL/L (ref 132–146)
WBC # BLD: 12.9 E9/L (ref 4.5–11.5)

## 2020-04-14 PROCEDURE — 87450 HC DIRECT STREP B ANTIGEN: CPT

## 2020-04-14 PROCEDURE — 87081 CULTURE SCREEN ONLY: CPT

## 2020-04-14 PROCEDURE — 36415 COLL VENOUS BLD VENIPUNCTURE: CPT

## 2020-04-14 PROCEDURE — 80048 BASIC METABOLIC PNL TOTAL CA: CPT

## 2020-04-14 PROCEDURE — 6360000002 HC RX W HCPCS: Performed by: INTERNAL MEDICINE

## 2020-04-14 PROCEDURE — 2580000003 HC RX 258: Performed by: INTERNAL MEDICINE

## 2020-04-14 PROCEDURE — 85025 COMPLETE CBC W/AUTO DIFF WBC: CPT

## 2020-04-14 PROCEDURE — 97530 THERAPEUTIC ACTIVITIES: CPT

## 2020-04-14 PROCEDURE — 93010 ELECTROCARDIOGRAM REPORT: CPT | Performed by: INTERNAL MEDICINE

## 2020-04-14 PROCEDURE — 6370000000 HC RX 637 (ALT 250 FOR IP): Performed by: INTERNAL MEDICINE

## 2020-04-14 PROCEDURE — 6360000002 HC RX W HCPCS: Performed by: SPECIALIST

## 2020-04-14 PROCEDURE — 97161 PT EVAL LOW COMPLEX 20 MIN: CPT

## 2020-04-14 PROCEDURE — 2580000003 HC RX 258: Performed by: SPECIALIST

## 2020-04-14 PROCEDURE — 92526 ORAL FUNCTION THERAPY: CPT | Performed by: SPEECH-LANGUAGE PATHOLOGIST

## 2020-04-14 PROCEDURE — 1200000000 HC SEMI PRIVATE

## 2020-04-14 PROCEDURE — 84145 PROCALCITONIN (PCT): CPT

## 2020-04-14 PROCEDURE — 92610 EVALUATE SWALLOWING FUNCTION: CPT | Performed by: SPEECH-LANGUAGE PATHOLOGIST

## 2020-04-14 PROCEDURE — 2700000000 HC OXYGEN THERAPY PER DAY

## 2020-04-14 RX ORDER — LEVOFLOXACIN 5 MG/ML
500 INJECTION, SOLUTION INTRAVENOUS
Status: DISCONTINUED | OUTPATIENT
Start: 2020-04-14 | End: 2020-04-14

## 2020-04-14 RX ORDER — SODIUM CHLORIDE 9 MG/ML
INJECTION, SOLUTION INTRAVENOUS EVERY 12 HOURS
Status: DISCONTINUED | OUTPATIENT
Start: 2020-04-14 | End: 2020-04-16 | Stop reason: HOSPADM

## 2020-04-14 RX ORDER — METHYLPREDNISOLONE SODIUM SUCCINATE 40 MG/ML
40 INJECTION, POWDER, LYOPHILIZED, FOR SOLUTION INTRAMUSCULAR; INTRAVENOUS EVERY 12 HOURS
Status: COMPLETED | OUTPATIENT
Start: 2020-04-14 | End: 2020-04-15

## 2020-04-14 RX ADMIN — SODIUM CHLORIDE, PRESERVATIVE FREE 1 G: 5 INJECTION INTRAVENOUS at 09:40

## 2020-04-14 RX ADMIN — CARVEDILOL 9.38 MG: 6.25 TABLET, FILM COATED ORAL at 08:01

## 2020-04-14 RX ADMIN — ASPIRIN 81 MG: 81 TABLET, COATED ORAL at 18:30

## 2020-04-14 RX ADMIN — CALCIUM 500 MG: 500 TABLET ORAL at 08:00

## 2020-04-14 RX ADMIN — BUDESONIDE AND FORMOTEROL FUMARATE DIHYDRATE 2 PUFF: 160; 4.5 AEROSOL RESPIRATORY (INHALATION) at 08:00

## 2020-04-14 RX ADMIN — SODIUM CHLORIDE: 9 INJECTION, SOLUTION INTRAVENOUS at 20:07

## 2020-04-14 RX ADMIN — Medication 10 ML: at 08:02

## 2020-04-14 RX ADMIN — CEFEPIME 2 G: 2 INJECTION, POWDER, FOR SOLUTION INTRAVENOUS at 16:00

## 2020-04-14 RX ADMIN — BUDESONIDE AND FORMOTEROL FUMARATE DIHYDRATE 2 PUFF: 160; 4.5 AEROSOL RESPIRATORY (INHALATION) at 20:05

## 2020-04-14 RX ADMIN — ENOXAPARIN SODIUM 40 MG: 40 INJECTION SUBCUTANEOUS at 18:38

## 2020-04-14 RX ADMIN — SODIUM CHLORIDE: 9 INJECTION, SOLUTION INTRAVENOUS at 06:00

## 2020-04-14 RX ADMIN — METHYLPREDNISOLONE SODIUM SUCCINATE 40 MG: 40 INJECTION, POWDER, LYOPHILIZED, FOR SOLUTION INTRAMUSCULAR; INTRAVENOUS at 16:00

## 2020-04-14 RX ADMIN — LEVOFLOXACIN 500 MG: 500 INJECTION, SOLUTION INTRAVENOUS at 09:39

## 2020-04-14 RX ADMIN — PIPERACILLIN AND TAZOBACTAM 3.38 G: 3; .375 INJECTION, POWDER, FOR SOLUTION INTRAVENOUS at 06:00

## 2020-04-14 RX ADMIN — ACETAMINOPHEN 650 MG: 325 TABLET ORAL at 09:33

## 2020-04-14 RX ADMIN — ALBUTEROL SULFATE 2 PUFF: 90 AEROSOL, METERED RESPIRATORY (INHALATION) at 08:00

## 2020-04-14 RX ADMIN — ROSUVASTATIN CALCIUM 5 MG: 5 TABLET, FILM COATED ORAL at 20:05

## 2020-04-14 RX ADMIN — Medication 10 ML: at 22:15

## 2020-04-14 RX ADMIN — LOSARTAN POTASSIUM 25 MG: 25 TABLET ORAL at 08:00

## 2020-04-14 RX ADMIN — ALBUTEROL SULFATE 2 PUFF: 90 AEROSOL, METERED RESPIRATORY (INHALATION) at 20:14

## 2020-04-14 RX ADMIN — PREDNISONE 2.5 MG: 5 TABLET ORAL at 08:01

## 2020-04-14 RX ADMIN — MONTELUKAST SODIUM 10 MG: 10 TABLET, FILM COATED ORAL at 20:05

## 2020-04-14 ASSESSMENT — PAIN SCALES - GENERAL
PAINLEVEL_OUTOF10: 0

## 2020-04-14 NOTE — H&P
[address] [facility]  Attending History and Physical      CHIEF COMPLAINT:  Cough and shortness of breath      HISTORY OF PRESENT ILLNESS:      The patient is a 80 y.o. male patient of dr Alejandra Whatley who presents with complains of cough and shortness of breath. Patient states he has been feeling poorly for about 2 weeks. He suffers IPF. He usually does not need oxygen at rest.  He has had to use oxygen over the past 2 weeks at rest because his pulse ox was low. He denied chest pain, abdominal pain, nausea, vomiting, fevers, chills and diaphoresis. He has a chronic cough. He denies increased sputum production. He did not take anything at home for the symptoms. He presented to the hospital secondary to continued dyspnea. He is feeling no better since presentation.          Past Medical History:    Past Medical History:   Diagnosis Date    Aneurysm ([facility]     iliacs    Asthma     CAD (coronary artery disease)     Cardiomyopathy ([facility]     [facility]congest[facility] failure) ([facility]     Dilatation of aorta ([facility] 10/4/2018    Hyperlipidemia     Iliac artery aneurysm, bilateral ([facility] 10/4/2018    Inferoposterior myocardial infarction ([facility])     NSVT (nonsustained ventricular tachycardia) ([facility])        Past Surgical History:    Past Surgical History:   Procedure Laterality Date    CARDIAC CATHETERIZATION  4-    Dr. Valencia Kinsey cath used   [facility] CORONARY ANGIOPLASTY  4/15/2014    CORONARY ARTERY BYPASS GRAFT   4/16/2014    x3       Medications Prior to Admission:    Medications Prior to Admission: predniSONE (DELTASONE) 2.5 MG tablet, Take 2.5 mg by mouth daily  fluticasone (FLONASE) 50 MCG/ACT nasal spray, 1 spray by Each Nostril route daily  carvedilol (COREG) 6.25 MG tablet, Take 9.36 mg by mouth 2 times daily (with meals) 9.375 bid   montelukast (SINGULAIR) 10 MG tablet, Take 10 mg by mouth nightly  rosuvastatin (CRESTOR) 5 MG tablet, Take 1 tablet by results found for: PHOS  PT/INR:    Lab Results   Component Value Date    PROTIME 13.6 01/07/2018    INR 1.3 01/07/2018     PTT:    Lab Results   Component Value Date    APTT 27.6 01/07/2018   [APTT}  Troponin:    Lab Results   Component Value Date    TROPONINI 0.02 04/13/2020     Last 3 Troponin:    Lab Results   Component Value Date    TROPONINI 0.02 04/13/2020    TROPONINI <0.01 02/03/2020    TROPONINI <0.01 06/19/2019     U/A:    Lab Results   Component Value Date    COLORU Yellow 06/08/2018    PROTEINU Negative 06/08/2018    PHUR 5.0 06/08/2018    WBCUA NONE 06/08/2018    RBCUA NONE 06/08/2018    BACTERIA RARE 06/08/2018    CLARITYU Clear 06/08/2018    SPECGRAV 1.025 06/08/2018    LEUKOCYTESUR Negative 06/08/2018    UROBILINOGEN 0.2 06/08/2018    BILIRUBINUR Negative 06/08/2018    BLOODU TRACE 06/08/2018    GLUCOSEU Negative 06/08/2018     HgBA1c:    Lab Results   Component Value Date    LABA1C 5.7 02/04/2020     FLP:    Lab Results   Component Value Date    TRIG 110 08/08/2019    HDL 57 08/08/2019    LDLCALC 101 08/08/2019    LABVLDL 22 08/08/2019     TSH:    Lab Results   Component Value Date    TSH 0.689 02/05/2020       ASSESSMENT:      Patient Active Problem List   Diagnosis    Automatic implantable cardioverter-defibrillator in situ    [facility]idiopathic [facility] fibrosis) [facility]    Peripheral vascular disease ([facility]    PM[facility]ymyalgia [facility]a) [facility]    Coronary artery disease involving native coronary artery without angina pectoris    Hyperlipidemia LDL goal <100    Acute on chronic systolic congestive heart failure (HCC)    Acute on chronic respiratory failure with hypoxia (HCC)    CKD (chronic kidney disease) stage 3, GFR 30-59 ml/min ([facility]    Pneumonia         PLAN:    Stable. Pulmonology evaluation. Supplemental oxygen as needed. Stable. Stable. Continue medical management of ASCAD. Continue aggressive lipid therapy  Echo from 1/28/2020 reviewed.   EF of 35-40% with stage I diastolic

## 2020-04-14 NOTE — PROGRESS NOTES
Palliative medicine    Consult received  Palliative medicine to speak with patient in am 4/15  Patient was participating with PT/OT.    Code status DNR-CCA    Sim Yanez PA-C

## 2020-04-14 NOTE — CONSULTS
week     Comment: social    Drug use: No    Sexual activity: Not Currently     Partners: Female   Lifestyle    Physical activity     Days per week: Not on file     Minutes per session: Not on file    Stress: Not on file   Relationships    Social connections     Talks on phone: Not on file     Gets together: Not on file     Attends Voodoo service: Not on file     Active member of club or organization: Not on file     Attends meetings of clubs or organizations: Not on file     Relationship status: Not on file    Intimate partner violence     Fear of current or ex partner: Not on file     Emotionally abused: Not on file     Physically abused: Not on file     Forced sexual activity: Not on file   Other Topics Concern    Not on file   Social History Narrative    Not on file     Smoking history:  reports that he quit smoking about 40 years ago. His smoking use included cigarettes. He started smoking about 70 years ago. He has a 30.00 pack-year smoking history. ETOH:   reports current alcohol use. Exposures: There  is not history of TB or TB exposure. There is a remote history of asbestos exposure-not very impressive   the patient reports does not have coal, foundry, quarry or [employer] exposure. Recent travel history none. There is not  history of recreational or IV drug use. There is not hot tub exposure. The patient does not have any exotic pets, turtles or exotic birds.        Vaccines:    Influenza:  Up-to-date  Pneumococcal Polysaccharide:    Immunization History   Administered Date(s) Administered    Influenza A (J2C3-55) Vaccine PF IM 12/10/2009    Influenza Vaccine, unspecified formulation 11/09/2016    Influenza, High Dose (Fluzone 65 yrs and older) 11/05/2015, 11/13/2015, 11/09/2016, 09/15/2017, 09/07/2018, 09/05/2019    Pneumococcal Conjugate 13-valent ([id number]) 12/01/2015    Pneumococcal Conjugate Vaccine 09/18/2018    Zoster Live (Zostavax) 02/06/2013        Home Meds: Medications  sodium chloride flush  10 mL Intravenous 2 times per day    enoxaparin  40 mg Subcutaneous Daily       Continuous Infusions:  none     No Known Allergies    REVIEW OF SYSTEMS:  Constitutional: Denies weight loss, night sweats, weakness and fatigue patient did have low-grade temps this morning 100.8  °F  Skin: Denies pigmentation, dark lesions, and rashes   HEENT: Denies hearing loss, tinnitus, ear drainage, epistaxis, sore throat, and hoarseness. Cardiovascular: Denies palpitations, chest pain, and chest pressure. Respiratory: Patient no cough worsening dyspnea  Gastrointestinal: Denies nausea, vomiting, poor appetite, diarrhea, heartburn or reflux  Genitourinary: Denies dysuria, frequency, urgency or hematuria  Musculoskeletal: Denies myalgias, muscle weakness, and bone pain  Neurological: Denies dizziness, vertigo, headache, and focal weakness  Psychological: Denies anxiety and depression  Endocrine: Denies heat intolerance and cold intolerance  Hematopoietic/Lymphatic: Denies bleeding problems and blood transfusions    OBJECTIVE:   BP (!) 82/48   Pulse 82   Temp 97.6 °F (36.4 °C) (Oral)   Resp 22   Ht 5' 10\" (1.778 m)   Wt 170 lb (77.1 kg)   SpO2 93%   BMI 24.39 kg/m²   SpO2 Readings from Last 1 Encounters:   04/14/20 93%        I/O:    Intake/Output Summary (Last 24 hours) at 4/14/2020 1425  Last data filed at 4/14/2020 0559  Gross per 24 hour   Intake 232.5 ml   Output 650 ml   Net -417.5 ml                      Physical Exam:  Due to the current efforts to prevent transmission of COVID-19 and also the need to preserve PPE for other caregivers, a face-to-face encounter with the patient was not performed. That being said, all relevant records and diagnostic tests were reviewed, including laboratory results and imaging. Please reference any relevant documentation elsewhere. Care will be coordinated with the primary service.     Per pcp bibasilar crackles present   No peripheral edema daily  5. Symbicort BID, Albuterol HFA prn-resume breo on discharge  6. Resume home dose of OFEV 150 mg BID for IPF  7. Incentive spirometer  8. Speech eval -nectar thick liquids  9. DNR CCA palliative care consulted  10. Follows with Dr. Sosa Dalal as outpatient     Thank you for allowing me to participate in the care of Nikko Mclain. Please feel free to call with questions. I personally saw, examined, and cared for the patient. Labs, medications, radiographs reviewed. I agree with history exam and plans detailed in NP note with the following additions:    Mr. Miguel Gaines is an 80year old male known to me with a history of chronic hypoxic respiratory failure and combined pulmonary fibrosis emphysema syndrome. He presented with fever, cough, SOB, increased hypoxia. Now on 6L supplemental O2.  CT chest with stable areas of fibrosis and a worsening RLL consolidation consistent with pneumonia. Plan for broad cultures, ID consult (Cefepime and Levaquin), IV steroids, bronchodilators, wean oxygen as tolerated. Discussed with the patient's daughter over the phone.     Electronically signed by Tristen Frazier MD on 4/14/2020 at 4:02 PM

## 2020-04-15 ENCOUNTER — CARE COORDINATION (OUTPATIENT)
Dept: CASE MANAGEMENT | Age: 85
End: 2020-04-15

## 2020-04-15 LAB
ALBUMIN SERPL-MCNC: 2.8 G/DL (ref 3.5–5.2)
ALP BLD-CCNC: 61 U/L (ref 40–129)
ALT SERPL-CCNC: 26 U/L (ref 0–40)
ANION GAP SERPL CALCULATED.3IONS-SCNC: 11 MMOL/L (ref 7–16)
ANISOCYTOSIS: ABNORMAL
AST SERPL-CCNC: 32 U/L (ref 0–39)
BASOPHILS ABSOLUTE: 0.01 E9/L (ref 0–0.2)
BASOPHILS RELATIVE PERCENT: 0.1 % (ref 0–2)
BILIRUB SERPL-MCNC: 0.6 MG/DL (ref 0–1.2)
BUN BLDV-MCNC: 20 MG/DL (ref 8–23)
BURR CELLS: ABNORMAL
C-REACTIVE PROTEIN: 23.1 MG/DL (ref 0–0.4)
CALCIUM SERPL-MCNC: 8.7 MG/DL (ref 8.6–10.2)
CHLORIDE BLD-SCNC: 97 MMOL/L (ref 98–107)
CO2: 23 MMOL/L (ref 22–29)
CREAT SERPL-MCNC: 1.2 MG/DL (ref 0.7–1.2)
EOSINOPHILS ABSOLUTE: 0 E9/L (ref 0.05–0.5)
EOSINOPHILS RELATIVE PERCENT: 0 % (ref 0–6)
GFR AFRICAN AMERICAN: >60
GFR NON-AFRICAN AMERICAN: 58 ML/MIN/1.73
GLUCOSE BLD-MCNC: 176 MG/DL (ref 74–99)
HCT VFR BLD CALC: 29.1 % (ref 37–54)
HEMOGLOBIN: 9.3 G/DL (ref 12.5–16.5)
IMMATURE GRANULOCYTES #: 0.07 E9/L
IMMATURE GRANULOCYTES %: 0.5 % (ref 0–5)
L. PNEUMOPHILA SEROGP 1 UR AG: NORMAL
LYMPHOCYTES ABSOLUTE: 3.49 E9/L (ref 1.5–4)
LYMPHOCYTES RELATIVE PERCENT: 25.2 % (ref 20–42)
MAGNESIUM: 2.5 MG/DL (ref 1.6–2.6)
MCH RBC QN AUTO: 29.8 PG (ref 26–35)
MCHC RBC AUTO-ENTMCNC: 32 % (ref 32–34.5)
MCV RBC AUTO: 93.3 FL (ref 80–99.9)
MONOCYTES ABSOLUTE: 0.25 E9/L (ref 0.1–0.95)
MONOCYTES RELATIVE PERCENT: 1.8 % (ref 2–12)
NEUTROPHILS ABSOLUTE: 10.01 E9/L (ref 1.8–7.3)
NEUTROPHILS RELATIVE PERCENT: 72.4 % (ref 43–80)
OVALOCYTES: ABNORMAL
PDW BLD-RTO: 15.9 FL (ref 11.5–15)
PLATELET # BLD: 312 E9/L (ref 130–450)
PMV BLD AUTO: 8.5 FL (ref 7–12)
POIKILOCYTES: ABNORMAL
POLYCHROMASIA: ABNORMAL
POTASSIUM SERPL-SCNC: 4.1 MMOL/L (ref 3.5–5)
RBC # BLD: 3.12 E12/L (ref 3.8–5.8)
SCHISTOCYTES: ABNORMAL
SEDIMENTATION RATE, ERYTHROCYTE: 112 MM/HR (ref 0–15)
SODIUM BLD-SCNC: 131 MMOL/L (ref 132–146)
STREP PNEUMONIAE ANTIGEN, URINE: NORMAL
TOTAL PROTEIN: 6.6 G/DL (ref 6.4–8.3)
WBC # BLD: 13.8 E9/L (ref 4.5–11.5)

## 2020-04-15 PROCEDURE — 85025 COMPLETE CBC W/AUTO DIFF WBC: CPT

## 2020-04-15 PROCEDURE — 87070 CULTURE OTHR SPECIMN AEROBIC: CPT

## 2020-04-15 PROCEDURE — 2700000000 HC OXYGEN THERAPY PER DAY

## 2020-04-15 PROCEDURE — 97530 THERAPEUTIC ACTIVITIES: CPT

## 2020-04-15 PROCEDURE — 6360000002 HC RX W HCPCS: Performed by: INTERNAL MEDICINE

## 2020-04-15 PROCEDURE — 6370000000 HC RX 637 (ALT 250 FOR IP): Performed by: INTERNAL MEDICINE

## 2020-04-15 PROCEDURE — 36415 COLL VENOUS BLD VENIPUNCTURE: CPT

## 2020-04-15 PROCEDURE — 99221 1ST HOSP IP/OBS SF/LOW 40: CPT | Performed by: PHYSICIAN ASSISTANT

## 2020-04-15 PROCEDURE — 87205 SMEAR GRAM STAIN: CPT

## 2020-04-15 PROCEDURE — 87206 SMEAR FLUORESCENT/ACID STAI: CPT

## 2020-04-15 PROCEDURE — 6360000002 HC RX W HCPCS: Performed by: SPECIALIST

## 2020-04-15 PROCEDURE — 97165 OT EVAL LOW COMPLEX 30 MIN: CPT

## 2020-04-15 PROCEDURE — 83735 ASSAY OF MAGNESIUM: CPT

## 2020-04-15 PROCEDURE — 6370000000 HC RX 637 (ALT 250 FOR IP): Performed by: SPECIALIST

## 2020-04-15 PROCEDURE — 85651 RBC SED RATE NONAUTOMATED: CPT

## 2020-04-15 PROCEDURE — 80053 COMPREHEN METABOLIC PANEL: CPT

## 2020-04-15 PROCEDURE — 1200000000 HC SEMI PRIVATE

## 2020-04-15 PROCEDURE — 2580000003 HC RX 258: Performed by: SPECIALIST

## 2020-04-15 PROCEDURE — 86140 C-REACTIVE PROTEIN: CPT

## 2020-04-15 PROCEDURE — 2580000003 HC RX 258: Performed by: INTERNAL MEDICINE

## 2020-04-15 RX ORDER — PREDNISONE 20 MG/1
40 TABLET ORAL DAILY
Status: DISCONTINUED | OUTPATIENT
Start: 2020-04-16 | End: 2020-04-16 | Stop reason: HOSPADM

## 2020-04-15 RX ADMIN — Medication 10 ML: at 08:14

## 2020-04-15 RX ADMIN — CEFEPIME 2 G: 2 INJECTION, POWDER, FOR SOLUTION INTRAVENOUS at 03:10

## 2020-04-15 RX ADMIN — CEFEPIME 2 G: 2 INJECTION, POWDER, FOR SOLUTION INTRAVENOUS at 15:43

## 2020-04-15 RX ADMIN — LOSARTAN POTASSIUM 25 MG: 25 TABLET ORAL at 08:14

## 2020-04-15 RX ADMIN — METHYLPREDNISOLONE SODIUM SUCCINATE 40 MG: 40 INJECTION, POWDER, LYOPHILIZED, FOR SOLUTION INTRAMUSCULAR; INTRAVENOUS at 15:43

## 2020-04-15 RX ADMIN — MONTELUKAST SODIUM 10 MG: 10 TABLET, FILM COATED ORAL at 20:00

## 2020-04-15 RX ADMIN — SODIUM CHLORIDE: 9 INJECTION, SOLUTION INTRAVENOUS at 19:58

## 2020-04-15 RX ADMIN — BUDESONIDE AND FORMOTEROL FUMARATE DIHYDRATE 2 PUFF: 160; 4.5 AEROSOL RESPIRATORY (INHALATION) at 08:14

## 2020-04-15 RX ADMIN — LEVOFLOXACIN 750 MG: 500 TABLET, FILM COATED ORAL at 08:14

## 2020-04-15 RX ADMIN — Medication 10 ML: at 20:00

## 2020-04-15 RX ADMIN — ASPIRIN 81 MG: 81 TABLET, COATED ORAL at 18:54

## 2020-04-15 RX ADMIN — SODIUM CHLORIDE: 9 INJECTION, SOLUTION INTRAVENOUS at 07:30

## 2020-04-15 RX ADMIN — BUDESONIDE AND FORMOTEROL FUMARATE DIHYDRATE 2 PUFF: 160; 4.5 AEROSOL RESPIRATORY (INHALATION) at 20:00

## 2020-04-15 RX ADMIN — METHYLPREDNISOLONE SODIUM SUCCINATE 40 MG: 40 INJECTION, POWDER, LYOPHILIZED, FOR SOLUTION INTRAMUSCULAR; INTRAVENOUS at 03:10

## 2020-04-15 RX ADMIN — ENOXAPARIN SODIUM 40 MG: 40 INJECTION SUBCUTANEOUS at 18:47

## 2020-04-15 RX ADMIN — CALCIUM 500 MG: 500 TABLET ORAL at 08:14

## 2020-04-15 RX ADMIN — ROSUVASTATIN CALCIUM 5 MG: 5 TABLET, FILM COATED ORAL at 20:00

## 2020-04-15 ASSESSMENT — PAIN SCALES - GENERAL: PAINLEVEL_OUTOF10: 0

## 2020-04-15 NOTE — CARE COORDINATION
COVID negative from 4/13- continuing Droplet Plus isolation per protocol. PT am-pac 17- minimal assist. Palliative care to see pt today- DNR-CCA. Currently on O2 3lNC- wears home O2 at night through [address]. Currenntly on iv Cefepime and po Levaquin- will wait for ID final plan. Discharge plan unknown at present pending progress- from home PTA- from previous CM assessment,  Pt lives with wife whom pt reports is not well .  Will continue to follow Evangelista Dash

## 2020-04-15 NOTE — PROGRESS NOTES
[address] [place] [address] [facility]  Progress Note    SUBJECTIVE:  Chief Complaint   Patient presents with    Cough    Fatigue     Patient still has a cough. It is minimally productive. He says that nursing took out the sputum on the sink and put it in a container and sent it to the lab. No nausea or vomiting. Minimal dyspnea. No diarrhea. Review of systems:  As stated above in the chief complaint, otherwise negative. Medications:  Scheduled Meds:   methylPREDNISolone  40 mg Intravenous Q12H    cefepime  2 g Intravenous Q12H    levoFLOXacin  750 mg Oral Every Other Day    Nintedanib Esylate  150 mg Oral BID    aspirin  81 mg Oral QPM    calcium elemental  1 tablet Oral Daily    carvedilol  9.375 mg Oral BID WC    budesonide-formoterol  2 puff Inhalation BID    losartan  25 mg Oral Daily    montelukast  10 mg Oral Nightly    rosuvastatin  5 mg Oral Nightly    sodium chloride flush  10 mL Intravenous 2 times per day    enoxaparin  40 mg Subcutaneous Daily     Continuous Infusions:   sodium chloride 12.5 mL/hr at 04/15/20 0730     PRN Meds:albuterol sulfate HFA, sodium chloride flush, acetaminophen **OR** acetaminophen, polyethylene glycol    OBJECTIVE:  BP 84/64 Comment: manual  Pulse 74   Temp 97.6 °F (36.4 °C) (Oral)   Resp 20   Ht 5' 10\" (1.778 m)   Wt 170 lb (77.1 kg)   SpO2 94%   BMI 24.39 kg/m²   Temp  Av.8 °F (36.6 °C)  Min: 97.6 °F (36.4 °C)  Max: 98.1 °F (36.7 °C)  Constitutional: The patient is awake, alert, and oriented. Sitting in chair. No distress. Skin: Warm and dry. No rashes were noted. HEENT: Round and reactive pupils. Moist mucous membranes. No ulcerations or thrush. Neck: Supple to movements. Chest: Bilateral inspiratory crackles on bases posteriorly. Cardiovascular: Heart sounds rhythmic and regular. Abdomen: Positive bowel sounds to auscultation. Benign to palpation. No masses felt. Extremities: No edema.   Lines:

## 2020-04-15 NOTE — CONSULTS
of systems negative/unremarkable and patient in COVID-19 isolation    Objective:   PHYSICAL EXAM:   Patient observed through window outside room. I did not enter room due to current isolation status and hospital system recommendations. Vitals:    BP 84/64 Comment: manual  Pulse 74   Temp 97.6 °F (36.4 °C) (Oral)   Resp 20   Ht 5' 10\" (1.778 m)   Wt 170 lb (77.1 kg)   SpO2 93%   BMI 24.39 kg/m²   Gen: awake and alert sitting in a chair OOB  HEENT: normocephalic/atraumatic  Lungs: respirations nonlabored   Heart: regular rate and rhythm per monitor  Abdomen: last BM none documented  Skin: not jaundice  Neuro: appropriate conversation, good historian    Results/Verification of Data Review  Objective data reviewed: labs, images, records, medication use, vitals and chart    LABS:  Recent Labs     04/13/20  1238 04/14/20  0610 04/15/20  0342    133 131*   K 4.6 4.1 4.1   CL 99 101 97*   CO2 23 22 23   BUN 20 17 20   CREATININE 1.3* 1.2 1.2   GLUCOSE 121* 114* 176*   CALCIUM 9.0 8.5* 8.7       Recent Labs     04/13/20  1238 04/14/20  0610 04/15/20  0342   WBC 16.3* 12.9* 13.8*   RBC 3.50* 3.11* 3.12*   HGB 10.6* 9.4* 9.3*   HCT 32.7* 28.8* 29.1*   MCV 93.4 92.6 93.3   MCH 30.3 30.2 29.8   MCHC 32.4 32.6 32.0   RDW 16.0* 16.0* 15.9*    263 312   MPV 8.5 8.3 8.5       No results for input(s): POCGLU in the last 72 hours.     CBC with Differential:    Lab Results   Component Value Date    WBC 13.8 04/15/2020    RBC 3.12 04/15/2020    HGB 9.3 04/15/2020    HCT 29.1 04/15/2020     04/15/2020    MCV 93.3 04/15/2020    MCH 29.8 04/15/2020    MCHC 32.0 04/15/2020    RDW 15.9 04/15/2020    NRBC 0.0 01/11/2018    LYMPHOPCT 25.2 04/15/2020    PROMYELOPCT 0.9 01/11/2018    MONOPCT 1.8 04/15/2020    BASOPCT 0.1 04/15/2020    MONOSABS 0.25 04/15/2020    LYMPHSABS 3.49 04/15/2020    EOSABS 0.00 04/15/2020    BASOSABS 0.01 04/15/2020     Hepatic Function Panel:    Lab Results   Component Value Date    [facility] 61 04/15/2020    ALT 26 04/15/2020    AST 32 04/15/2020    PROT 6.6 04/15/2020    BILITOT 0.6 04/15/2020    BILIDIR 0.2 2019    IBILI 0.3 2019    LABALBU 2.8 04/15/2020    LABALBU 4.2 2011       Radiology: Ct Chest Wo Contrast    Result Date: 2020  Patient MRN:  [id number] Patient :  1935 Patient Age:  80 years Patient Gender:  Male Order Date:2020 12:30 PM EXAM:  CT CHEST WO CONTRAST INDICATION:   trauma trauma  COMPARISON: CT of the chest without contrast Technique: Low-dose CT  acquisition technique included one of following options; 1 . Automated exposure control, 2. Adjustment of MA and or KV according to patient's size or 3. Use of iterative reconstruction. Multiple axial images were obtained from the apices of the lungs through the lung bases. Sagittal and coronal reconstructions performed for aid in interpretation of the study. Rafat Cuff FINDINGS: Prominent bilateral pulmonary consolidations are seen in the right lower lobe. There are extensive pulmonary fibrotic changes with a peripheral and basilar predominance, along with honeycombing. The central airway is clear. The heart is mildly enlarged. Severe calcified coronary atherosclerosis noted. AICD in situ. Mild calcified atherosclerosis is seen in the thoracic aorta. No aneurysm. The pulmonary arterial trunk is of normal caliber. Enlarged lymph nodes are seen in the mediastinum and the right hilar region. Cholelithiasis is noted. 2 mm nonobstructive right renal calculus is visualized. Evaluation of the bones reveals no fracture or destructive lesion. Stable lucency in the T12 vertebral body likely represents a hemangioma. 1. Consolidative opacity in the right lower lobe is likely infectious/inflammatory. Given the history of trauma, hemorrhage cannot be excluded. 2. Grossly stable severe fibrotic changes in the lungs with a peripheral and basilar predominance as is commonly seen in the usual interstitial pneumonia. specifically stated this time he does not want intubated for any reason including for treatment of respiratory distress even if he had an underlying pneumonia or other issue. He feels with how bad his lungs are in his heart that he would not recover well. He plans to go home where he resides with his wife. He has concerns about going home and exposure to his son who is traveling from [place]. He reports his son however was tested for COVID-19 and negative. Patient is agreeable to current treatments and would wish to come to the hospital if needed. He does not wish to pursue aggressive interventions such as surgical procedures or resuscitation. Family Meeting:  Discussion held over telephone due to current visitor restriction secondary to [address] pandemic    Participants:patient  Family meeting was held to discuss:prognosis, treatment options, goals of care, prior expressed wishes, advanced care planning, symptom management and discharge plan     Discharge planning: discharge to home    Discussed patient and the plan of care with the other IDT members of Palliative Care, and with patient and floor nurse. Referrals to: none today    Time/Communication  Total time to complete consult/progress note 40 minutes, including chart review, coordination of care, discussion with attending/bedside RN, conversation with family. Total time communicating with family over the telephone secondary to current visitor restrictions due to COVID-19 pandemic 15 minutes regarding goals of care, symptom management, diagnosis and prognosis,  medical update and support due to visitor restriction and see above. Marixa Ayala PA-C  Palliative Medicine    Thank you for allowing Palliative Medicine to participate in the care of Edson Marshall. Note: This report was completed using [employer] voiced recognition software.   Every effort has been made to ensure accuracy; however, inadvertent computerized transcription errors may be present.

## 2020-04-15 NOTE — PROGRESS NOTES
2. Community acquired pneumonia -right lower lobe   3. 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 IPF-on OFEV at home (his asbestos exposure is not very impressive)  5. Ischemic cardiomyopathy, HFrEF 35-40%  6. History of recurrent pneumonia  7. Chronic daily prednisone 2.5 mg-immunocompromised  8. Leukocytosis   9. CKD  10. Nicotine dependence in remission  11. Physical deconditioning       Plan:     1. Oxygen therapy weaned to 3 L today this is his baseline between 2 and 3 L nasal cannula at home  2. CTA chest with stable areas of fibrosis and worsening right lower lobe consolidation consistent with pneumonia  3. Antibiotic management per ID service- Cefepime Q 12 hrs and Levaquin with renal adjustment QOD. Received 1 dose vancomycin. Procalcitonin 0.33, Bacterial antigens negative  4. Solu-Medrol 40 mg every 12 hours with taper to oral prednisone in a.m.- baseline is 2.5 mg oral prednisone daily  5. Symbicort BID, Albuterol HFA prn-resume breo on discharge  6. OFEV 150 mg BID for IPF  7. Incentive spirometer  8. Speech eval -nectar thick liquids  9. DNR [facility] palliative care consulted  10. Follows with Dr. Dayana Hawkins as outpatient   11. Hope to DC in the next 24 hours  This plan of care was reviewed in collaboration with Dr. Dayana Hawkins  Electronically signed by WILFRIDO Ny CNP on 4/15/2020 at 2:40 PM    I personally saw, examined, and cared for the patient. Labs, medications, radiographs reviewed.  I agree with history exam and plans detailed in NP note with the following additions:    Doing better today on 3L  Appreciate ID input - Cefepime and doxy  Change to oral prednisone as of tomorrow  Cultures thus far negative  Possible d/c tomorrow

## 2020-04-15 NOTE — PROGRESS NOTES
Subjective: The patient is awake and alert. No problems overnight. Denies chest pain, angina. Breathing a little better. Denies abdominal pain. Tolerating diet. No nausea or vomiting. Objective:    BP 84/64 Comment: manual  Pulse 74   Temp 97.6 °F (36.4 °C) (Oral)   Resp 20   Ht 5' 10\" (1.778 m)   Wt 170 lb (77.1 kg)   SpO2 94%   BMI 24.39 kg/m²     Current medications that patient is taking have been reviewed. Heart:  RRR, no murmurs, gallops, or rubs.   Lungs:  Bilateral crackles  Abd: bowel sounds present, soft, nontender, nondistended, no masses  Extrem:  No cyanosis or edema    CBC with Differential:    Lab Results   Component Value Date    WBC 13.8 04/15/2020    RBC 3.12 04/15/2020    HGB 9.3 04/15/2020    HCT 29.1 04/15/2020     04/15/2020    MCV 93.3 04/15/2020    MCH 29.8 04/15/2020    MCHC 32.0 04/15/2020    RDW 15.9 04/15/2020    NRBC 0.0 01/11/2018    LYMPHOPCT 25.2 04/15/2020    PROMYELOPCT 0.9 01/11/2018    MONOPCT 1.8 04/15/2020    BASOPCT 0.1 04/15/2020    MONOSABS 0.25 04/15/2020    LYMPHSABS 3.49 04/15/2020    EOSABS 0.00 04/15/2020    BASOSABS 0.01 04/15/2020     CMP:    Lab Results   Component Value Date     04/15/2020    K 4.1 04/15/2020    K 4.1 04/14/2020    CL 97 04/15/2020    CO2 23 04/15/2020    BUN 20 04/15/2020    CREATININE 1.2 04/15/2020    GFRAA >60 04/15/2020    LABGLOM 58 04/15/2020    GLUCOSE 176 04/15/2020    GLUCOSE 156 05/26/2011    PROT 6.6 04/15/2020    LABALBU 2.8 04/15/2020    LABALBU 4.2 05/26/2011    CALCIUM 8.7 04/15/2020    BILITOT 0.6 04/15/2020    ALKPHOS 61 04/15/2020    AST 32 04/15/2020    ALT 26 04/15/2020     BMP:    Lab Results   Component Value Date     04/15/2020    K 4.1 04/15/2020    K 4.1 04/14/2020    CL 97 04/15/2020    CO2 23 04/15/2020    BUN 20 04/15/2020    LABALBU 2.8 04/15/2020    LABALBU 4.2 05/26/2011    CREATININE 1.2 04/15/2020    CALCIUM 8.7 04/15/2020    GFRAA >60 04/15/2020    LABGLOM 58 04/15/2020 GLUCOSE 176 04/15/2020    GLUCOSE 156 05/26/2011     Magnesium:    Lab Results   Component Value Date    MG 2.5 04/15/2020     Phosphorus:  No results found for: PHOS  PT/INR:    Lab Results   Component Value Date    PROTIME 13.6 01/07/2018    INR 1.3 01/07/2018     PTT:    Lab Results   Component Value Date    APTT 27.6 01/07/2018   [APTT}     Assessment:    Patient Active Problem List   Diagnosis    Automatic implantable cardioverter-defibrillator in situ    IPF [facility]idiopathic pulmonary fibrosis) ([facility])    Peripheral vascular disease ([facility])    PMR (polymyalgia rheumatica) ([facility])    Coronary artery disease involving native coronary artery without angina pectoris    Hyperlipidemia LDL goal <100    Acute on chronic systolic congestive heart failure ([facility])    Acute on chronic respiratory failure with hypoxia ([facility])    CKD (chronic kidney disease) stage 3, GFR 30-59 ml/min ([facility])    Pneumonia       Plan:    Stable. Progressive. Stable. Stable. Continue medical management of ASCAD. Continue aggressive lipid therapy  Acute CHF ruled out. Secondary to pneumonia and pulmonary fibrosis. HD stable. Renal function at baseline. Treating for acute bacterial pneumonia. Antibiotics. Aggressive pulmonary hygiene.   Pt/Ot evaluations for discharge planning    Alvin Pritchett    8:59 AM  4/15/2020

## 2020-04-16 VITALS
WEIGHT: 170 LBS | RESPIRATION RATE: 20 BRPM | BODY MASS INDEX: 24.34 KG/M2 | HEIGHT: 70 IN | OXYGEN SATURATION: 91 % | SYSTOLIC BLOOD PRESSURE: 115 MMHG | TEMPERATURE: 97.6 F | HEART RATE: 65 BPM | DIASTOLIC BLOOD PRESSURE: 59 MMHG

## 2020-04-16 LAB
GRAM STAIN ORDERABLE: NORMAL
MRSA CULTURE ONLY: NORMAL

## 2020-04-16 PROCEDURE — 97530 THERAPEUTIC ACTIVITIES: CPT

## 2020-04-16 PROCEDURE — 6370000000 HC RX 637 (ALT 250 FOR IP): Performed by: INTERNAL MEDICINE

## 2020-04-16 PROCEDURE — 2700000000 HC OXYGEN THERAPY PER DAY

## 2020-04-16 PROCEDURE — 2580000003 HC RX 258: Performed by: INTERNAL MEDICINE

## 2020-04-16 PROCEDURE — 6360000002 HC RX W HCPCS: Performed by: SPECIALIST

## 2020-04-16 PROCEDURE — 2580000003 HC RX 258: Performed by: SPECIALIST

## 2020-04-16 RX ORDER — CEFDINIR 300 MG/1
300 CAPSULE ORAL EVERY 12 HOURS SCHEDULED
Qty: 14 CAPSULE | Refills: 0 | Status: SHIPPED | OUTPATIENT
Start: 2020-04-16 | End: 2020-04-23

## 2020-04-16 RX ORDER — LEVOFLOXACIN 750 MG/1
750 TABLET ORAL EVERY OTHER DAY
Qty: 3 TABLET | Refills: 0 | Status: SHIPPED | OUTPATIENT
Start: 2020-04-17 | End: 2020-04-24

## 2020-04-16 RX ORDER — PREDNISONE 10 MG/1
TABLET ORAL
Qty: 14 TABLET | Refills: 0 | Status: SHIPPED | OUTPATIENT
Start: 2020-04-16 | End: 2020-04-16

## 2020-04-16 RX ORDER — PREDNISONE 10 MG/1
TABLET ORAL
Qty: 14 TABLET | Refills: 0 | Status: SHIPPED | OUTPATIENT
Start: 2020-04-16 | End: 2020-09-24 | Stop reason: DRUGHIGH

## 2020-04-16 RX ORDER — CEFDINIR 300 MG/1
300 CAPSULE ORAL EVERY 12 HOURS SCHEDULED
Status: DISCONTINUED | OUTPATIENT
Start: 2020-04-16 | End: 2020-04-16 | Stop reason: HOSPADM

## 2020-04-16 RX ADMIN — PREDNISONE 40 MG: 20 TABLET ORAL at 07:52

## 2020-04-16 RX ADMIN — CEFEPIME 2 G: 2 INJECTION, POWDER, FOR SOLUTION INTRAVENOUS at 03:37

## 2020-04-16 RX ADMIN — CARVEDILOL 9.38 MG: 6.25 TABLET, FILM COATED ORAL at 07:52

## 2020-04-16 RX ADMIN — LOSARTAN POTASSIUM 25 MG: 25 TABLET ORAL at 07:52

## 2020-04-16 RX ADMIN — SODIUM CHLORIDE: 9 INJECTION, SOLUTION INTRAVENOUS at 07:45

## 2020-04-16 RX ADMIN — Medication 10 ML: at 07:53

## 2020-04-16 RX ADMIN — CALCIUM 500 MG: 500 TABLET ORAL at 07:52

## 2020-04-16 ASSESSMENT — PAIN SCALES - GENERAL: PAINLEVEL_OUTOF10: 0

## 2020-04-16 NOTE — PROGRESS NOTES
Time  20 minutes     Evaluation Time includes thorough review of current medical information, gathering information on past medical history/social history and prior level of function, completion of standardized testing/informal observation of tasks, assessment of data and education on plan of care and goals. CPT codes:  [] Low Complexity PT evaluation 10089  [] Moderate Complexity PT evaluation 63066  [] High Complexity PT evaluation 31059  [] PT Re-evaluation 54944  [] Gait training 75736 ** minutes  [] Manual therapy 14623 ** minutes  [x] Therapeutic activities 54820 20 minutes  [] Therapeutic exercises 77609 ** minutes  [] Neuromuscular reeducation 35340 ** minutes     Den Torres., P.T.   License Number: [id number]

## 2020-04-16 NOTE — PROGRESS NOTES
Pulmonary Subsequent Hospital F/U note    Patient is being followed for: pneumonia, acute on chronic hypoxic respiratory failure, ILD    Interval HPI:  Mr. Maxwell Madera is feeling better. He has a cough with clear/white sputum. No hemoptysis. He is not having any fevers. Wants to go home today  He is ambulating around his room    ROS:  Denies fever, night sweats  Denies hemoptysis  Denies chest pain, edema, palpitations  Denies nausea    Exam:  BP (!) 115/59   Pulse 65   Temp 97.6 °F (36.4 °C) (Oral)   Resp 20   Ht 5' 10\" (1.778 m)   Wt 170 lb (77.1 kg)   SpO2 91%   BMI 24.39 kg/m²    Due to the current efforts to prevent transmission of COVID-19 and also the need to preserve PPE for other caregivers, a face-to-face encounter with the patient was not performed. That being said, all relevant records and diagnostic tests were reviewed, including laboratory results and imaging. Please reference any relevant documentation elsewhere. Care will be coordinated with the primary service. Data:    Oximetry:  SpO2 Readings from Last 1 Encounters:   04/16/20 91%       Imaging personally reviewed by myself:  CT chest     Impression       1. Consolidative opacity in the right lower lobe is likely   infectious/inflammatory. Given the history of trauma, hemorrhage   cannot be excluded. 2. Grossly stable severe fibrotic changes in the lungs with a   peripheral and basilar predominance as is commonly seen in the usual   interstitial pneumonia. Differential diagnosis includes sequela of   collagen vascular disease. 3. Stable lymphadenopathy in the infracarinal and right hilar regions. 4. Severe calcified coronary atherosclerosis.      Respiratory culture neg  Strep/legionella neg  Viral panel neg  covid neg      Pertinent labs reviewed and noted:  Lab Results   Component Value Date    WBC 13.8 04/15/2020    HGB 9.3 04/15/2020    HCT 29.1 04/15/2020    MCV 93.3 04/15/2020    MCH 29.8 04/15/2020    MCHC 32.0 04/15/2020

## 2020-04-16 NOTE — HOME CARE
Referral received for home health care. Spoke with spouse and daughter. Demographics verified. Will plan to see after discharge. Intake to follow.    Melisa Abdalla LPN/JUAN

## 2020-04-17 ENCOUNTER — CARE COORDINATION (OUTPATIENT)
Dept: CASE MANAGEMENT | Age: 85
End: 2020-04-17

## 2020-04-17 LAB
CULTURE, RESPIRATORY: ABNORMAL
CULTURE, RESPIRATORY: ABNORMAL
ORGANISM: ABNORMAL
SMEAR, RESPIRATORY: ABNORMAL

## 2020-04-17 NOTE — DISCHARGE INSTR - COC
Continuity of Care Form    Patient Name: Pratibha Huang   :  1935  MRN:  [id number]    Admit date:  2020  Discharge date:  ***    Code Status Order: Prior   Advance Directives:   [facility] Documentation     Date/Time Healthcare Directive Type of Healthcare Directive Copy in [address] Agent's Name Healthcare Agent's Phone Number    [phone]  Yes, patient has an advance directive for healthcare treatment  Living will;Health care treatment directive  No, copy requested from family  Spouse  Kamryn Llanes  [phone]          Admitting Physician:  Isabella Wakefield MD  PCP: Onur Giordano MD    Discharging Nurse: [facility] Unit/Room#: 5609/7634-J  Discharging Unit Phone Number: ***    Emergency Contact:   Extended Emergency Contact Information  Primary Emergency Contact: [facility] Phone: [phone]  Mobile Phone: [phone]  Relation: Spouse  Preferred language: English   needed? No  Secondary Emergency Contact: [facility]  Address: [address] [place] [address] Phone: [phone]  Mobile Phone: [phone]  Relation: Child  Preferred language: English   needed?  No    Past Surgical History:  Past Surgical History:   Procedure Laterality Date    CARDIAC CATHETERIZATION  4-    Dr. German Baugh cath used   [address] [facility]  4/15/2014    CORONARY ARTERY BYPASS GRAFT   4/16/2014    x3       Immunization History:   Immunization History   Administered Date(s) Administered    Influenza A ([id number]) Vaccine PF IM 12/10/2009    Influenza Vaccine, unspecified formulation 2016    Influenza, High Dose (Fluzone 65 yrs and older) 2015, 2015, 2016, 09/15/2017, 2018, 2019    Pneumococcal Conjugate 13-valent ([id number]) 2015    Pneumococcal Conjugate Vaccine ***    Physician Certification: I certify the above information and transfer of Jacob Lenz  is necessary for the continuing treatment of the diagnosis listed and that he requires {Admit to Appropriate Level of Care:57885} for {GREATER/LESS:726193744} 30 days.      Update Admission H&P: {CHP DME Changes in YXRPT:299581494}    PHYSICIAN SIGNATURE:  {Esignature:[id number]}

## 2020-04-17 NOTE — DISCHARGE SUMMARY
diet    Follow up with dr Jodi Lemos in 1 week. Follow up with dr Priyank Mason in 2-3 weeks. Follow up with dr Den dewitt in 1-2 weeks.       Note that over 30 minutes was spent in preparing discharge papers, discussing discharge with patient, medication review, etc.    Signed:  Cain Leal    4/17/2020  12:38 PM

## 2020-04-18 LAB
BLOOD CULTURE, ROUTINE: NORMAL
CULTURE, BLOOD 2: NORMAL

## 2020-04-24 ENCOUNTER — CARE COORDINATION (OUTPATIENT)
Dept: CASE MANAGEMENT | Age: 85
End: 2020-04-24

## 2020-06-09 ENCOUNTER — NURSE ONLY (OUTPATIENT)
Dept: NON INVASIVE DIAGNOSTICS | Age: 85
End: 2020-06-09
Payer: MEDICARE

## 2020-06-09 PROCEDURE — 93296 REM INTERROG EVL PM/IDS: CPT | Performed by: INTERNAL MEDICINE

## 2020-06-09 PROCEDURE — 93295 DEV INTERROG REMOTE 1/2/MLT: CPT | Performed by: INTERNAL MEDICINE

## 2020-06-09 NOTE — PROGRESS NOTES
See [facility] [place] report. Remote monitoring reviewed over a 90 day period. End of 90 day monitoring period date of service 6.9.2020.

## 2020-06-16 RX ORDER — ROSUVASTATIN CALCIUM 5 MG/1
5 TABLET, COATED ORAL NIGHTLY
Qty: 30 TABLET | Refills: 5 | Status: SHIPPED
Start: 2020-06-16 | End: 2021-01-01 | Stop reason: ALTCHOICE

## 2020-06-16 RX ORDER — CARVEDILOL 6.25 MG/1
6.25 TABLET ORAL 2 TIMES DAILY WITH MEALS
Qty: 60 TABLET | Refills: 11 | Status: ON HOLD
Start: 2020-06-16 | End: 2021-01-01 | Stop reason: HOSPADM

## 2020-06-16 RX ORDER — CARVEDILOL 3.12 MG/1
3.12 TABLET ORAL 2 TIMES DAILY WITH MEALS
Qty: 60 TABLET | Refills: 11 | Status: ON HOLD
Start: 2020-06-16 | End: 2021-01-01 | Stop reason: HOSPADM

## 2020-06-16 RX ORDER — CARVEDILOL 3.12 MG/1
3.12 TABLET ORAL 2 TIMES DAILY WITH MEALS
COMMUNITY
End: 2020-06-16 | Stop reason: SDUPTHER

## 2020-07-06 NOTE — PROGRESS NOTES
Electrophysiology Outpatient Follow-Up Note    Melissa Elizondo  1935  Date of Service: 7/7/2020  Referring Provider/PCP: Jodi Samaniego MD   Primary Electrophysiologist: Dr. Francesco Contreras  Chief Complaint: ICD in situ and sustained VT        Subjective: Melissa Elizondo is seen today for his yearly outpatient follow up. He was admitted to the hospital in April 2020 due to acute on chronic respiratory failure secondary to pneumonia. The patient currently feels well and offers no complaints from a device POV. The device site looks well healed and free from infection or erosion. He reports feeling weak and fatigue. The patient denies any chest pain, dyspnea, palpitations, dizziness, syncope, orthopnea or paroxysmal nocturnal dyspnea. The patient continues to be followed remotely.     Patient Active Problem List   Diagnosis    Automatic implantable cardioverter-defibrillator in situ    IPF [facility]idiopathic pulmonary fibrosis) ([facility])    Peripheral vascular disease ([address]    PMR (polymyalgia rheumatica) ([facility])    Coronary artery disease involving native coronary artery without angina pectoris    Hyperlipidemia LDL goal <100    Acute on chronic systolic congestive heart failure ([facility])    Acute on chronic respiratory failure with hypoxia ([facility])    CKD (chronic kidney disease) stage 3, GFR 30-59 ml/min ([address]    Pneumonia     Past Medical History:   Diagnosis Date    Aneurysm ([address]     iliacs    Asthma     CAD (coronary artery disease)     Cardiomyopathy ([address]     CHF (congestive heart failure) ([address]     Dilatation of aorta ([address] 10/4/2018    Hyperlipidemia     Iliac artery aneurysm, bilateral ([address] 10/4/2018    Inferoposterior myocardial infarction ([address]     NSVT (nonsustained ventricular tachycardia) ([facility])        Medications:  Current Outpatient Medications on File Prior to Visit   Medication Sig Dispense Refill    rosuvastatin (CRESTOR) 5 MG tablet Take 1 tablet by mouth nightly 30 tablet 5    carvedilol (COREG) 6.25 MG tablet Take 1 tablet by mouth 2 times daily (with meals) 9.375 bid 60 tablet 11    carvedilol (COREG) 3.125 MG tablet Take 1 tablet by mouth 2 times daily (with meals) 60 tablet 11    albuterol (PROVENTIL) (2.5 MG/3ML) 0.083% nebulizer solution USE 1 VIAL IN NEBULIZER 4 TIMES DAILY 120 vial 11    fluticasone (FLONASE) 50 MCG/ACT nasal spray 1 spray by Each Nostril route daily 1 Bottle 5    predniSONE (DELTASONE) 10 MG tablet 40 mg for 2 days ,  20 mg for 2 days , 10 mg for 2 days then resume your daily dose of prednisone 3 mg daily 14 tablet 0    azelastine (ASTELIN) 0.1 % nasal spray 2 sprays by Nasal route 2 times daily Use in each nostril as directed 1 Bottle 3    albuterol sulfate HFA (VENTOLIN HFA) 108 (90 Base) MCG/ACT inhaler Inhale 2 puffs into the lungs every 6 hours as needed for Wheezing or Shortness of Breath 1 Inhaler 0    montelukast (SINGULAIR) 10 MG tablet Take 10 mg by mouth nightly      Fluticasone furoate-vilanterol (BREO ELLIPTA) 200-25 MCG/INH AEPB inhaler USE 1 INHALATION ORALLY    ONCE A  each 3    potassium chloride (KLOR-CON M) 20 MEQ extended release tablet Take 1 tablet by mouth daily 90 tablet 0    furosemide (LASIX) 20 MG tablet Take 1 tablet by mouth daily 90 tablet 0    OFEV 150 MG CAPS TAKE 1 CAPSULE BY MOUTH TWICE A DAY  EVERY 12 HOURS  AS DIRECTED WITH FOOD  12    OXYGEN Inhale 2 L into the lungs as needed      losartan (COZAAR) 25 MG tablet Take 1 tablet by mouth daily (Patient taking differently: Take 12.5 mg by mouth daily ) 90 tablet 0    calcium carbonate 600 MG TABS tablet Take 1 tablet by mouth daily      Multiple Vitamins-Minerals (MULTIVITAMIN ADULT) TABS Take by mouth daily      Coenzyme Q10 (COQ10 PO) Take 100 mg by mouth daily       aspirin 81 MG EC tablet Take 81 mg by mouth every evening        No current facility-administered medications on file prior to visit.         No Known Allergies    ROS:   Constitutional: Negative for fever, activity change and appetite change. HENT: Negative for epistaxis. Eyes: Negative for diploplia, blurred vision. Respiratory: Negative for cough, chest tightness, shortness of breath and wheezing. Cardiovascular: pertinent positives in HPI  Gastrointestinal: Negative for abdominal pain and blood in stool. All other review of systems are negative      Physical exam:   BP (!) 108/58   Pulse 72   Ht 5' 10\" (1.778 m)   Wt 158 lb (71.7 kg)   BMI 22.67 kg/m²    Constitutional: well developed, in no acute distress   Eyes: conjunctivae normal, no xanthelasma   Ears, Nose, Throat: oral mucosa moist, no cyanosis   Neck: supple, no JVD, no bruits, no thyromegaly   CV: regular rate & rhythm, normal S1 & S2, No S3 or S4. No murmurs, rubs, or gallops. Peripheral pulses normal   Lungs: Basilar crackles bilaterally, normal respiratory effort   Abdomen: soft, non-tender, bowel sounds present, no masses or hepatomegaly   Extremities: no digital clubbing, no edema   Skin: warm, no rashes  Neuro/Psych: A&O x 3, normal mood and affect  ICD site: well healed. No erosion, infection or migration    Data:   Lab Results   Component Value Date    WBC 13.8 (H) 04/15/2020    HGB 9.3 (L) 04/15/2020    HCT 29.1 (L) 04/15/2020    MCV 93.3 04/15/2020     04/15/2020     Lab Results   Component Value Date    CREATININE 1.2 04/15/2020     Lab Results   Component Value Date    INR 1.3 01/07/2018    INR 1.2 07/20/2014    INR 1.3 04/17/2014    PROTIME 13.6 (H) 01/07/2018    PROTIME 12.0 07/20/2014    PROTIME 14.1 (H) 04/17/2014     Lab Results   Component Value Date    TSH 0.689 02/05/2020     Lab Results   Component Value Date    ALT 26 04/15/2020    AST 32 04/15/2020    ALKPHOS 61 04/15/2020    BILITOT 0.6 04/15/2020     Pertinent Cardiac Testing:    TTE 01/08/18:   Findings      Left Ventricle   Left ventricle is mildly enlarged .   Moderate left ventricular concentric hypertrophy noted.   Inferior wall hypokinesis.   Ejection fraction is visually estimated at 40%.   There is doppler evidence of stage I diastolic dysfunction.      Right Ventricle   Normal right ventricular size and function.      Left Atrium   The left atrium is moderately dilated.      Right Atrium   Normal right atrium size.      Mitral Valve   No evidence of mitral valve stenosis.      Tricuspid Valve   Mild tricuspid regurgitation. RVSP is 32 mmHg.      Aortic Valve   Aortic valve opens well.   The aortic valve appears mildly sclerotic.      Pulmonic Valve   Not well visualized. Normal Doppler evaluation.      Pericardial Effusion   No evidence of pericardial effusion.      Aorta   Aortic root dimension within normal limits.   Miscellaneous   Normal Inferior Vena Cava diameter and respiratory variation.      Conclusions      Summary   Left ventricle is mildly enlarged .   Moderate left ventricular concentric hypertrophy noted.   Inferior wall hypokinesis.   Ejection fraction is visually estimated at 40%.   .      Signature      ----------------------------------------------------------------   Electronically signed by Moe Sousa MD(Interpreting physician)  Calin Lee 01/09/2018 08:18 AM   ----------------------------------------------------------------     Device Interrogation 7/7/20:  Make/Model Medtronic/ Evra XT OR   Mode MVP-R 60/120  P wave: 2.3 mV  Impedance: 494 ohms   Threshold: 0.75 V @ 0.40 ms  RV R wave: 5.8 mV  Impedance: 323 ohms   Threshold: 1.0 V @ 0.40 ms  Pacing: A: 85.6%  RV: 0.4%    Battery Voltage/Longevity: 2.97 V/ 3.9 years  Charging time: 3.9 sec     Arrhythmias: 8 NSVT for 1-3 seconds  Overall device function is normal    All device programmable settings were evaluated per above and in the scanned document, along with iterative adjustments (capture thresholds) to assess and select the most appropriate final programming to provide for consistent delivery of the appropriate therapy and to verify function of the device. Impression:    1. Dual-chamber ICD in situ  - Medtronic.  - In July 2014, presented with syncopal episode. Due to ischemic cardiomyopathy, prior myocardial infarction, and documented monomorphic nonsustained VT, he underwent an EP study on 7/22/14which showed inducible ventricular tachycardia  - Status post ICD implantation 7/22/14.  - Normal device function. 2. 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   - Non-sustained VT noted on follow up. - Consider antiarrhythmic if sustained VT noted despite beta-blocker therapy. - Continue to monitor. 3. Coronary artery disease  - STEMI s/p RCA thrombectomy and CABG in April 2014  - On ASA, Crestor and Coreg. 4. Ischemic cardiomyopathy  - Status post ICD after positive EP study in July 2014. - Echocardiogram: EF 35-40% in 5/2015. - Echocardiogram: EF 40% in 1/2018  - Echocardiogram: EF 35-40% on 1/28/20  - Currently well compensated and euvolemic.  - NYHA class II, ACC stage C  - On GDMT including Coreg, Cozaar and Lasix. 5. Pulmonary fibrosis  - On continuous O2 cannula. - Follows with Dr. Jae Chávez     6. Polymyalgia rheumatica  - On chronic Prednisone use      Recommendations:     1. Continue Carvedilol 9.375 mg bid. 2. Continue follow up with Cardiology for GDMT adjustment. 3. Remote ICD monitoring via [facility] transmission every 91 days. 4. Follow-up with in 1 year. Encouraged the patient to call the office for any questions or concerns. I have spent a total of 30 minutes with the patient and the family reviewing the above stated recommendations. And a total of >50% of that time involved face-to-face time providing counseling and or coordination of care with the other providers    Thank you for allowing me the opportunity to participate in the care of your patient.      Nate Cox MD  Cardiac Electrophysiology  [facility]) Physicians  [facility] [place]: [facility]  2:10

## 2020-07-07 ENCOUNTER — OFFICE VISIT (OUTPATIENT)
Dept: NON INVASIVE DIAGNOSTICS | Age: 85
End: 2020-07-07
Payer: MEDICARE

## 2020-07-07 VITALS
DIASTOLIC BLOOD PRESSURE: 58 MMHG | HEIGHT: 70 IN | SYSTOLIC BLOOD PRESSURE: 108 MMHG | HEART RATE: 72 BPM | BODY MASS INDEX: 22.62 KG/M2 | WEIGHT: 158 LBS

## 2020-07-07 PROCEDURE — G8420 CALC BMI NORM PARAMETERS: HCPCS | Performed by: INTERNAL MEDICINE

## 2020-07-07 PROCEDURE — 99214 OFFICE O/P EST MOD 30 MIN: CPT | Performed by: INTERNAL MEDICINE

## 2020-07-07 PROCEDURE — 1036F TOBACCO NON-USER: CPT | Performed by: INTERNAL MEDICINE

## 2020-07-07 PROCEDURE — 93290 INTERROG DEV EVAL ICPMS IP: CPT | Performed by: INTERNAL MEDICINE

## 2020-07-07 PROCEDURE — 1123F ACP DISCUSS/DSCN MKR DOCD: CPT | Performed by: INTERNAL MEDICINE

## 2020-07-07 PROCEDURE — 93284 PRGRMG EVAL IMPLANTABLE DFB: CPT | Performed by: INTERNAL MEDICINE

## 2020-07-07 PROCEDURE — G8427 DOCREV CUR MEDS BY ELIG CLIN: HCPCS | Performed by: INTERNAL MEDICINE

## 2020-07-07 PROCEDURE — 4040F PNEUMOC VAC/ADMIN/RCVD: CPT | Performed by: INTERNAL MEDICINE

## 2020-07-27 RX ORDER — LOSARTAN POTASSIUM 25 MG/1
12.5 TABLET ORAL DAILY
Qty: 30 TABLET | Refills: 5 | Status: SHIPPED
Start: 2020-07-27 | End: 2021-01-01 | Stop reason: SDUPTHER

## 2020-09-04 LAB
ALBUMIN SERPL-MCNC: NORMAL G/DL
ALP BLD-CCNC: NORMAL U/L
ALT SERPL-CCNC: NORMAL U/L
ANION GAP SERPL CALCULATED.3IONS-SCNC: NORMAL MMOL/L
AST SERPL-CCNC: NORMAL U/L
BASOPHILS ABSOLUTE: NORMAL
BASOPHILS RELATIVE PERCENT: NORMAL
BILIRUB SERPL-MCNC: NORMAL MG/DL
BILIRUBIN, URINE: NORMAL
BLOOD, URINE: NORMAL
BUN BLDV-MCNC: NORMAL MG/DL
CALCIUM SERPL-MCNC: NORMAL MG/DL
CHLORIDE BLD-SCNC: NORMAL MMOL/L
CHOLESTEROL, TOTAL: NORMAL
CHOLESTEROL/HDL RATIO: NORMAL
CLARITY: NORMAL
CO2: NORMAL
COLOR: NORMAL
CREAT SERPL-MCNC: NORMAL MG/DL
EOSINOPHILS ABSOLUTE: NORMAL
EOSINOPHILS RELATIVE PERCENT: NORMAL
GFR CALCULATED: NORMAL
GLUCOSE BLD-MCNC: NORMAL MG/DL
GLUCOSE URINE: NORMAL
HCT VFR BLD CALC: NORMAL %
HDLC SERPL-MCNC: NORMAL MG/DL
HEMOGLOBIN: NORMAL
KETONES, URINE: NORMAL
LDL CHOLESTEROL CALCULATED: NORMAL
LEUKOCYTE ESTERASE, URINE: NORMAL
LYMPHOCYTES ABSOLUTE: NORMAL
LYMPHOCYTES RELATIVE PERCENT: NORMAL
MCH RBC QN AUTO: NORMAL PG
MCHC RBC AUTO-ENTMCNC: NORMAL G/DL
MCV RBC AUTO: NORMAL FL
MONOCYTES ABSOLUTE: NORMAL
MONOCYTES RELATIVE PERCENT: NORMAL
NEUTROPHILS ABSOLUTE: NORMAL
NEUTROPHILS RELATIVE PERCENT: NORMAL
NITRITE, URINE: NORMAL
NONHDLC SERPL-MCNC: NORMAL MG/DL
PH UA: NORMAL
PLATELET # BLD: NORMAL 10*3/UL
PMV BLD AUTO: NORMAL FL
POTASSIUM SERPL-SCNC: NORMAL MMOL/L
PROTEIN UA: NORMAL
RBC # BLD: NORMAL 10*6/UL
SODIUM BLD-SCNC: NORMAL MMOL/L
SPECIFIC GRAVITY, URINE: NORMAL
TOTAL PROTEIN: NORMAL
TRIGL SERPL-MCNC: NORMAL MG/DL
UROBILINOGEN, URINE: NORMAL
VLDLC SERPL CALC-MCNC: NORMAL MG/DL
WBC # BLD: NORMAL 10*3/UL

## 2020-09-08 ENCOUNTER — TELEPHONE (OUTPATIENT)
Dept: ADMINISTRATIVE | Age: 85
End: 2020-09-08

## 2020-09-08 NOTE — TELEPHONE ENCOUNTER
Patient called to establish with Dr Kenzie Hurd, stated he is a special friend of his fathers. Per protocol transferred call to  to be scheduled.

## 2020-09-24 ENCOUNTER — OFFICE VISIT (OUTPATIENT)
Dept: PRIMARY CARE CLINIC | Age: 85
End: 2020-09-24
Payer: MEDICARE

## 2020-09-24 VITALS
SYSTOLIC BLOOD PRESSURE: 102 MMHG | HEIGHT: 70 IN | BODY MASS INDEX: 22.9 KG/M2 | OXYGEN SATURATION: 92 % | WEIGHT: 160 LBS | DIASTOLIC BLOOD PRESSURE: 68 MMHG | TEMPERATURE: 96.6 F | HEART RATE: 72 BPM

## 2020-09-24 PROCEDURE — G0008 ADMIN INFLUENZA VIRUS VAC: HCPCS | Performed by: FAMILY MEDICINE

## 2020-09-24 PROCEDURE — 90694 VACC AIIV4 NO PRSRV 0.5ML IM: CPT | Performed by: FAMILY MEDICINE

## 2020-09-24 PROCEDURE — G8427 DOCREV CUR MEDS BY ELIG CLIN: HCPCS | Performed by: FAMILY MEDICINE

## 2020-09-24 PROCEDURE — G8420 CALC BMI NORM PARAMETERS: HCPCS | Performed by: FAMILY MEDICINE

## 2020-09-24 PROCEDURE — 99203 OFFICE O/P NEW LOW 30 MIN: CPT | Performed by: FAMILY MEDICINE

## 2020-09-24 PROCEDURE — 1036F TOBACCO NON-USER: CPT | Performed by: FAMILY MEDICINE

## 2020-09-24 PROCEDURE — 4040F PNEUMOC VAC/ADMIN/RCVD: CPT | Performed by: FAMILY MEDICINE

## 2020-09-24 PROCEDURE — 1123F ACP DISCUSS/DSCN MKR DOCD: CPT | Performed by: FAMILY MEDICINE

## 2020-09-24 RX ORDER — PREDNISONE 1 MG/1
1 TABLET ORAL DAILY
Qty: 30 TABLET | Refills: 5 | Status: ON HOLD
Start: 2020-09-24 | End: 2021-01-01 | Stop reason: HOSPADM

## 2020-09-24 RX ORDER — PREDNISONE 1 MG/1
1 TABLET ORAL DAILY
COMMUNITY
End: 2020-09-24 | Stop reason: SDUPTHER

## 2020-09-24 ASSESSMENT — PATIENT HEALTH QUESTIONNAIRE - PHQ9
SUM OF ALL RESPONSES TO PHQ9 QUESTIONS 1 & 2: 0
SUM OF ALL RESPONSES TO PHQ QUESTIONS 1-9: 0
SUM OF ALL RESPONSES TO PHQ QUESTIONS 1-9: 0
2. FEELING DOWN, DEPRESSED OR HOPELESS: 0
1. LITTLE INTEREST OR PLEASURE IN DOING THINGS: 0

## 2020-09-24 ASSESSMENT — ENCOUNTER SYMPTOMS
SHORTNESS OF BREATH: 1
BLOOD IN STOOL: 0
DIARRHEA: 0
CHEST TIGHTNESS: 0
CONSTIPATION: 0

## 2020-09-24 NOTE — PROGRESS NOTES
Vaccine Information Sheet, \"Influenza - Inactivated\"  given to [facility], or parent/legal guardian of  [facility] and verbalized understanding. Patient responses:    Have you ever had a reaction to a flu vaccine? No  Do you have any current illness? No  Have you ever had Guillian [place] Syndrome? No  Do you have a serious allergy to any of the follow: Neomycin, Polymyxin, Thimerosal, eggs or egg products? No    Flu vaccine given per order. Please see immunization tab. Risks and benefits explained. Current VIS given.       Immunizations Administered     Name Date Dose Route    Influenza, Quadv, adjuvanted, 65 yrs +, IM, PF (Fluad) 9/24/2020 0.5 mL Intramuscular    Site: Deltoid- Left    Lot: [id number]    NDC: 11580-706-25

## 2020-09-24 NOTE — PROGRESS NOTES
Eric Higuera, a male of 80 y.o. came to the office 9/24/2020. Patient Active Problem List   Diagnosis    Automatic implantable cardioverter-defibrillator in situ    [facility]    Peripheral vascular disease [facility]    PMR (polymyalgia rheumatica) ([facility])    Coronary artery disease involving native coronary artery without angina pectoris    Hyperlipidemia LDL goal <100    Acute on chronic systolic congestive heart failure (HCC)    Acute on chronic respiratory failure with hypoxia ([facility])    CKD (chronic kidney disease) stage 3, GFR 30-59 ml/min ([facility])    Pneumonia          Coronary Artery Disease   Presents for follow-up visit. Symptoms include shortness of breath (with walking. wears oxygen at night and when exercises. ). Pertinent negatives include no chest pain, chest pressure, chest tightness, dizziness, leg swelling, muscle weakness or palpitations. The symptoms have been resolved. Compliance with diet is good. Compliance with exercise is good. Compliance with medications is good. balance is off: walks with legs far apart. Has tried PT without success at [facility]. Denies falls. Gen: gets labs done at [place].   PMR: on decreasing prednisone and not having pain    No Known Allergies    Current Outpatient Medications on File Prior to Visit   Medication Sig Dispense Refill    losartan (COZAAR) 25 MG tablet Take 0.5 tablets by mouth daily 30 tablet 5    rosuvastatin (CRESTOR) 5 MG tablet Take 1 tablet by mouth nightly 30 tablet 5    carvedilol (COREG) 6.25 MG tablet Take 1 tablet by mouth 2 times daily (with meals) 9.375 bid 60 tablet 11    carvedilol (COREG) 3.125 MG tablet Take 1 tablet by mouth 2 times daily (with meals) 60 tablet 11    montelukast (SINGULAIR) 10 MG tablet Take 10 mg by mouth nightly      Fluticasone furoate-vilanterol (BREO ELLIPTA) 200-25 MCG/INH AEPB inhaler USE 1 INHALATION ORALLY    ONCE A  each 3    potassium chloride (KLOR-CON M) 20 MEQ extended release tablet Take 1 tablet by mouth daily 90 tablet 0    furosemide (LASIX) 20 MG tablet Take 1 tablet by mouth daily 90 tablet 0    OXYGEN Inhale 2 L into the lungs as needed      calcium carbonate 600 MG TABS tablet Take 1 tablet by mouth daily      Multiple Vitamins-Minerals (MULTIVITAMIN ADULT) TABS Take by mouth daily      Coenzyme Q10 (COQ10 PO) Take 100 mg by mouth daily       aspirin 81 MG EC tablet Take 81 mg by mouth every evening       albuterol (PROVENTIL) (2.5 MG/3ML) 0.083% nebulizer solution USE 1 VIAL IN NEBULIZER 4 TIMES DAILY (Patient not taking: Reported on 9/24/2020) 120 vial 11    fluticasone (FLONASE) 50 MCG/ACT nasal spray 1 spray by Each Nostril route daily (Patient not taking: Reported on 9/24/2020) 1 Bottle 5    azelastine (ASTELIN) 0.1 % nasal spray 2 sprays by Nasal route 2 times daily Use in each nostril as directed (Patient not taking: Reported on 9/24/2020) 1 Bottle 3    albuterol sulfate HFA (VENTOLIN HFA) 108 (90 Base) MCG/ACT inhaler Inhale 2 puffs into the lungs every 6 hours as needed for Wheezing or Shortness of Breath (Patient not taking: Reported on 9/24/2020) 1 Inhaler 0    OFEV 150 MG CAPS TAKE 1 CAPSULE BY MOUTH TWICE A DAY  EVERY 12 HOURS  AS DIRECTED WITH FOOD  12     No current facility-administered medications on file prior to visit. Review of Systems   Respiratory: Positive for shortness of breath (with walking. wears oxygen at night and when exercises. ). Negative for chest tightness. Cardiovascular: Negative for chest pain, palpitations and leg swelling. Gastrointestinal: Negative for blood in stool, constipation and diarrhea. Musculoskeletal: Negative for muscle weakness. Neurological: Negative for dizziness, weakness and light-headedness.    other review of systems reviewed and are negative    OBJECTIVE:  /68   Pulse 72   Temp 96.6 °F (35.9 °C)   Ht 5' 10\" (1.778 m)   Wt 160 lb (72.6 kg)   SpO2 92%   BMI 22.96 kg/m²      Physical Exam  Constitutional:       General: He is not in acute distress. Eyes:      General: No scleral icterus. Conjunctiva/sclera: Conjunctivae normal.   Neck:      Musculoskeletal: Neck supple. Thyroid: No thyromegaly. Cardiovascular:      Rate and Rhythm: Normal rate and regular rhythm. Heart sounds: No murmur. Pulmonary:      Effort: Pulmonary effort is normal.      Breath sounds: Normal breath sounds. Lymphadenopathy:      Cervical: No cervical adenopathy. Skin:     General: Skin is warm and dry. Neurological:      Mental Status: He is alert and oriented to person, place, and time. gait: decrease length of stride and some unsteadiness.   - rhomberg. ASSESSMENT AND PLAN:    Figueroa Vigil was seen today for new patient, established new doctor and gait problem. Diagnoses and all orders for this visit:    Imbalance  -     External Referral To Physical Therapy    Coronary artery disease involving native coronary artery of native heart without angina pectoris    Need for influenza vaccination  -     INFLUENZA, QUADV, ADJUVANTED, 65 YRS =, IM, PF, PREFILL SYR, 0.5ML (FLUAD)    PMR (polymyalgia rheumatica) (HCC)  -     predniSONE (DELTASONE) 1 MG tablet; Take 1 tablet by mouth daily     - decrease Prednsione to 1/2 tablet or 0.5 mg for 1 month then stop. - continue all other meds. Return in about 6 weeks (around 11/5/2020), or if symptoms worsen or fail to improve.     John Banuelos, DO

## 2020-09-29 ENCOUNTER — TELEPHONE (OUTPATIENT)
Dept: PRIMARY CARE CLINIC | Age: 85
End: 2020-09-29

## 2020-09-29 NOTE — TELEPHONE ENCOUNTER
Patient wanted you to know that he has trouble sleeping at night. States he forgot to mention this earlier.

## 2020-09-29 NOTE — TELEPHONE ENCOUNTER
Patient takes melatonin but is unsure of the dose, it may only be 5mg. He will try increasing to see if this helps.

## 2020-10-01 ENCOUNTER — TELEPHONE (OUTPATIENT)
Dept: VASCULAR SURGERY | Age: 85
End: 2020-10-01

## 2020-10-01 NOTE — TELEPHONE ENCOUNTER
Patient called to request a phone visit. Cancelled today's appt and scheduled phone visit on 10-14-20 at 4:00 pm.  We want to confirm that, for purposes of billing, this is a virtual visit with your provider for which we will submit a claim for reimbursement with your insurance company. You will be responsible for any copays, coinsurance amounts or other amounts not covered by your insurance company. If you do not accept this, unfortunately we will not be able to schedule a virtual visit with the provider. Do you accept?  Y  10-14-20 4:00 pm Dr. Jacob Chang

## 2020-10-14 ENCOUNTER — VIRTUAL VISIT (OUTPATIENT)
Dept: VASCULAR SURGERY | Age: 85
End: 2020-10-14
Payer: MEDICARE

## 2020-10-14 PROBLEM — J96.21 ACUTE ON CHRONIC RESPIRATORY FAILURE WITH HYPOXIA (HCC): Status: RESOLVED | Noted: 2020-04-14 | Resolved: 2020-10-14

## 2020-10-14 PROBLEM — J18.9 PNEUMONIA: Status: RESOLVED | Noted: 2020-04-14 | Resolved: 2020-10-14

## 2020-10-14 PROBLEM — I50.23 ACUTE ON CHRONIC SYSTOLIC CONGESTIVE HEART FAILURE (HCC): Status: RESOLVED | Noted: 2020-02-04 | Resolved: 2020-10-14

## 2020-10-14 PROCEDURE — 99442 PR PHYS/QHP TELEPHONE EVALUATION 11-20 MIN: CPT | Performed by: SURGERY

## 2020-10-14 NOTE — PROGRESS NOTES
TELEPHONE VISIT    Consent:  He and/or health care decision maker is aware that that he may receive a bill for this telephone service, depending on his insurance coverage, and has provided verbal consent to proceed: Yes      Documentation:  I communicated with the patient and/or health care decision maker about patient known to have slight dilatation of abdominal aorta with bilateral small bilateral iliac artery aneurysms, denies any abdominal pain or back pain    Patient has been in the hospital recently, with congestive heart failure and hypoxic respiratory failure, does have a defibrillator tells me he is doing better    Is on supplemental oxygen, 2 L/min, while he is exercising and also at nighttime    He can walk a 1 block at a time very slowly and denies any symptoms of rest pain  . Details of this discussion including any medical advice provided: Regarding the aortic dilatation and the bilateral iliac artery aneurysms, patient recommended ultrasound abdominal aorta and iliac arteries, call and come to hospital abdominal, pelvic or back pain follow up appointment see me back in 2 years    The natural history of the aneurysm, slow expansion or. Time was explained to the patient but call me anytime asks questions, symptoms or concerns    All his questions were answered      I affirm this is a Patient Initiated Episode with a Patient who has not had a related appointment within my department in the past 7 days or scheduled within the next 24 hours.         Patient's location: {itzel:[id number]::\"home address in [place]\",\"other address in [facility]   Physician  location home address in [facility]   Other people involved in call          Total Time: minutes: 11-20 minutes

## 2020-10-19 ENCOUNTER — TELEPHONE (OUTPATIENT)
Dept: PRIMARY CARE CLINIC | Age: 85
End: 2020-10-19

## 2020-10-19 RX ORDER — TRAZODONE HYDROCHLORIDE 50 MG/1
50 TABLET ORAL NIGHTLY
Qty: 30 TABLET | Refills: 2 | Status: SHIPPED
Start: 2020-10-19 | End: 2020-11-05

## 2020-10-19 NOTE — TELEPHONE ENCOUNTER
Melatonin not helping his sleeping therefore we will try trazodone 50 mg at night to help sleep. He does not have to stay on the Trulance unless his constipation worsens. Medication [facility] in [place]

## 2020-10-28 ENCOUNTER — TELEPHONE (OUTPATIENT)
Dept: VASCULAR SURGERY | Age: 85
End: 2020-10-28

## 2020-10-28 NOTE — TELEPHONE ENCOUNTER
Called and spoke with patients wife regarding test reports, ni significant change in abd or pelvis, call and go to hospital if he gets abd pain. Keep two year appointment.

## 2020-10-28 NOTE — TELEPHONE ENCOUNTER
----- Message from Jaden Layton MD sent at 10/28/2020  4:35 PM EDT -----  Please inform patient, no significant change noted in the ultrasound abdomen and pelvis, call and come to the hospital just room with any abdominal or back pain    Please make sure that he does have an appointment to see me back in 2 years

## 2020-11-05 ENCOUNTER — OFFICE VISIT (OUTPATIENT)
Dept: PRIMARY CARE CLINIC | Age: 85
End: 2020-11-05
Payer: MEDICARE

## 2020-11-05 VITALS
HEART RATE: 79 BPM | DIASTOLIC BLOOD PRESSURE: 68 MMHG | BODY MASS INDEX: 22.76 KG/M2 | SYSTOLIC BLOOD PRESSURE: 104 MMHG | HEIGHT: 70 IN | WEIGHT: 159 LBS | OXYGEN SATURATION: 91 % | TEMPERATURE: 97.6 F

## 2020-11-05 PROCEDURE — G8420 CALC BMI NORM PARAMETERS: HCPCS | Performed by: FAMILY MEDICINE

## 2020-11-05 PROCEDURE — G8427 DOCREV CUR MEDS BY ELIG CLIN: HCPCS | Performed by: FAMILY MEDICINE

## 2020-11-05 PROCEDURE — 4040F PNEUMOC VAC/ADMIN/RCVD: CPT | Performed by: FAMILY MEDICINE

## 2020-11-05 PROCEDURE — 1123F ACP DISCUSS/DSCN MKR DOCD: CPT | Performed by: FAMILY MEDICINE

## 2020-11-05 PROCEDURE — 1036F TOBACCO NON-USER: CPT | Performed by: FAMILY MEDICINE

## 2020-11-05 PROCEDURE — 99213 OFFICE O/P EST LOW 20 MIN: CPT | Performed by: FAMILY MEDICINE

## 2020-11-05 PROCEDURE — G8484 FLU IMMUNIZE NO ADMIN: HCPCS | Performed by: FAMILY MEDICINE

## 2020-11-05 RX ORDER — PILOCARPINE HYDROCHLORIDE 5 MG/1
5 TABLET, FILM COATED ORAL NIGHTLY
Qty: 30 TABLET | Refills: 2 | Status: SHIPPED
Start: 2020-11-05 | End: 2020-11-16

## 2020-11-05 ASSESSMENT — ENCOUNTER SYMPTOMS
CHEST TIGHTNESS: 0
SHORTNESS OF BREATH: 1

## 2020-11-05 NOTE — PROGRESS NOTES
Raz Chandler, a male of 80 y.o. came to the office 11/5/2020. Patient Active Problem List   Diagnosis    Automatic implantable cardioverter-defibrillator in situ    Dilatation of aorta (HCC)    Iliac artery aneurysm, bilateral (HCC)    [facility]    Peripheral vascular disease ([facility]    PMR (polymyalgia rheumatica) (HCC)    Coronary artery disease involving native coronary artery without angina pectoris    Hyperlipidemia LDL goal <100    CKD (chronic kidney disease) stage 3, GFR 30-59 ml/min          Coronary Artery Disease   Presents for follow-up visit. Symptoms include shortness of breath (with walking). Pertinent negatives include no chest pain, chest tightness, palpitations or weight gain. The symptoms have been resolved. Compliance with diet is good. Compliance with exercise is good. Compliance with medications is good. PMR: weaning off Prednisone. Muscles and jt pain doing well. Imbalance: PT is making him stronger but not helping with balance so far. They are going to try to do some other measures. Insomnia: wakes up due to dry mouth. No relief with Trazodone. Wears oxygen to bed hs that is not humidified.      No Known Allergies    Current Outpatient Medications on File Prior to Visit   Medication Sig Dispense Refill    predniSONE (DELTASONE) 1 MG tablet Take 1 tablet by mouth daily 30 tablet 5    losartan (COZAAR) 25 MG tablet Take 0.5 tablets by mouth daily 30 tablet 5    rosuvastatin (CRESTOR) 5 MG tablet Take 1 tablet by mouth nightly 30 tablet 5    carvedilol (COREG) 6.25 MG tablet Take 1 tablet by mouth 2 times daily (with meals) 9.375 bid 60 tablet 11    carvedilol (COREG) 3.125 MG tablet Take 1 tablet by mouth 2 times daily (with meals) 60 tablet 11    montelukast (SINGULAIR) 10 MG tablet Take 10 mg by mouth nightly      Fluticasone furoate-vilanterol (BREO ELLIPTA) 200-25 MCG/INH AEPB inhaler USE 1 INHALATION ORALLY    ONCE A  each 3    furosemide (LASIX) 20 MG tablet Take 1 tablet by mouth daily 90 tablet 0    OFEV 150 MG CAPS TAKE 1 CAPSULE BY MOUTH TWICE A DAY  EVERY 12 HOURS  AS DIRECTED WITH FOOD  12    OXYGEN Inhale 2 L into the lungs as needed      calcium carbonate 600 MG TABS tablet Take 1 tablet by mouth daily      Multiple Vitamins-Minerals (MULTIVITAMIN ADULT) TABS Take by mouth daily      Coenzyme Q10 (COQ10 PO) Take 100 mg by mouth daily       aspirin 81 MG EC tablet Take 81 mg by mouth every evening       albuterol (PROVENTIL) (2.5 MG/3ML) 0.083% nebulizer solution USE 1 VIAL IN NEBULIZER 4 TIMES DAILY (Patient not taking: Reported on 9/24/2020) 120 vial 11    fluticasone (FLONASE) 50 MCG/ACT nasal spray 1 spray by Each Nostril route daily (Patient not taking: Reported on 9/24/2020) 1 Bottle 5    azelastine (ASTELIN) 0.1 % nasal spray 2 sprays by Nasal route 2 times daily Use in each nostril as directed (Patient not taking: Reported on 9/24/2020) 1 Bottle 3    albuterol sulfate HFA (VENTOLIN HFA) 108 (90 Base) MCG/ACT inhaler Inhale 2 puffs into the lungs every 6 hours as needed for Wheezing or Shortness of Breath (Patient not taking: Reported on 9/24/2020) 1 Inhaler 0    potassium chloride (KLOR-CON M) 20 MEQ extended release tablet Take 1 tablet by mouth daily (Patient not taking: Reported on 10/13/2020) 90 tablet 0     No current facility-administered medications on file prior to visit. Review of Systems   Constitutional: Negative for weight gain. Respiratory: Positive for shortness of breath (with walking). Negative for chest tightness. Cardiovascular: Negative for chest pain and palpitations. other review of systems reviewed and are negative    OBJECTIVE:  /68   Pulse 79   Temp 97.6 °F (36.4 °C)   Ht 5' 10\" (1.778 m)   Wt 159 lb (72.1 kg)   SpO2 91%   BMI 22.81 kg/m²      Physical Exam  Constitutional:       General: He is not in acute distress.   Eyes:      General: No scleral icterus. Conjunctiva/sclera: Conjunctivae normal.   Neck:      Musculoskeletal: Neck supple. Thyroid: No thyromegaly. Cardiovascular:      Rate and Rhythm: Normal rate and regular rhythm. Heart sounds: No murmur. Pulmonary:      Effort: Pulmonary effort is normal.      Breath sounds: Normal breath sounds. Lymphadenopathy:      Cervical: No cervical adenopathy. Skin:     General: Skin is warm and dry. Neurological:      Mental Status: He is alert and oriented to person, place, and time. ASSESSMENT AND PLAN:    Bill Min was seen today for other. Diagnoses and all orders for this visit:    Coronary artery disease involving native coronary artery of native heart without angina pectoris    Imbalance    PMR (polymyalgia rheumatica) (HCC)    Other insomnia    Other orders  -     pilocarpine (SALAGEN) 5 MG tablet; Take 1 tablet by mouth nightly    - continue current meds but stop Trazodone since no improvement with sleep  - try Salagen for dry mouth and try to get oxygen humidified at night from [facility]. - he's to get me copy of labs from [place]. - continue PT for balance. Use walker prn. Return in about 3 months (around 2/5/2021).     Meghana Banuelos, DO

## 2020-11-12 PROBLEM — E55.9 VITAMIN D DEFICIENCY: Status: ACTIVE | Noted: 2020-11-12

## 2020-11-12 PROBLEM — G47.00 INSOMNIA: Status: ACTIVE | Noted: 2020-11-12

## 2020-11-12 PROBLEM — K21.9 GASTROESOPHAGEAL REFLUX DISEASE: Status: ACTIVE | Noted: 2020-11-12

## 2020-11-16 ENCOUNTER — OFFICE VISIT (OUTPATIENT)
Dept: PRIMARY CARE CLINIC | Age: 85
End: 2020-11-16
Payer: MEDICARE

## 2020-11-16 VITALS
HEART RATE: 75 BPM | SYSTOLIC BLOOD PRESSURE: 108 MMHG | WEIGHT: 158 LBS | DIASTOLIC BLOOD PRESSURE: 60 MMHG | TEMPERATURE: 97.2 F | OXYGEN SATURATION: 92 % | BODY MASS INDEX: 22.67 KG/M2

## 2020-11-16 PROCEDURE — G8420 CALC BMI NORM PARAMETERS: HCPCS | Performed by: FAMILY MEDICINE

## 2020-11-16 PROCEDURE — G8427 DOCREV CUR MEDS BY ELIG CLIN: HCPCS | Performed by: FAMILY MEDICINE

## 2020-11-16 PROCEDURE — 1123F ACP DISCUSS/DSCN MKR DOCD: CPT | Performed by: FAMILY MEDICINE

## 2020-11-16 PROCEDURE — G8484 FLU IMMUNIZE NO ADMIN: HCPCS | Performed by: FAMILY MEDICINE

## 2020-11-16 PROCEDURE — 1036F TOBACCO NON-USER: CPT | Performed by: FAMILY MEDICINE

## 2020-11-16 PROCEDURE — 4040F PNEUMOC VAC/ADMIN/RCVD: CPT | Performed by: FAMILY MEDICINE

## 2020-11-16 PROCEDURE — 99213 OFFICE O/P EST LOW 20 MIN: CPT | Performed by: FAMILY MEDICINE

## 2020-11-16 NOTE — PATIENT INSTRUCTIONS
Patient Education        Hamstring Strain: Rehab Exercises  Introduction  Here are some examples of exercises for you to try. The exercises may be suggested for a condition or for rehabilitation. Start each exercise slowly. Ease off the exercises if you start to have pain. You will be told when to start these exercises and which ones will work best for you. How to do the exercises  Hamstring set (heel dig)   1. Sit with your affected leg bent. Your good leg should be straight and supported on the floor. 2. Tighten the muscles on the back of your bent leg (hamstring) by pressing your heel into the floor. 3. Hold for about 6 seconds, and then rest for up to 10 seconds. 4. Repeat 8 to 12 times. Hamstring curl   1. Lie on your stomach with your knees straight. Place a pillow under your stomach. If your kneecap is uncomfortable, roll up a washcloth and put it under your leg just above your kneecap. 2. Lift the foot of your affected leg by bending your knee so that you bring your foot up toward your buttock. If this motion hurts, try it without bending your knee quite as far. This may help you avoid any painful motion. 3. Slowly move your leg up and down. 4. Repeat 8 to 12 times. 5. When you can do this exercise with ease and no pain, add some resistance. To do this:  6. Tie the ends of an exercise band together to form a loop. Attach one end of the loop to a secure object or shut a door on it to hold it in place. (Or you can have someone hold one end of the loop to provide resistance.)  7. Loop the other end of the exercise band around the lower part of your affected leg. 8. Repeat steps 1 through 4, slowly pulling back on the exercise band with your leg. Hip extension   1. Stand facing a wall with your hands on the wall at about chest level. 2. Keeping the knee of your affected leg straight, kick that leg straight back behind you. 3. Relax, and lower your leg back to the starting position.   4. Repeat 8 to 12 times. 5. When you can do this exercise with ease and no pain, add some resistance. To do this:  6. Tie the ends of an exercise band together to form a loop. Attach one end of the loop to a secure object or shut a door on it to hold it in place. (Or you can have someone hold one end of the loop to provide resistance.)  7. Loop the other end of the exercise band around the lower part of your affected leg. 8. Repeat steps 1 through 4, slowly pulling back on the exercise band with your leg. Hamstring wall stretch   1. Lie on your back in a doorway, with your good leg through the open door. 2. Slide your affected leg up the wall to straighten your knee. You should feel a gentle stretch down the back of your leg. 3. Hold the stretch for at least 1 minute to begin. Then try to lengthen the time you hold the stretch to as long as 6 minutes. 4. Repeat 2 to 4 times. 5. If you do not have a place to do this exercise in a doorway, there is another way to do it:  6. Lie on your back, and bend the knee of your affected leg. 7. Loop a towel under the ball and toes of that foot, and hold the ends of the towel in your hands. 8. Straighten your knee, and slowly pull back on the towel. You should feel a gentle stretch down the back of your leg. 9. Hold the stretch for 15 to 30 seconds. Or even better, hold the stretch for 1 minute if you can. 10. Repeat 2 to 4 times. 1. Do not arch your back. 2. Do not bend either knee. 3. Keep one heel touching the floor and the other heel touching the wall. Do not point your toes. Calf stretch   1. Stand facing a wall with your hands on the wall at about eye level. Put your affected leg about a step behind your other leg. 2. Keeping your back leg straight and your back heel on the floor, bend your front knee and gently bring your hip and chest toward the wall until you feel a stretch in the calf of your back leg. 3. Hold the stretch for 15 to 30 seconds.   4. Repeat 2 to 4 times. 5. Repeat steps 1 through 4, but this time keep your back knee bent. Single-leg balance   1. Stand on a flat surface with your arms stretched out to your sides like you are making the letter \"T. \" Then lift your good leg off the floor, bending it at the knee. If you are not steady on your feet, use one hand to hold on to a chair, counter, or wall. 2. Standing on your affected leg, keep that knee straight. Try to balance on that leg for up to 30 seconds. Then rest for up to 10 seconds. 3. Repeat 6 to 8 times. 4. When you can balance on your affected leg for 30 seconds with your eyes open, try to balance on it with your eyes closed. 5. When you can do this exercise with your eyes closed for 30 seconds and with ease and no pain, try standing on a pillow or piece of foam, and repeat steps 1 through 4. Follow-up care is a key part of your treatment and safety. Be sure to make and go to all appointments, and call your doctor if you are having problems. It's also a good idea to know your test results and keep a list of the medicines you take. Where can you learn more? Go to https://[email or web address]. org and sign in to your [employer] account. Enter [phone] in the EventMama box to learn more about \"Hamstring Strain: Rehab Exercises. \"     If you do not have an account, please click on the \"Sign Up Now\" link. Current as of: March 2, 2020               Content Version: 12.6  © 1853-4657 [employer]. Care instructions adapted under license by [facility] ([facility]). If you have questions about a medical condition or this instruction, always ask your healthcare professional. [place] [address] any warranty or liability for your use of this information.

## 2020-11-16 NOTE — PROGRESS NOTES
[facility], a male of 80 y.o. came to the office 11/16/2020. Patient Active Problem List   Diagnosis    Automatic implantable cardioverter-defibrillator in situ    Dilatation of aorta (HCC)    Iliac artery aneurysm, bilateral (HCC)    IPF (idiopathic pulmonary fibrosis) (HCC)    Peripheral vascular disease ([address]    PMR (polymyalgia rheumatica) (HCC)    Coronary artery disease involving native coronary artery without angina pectoris    Hyperlipidemia LDL goal <100    CKD (chronic kidney disease) stage 3, GFR 30-59 ml/min    Gastroesophageal reflux disease    Insomnia    Vitamin D deficiency          Knee Pain    There was no injury mechanism. The pain is present in the right knee (for a few months. ). The pain is moderate. The pain has been constant since onset. Associated symptoms include a loss of motion (in am ). He has tried NSAIDs (aleve hs otc) for the symptoms. The treatment provided significant relief. dry mouth: had to stop Salagen due to over salivating with it hs when his mouth is too dry.      No Known Allergies    Current Outpatient Medications on File Prior to Visit   Medication Sig Dispense Refill    montelukast (SINGULAIR) 10 MG tablet Take 10 mg by mouth nightly      predniSONE (DELTASONE) 1 MG tablet Take 1 tablet by mouth daily 30 tablet 5    losartan (COZAAR) 25 MG tablet Take 0.5 tablets by mouth daily 30 tablet 5    rosuvastatin (CRESTOR) 5 MG tablet Take 1 tablet by mouth nightly 30 tablet 5    carvedilol (COREG) 6.25 MG tablet Take 1 tablet by mouth 2 times daily (with meals) 9.375 bid 60 tablet 11    carvedilol (COREG) 3.125 MG tablet Take 1 tablet by mouth 2 times daily (with meals) 60 tablet 11    albuterol (PROVENTIL) (2.5 MG/3ML) 0.083% nebulizer solution USE 1 VIAL IN NEBULIZER 4 TIMES DAILY (Patient taking differently: Take by nebulization as needed ) 120 vial 11    fluticasone (FLONASE) 50 MCG/ACT nasal spray 1 spray by Each Nostril route daily 1 Bottle 5    azelastine (ASTELIN) 0.1 % nasal spray 2 sprays by Nasal route 2 times daily Use in each nostril as directed (Patient taking differently: 2 sprays by Nasal route as needed Use in each nostril as directed) 1 Bottle 3    albuterol sulfate HFA (VENTOLIN HFA) 108 (90 Base) MCG/ACT inhaler Inhale 2 puffs into the lungs every 6 hours as needed for Wheezing or Shortness of Breath (Patient taking differently: Inhale 2 puffs into the lungs as needed for Wheezing or Shortness of Breath ) 1 Inhaler 0    Fluticasone furoate-vilanterol (BREO ELLIPTA) 200-25 MCG/INH AEPB inhaler USE 1 INHALATION ORALLY    ONCE A DAY (Patient taking differently: as needed USE 1 INHALATION ORALLY    ONCE A DAY) 180 each 3    potassium chloride (KLOR-CON M) 20 MEQ extended release tablet Take 1 tablet by mouth daily 90 tablet 0    furosemide (LASIX) 20 MG tablet Take 1 tablet by mouth daily 90 tablet 0    OFEV 150 MG CAPS TAKE 1 CAPSULE BY MOUTH TWICE A DAY  EVERY 12 HOURS  AS DIRECTED WITH FOOD  12    OXYGEN Inhale 2 L into the lungs as needed      Multiple Vitamins-Minerals (MULTIVITAMIN ADULT) TABS Take by mouth daily      Coenzyme Q10 (COQ10 PO) Take 100 mg by mouth daily       aspirin 81 MG EC tablet Take 81 mg by mouth every evening        No current facility-administered medications on file prior to visit. Review of Systems   Musculoskeletal: Positive for arthralgias and gait problem. other review of systems reviewed and are negative    OBJECTIVE:  /60   Pulse 75   Temp 97.2 °F (36.2 °C)   Wt 158 lb (71.7 kg)   SpO2 92%   BMI 22.67 kg/m²      Physical Exam  Right Knee Exam     Muscle Strength   The patient has normal right knee strength. Tenderness   The patient is experiencing tenderness in the medial hamstring.     Range of Motion   Extension:  -20 abnormal   Flexion: normal     Tests   Oralia:  Medial - negative Lateral - negative  Varus: negative Valgus: negative  Drawer:  Anterior - negative Posterior - negative  Patellar apprehension: negative    Other   Swelling: none            ASSESSMENT AND PLAN:    Augie Apgar was seen today for leg pain. Diagnoses and all orders for this visit:    Acute pain of right knee  -     XR KNEE RIGHT (3 VIEWS); Future    Strain of right hamstring, initial encounter    - ok to continue Aleve ot hs  - HO hamstring exercises do daily. Return if symptoms worsen or fail to improve, for as scheduled.     Jeremy Banuelos, DO

## 2020-11-23 DIAGNOSIS — Z20.822 EXPOSURE TO COVID-19 VIRUS: ICD-10-CM

## 2020-11-25 ENCOUNTER — TELEPHONE (OUTPATIENT)
Dept: PRIMARY CARE CLINIC | Age: 85
End: 2020-11-25

## 2020-11-25 LAB
SARS-COV-2: NOT DETECTED
SOURCE: NORMAL

## 2020-12-09 ENCOUNTER — NURSE ONLY (OUTPATIENT)
Dept: NON INVASIVE DIAGNOSTICS | Age: 85
End: 2020-12-09
Payer: MEDICARE

## 2020-12-09 DIAGNOSIS — Z95.810 AUTOMATIC IMPLANTABLE CARDIOVERTER-DEFIBRILLATOR IN SITU: Primary | ICD-10-CM

## 2020-12-09 DIAGNOSIS — I50.22 CHRONIC SYSTOLIC CHF (CONGESTIVE HEART FAILURE) (HCC): ICD-10-CM

## 2020-12-09 DIAGNOSIS — I25.5 ISCHEMIC CARDIOMYOPATHY: ICD-10-CM

## 2020-12-09 PROCEDURE — 93295 DEV INTERROG REMOTE 1/2/MLT: CPT | Performed by: INTERNAL MEDICINE

## 2020-12-09 PROCEDURE — 93297 REM INTERROG DEV EVAL ICPMS: CPT | Performed by: INTERNAL MEDICINE

## 2020-12-09 PROCEDURE — 93296 REM INTERROG EVL PM/IDS: CPT | Performed by: INTERNAL MEDICINE

## 2020-12-28 RX ORDER — PILOCARPINE HYDROCHLORIDE 5 MG/1
5 TABLET, FILM COATED ORAL NIGHTLY
COMMUNITY
End: 2020-12-28 | Stop reason: SDUPTHER

## 2020-12-28 RX ORDER — PILOCARPINE HYDROCHLORIDE 5 MG/1
5 TABLET, FILM COATED ORAL NIGHTLY
Qty: 30 TABLET | Refills: 1 | Status: ON HOLD
Start: 2020-12-28 | End: 2021-01-01 | Stop reason: ALTCHOICE

## 2021-01-01 ENCOUNTER — APPOINTMENT (OUTPATIENT)
Dept: CT IMAGING | Age: 86
DRG: 291 | End: 2021-01-01
Payer: MEDICARE

## 2021-01-01 ENCOUNTER — TELEPHONE (OUTPATIENT)
Dept: PRIMARY CARE CLINIC | Age: 86
End: 2021-01-01

## 2021-01-01 ENCOUNTER — CARE COORDINATION (OUTPATIENT)
Dept: CASE MANAGEMENT | Age: 86
End: 2021-01-01

## 2021-01-01 ENCOUNTER — OFFICE VISIT (OUTPATIENT)
Dept: PRIMARY CARE CLINIC | Age: 86
End: 2021-01-01
Payer: MEDICARE

## 2021-01-01 ENCOUNTER — TELEPHONE (OUTPATIENT)
Dept: PALLATIVE CARE | Age: 86
End: 2021-01-01

## 2021-01-01 ENCOUNTER — OFFICE VISIT (OUTPATIENT)
Dept: CARDIOLOGY CLINIC | Age: 86
End: 2021-01-01
Payer: MEDICARE

## 2021-01-01 ENCOUNTER — HOSPITAL ENCOUNTER (INPATIENT)
Age: 86
LOS: 5 days | Discharge: HOME | DRG: 291 | End: 2021-09-19
Attending: EMERGENCY MEDICINE | Admitting: FAMILY MEDICINE
Payer: MEDICARE

## 2021-01-01 ENCOUNTER — SCHEDULED TELEPHONE ENCOUNTER (OUTPATIENT)
Dept: PHARMACY | Facility: CLINIC | Age: 86
End: 2021-01-01

## 2021-01-01 ENCOUNTER — APPOINTMENT (OUTPATIENT)
Dept: GENERAL RADIOLOGY | Age: 86
DRG: 177 | End: 2021-01-01
Payer: MEDICARE

## 2021-01-01 ENCOUNTER — HOSPITAL ENCOUNTER (OUTPATIENT)
Dept: OTHER | Age: 86
Setting detail: THERAPIES SERIES
Discharge: HOME | End: 2021-12-20
Payer: MEDICARE

## 2021-01-01 ENCOUNTER — HOSPITAL ENCOUNTER (OUTPATIENT)
Dept: OTHER | Age: 86
Setting detail: THERAPIES SERIES
Discharge: HOME | End: 2021-12-29
Payer: MEDICARE

## 2021-01-01 ENCOUNTER — HOSPITAL ENCOUNTER (EMERGENCY)
Age: 86
Discharge: HOME | End: 2021-08-03
Attending: EMERGENCY MEDICINE
Payer: MEDICARE

## 2021-01-01 ENCOUNTER — CARE COORDINATION (OUTPATIENT)
Dept: CARE COORDINATION | Age: 86
End: 2021-01-01

## 2021-01-01 ENCOUNTER — HOSPITAL ENCOUNTER (OUTPATIENT)
Age: 86
Discharge: HOME | End: 2021-10-17
Payer: MEDICARE

## 2021-01-01 ENCOUNTER — HOSPITAL ENCOUNTER (OUTPATIENT)
Dept: OTHER | Age: 86
Setting detail: THERAPIES SERIES
Discharge: HOME | End: 2021-11-17
Payer: MEDICARE

## 2021-01-01 ENCOUNTER — TELEPHONE (OUTPATIENT)
Dept: OTHER | Age: 86
End: 2021-01-01

## 2021-01-01 ENCOUNTER — APPOINTMENT (OUTPATIENT)
Dept: GENERAL RADIOLOGY | Age: 86
End: 2021-01-01
Payer: MEDICARE

## 2021-01-01 ENCOUNTER — APPOINTMENT (OUTPATIENT)
Dept: NON INVASIVE DIAGNOSTICS | Age: 86
DRG: 309 | End: 2021-01-01
Payer: MEDICARE

## 2021-01-01 ENCOUNTER — APPOINTMENT (OUTPATIENT)
Dept: GENERAL RADIOLOGY | Age: 86
DRG: 291 | End: 2021-01-01
Payer: MEDICARE

## 2021-01-01 ENCOUNTER — OFFICE VISIT (OUTPATIENT)
Dept: NON INVASIVE DIAGNOSTICS | Age: 86
End: 2021-01-01
Payer: MEDICARE

## 2021-01-01 ENCOUNTER — TELEPHONE (OUTPATIENT)
Dept: OTHER | Facility: CLINIC | Age: 86
End: 2021-01-01

## 2021-01-01 ENCOUNTER — HOSPITAL ENCOUNTER (OUTPATIENT)
Dept: GENERAL RADIOLOGY | Age: 86
Discharge: HOME | End: 2021-10-17
Payer: MEDICARE

## 2021-01-01 ENCOUNTER — HOSPITAL ENCOUNTER (OUTPATIENT)
Age: 86
Setting detail: OBSERVATION
Discharge: HOME | End: 2021-08-12
Attending: EMERGENCY MEDICINE | Admitting: STUDENT IN AN ORGANIZED HEALTH CARE EDUCATION/TRAINING PROGRAM
Payer: MEDICARE

## 2021-01-01 ENCOUNTER — TELEPHONE (OUTPATIENT)
Dept: PHARMACY | Facility: CLINIC | Age: 86
End: 2021-01-01

## 2021-01-01 ENCOUNTER — NURSE ONLY (OUTPATIENT)
Dept: NON INVASIVE DIAGNOSTICS | Age: 86
End: 2021-01-01
Payer: MEDICARE

## 2021-01-01 ENCOUNTER — PROCEDURE VISIT (OUTPATIENT)
Dept: PODIATRY | Age: 86
End: 2021-01-01
Payer: MEDICARE

## 2021-01-01 ENCOUNTER — APPOINTMENT (OUTPATIENT)
Dept: CT IMAGING | Age: 86
DRG: 309 | End: 2021-01-01
Payer: MEDICARE

## 2021-01-01 ENCOUNTER — HOSPITAL ENCOUNTER (OUTPATIENT)
Dept: OTHER | Age: 86
Setting detail: THERAPIES SERIES
Discharge: HOME | End: 2021-12-15
Payer: MEDICARE

## 2021-01-01 ENCOUNTER — APPOINTMENT (OUTPATIENT)
Dept: ULTRASOUND IMAGING | Age: 86
DRG: 291 | End: 2021-01-01
Payer: MEDICARE

## 2021-01-01 ENCOUNTER — NURSE TRIAGE (OUTPATIENT)
Dept: OTHER | Facility: CLINIC | Age: 86
End: 2021-01-01

## 2021-01-01 ENCOUNTER — APPOINTMENT (OUTPATIENT)
Dept: NUCLEAR MEDICINE | Age: 86
DRG: 309 | End: 2021-01-01
Payer: MEDICARE

## 2021-01-01 ENCOUNTER — HOSPITAL ENCOUNTER (OUTPATIENT)
Dept: OTHER | Age: 86
Setting detail: THERAPIES SERIES
Discharge: HOME | End: 2021-10-21
Payer: MEDICARE

## 2021-01-01 ENCOUNTER — HOSPITAL ENCOUNTER (OUTPATIENT)
Dept: OTHER | Age: 86
Setting detail: THERAPIES SERIES
Discharge: HOME | End: 2021-11-05
Payer: MEDICARE

## 2021-01-01 ENCOUNTER — HOSPITAL ENCOUNTER (OUTPATIENT)
Dept: CT IMAGING | Age: 86
Discharge: HOME | End: 2021-10-17
Payer: MEDICARE

## 2021-01-01 ENCOUNTER — OFFICE VISIT (OUTPATIENT)
Dept: PALLATIVE CARE | Age: 86
End: 2021-01-01
Payer: MEDICARE

## 2021-01-01 ENCOUNTER — APPOINTMENT (OUTPATIENT)
Dept: GENERAL RADIOLOGY | Age: 86
DRG: 309 | End: 2021-01-01
Payer: MEDICARE

## 2021-01-01 ENCOUNTER — HOSPITAL ENCOUNTER (EMERGENCY)
Age: 86
Discharge: HOME | End: 2021-09-07
Attending: EMERGENCY MEDICINE
Payer: MEDICARE

## 2021-01-01 ENCOUNTER — APPOINTMENT (OUTPATIENT)
Dept: CT IMAGING | Age: 86
End: 2021-01-01
Payer: MEDICARE

## 2021-01-01 ENCOUNTER — HOSPITAL ENCOUNTER (OUTPATIENT)
Dept: OTHER | Age: 86
Setting detail: THERAPIES SERIES
Discharge: HOME | End: 2021-10-07
Payer: MEDICARE

## 2021-01-01 ENCOUNTER — HOSPITAL ENCOUNTER (INPATIENT)
Age: 86
LOS: 3 days | Discharge: NURSING, REHAB OR LONG-TERM CARE | DRG: 177 | End: 2021-02-24
Attending: EMERGENCY MEDICINE | Admitting: INTERNAL MEDICINE
Payer: MEDICARE

## 2021-01-01 ENCOUNTER — HOSPITAL ENCOUNTER (OUTPATIENT)
Dept: GENERAL RADIOLOGY | Age: 86
Discharge: HOME | End: 2021-05-05
Payer: MEDICARE

## 2021-01-01 ENCOUNTER — HOSPITAL ENCOUNTER (INPATIENT)
Age: 86
LOS: 3 days | Discharge: HOME HEALTH | DRG: 309 | End: 2021-04-13
Attending: EMERGENCY MEDICINE | Admitting: INTERNAL MEDICINE
Payer: MEDICARE

## 2021-01-01 VITALS
OXYGEN SATURATION: 98 % | BODY MASS INDEX: 21.47 KG/M2 | DIASTOLIC BLOOD PRESSURE: 70 MMHG | SYSTOLIC BLOOD PRESSURE: 115 MMHG | RESPIRATION RATE: 12 BRPM | HEART RATE: 60 BPM | HEIGHT: 70 IN | TEMPERATURE: 97.3 F | WEIGHT: 150 LBS

## 2021-01-01 VITALS
OXYGEN SATURATION: 94 % | RESPIRATION RATE: 16 BRPM | BODY MASS INDEX: 20.76 KG/M2 | WEIGHT: 145 LBS | HEART RATE: 72 BPM | DIASTOLIC BLOOD PRESSURE: 70 MMHG | SYSTOLIC BLOOD PRESSURE: 120 MMHG | HEIGHT: 70 IN

## 2021-01-01 VITALS
DIASTOLIC BLOOD PRESSURE: 52 MMHG | OXYGEN SATURATION: 90 % | BODY MASS INDEX: 19.51 KG/M2 | HEART RATE: 63 BPM | TEMPERATURE: 96.8 F | SYSTOLIC BLOOD PRESSURE: 94 MMHG | WEIGHT: 136 LBS

## 2021-01-01 VITALS
OXYGEN SATURATION: 90 % | WEIGHT: 146 LBS | TEMPERATURE: 97.1 F | DIASTOLIC BLOOD PRESSURE: 60 MMHG | HEART RATE: 70 BPM | BODY MASS INDEX: 20.95 KG/M2 | SYSTOLIC BLOOD PRESSURE: 110 MMHG

## 2021-01-01 VITALS
WEIGHT: 145 LBS | BODY MASS INDEX: 20.76 KG/M2 | DIASTOLIC BLOOD PRESSURE: 54 MMHG | RESPIRATION RATE: 16 BRPM | HEIGHT: 70 IN | SYSTOLIC BLOOD PRESSURE: 90 MMHG | HEART RATE: 72 BPM

## 2021-01-01 VITALS
OXYGEN SATURATION: 99 % | HEART RATE: 64 BPM | DIASTOLIC BLOOD PRESSURE: 61 MMHG | WEIGHT: 148.6 LBS | HEIGHT: 70 IN | RESPIRATION RATE: 14 BRPM | SYSTOLIC BLOOD PRESSURE: 106 MMHG | BODY MASS INDEX: 21.27 KG/M2 | TEMPERATURE: 98.1 F

## 2021-01-01 VITALS
TEMPERATURE: 97 F | SYSTOLIC BLOOD PRESSURE: 102 MMHG | OXYGEN SATURATION: 95 % | HEIGHT: 70 IN | BODY MASS INDEX: 20.47 KG/M2 | WEIGHT: 143 LBS | HEART RATE: 77 BPM | RESPIRATION RATE: 16 BRPM | DIASTOLIC BLOOD PRESSURE: 68 MMHG

## 2021-01-01 VITALS
SYSTOLIC BLOOD PRESSURE: 118 MMHG | WEIGHT: 138 LBS | RESPIRATION RATE: 16 BRPM | BODY MASS INDEX: 19.76 KG/M2 | DIASTOLIC BLOOD PRESSURE: 78 MMHG | HEIGHT: 70 IN | HEART RATE: 67 BPM

## 2021-01-01 VITALS
HEIGHT: 70 IN | OXYGEN SATURATION: 98 % | DIASTOLIC BLOOD PRESSURE: 59 MMHG | BODY MASS INDEX: 20.13 KG/M2 | HEART RATE: 73 BPM | WEIGHT: 140.6 LBS | TEMPERATURE: 96.9 F | SYSTOLIC BLOOD PRESSURE: 109 MMHG | RESPIRATION RATE: 28 BRPM

## 2021-01-01 VITALS
HEART RATE: 65 BPM | BODY MASS INDEX: 19.8 KG/M2 | RESPIRATION RATE: 18 BRPM | WEIGHT: 138 LBS | SYSTOLIC BLOOD PRESSURE: 92 MMHG | TEMPERATURE: 97.9 F | DIASTOLIC BLOOD PRESSURE: 48 MMHG

## 2021-01-01 VITALS
TEMPERATURE: 97.5 F | SYSTOLIC BLOOD PRESSURE: 120 MMHG | WEIGHT: 152 LBS | DIASTOLIC BLOOD PRESSURE: 64 MMHG | BODY MASS INDEX: 21.81 KG/M2 | OXYGEN SATURATION: 91 % | HEART RATE: 101 BPM

## 2021-01-01 VITALS
BODY MASS INDEX: 20.81 KG/M2 | SYSTOLIC BLOOD PRESSURE: 100 MMHG | DIASTOLIC BLOOD PRESSURE: 62 MMHG | TEMPERATURE: 96.7 F | HEART RATE: 60 BPM | WEIGHT: 145 LBS

## 2021-01-01 VITALS
TEMPERATURE: 97.6 F | BODY MASS INDEX: 19.76 KG/M2 | SYSTOLIC BLOOD PRESSURE: 118 MMHG | WEIGHT: 138 LBS | HEIGHT: 70 IN | HEART RATE: 76 BPM | OXYGEN SATURATION: 95 % | DIASTOLIC BLOOD PRESSURE: 62 MMHG

## 2021-01-01 VITALS
OXYGEN SATURATION: 97 % | SYSTOLIC BLOOD PRESSURE: 116 MMHG | TEMPERATURE: 97 F | DIASTOLIC BLOOD PRESSURE: 52 MMHG | HEART RATE: 70 BPM

## 2021-01-01 VITALS
RESPIRATION RATE: 18 BRPM | OXYGEN SATURATION: 92 % | WEIGHT: 144.1 LBS | SYSTOLIC BLOOD PRESSURE: 81 MMHG | TEMPERATURE: 97.4 F | HEART RATE: 80 BPM | BODY MASS INDEX: 20.63 KG/M2 | DIASTOLIC BLOOD PRESSURE: 58 MMHG | HEIGHT: 70 IN

## 2021-01-01 VITALS
BODY MASS INDEX: 19.37 KG/M2 | DIASTOLIC BLOOD PRESSURE: 52 MMHG | RESPIRATION RATE: 20 BRPM | SYSTOLIC BLOOD PRESSURE: 87 MMHG | OXYGEN SATURATION: 93 % | HEART RATE: 80 BPM | WEIGHT: 135 LBS

## 2021-01-01 VITALS
DIASTOLIC BLOOD PRESSURE: 70 MMHG | BODY MASS INDEX: 21.24 KG/M2 | RESPIRATION RATE: 14 BRPM | WEIGHT: 148 LBS | TEMPERATURE: 97.2 F | SYSTOLIC BLOOD PRESSURE: 108 MMHG | OXYGEN SATURATION: 96 % | HEART RATE: 74 BPM

## 2021-01-01 VITALS
SYSTOLIC BLOOD PRESSURE: 128 MMHG | DIASTOLIC BLOOD PRESSURE: 64 MMHG | OXYGEN SATURATION: 98 % | HEART RATE: 50 BPM | RESPIRATION RATE: 14 BRPM

## 2021-01-01 VITALS
BODY MASS INDEX: 21.13 KG/M2 | SYSTOLIC BLOOD PRESSURE: 148 MMHG | RESPIRATION RATE: 22 BRPM | OXYGEN SATURATION: 95 % | DIASTOLIC BLOOD PRESSURE: 69 MMHG | WEIGHT: 147.6 LBS | HEART RATE: 74 BPM | HEIGHT: 70 IN | TEMPERATURE: 97.5 F

## 2021-01-01 VITALS
SYSTOLIC BLOOD PRESSURE: 98 MMHG | HEART RATE: 72 BPM | DIASTOLIC BLOOD PRESSURE: 60 MMHG | BODY MASS INDEX: 20.09 KG/M2 | WEIGHT: 140 LBS | OXYGEN SATURATION: 92 % | RESPIRATION RATE: 18 BRPM

## 2021-01-01 VITALS
RESPIRATION RATE: 18 BRPM | HEIGHT: 70 IN | BODY MASS INDEX: 20.37 KG/M2 | OXYGEN SATURATION: 99 % | TEMPERATURE: 98.2 F | SYSTOLIC BLOOD PRESSURE: 100 MMHG | DIASTOLIC BLOOD PRESSURE: 54 MMHG | HEART RATE: 72 BPM | WEIGHT: 142.3 LBS

## 2021-01-01 VITALS — HEIGHT: 70 IN | WEIGHT: 150 LBS | BODY MASS INDEX: 21.47 KG/M2

## 2021-01-01 VITALS
DIASTOLIC BLOOD PRESSURE: 72 MMHG | HEIGHT: 70 IN | OXYGEN SATURATION: 93 % | SYSTOLIC BLOOD PRESSURE: 100 MMHG | HEART RATE: 69 BPM | BODY MASS INDEX: 20.9 KG/M2 | WEIGHT: 146 LBS | TEMPERATURE: 97.2 F

## 2021-01-01 VITALS
SYSTOLIC BLOOD PRESSURE: 92 MMHG | WEIGHT: 140 LBS | BODY MASS INDEX: 20.04 KG/M2 | DIASTOLIC BLOOD PRESSURE: 50 MMHG | HEIGHT: 70 IN | RESPIRATION RATE: 18 BRPM

## 2021-01-01 VITALS
BODY MASS INDEX: 19.5 KG/M2 | HEART RATE: 73 BPM | DIASTOLIC BLOOD PRESSURE: 54 MMHG | RESPIRATION RATE: 16 BRPM | HEIGHT: 70 IN | OXYGEN SATURATION: 98 % | WEIGHT: 136.2 LBS | SYSTOLIC BLOOD PRESSURE: 90 MMHG

## 2021-01-01 VITALS
BODY MASS INDEX: 19.73 KG/M2 | WEIGHT: 137.5 LBS | TEMPERATURE: 97.2 F | DIASTOLIC BLOOD PRESSURE: 50 MMHG | SYSTOLIC BLOOD PRESSURE: 80 MMHG | HEART RATE: 61 BPM | OXYGEN SATURATION: 86 %

## 2021-01-01 VITALS
WEIGHT: 141 LBS | HEART RATE: 60 BPM | RESPIRATION RATE: 18 BRPM | OXYGEN SATURATION: 100 % | DIASTOLIC BLOOD PRESSURE: 57 MMHG | SYSTOLIC BLOOD PRESSURE: 106 MMHG | BODY MASS INDEX: 20.23 KG/M2

## 2021-01-01 VITALS
WEIGHT: 139.9 LBS | HEIGHT: 70 IN | RESPIRATION RATE: 18 BRPM | BODY MASS INDEX: 20.03 KG/M2 | DIASTOLIC BLOOD PRESSURE: 52 MMHG | SYSTOLIC BLOOD PRESSURE: 92 MMHG | HEART RATE: 69 BPM

## 2021-01-01 DIAGNOSIS — I49.01 VENTRICULAR FIBRILLATION (HCC): Primary | ICD-10-CM

## 2021-01-01 DIAGNOSIS — E44.0 MODERATE PROTEIN MALNUTRITION (HCC): ICD-10-CM

## 2021-01-01 DIAGNOSIS — R06.89 DYSPNEA AND RESPIRATORY ABNORMALITIES: ICD-10-CM

## 2021-01-01 DIAGNOSIS — I50.9 CONGESTIVE HEART FAILURE, UNSPECIFIED HF CHRONICITY, UNSPECIFIED HEART FAILURE TYPE (HCC): Primary | ICD-10-CM

## 2021-01-01 DIAGNOSIS — R07.9 CHEST PAIN, UNSPECIFIED TYPE: Primary | ICD-10-CM

## 2021-01-01 DIAGNOSIS — I25.5 ISCHEMIC CARDIOMYOPATHY: Primary | ICD-10-CM

## 2021-01-01 DIAGNOSIS — Z95.810 AUTOMATIC IMPLANTABLE CARDIOVERTER-DEFIBRILLATOR IN SITU: Primary | ICD-10-CM

## 2021-01-01 DIAGNOSIS — Z20.822 COVID-19 VIRUS NOT DETECTED: ICD-10-CM

## 2021-01-01 DIAGNOSIS — J96.01 ACUTE RESPIRATORY FAILURE WITH HYPOXIA (HCC): ICD-10-CM

## 2021-01-01 DIAGNOSIS — I50.22 CHRONIC SYSTOLIC CHF (CONGESTIVE HEART FAILURE) (HCC): ICD-10-CM

## 2021-01-01 DIAGNOSIS — R26.2 DIFFICULTY WALKING: ICD-10-CM

## 2021-01-01 DIAGNOSIS — R06.02 SHORTNESS OF BREATH: ICD-10-CM

## 2021-01-01 DIAGNOSIS — R07.89 CHEST TIGHTNESS: Primary | ICD-10-CM

## 2021-01-01 DIAGNOSIS — R13.10 DYSPHAGIA, UNSPECIFIED TYPE: ICD-10-CM

## 2021-01-01 DIAGNOSIS — J96.10 CHRONIC RESPIRATORY FAILURE NOT AFFECTING CURRENT EPISODE OF CARE (HCC): ICD-10-CM

## 2021-01-01 DIAGNOSIS — I10 ESSENTIAL HYPERTENSION: ICD-10-CM

## 2021-01-01 DIAGNOSIS — E78.5 DYSLIPIDEMIA: ICD-10-CM

## 2021-01-01 DIAGNOSIS — R06.02 SOB (SHORTNESS OF BREATH): ICD-10-CM

## 2021-01-01 DIAGNOSIS — R06.09 DYSPNEA ON EXERTION: Primary | ICD-10-CM

## 2021-01-01 DIAGNOSIS — I77.819 DILATATION OF AORTA (HCC): ICD-10-CM

## 2021-01-01 DIAGNOSIS — I25.5 ISCHEMIC CARDIOMYOPATHY: ICD-10-CM

## 2021-01-01 DIAGNOSIS — F41.9 ANXIETY: ICD-10-CM

## 2021-01-01 DIAGNOSIS — J18.9 PNEUMONIA OF RIGHT LOWER LOBE DUE TO INFECTIOUS ORGANISM: Primary | ICD-10-CM

## 2021-01-01 DIAGNOSIS — Z99.81 SUPPLEMENTAL OXYGEN DEPENDENT: ICD-10-CM

## 2021-01-01 DIAGNOSIS — F43.22 ADJUSTMENT DISORDER WITH ANXIETY: ICD-10-CM

## 2021-01-01 DIAGNOSIS — F43.22 ADJUSTMENT DISORDER WITH ANXIETY: Primary | ICD-10-CM

## 2021-01-01 DIAGNOSIS — J84.112 IPF (IDIOPATHIC PULMONARY FIBROSIS) (HCC): ICD-10-CM

## 2021-01-01 DIAGNOSIS — I25.118 CORONARY ARTERY DISEASE OF NATIVE ARTERY OF NATIVE HEART WITH STABLE ANGINA PECTORIS (HCC): Primary | ICD-10-CM

## 2021-01-01 DIAGNOSIS — I47.20 VENTRICULAR TACHYCARDIA: ICD-10-CM

## 2021-01-01 DIAGNOSIS — I25.10 CORONARY ARTERY DISEASE INVOLVING NATIVE CORONARY ARTERY OF NATIVE HEART WITHOUT ANGINA PECTORIS: Primary | ICD-10-CM

## 2021-01-01 DIAGNOSIS — R06.00 DYSPNEA AND RESPIRATORY ABNORMALITIES: ICD-10-CM

## 2021-01-01 DIAGNOSIS — R93.89 ABNORMAL CT SCAN: ICD-10-CM

## 2021-01-01 DIAGNOSIS — S02.401A CLOSED FRACTURE OF MAXILLARY SINUS, INITIAL ENCOUNTER (HCC): ICD-10-CM

## 2021-01-01 DIAGNOSIS — Z51.5 PALLIATIVE CARE BY SPECIALIST: ICD-10-CM

## 2021-01-01 DIAGNOSIS — R06.09 DYSPNEA ON EXERTION: ICD-10-CM

## 2021-01-01 DIAGNOSIS — N20.0 KIDNEY STONES: ICD-10-CM

## 2021-01-01 DIAGNOSIS — S01.81XD FACIAL LACERATION, SUBSEQUENT ENCOUNTER: ICD-10-CM

## 2021-01-01 DIAGNOSIS — R63.0 ANOREXIA: ICD-10-CM

## 2021-01-01 DIAGNOSIS — S01.01XA LACERATION OF SCALP, INITIAL ENCOUNTER: ICD-10-CM

## 2021-01-01 DIAGNOSIS — I50.9 CONGESTIVE HEART FAILURE, UNSPECIFIED HF CHRONICITY, UNSPECIFIED HEART FAILURE TYPE (HCC): ICD-10-CM

## 2021-01-01 DIAGNOSIS — N20.0 KIDNEY STONE: ICD-10-CM

## 2021-01-01 DIAGNOSIS — I25.10 CORONARY ARTERY DISEASE INVOLVING NATIVE CORONARY ARTERY OF NATIVE HEART WITHOUT ANGINA PECTORIS: ICD-10-CM

## 2021-01-01 DIAGNOSIS — M79.674 PAIN OF TOE OF RIGHT FOOT: ICD-10-CM

## 2021-01-01 DIAGNOSIS — S02.2XXD CLOSED FRACTURE OF NASAL BONE WITH ROUTINE HEALING, SUBSEQUENT ENCOUNTER: ICD-10-CM

## 2021-01-01 DIAGNOSIS — R63.0 ANOREXIA: Primary | ICD-10-CM

## 2021-01-01 DIAGNOSIS — I50.23 ACUTE ON CHRONIC SYSTOLIC HF (HEART FAILURE) (HCC): Primary | ICD-10-CM

## 2021-01-01 DIAGNOSIS — J96.21 ACUTE ON CHRONIC RESPIRATORY FAILURE WITH HYPOXIA (HCC): ICD-10-CM

## 2021-01-01 DIAGNOSIS — Z00.00 ROUTINE GENERAL MEDICAL EXAMINATION AT A HEALTH CARE FACILITY: Primary | ICD-10-CM

## 2021-01-01 DIAGNOSIS — I50.22 CHRONIC SYSTOLIC CHF (CONGESTIVE HEART FAILURE) (HCC): Primary | ICD-10-CM

## 2021-01-01 DIAGNOSIS — I25.10 CORONARY ARTERY DISEASE INVOLVING NATIVE CORONARY ARTERY OF NATIVE HEART WITHOUT ANGINA PECTORIS: Chronic | ICD-10-CM

## 2021-01-01 DIAGNOSIS — B35.1 ONYCHOMYCOSIS: Primary | ICD-10-CM

## 2021-01-01 DIAGNOSIS — Z79.899 ENCOUNTER FOR MEDICATION REVIEW: Primary | ICD-10-CM

## 2021-01-01 DIAGNOSIS — I73.9 PVD (PERIPHERAL VASCULAR DISEASE) (HCC): ICD-10-CM

## 2021-01-01 DIAGNOSIS — S09.90XA INJURY OF HEAD, INITIAL ENCOUNTER: ICD-10-CM

## 2021-01-01 DIAGNOSIS — J98.4 PULMONARY CAVITARY LESION: ICD-10-CM

## 2021-01-01 DIAGNOSIS — M79.675 PAIN OF TOE OF LEFT FOOT: ICD-10-CM

## 2021-01-01 DIAGNOSIS — S02.2XXA CLOSED FRACTURE OF NASAL BONE, INITIAL ENCOUNTER: ICD-10-CM

## 2021-01-01 DIAGNOSIS — R26.89 IMBALANCE: ICD-10-CM

## 2021-01-01 DIAGNOSIS — R53.81 PHYSICAL DEBILITY: Primary | ICD-10-CM

## 2021-01-01 DIAGNOSIS — R05.9 COUGH: ICD-10-CM

## 2021-01-01 DIAGNOSIS — J96.21 ACUTE ON CHRONIC RESPIRATORY FAILURE WITH HYPOXIA (HCC): Primary | ICD-10-CM

## 2021-01-01 DIAGNOSIS — Z23 NEED FOR INFLUENZA VACCINATION: ICD-10-CM

## 2021-01-01 DIAGNOSIS — Z45.02 DEFIBRILLATOR DISCHARGE: ICD-10-CM

## 2021-01-01 DIAGNOSIS — N20.0 KIDNEY STONE ON RIGHT SIDE: Primary | ICD-10-CM

## 2021-01-01 DIAGNOSIS — R10.9 ABDOMINAL PAIN, UNSPECIFIED ABDOMINAL LOCATION: Primary | ICD-10-CM

## 2021-01-01 DIAGNOSIS — F51.01 PRIMARY INSOMNIA: Primary | ICD-10-CM

## 2021-01-01 LAB
ADENOVIRUS BY PCR: NOT DETECTED
ALBUMIN SERPL-MCNC: 3 G/DL (ref 3.5–5.2)
ALBUMIN SERPL-MCNC: 3.1 G/DL (ref 3.5–5.2)
ALBUMIN SERPL-MCNC: 3.2 G/DL (ref 3.5–5.2)
ALBUMIN SERPL-MCNC: 3.3 G/DL (ref 3.5–5.2)
ALBUMIN SERPL-MCNC: 3.7 G/DL (ref 3.5–5.2)
ALP BLD-CCNC: 42 U/L (ref 40–129)
ALP BLD-CCNC: 45 U/L (ref 40–129)
ALP BLD-CCNC: 47 U/L (ref 40–129)
ALP BLD-CCNC: 50 U/L (ref 40–129)
ALT SERPL-CCNC: 11 U/L (ref 0–40)
ALT SERPL-CCNC: 12 U/L (ref 0–40)
ALT SERPL-CCNC: 13 U/L (ref 0–40)
ALT SERPL-CCNC: 15 U/L (ref 0–40)
ALT SERPL-CCNC: 7 U/L (ref 0–40)
ANION GAP SERPL CALCULATED.3IONS-SCNC: 10 MMOL/L (ref 7–16)
ANION GAP SERPL CALCULATED.3IONS-SCNC: 11 MMOL/L (ref 7–16)
ANION GAP SERPL CALCULATED.3IONS-SCNC: 11 MMOL/L (ref 7–16)
ANION GAP SERPL CALCULATED.3IONS-SCNC: 12 MMOL/L (ref 7–16)
ANION GAP SERPL CALCULATED.3IONS-SCNC: 12 MMOL/L (ref 7–16)
ANION GAP SERPL CALCULATED.3IONS-SCNC: 5 MMOL/L (ref 7–16)
ANION GAP SERPL CALCULATED.3IONS-SCNC: 7 MMOL/L (ref 7–16)
ANION GAP SERPL CALCULATED.3IONS-SCNC: 8 MMOL/L (ref 7–16)
ANION GAP SERPL CALCULATED.3IONS-SCNC: 9 MMOL/L (ref 7–16)
ANISOCYTOSIS: ABNORMAL
AST SERPL-CCNC: 17 U/L (ref 0–39)
AST SERPL-CCNC: 19 U/L (ref 0–39)
AST SERPL-CCNC: 21 U/L (ref 0–39)
B.E.: -1.6 MMOL/L (ref -3–3)
BACTERIA: ABNORMAL /HPF
BASOPHILS ABSOLUTE: 0 E9/L (ref 0–0.2)
BASOPHILS ABSOLUTE: 0 E9/L (ref 0–0.2)
BASOPHILS ABSOLUTE: 0.01 E9/L (ref 0–0.2)
BASOPHILS ABSOLUTE: 0.02 E9/L (ref 0–0.2)
BASOPHILS ABSOLUTE: 0.03 E9/L (ref 0–0.2)
BASOPHILS RELATIVE PERCENT: 0 % (ref 0–2)
BASOPHILS RELATIVE PERCENT: 0.1 % (ref 0–2)
BASOPHILS RELATIVE PERCENT: 0.2 % (ref 0–2)
BASOPHILS RELATIVE PERCENT: 0.3 % (ref 0–2)
BILIRUB SERPL-MCNC: 0.3 MG/DL (ref 0–1.2)
BILIRUB SERPL-MCNC: 0.4 MG/DL (ref 0–1.2)
BILIRUB SERPL-MCNC: 0.5 MG/DL (ref 0–1.2)
BILIRUB SERPL-MCNC: 0.9 MG/DL (ref 0–1.2)
BILIRUBIN URINE: NEGATIVE
BILIRUBIN URINE: NEGATIVE
BLOOD CULTURE, ROUTINE: NORMAL
BLOOD, URINE: ABNORMAL
BLOOD, URINE: NEGATIVE
BORDETELLA PARAPERTUSSIS BY PCR: NOT DETECTED
BORDETELLA PERTUSSIS BY PCR: NOT DETECTED
BUN BLDV-MCNC: 16 MG/DL (ref 6–23)
BUN BLDV-MCNC: 17 MG/DL (ref 6–23)
BUN BLDV-MCNC: 18 MG/DL (ref 6–23)
BUN BLDV-MCNC: 19 MG/DL (ref 6–23)
BUN BLDV-MCNC: 19 MG/DL (ref 6–23)
BUN BLDV-MCNC: 20 MG/DL (ref 6–23)
BUN BLDV-MCNC: 21 MG/DL (ref 6–23)
BUN BLDV-MCNC: 21 MG/DL (ref 6–23)
BUN BLDV-MCNC: 23 MG/DL (ref 6–23)
BUN BLDV-MCNC: 23 MG/DL (ref 6–23)
BUN BLDV-MCNC: 23 MG/DL (ref 8–23)
BUN BLDV-MCNC: 26 MG/DL (ref 8–23)
BUN BLDV-MCNC: 28 MG/DL (ref 6–23)
BUN BLDV-MCNC: 28 MG/DL (ref 8–23)
BUN BLDV-MCNC: 29 MG/DL (ref 6–23)
BUN BLDV-MCNC: 29 MG/DL (ref 6–23)
BUN BLDV-MCNC: 29 MG/DL (ref 8–23)
BUN BLDV-MCNC: 30 MG/DL (ref 8–23)
BUN BLDV-MCNC: 30 MG/DL (ref 8–23)
BUN BLDV-MCNC: 36 MG/DL (ref 6–23)
BURR CELLS: ABNORMAL
CALCIUM SERPL-MCNC: 8.4 MG/DL (ref 8.6–10.2)
CALCIUM SERPL-MCNC: 8.4 MG/DL (ref 8.6–10.2)
CALCIUM SERPL-MCNC: 8.5 MG/DL (ref 8.6–10.2)
CALCIUM SERPL-MCNC: 8.5 MG/DL (ref 8.6–10.2)
CALCIUM SERPL-MCNC: 8.6 MG/DL (ref 8.6–10.2)
CALCIUM SERPL-MCNC: 8.8 MG/DL (ref 8.6–10.2)
CALCIUM SERPL-MCNC: 8.9 MG/DL (ref 8.6–10.2)
CALCIUM SERPL-MCNC: 9 MG/DL (ref 8.6–10.2)
CALCIUM SERPL-MCNC: 9 MG/DL (ref 8.6–10.2)
CALCIUM SERPL-MCNC: 9.3 MG/DL (ref 8.6–10.2)
CALCIUM SERPL-MCNC: 9.3 MG/DL (ref 8.6–10.2)
CALCIUM SERPL-MCNC: 9.4 MG/DL (ref 8.6–10.2)
CHLAMYDOPHILIA PNEUMONIAE BY PCR: NOT DETECTED
CHLORIDE BLD-SCNC: 100 MMOL/L (ref 98–107)
CHLORIDE BLD-SCNC: 101 MMOL/L (ref 98–107)
CHLORIDE BLD-SCNC: 102 MMOL/L (ref 98–107)
CHLORIDE BLD-SCNC: 102 MMOL/L (ref 98–107)
CHLORIDE BLD-SCNC: 103 MMOL/L (ref 98–107)
CHLORIDE BLD-SCNC: 104 MMOL/L (ref 98–107)
CHLORIDE BLD-SCNC: 105 MMOL/L (ref 98–107)
CHLORIDE BLD-SCNC: 105 MMOL/L (ref 98–107)
CHLORIDE BLD-SCNC: 106 MMOL/L (ref 98–107)
CHLORIDE BLD-SCNC: 108 MMOL/L (ref 98–107)
CHLORIDE BLD-SCNC: 109 MMOL/L (ref 98–107)
CHOLESTEROL, TOTAL: 107 MG/DL (ref 0–199)
CLARITY: CLEAR
CLARITY: CLEAR
CO2: 23 MMOL/L (ref 22–29)
CO2: 24 MMOL/L (ref 22–29)
CO2: 25 MMOL/L (ref 22–29)
CO2: 26 MMOL/L (ref 22–29)
CO2: 27 MMOL/L (ref 22–29)
CO2: 28 MMOL/L (ref 22–29)
CO2: 29 MMOL/L (ref 22–29)
CO2: 30 MMOL/L (ref 22–29)
COHB: 1.2 % (ref 0–1.5)
COLOR: YELLOW
COLOR: YELLOW
CORONAVIRUS 229E BY PCR: NOT DETECTED
CORONAVIRUS HKU1 BY PCR: NOT DETECTED
CORONAVIRUS NL63 BY PCR: NOT DETECTED
CORONAVIRUS OC43 BY PCR: NOT DETECTED
CREAT SERPL-MCNC: 0.9 MG/DL (ref 0.7–1.2)
CREAT SERPL-MCNC: 1 MG/DL (ref 0.7–1.2)
CREAT SERPL-MCNC: 1.1 MG/DL (ref 0.7–1.2)
CREAT SERPL-MCNC: 1.2 MG/DL (ref 0.7–1.2)
CREAT SERPL-MCNC: 1.3 MG/DL (ref 0.7–1.2)
CREAT SERPL-MCNC: 1.4 MG/DL (ref 0.7–1.2)
CREAT SERPL-MCNC: 1.4 MG/DL (ref 0.7–1.2)
CRITICAL: ABNORMAL
CULTURE, BLOOD 2: NORMAL
DATE ANALYZED: ABNORMAL
DATE OF COLLECTION: ABNORMAL
EKG ATRIAL RATE: 53 BPM
EKG ATRIAL RATE: 61 BPM
EKG ATRIAL RATE: 64 BPM
EKG ATRIAL RATE: 65 BPM
EKG ATRIAL RATE: 69 BPM
EKG ATRIAL RATE: 70 BPM
EKG ATRIAL RATE: 70 BPM
EKG ATRIAL RATE: 72 BPM
EKG P AXIS: -37 DEGREES
EKG P AXIS: 14 DEGREES
EKG P AXIS: 14 DEGREES
EKG P AXIS: 16 DEGREES
EKG P AXIS: 16 DEGREES
EKG P AXIS: 20 DEGREES
EKG P AXIS: 29 DEGREES
EKG P AXIS: 29 DEGREES
EKG P-R INTERVAL: 180 MS
EKG P-R INTERVAL: 182 MS
EKG P-R INTERVAL: 190 MS
EKG P-R INTERVAL: 196 MS
EKG P-R INTERVAL: 200 MS
EKG P-R INTERVAL: 258 MS
EKG Q-T INTERVAL: 398 MS
EKG Q-T INTERVAL: 406 MS
EKG Q-T INTERVAL: 406 MS
EKG Q-T INTERVAL: 408 MS
EKG Q-T INTERVAL: 424 MS
EKG Q-T INTERVAL: 426 MS
EKG Q-T INTERVAL: 430 MS
EKG Q-T INTERVAL: 446 MS
EKG QRS DURATION: 122 MS
EKG QRS DURATION: 124 MS
EKG QRS DURATION: 124 MS
EKG QRS DURATION: 126 MS
EKG QRS DURATION: 134 MS
EKG QRS DURATION: 134 MS
EKG QRS DURATION: 138 MS
EKG QRS DURATION: 140 MS
EKG QTC CALCULATION (BAZETT): 406 MS
EKG QTC CALCULATION (BAZETT): 426 MS
EKG QTC CALCULATION (BAZETT): 428 MS
EKG QTC CALCULATION (BAZETT): 438 MS
EKG QTC CALCULATION (BAZETT): 443 MS
EKG QTC CALCULATION (BAZETT): 446 MS
EKG QTC CALCULATION (BAZETT): 457 MS
EKG QTC CALCULATION (BAZETT): 463 MS
EKG R AXIS: -15 DEGREES
EKG R AXIS: -22 DEGREES
EKG R AXIS: -23 DEGREES
EKG R AXIS: -23 DEGREES
EKG R AXIS: -24 DEGREES
EKG R AXIS: -24 DEGREES
EKG R AXIS: -26 DEGREES
EKG R AXIS: -30 DEGREES
EKG T AXIS: -12 DEGREES
EKG T AXIS: 13 DEGREES
EKG T AXIS: 20 DEGREES
EKG T AXIS: 30 DEGREES
EKG T AXIS: 64 DEGREES
EKG T AXIS: 66 DEGREES
EKG VENTRICULAR RATE: 60 BPM
EKG VENTRICULAR RATE: 61 BPM
EKG VENTRICULAR RATE: 64 BPM
EKG VENTRICULAR RATE: 65 BPM
EKG VENTRICULAR RATE: 69 BPM
EKG VENTRICULAR RATE: 70 BPM
EKG VENTRICULAR RATE: 70 BPM
EKG VENTRICULAR RATE: 72 BPM
EOSINOPHILS ABSOLUTE: 0 E9/L (ref 0.05–0.5)
EOSINOPHILS ABSOLUTE: 0.04 E9/L (ref 0.05–0.5)
EOSINOPHILS ABSOLUTE: 0.05 E9/L (ref 0.05–0.5)
EOSINOPHILS ABSOLUTE: 0.06 E9/L (ref 0.05–0.5)
EOSINOPHILS ABSOLUTE: 0.12 E9/L (ref 0.05–0.5)
EOSINOPHILS ABSOLUTE: 0.12 E9/L (ref 0.05–0.5)
EOSINOPHILS ABSOLUTE: 0.15 E9/L (ref 0.05–0.5)
EOSINOPHILS ABSOLUTE: 0.16 E9/L (ref 0.05–0.5)
EOSINOPHILS ABSOLUTE: 0.16 E9/L (ref 0.05–0.5)
EOSINOPHILS ABSOLUTE: 0.19 E9/L (ref 0.05–0.5)
EOSINOPHILS RELATIVE PERCENT: 0 % (ref 0–6)
EOSINOPHILS RELATIVE PERCENT: 0.3 % (ref 0–6)
EOSINOPHILS RELATIVE PERCENT: 0.5 % (ref 0–6)
EOSINOPHILS RELATIVE PERCENT: 0.5 % (ref 0–6)
EOSINOPHILS RELATIVE PERCENT: 0.9 % (ref 0–6)
EOSINOPHILS RELATIVE PERCENT: 1.1 % (ref 0–6)
EOSINOPHILS RELATIVE PERCENT: 1.3 % (ref 0–6)
EOSINOPHILS RELATIVE PERCENT: 1.4 % (ref 0–6)
EOSINOPHILS RELATIVE PERCENT: 1.4 % (ref 0–6)
EOSINOPHILS RELATIVE PERCENT: 1.7 % (ref 0–6)
GFR AFRICAN AMERICAN: 58
GFR AFRICAN AMERICAN: 58
GFR AFRICAN AMERICAN: >60
GFR NON-AFRICAN AMERICAN: 48 ML/MIN/1.73
GFR NON-AFRICAN AMERICAN: 48 ML/MIN/1.73
GFR NON-AFRICAN AMERICAN: 52 ML/MIN/1.73
GFR NON-AFRICAN AMERICAN: 57 ML/MIN/1.73
GFR NON-AFRICAN AMERICAN: >60 ML/MIN/1.73
GLUCOSE BLD-MCNC: 101 MG/DL (ref 74–99)
GLUCOSE BLD-MCNC: 102 MG/DL (ref 74–99)
GLUCOSE BLD-MCNC: 107 MG/DL (ref 74–99)
GLUCOSE BLD-MCNC: 108 MG/DL (ref 74–99)
GLUCOSE BLD-MCNC: 112 MG/DL (ref 74–99)
GLUCOSE BLD-MCNC: 115 MG/DL (ref 74–99)
GLUCOSE BLD-MCNC: 118 MG/DL (ref 74–99)
GLUCOSE BLD-MCNC: 122 MG/DL (ref 74–99)
GLUCOSE BLD-MCNC: 123 MG/DL (ref 74–99)
GLUCOSE BLD-MCNC: 123 MG/DL (ref 74–99)
GLUCOSE BLD-MCNC: 124 MG/DL (ref 74–99)
GLUCOSE BLD-MCNC: 142 MG/DL (ref 74–99)
GLUCOSE BLD-MCNC: 166 MG/DL (ref 74–99)
GLUCOSE BLD-MCNC: 205 MG/DL (ref 74–99)
GLUCOSE BLD-MCNC: 91 MG/DL (ref 74–99)
GLUCOSE BLD-MCNC: 92 MG/DL (ref 74–99)
GLUCOSE BLD-MCNC: 94 MG/DL (ref 74–99)
GLUCOSE BLD-MCNC: 98 MG/DL (ref 74–99)
GLUCOSE URINE: NEGATIVE MG/DL
GLUCOSE URINE: NEGATIVE MG/DL
HCO3: 21.7 MMOL/L (ref 22–26)
HCT VFR BLD CALC: 28.6 % (ref 37–54)
HCT VFR BLD CALC: 30.4 % (ref 37–54)
HCT VFR BLD CALC: 30.6 % (ref 37–54)
HCT VFR BLD CALC: 31.2 % (ref 37–54)
HCT VFR BLD CALC: 32.8 % (ref 37–54)
HCT VFR BLD CALC: 33.2 % (ref 37–54)
HCT VFR BLD CALC: 33.5 % (ref 37–54)
HCT VFR BLD CALC: 33.7 % (ref 37–54)
HCT VFR BLD CALC: 33.9 % (ref 37–54)
HCT VFR BLD CALC: 35.2 % (ref 37–54)
HCT VFR BLD CALC: 35.8 % (ref 37–54)
HCT VFR BLD CALC: 35.9 % (ref 37–54)
HDLC SERPL-MCNC: 28 MG/DL
HEMOGLOBIN: 10.4 G/DL (ref 12.5–16.5)
HEMOGLOBIN: 10.4 G/DL (ref 12.5–16.5)
HEMOGLOBIN: 10.6 G/DL (ref 12.5–16.5)
HEMOGLOBIN: 10.8 G/DL (ref 12.5–16.5)
HEMOGLOBIN: 11 G/DL (ref 12.5–16.5)
HEMOGLOBIN: 11.5 G/DL (ref 12.5–16.5)
HEMOGLOBIN: 9.1 G/DL (ref 12.5–16.5)
HEMOGLOBIN: 9.5 G/DL (ref 12.5–16.5)
HEMOGLOBIN: 9.7 G/DL (ref 12.5–16.5)
HEMOGLOBIN: 9.9 G/DL (ref 12.5–16.5)
HHB: 6 % (ref 0–5)
HUMAN METAPNEUMOVIRUS BY PCR: NOT DETECTED
HUMAN RHINOVIRUS/ENTEROVIRUS BY PCR: NOT DETECTED
IMMATURE GRANULOCYTES #: 0.04 E9/L
IMMATURE GRANULOCYTES #: 0.05 E9/L
IMMATURE GRANULOCYTES #: 0.06 E9/L
IMMATURE GRANULOCYTES #: 0.06 E9/L
IMMATURE GRANULOCYTES #: 0.07 E9/L
IMMATURE GRANULOCYTES #: 0.07 E9/L
IMMATURE GRANULOCYTES #: 0.08 E9/L
IMMATURE GRANULOCYTES #: 0.08 E9/L
IMMATURE GRANULOCYTES #: 0.09 E9/L
IMMATURE GRANULOCYTES %: 0.4 % (ref 0–5)
IMMATURE GRANULOCYTES %: 0.5 % (ref 0–5)
IMMATURE GRANULOCYTES %: 0.6 % (ref 0–5)
IMMATURE GRANULOCYTES %: 0.7 % (ref 0–5)
IMMATURE GRANULOCYTES %: 0.7 % (ref 0–5)
IMMATURE GRANULOCYTES %: 0.8 % (ref 0–5)
INFLUENZA A BY PCR: NOT DETECTED
INFLUENZA B BY PCR: NOT DETECTED
INR BLD: 1.2
KETONES, URINE: NEGATIVE MG/DL
KETONES, URINE: NEGATIVE MG/DL
L. PNEUMOPHILA SEROGP 1 UR AG: NORMAL
LAB: ABNORMAL
LACTIC ACID, SEPSIS: 1.2 MMOL/L (ref 0.5–1.9)
LACTIC ACID: 1.7 MMOL/L (ref 0.5–2.2)
LACTIC ACID: 1.8 MMOL/L (ref 0.5–2.2)
LDL CHOLESTEROL CALCULATED: 63 MG/DL (ref 0–99)
LEUKOCYTE ESTERASE, URINE: NEGATIVE
LEUKOCYTE ESTERASE, URINE: NEGATIVE
LIPASE: 22 U/L (ref 13–60)
LV EF: 36 %
LVEF MODALITY: NORMAL
LYMPHOCYTES ABSOLUTE: 3.1 E9/L (ref 1.5–4)
LYMPHOCYTES ABSOLUTE: 3.58 E9/L (ref 1.5–4)
LYMPHOCYTES ABSOLUTE: 3.69 E9/L (ref 1.5–4)
LYMPHOCYTES ABSOLUTE: 3.97 E9/L (ref 1.5–4)
LYMPHOCYTES ABSOLUTE: 3.98 E9/L (ref 1.5–4)
LYMPHOCYTES ABSOLUTE: 4.02 E9/L (ref 1.5–4)
LYMPHOCYTES ABSOLUTE: 4.36 E9/L (ref 1.5–4)
LYMPHOCYTES ABSOLUTE: 4.84 E9/L (ref 1.5–4)
LYMPHOCYTES ABSOLUTE: 4.95 E9/L (ref 1.5–4)
LYMPHOCYTES ABSOLUTE: 5.41 E9/L (ref 1.5–4)
LYMPHOCYTES RELATIVE PERCENT: 23.2 % (ref 20–42)
LYMPHOCYTES RELATIVE PERCENT: 29 % (ref 20–42)
LYMPHOCYTES RELATIVE PERCENT: 30.1 % (ref 20–42)
LYMPHOCYTES RELATIVE PERCENT: 30.2 % (ref 20–42)
LYMPHOCYTES RELATIVE PERCENT: 31.9 % (ref 20–42)
LYMPHOCYTES RELATIVE PERCENT: 35.3 % (ref 20–42)
LYMPHOCYTES RELATIVE PERCENT: 40 % (ref 20–42)
LYMPHOCYTES RELATIVE PERCENT: 41.3 % (ref 20–42)
LYMPHOCYTES RELATIVE PERCENT: 42 % (ref 20–42)
LYMPHOCYTES RELATIVE PERCENT: 45.8 % (ref 20–42)
Lab: ABNORMAL
MAGNESIUM: 1.9 MG/DL (ref 1.6–2.6)
MAGNESIUM: 1.9 MG/DL (ref 1.6–2.6)
MAGNESIUM: 2 MG/DL (ref 1.6–2.6)
MAGNESIUM: 2.1 MG/DL (ref 1.6–2.6)
MAGNESIUM: 2.3 MG/DL (ref 1.6–2.6)
MAGNESIUM: 2.3 MG/DL (ref 1.6–2.6)
MAGNESIUM: 2.5 MG/DL (ref 1.6–2.6)
MCH RBC QN AUTO: 28 PG (ref 26–35)
MCH RBC QN AUTO: 28.8 PG (ref 26–35)
MCH RBC QN AUTO: 29.2 PG (ref 26–35)
MCH RBC QN AUTO: 29.5 PG (ref 26–35)
MCH RBC QN AUTO: 29.9 PG (ref 26–35)
MCH RBC QN AUTO: 30.3 PG (ref 26–35)
MCH RBC QN AUTO: 30.4 PG (ref 26–35)
MCH RBC QN AUTO: 30.4 PG (ref 26–35)
MCH RBC QN AUTO: 30.7 PG (ref 26–35)
MCH RBC QN AUTO: 30.7 PG (ref 26–35)
MCH RBC QN AUTO: 30.8 PG (ref 26–35)
MCH RBC QN AUTO: 31 PG (ref 26–35)
MCHC RBC AUTO-ENTMCNC: 30.1 % (ref 32–34.5)
MCHC RBC AUTO-ENTMCNC: 30.6 % (ref 32–34.5)
MCHC RBC AUTO-ENTMCNC: 31 % (ref 32–34.5)
MCHC RBC AUTO-ENTMCNC: 31.3 % (ref 32–34.5)
MCHC RBC AUTO-ENTMCNC: 31.3 % (ref 32–34.5)
MCHC RBC AUTO-ENTMCNC: 31.5 % (ref 32–34.5)
MCHC RBC AUTO-ENTMCNC: 31.7 % (ref 32–34.5)
MCHC RBC AUTO-ENTMCNC: 31.7 % (ref 32–34.5)
MCHC RBC AUTO-ENTMCNC: 31.8 % (ref 32–34.5)
MCHC RBC AUTO-ENTMCNC: 31.9 % (ref 32–34.5)
MCHC RBC AUTO-ENTMCNC: 32.1 % (ref 32–34.5)
MCHC RBC AUTO-ENTMCNC: 32.2 % (ref 32–34.5)
MCV RBC AUTO: 92.1 FL (ref 80–99.9)
MCV RBC AUTO: 93 FL (ref 80–99.9)
MCV RBC AUTO: 93.1 FL (ref 80–99.9)
MCV RBC AUTO: 93.8 FL (ref 80–99.9)
MCV RBC AUTO: 94 FL (ref 80–99.9)
MCV RBC AUTO: 94.3 FL (ref 80–99.9)
MCV RBC AUTO: 96.2 FL (ref 80–99.9)
MCV RBC AUTO: 96.9 FL (ref 80–99.9)
MCV RBC AUTO: 97.1 FL (ref 80–99.9)
MCV RBC AUTO: 97.3 FL (ref 80–99.9)
MCV RBC AUTO: 98 FL (ref 80–99.9)
MCV RBC AUTO: 98.1 FL (ref 80–99.9)
METHB: 0.3 % (ref 0–1.5)
MODE: ABNORMAL
MONOCYTES ABSOLUTE: 0.25 E9/L (ref 0.1–0.95)
MONOCYTES ABSOLUTE: 0.45 E9/L (ref 0.1–0.95)
MONOCYTES ABSOLUTE: 0.64 E9/L (ref 0.1–0.95)
MONOCYTES ABSOLUTE: 0.65 E9/L (ref 0.1–0.95)
MONOCYTES ABSOLUTE: 0.66 E9/L (ref 0.1–0.95)
MONOCYTES ABSOLUTE: 0.68 E9/L (ref 0.1–0.95)
MONOCYTES ABSOLUTE: 0.73 E9/L (ref 0.1–0.95)
MONOCYTES ABSOLUTE: 0.86 E9/L (ref 0.1–0.95)
MONOCYTES ABSOLUTE: 0.95 E9/L (ref 0.1–0.95)
MONOCYTES ABSOLUTE: 1.04 E9/L (ref 0.1–0.95)
MONOCYTES RELATIVE PERCENT: 2.2 % (ref 2–12)
MONOCYTES RELATIVE PERCENT: 4.3 % (ref 2–12)
MONOCYTES RELATIVE PERCENT: 5.6 % (ref 2–12)
MONOCYTES RELATIVE PERCENT: 5.8 % (ref 2–12)
MONOCYTES RELATIVE PERCENT: 6 % (ref 2–12)
MONOCYTES RELATIVE PERCENT: 6.2 % (ref 2–12)
MONOCYTES RELATIVE PERCENT: 6.2 % (ref 2–12)
MONOCYTES RELATIVE PERCENT: 6.5 % (ref 2–12)
MONOCYTES RELATIVE PERCENT: 6.5 % (ref 2–12)
MONOCYTES RELATIVE PERCENT: 6.9 % (ref 2–12)
MYCOPLASMA PNEUMONIAE BY PCR: NOT DETECTED
NEUTROPHILS ABSOLUTE: 11.05 E9/L (ref 1.8–7.3)
NEUTROPHILS ABSOLUTE: 5.48 E9/L (ref 1.8–7.3)
NEUTROPHILS ABSOLUTE: 5.68 E9/L (ref 1.8–7.3)
NEUTROPHILS ABSOLUTE: 5.89 E9/L (ref 1.8–7.3)
NEUTROPHILS ABSOLUTE: 5.94 E9/L (ref 1.8–7.3)
NEUTROPHILS ABSOLUTE: 6.46 E9/L (ref 1.8–7.3)
NEUTROPHILS ABSOLUTE: 6.94 E9/L (ref 1.8–7.3)
NEUTROPHILS ABSOLUTE: 6.95 E9/L (ref 1.8–7.3)
NEUTROPHILS ABSOLUTE: 8.24 E9/L (ref 1.8–7.3)
NEUTROPHILS ABSOLUTE: 8.68 E9/L (ref 1.8–7.3)
NEUTROPHILS RELATIVE PERCENT: 46.2 % (ref 43–80)
NEUTROPHILS RELATIVE PERCENT: 49.9 % (ref 43–80)
NEUTROPHILS RELATIVE PERCENT: 50.5 % (ref 43–80)
NEUTROPHILS RELATIVE PERCENT: 52 % (ref 43–80)
NEUTROPHILS RELATIVE PERCENT: 61.9 % (ref 43–80)
NEUTROPHILS RELATIVE PERCENT: 62.1 % (ref 43–80)
NEUTROPHILS RELATIVE PERCENT: 62.3 % (ref 43–80)
NEUTROPHILS RELATIVE PERCENT: 62.6 % (ref 43–80)
NEUTROPHILS RELATIVE PERCENT: 62.7 % (ref 43–80)
NEUTROPHILS RELATIVE PERCENT: 69.5 % (ref 43–80)
NITRITE, URINE: NEGATIVE
NITRITE, URINE: NEGATIVE
O2 CONTENT: 14.7 ML/DL
O2 SATURATION: 93.9 % (ref 92–98.5)
O2HB: 92.5 % (ref 94–97)
OPERATOR ID: 1687
OVALOCYTES: ABNORMAL
PARAINFLUENZA VIRUS 1 BY PCR: NOT DETECTED
PARAINFLUENZA VIRUS 2 BY PCR: NOT DETECTED
PARAINFLUENZA VIRUS 3 BY PCR: NOT DETECTED
PARAINFLUENZA VIRUS 4 BY PCR: NOT DETECTED
PATIENT TEMP: 37 C
PCO2: 32.3 MMHG (ref 35–45)
PDW BLD-RTO: 15.3 FL (ref 11.5–15)
PDW BLD-RTO: 15.3 FL (ref 11.5–15)
PDW BLD-RTO: 15.5 FL (ref 11.5–15)
PDW BLD-RTO: 15.9 FL (ref 11.5–15)
PDW BLD-RTO: 15.9 FL (ref 11.5–15)
PDW BLD-RTO: 16.1 FL (ref 11.5–15)
PDW BLD-RTO: 16.2 FL (ref 11.5–15)
PDW BLD-RTO: 16.3 FL (ref 11.5–15)
PDW BLD-RTO: 16.3 FL (ref 11.5–15)
PDW BLD-RTO: 16.7 FL (ref 11.5–15)
PDW BLD-RTO: 17.2 FL (ref 11.5–15)
PDW BLD-RTO: 17.5 FL (ref 11.5–15)
PH BLOOD GAS: 7.45 (ref 7.35–7.45)
PH UA: 5 (ref 5–9)
PH UA: 5.5 (ref 5–9)
PLATELET # BLD: 192 E9/L (ref 130–450)
PLATELET # BLD: 192 E9/L (ref 130–450)
PLATELET # BLD: 212 E9/L (ref 130–450)
PLATELET # BLD: 214 E9/L (ref 130–450)
PLATELET # BLD: 221 E9/L (ref 130–450)
PLATELET # BLD: 228 E9/L (ref 130–450)
PLATELET # BLD: 238 E9/L (ref 130–450)
PLATELET # BLD: 244 E9/L (ref 130–450)
PLATELET # BLD: 257 E9/L (ref 130–450)
PLATELET # BLD: 264 E9/L (ref 130–450)
PLATELET # BLD: 265 E9/L (ref 130–450)
PLATELET # BLD: 290 E9/L (ref 130–450)
PMV BLD AUTO: 8.2 FL (ref 7–12)
PMV BLD AUTO: 8.2 FL (ref 7–12)
PMV BLD AUTO: 8.3 FL (ref 7–12)
PMV BLD AUTO: 8.3 FL (ref 7–12)
PMV BLD AUTO: 8.4 FL (ref 7–12)
PMV BLD AUTO: 8.5 FL (ref 7–12)
PMV BLD AUTO: 8.5 FL (ref 7–12)
PMV BLD AUTO: 8.6 FL (ref 7–12)
PMV BLD AUTO: 8.7 FL (ref 7–12)
PMV BLD AUTO: 8.7 FL (ref 7–12)
PMV BLD AUTO: 8.8 FL (ref 7–12)
PMV BLD AUTO: 8.8 FL (ref 7–12)
PO2: 70 MMHG (ref 75–100)
POIKILOCYTES: ABNORMAL
POLYCHROMASIA: ABNORMAL
POTASSIUM REFLEX MAGNESIUM: 3.4 MMOL/L (ref 3.5–5)
POTASSIUM REFLEX MAGNESIUM: 3.7 MMOL/L (ref 3.5–5)
POTASSIUM REFLEX MAGNESIUM: 3.9 MMOL/L (ref 3.5–5)
POTASSIUM REFLEX MAGNESIUM: 3.9 MMOL/L (ref 3.5–5)
POTASSIUM REFLEX MAGNESIUM: 4.2 MMOL/L (ref 3.5–5)
POTASSIUM REFLEX MAGNESIUM: 4.2 MMOL/L (ref 3.5–5)
POTASSIUM REFLEX MAGNESIUM: 4.3 MMOL/L (ref 3.5–5)
POTASSIUM REFLEX MAGNESIUM: 4.4 MMOL/L (ref 3.5–5)
POTASSIUM REFLEX MAGNESIUM: 4.5 MMOL/L (ref 3.5–5)
POTASSIUM SERPL-SCNC: 3.7 MMOL/L (ref 3.5–5)
POTASSIUM SERPL-SCNC: 3.7 MMOL/L (ref 3.5–5)
POTASSIUM SERPL-SCNC: 3.8 MMOL/L (ref 3.5–5)
POTASSIUM SERPL-SCNC: 4 MMOL/L (ref 3.5–5)
POTASSIUM SERPL-SCNC: 4.1 MMOL/L (ref 3.5–5)
POTASSIUM SERPL-SCNC: 4.2 MMOL/L (ref 3.5–5)
POTASSIUM SERPL-SCNC: 4.2 MMOL/L (ref 3.5–5)
POTASSIUM SERPL-SCNC: 4.3 MMOL/L (ref 3.5–5)
POTASSIUM SERPL-SCNC: 4.5 MMOL/L (ref 3.5–5)
PRO-BNP: 1125 PG/ML (ref 0–450)
PRO-BNP: 1394 PG/ML (ref 0–450)
PRO-BNP: 1396 PG/ML (ref 0–450)
PRO-BNP: 1531 PG/ML (ref 0–450)
PRO-BNP: 1778 PG/ML (ref 0–450)
PRO-BNP: 2142 PG/ML (ref 0–450)
PRO-BNP: 2274 PG/ML (ref 0–450)
PRO-BNP: 2913 PG/ML (ref 0–450)
PRO-BNP: 4156 PG/ML (ref 0–450)
PRO-BNP: 948 PG/ML (ref 0–450)
PRO-BNP: 995 PG/ML (ref 0–450)
PROCALCITONIN: 0.07 NG/ML (ref 0–0.08)
PROCALCITONIN: 0.1 NG/ML (ref 0–0.08)
PROTEIN UA: ABNORMAL MG/DL
PROTEIN UA: NEGATIVE MG/DL
PROTHROMBIN TIME: 12.8 SEC (ref 9.3–12.4)
RBC # BLD: 2.94 E12/L (ref 3.8–5.8)
RBC # BLD: 3.12 E12/L (ref 3.8–5.8)
RBC # BLD: 3.16 E12/L (ref 3.8–5.8)
RBC # BLD: 3.22 E12/L (ref 3.8–5.8)
RBC # BLD: 3.44 E12/L (ref 3.8–5.8)
RBC # BLD: 3.49 E12/L (ref 3.8–5.8)
RBC # BLD: 3.52 E12/L (ref 3.8–5.8)
RBC # BLD: 3.56 E12/L (ref 3.8–5.8)
RBC # BLD: 3.57 E12/L (ref 3.8–5.8)
RBC # BLD: 3.78 E12/L (ref 3.8–5.8)
RBC # BLD: 3.82 E12/L (ref 3.8–5.8)
RBC # BLD: 3.85 E12/L (ref 3.8–5.8)
RBC UA: >20 /HPF (ref 0–2)
RESPIRATORY SYNCYTIAL VIRUS BY PCR: NOT DETECTED
SARS-COV-2, NAAT: NOT DETECTED
SARS-COV-2, NAAT: NOT DETECTED
SARS-COV-2, PCR: NOT DETECTED
SODIUM BLD-SCNC: 136 MMOL/L (ref 132–146)
SODIUM BLD-SCNC: 136 MMOL/L (ref 132–146)
SODIUM BLD-SCNC: 137 MMOL/L (ref 132–146)
SODIUM BLD-SCNC: 138 MMOL/L (ref 132–146)
SODIUM BLD-SCNC: 139 MMOL/L (ref 132–146)
SODIUM BLD-SCNC: 140 MMOL/L (ref 132–146)
SODIUM BLD-SCNC: 142 MMOL/L (ref 132–146)
SOURCE, BLOOD GAS: ABNORMAL
SPECIFIC GRAVITY UA: 1.02 (ref 1–1.03)
SPECIFIC GRAVITY UA: 1.02 (ref 1–1.03)
STREP PNEUMONIAE ANTIGEN, URINE: NORMAL
T4 FREE: 1.11 NG/DL (ref 0.93–1.7)
THB: 11.3 G/DL (ref 11.5–16.5)
TIME ANALYZED: 531
TOTAL PROTEIN: 6 G/DL (ref 6.4–8.3)
TOTAL PROTEIN: 6.2 G/DL (ref 6.4–8.3)
TOTAL PROTEIN: 6.3 G/DL (ref 6.4–8.3)
TOTAL PROTEIN: 6.8 G/DL (ref 6.4–8.3)
TOTAL PROTEIN: 7.1 G/DL (ref 6.4–8.3)
TRIGL SERPL-MCNC: 79 MG/DL (ref 0–149)
TROPONIN, HIGH SENSITIVITY: 35 NG/L (ref 0–11)
TROPONIN, HIGH SENSITIVITY: 37 NG/L (ref 0–11)
TROPONIN, HIGH SENSITIVITY: 39 NG/L (ref 0–11)
TROPONIN, HIGH SENSITIVITY: 46 NG/L (ref 0–11)
TROPONIN, HIGH SENSITIVITY: 47 NG/L (ref 0–11)
TROPONIN, HIGH SENSITIVITY: 51 NG/L (ref 0–11)
TROPONIN, HIGH SENSITIVITY: 52 NG/L (ref 0–11)
TROPONIN, HIGH SENSITIVITY: 53 NG/L (ref 0–11)
TROPONIN, HIGH SENSITIVITY: 55 NG/L (ref 0–11)
TROPONIN: 0.02 NG/ML (ref 0–0.03)
TROPONIN: 0.02 NG/ML (ref 0–0.03)
TSH SERPL DL<=0.05 MIU/L-ACNC: 5.06 UIU/ML (ref 0.27–4.2)
UROBILINOGEN, URINE: 1 E.U./DL
UROBILINOGEN, URINE: 4 E.U./DL
VLDLC SERPL CALC-MCNC: 16 MG/DL
WBC # BLD: 10.3 E9/L (ref 4.5–11.5)
WBC # BLD: 10.9 E9/L (ref 4.5–11.5)
WBC # BLD: 11.2 E9/L (ref 4.5–11.5)
WBC # BLD: 11.2 E9/L (ref 4.5–11.5)
WBC # BLD: 11.7 E9/L (ref 4.5–11.5)
WBC # BLD: 11.7 E9/L (ref 4.5–11.5)
WBC # BLD: 11.8 E9/L (ref 4.5–11.5)
WBC # BLD: 11.8 E9/L (ref 4.5–11.5)
WBC # BLD: 13.2 E9/L (ref 4.5–11.5)
WBC # BLD: 13.9 E9/L (ref 4.5–11.5)
WBC # BLD: 14.4 E9/L (ref 4.5–11.5)
WBC # BLD: 15.9 E9/L (ref 4.5–11.5)
WBC UA: ABNORMAL /HPF (ref 0–5)

## 2021-01-01 PROCEDURE — 2580000003 HC RX 258: Performed by: INTERNAL MEDICINE

## 2021-01-01 PROCEDURE — 83880 ASSAY OF NATRIURETIC PEPTIDE: CPT

## 2021-01-01 PROCEDURE — 85027 COMPLETE CBC AUTOMATED: CPT

## 2021-01-01 PROCEDURE — 36415 COLL VENOUS BLD VENIPUNCTURE: CPT

## 2021-01-01 PROCEDURE — 83735 ASSAY OF MAGNESIUM: CPT

## 2021-01-01 PROCEDURE — 2700000000 HC OXYGEN THERAPY PER DAY

## 2021-01-01 PROCEDURE — 1036F TOBACCO NON-USER: CPT | Performed by: FAMILY MEDICINE

## 2021-01-01 PROCEDURE — 71045 X-RAY EXAM CHEST 1 VIEW: CPT

## 2021-01-01 PROCEDURE — 94640 AIRWAY INHALATION TREATMENT: CPT

## 2021-01-01 PROCEDURE — 80048 BASIC METABOLIC PNL TOTAL CA: CPT

## 2021-01-01 PROCEDURE — 99233 SBSQ HOSP IP/OBS HIGH 50: CPT | Performed by: INTERNAL MEDICINE

## 2021-01-01 PROCEDURE — 6370000000 HC RX 637 (ALT 250 FOR IP): Performed by: INTERNAL MEDICINE

## 2021-01-01 PROCEDURE — 83690 ASSAY OF LIPASE: CPT

## 2021-01-01 PROCEDURE — 80053 COMPREHEN METABOLIC PANEL: CPT

## 2021-01-01 PROCEDURE — 84484 ASSAY OF TROPONIN QUANT: CPT

## 2021-01-01 PROCEDURE — APPSS45 APP SPLIT SHARED TIME 31-45 MINUTES: Performed by: NURSE PRACTITIONER

## 2021-01-01 PROCEDURE — 6360000002 HC RX W HCPCS: Performed by: STUDENT IN AN ORGANIZED HEALTH CARE EDUCATION/TRAINING PROGRAM

## 2021-01-01 PROCEDURE — 92610 EVALUATE SWALLOWING FUNCTION: CPT | Performed by: SPEECH-LANGUAGE PATHOLOGIST

## 2021-01-01 PROCEDURE — 6370000000 HC RX 637 (ALT 250 FOR IP): Performed by: NURSE PRACTITIONER

## 2021-01-01 PROCEDURE — 1123F ACP DISCUSS/DSCN MKR DOCD: CPT | Performed by: INTERNAL MEDICINE

## 2021-01-01 PROCEDURE — 99223 1ST HOSP IP/OBS HIGH 75: CPT | Performed by: INTERNAL MEDICINE

## 2021-01-01 PROCEDURE — 82805 BLOOD GASES W/O2 SATURATION: CPT

## 2021-01-01 PROCEDURE — G8427 DOCREV CUR MEDS BY ELIG CLIN: HCPCS | Performed by: FAMILY MEDICINE

## 2021-01-01 PROCEDURE — 6360000002 HC RX W HCPCS: Performed by: INTERNAL MEDICINE

## 2021-01-01 PROCEDURE — 99284 EMERGENCY DEPT VISIT MOD MDM: CPT

## 2021-01-01 PROCEDURE — 70486 CT MAXILLOFACIAL W/O DYE: CPT

## 2021-01-01 PROCEDURE — 93005 ELECTROCARDIOGRAM TRACING: CPT | Performed by: EMERGENCY MEDICINE

## 2021-01-01 PROCEDURE — 93010 ELECTROCARDIOGRAM REPORT: CPT | Performed by: INTERNAL MEDICINE

## 2021-01-01 PROCEDURE — 1123F ACP DISCUSS/DSCN MKR DOCD: CPT | Performed by: FAMILY MEDICINE

## 2021-01-01 PROCEDURE — A9500 TC99M SESTAMIBI: HCPCS | Performed by: RADIOLOGY

## 2021-01-01 PROCEDURE — G0008 ADMIN INFLUENZA VIRUS VAC: HCPCS | Performed by: FAMILY MEDICINE

## 2021-01-01 PROCEDURE — 3430000000 HC RX DIAGNOSTIC RADIOPHARMACEUTICAL: Performed by: RADIOLOGY

## 2021-01-01 PROCEDURE — 87040 BLOOD CULTURE FOR BACTERIA: CPT

## 2021-01-01 PROCEDURE — 4040F PNEUMOC VAC/ADMIN/RCVD: CPT | Performed by: FAMILY MEDICINE

## 2021-01-01 PROCEDURE — 99214 OFFICE O/P EST MOD 30 MIN: CPT | Performed by: FAMILY MEDICINE

## 2021-01-01 PROCEDURE — 1111F DSCHRG MED/CURRENT MED MERGE: CPT | Performed by: FAMILY MEDICINE

## 2021-01-01 PROCEDURE — 85025 COMPLETE CBC W/AUTO DIFF WBC: CPT

## 2021-01-01 PROCEDURE — 6360000002 HC RX W HCPCS: Performed by: NURSE PRACTITIONER

## 2021-01-01 PROCEDURE — 99214 OFFICE O/P EST MOD 30 MIN: CPT | Performed by: INTERNAL MEDICINE

## 2021-01-01 PROCEDURE — G8484 FLU IMMUNIZE NO ADMIN: HCPCS | Performed by: FAMILY MEDICINE

## 2021-01-01 PROCEDURE — 99213 OFFICE O/P EST LOW 20 MIN: CPT | Performed by: FAMILY MEDICINE

## 2021-01-01 PROCEDURE — 93017 CV STRESS TEST TRACING ONLY: CPT

## 2021-01-01 PROCEDURE — 2060000000 HC ICU INTERMEDIATE R&B

## 2021-01-01 PROCEDURE — G8420 CALC BMI NORM PARAMETERS: HCPCS | Performed by: FAMILY MEDICINE

## 2021-01-01 PROCEDURE — 84145 PROCALCITONIN (PCT): CPT

## 2021-01-01 PROCEDURE — 4040F PNEUMOC VAC/ADMIN/RCVD: CPT | Performed by: INTERNAL MEDICINE

## 2021-01-01 PROCEDURE — 71250 CT THORAX DX C-: CPT

## 2021-01-01 PROCEDURE — 92526 ORAL FUNCTION THERAPY: CPT | Performed by: SPEECH-LANGUAGE PATHOLOGIST

## 2021-01-01 PROCEDURE — 2580000003 HC RX 258: Performed by: NURSE PRACTITIONER

## 2021-01-01 PROCEDURE — APPSS60 APP SPLIT SHARED TIME 46-60 MINUTES: Performed by: PHYSICIAN ASSISTANT

## 2021-01-01 PROCEDURE — 72125 CT NECK SPINE W/O DYE: CPT

## 2021-01-01 PROCEDURE — 96375 TX/PRO/DX INJ NEW DRUG ADDON: CPT

## 2021-01-01 PROCEDURE — 99232 SBSQ HOSP IP/OBS MODERATE 35: CPT | Performed by: INTERNAL MEDICINE

## 2021-01-01 PROCEDURE — 11721 DEBRIDE NAIL 6 OR MORE: CPT | Performed by: PODIATRIST

## 2021-01-01 PROCEDURE — 93000 ELECTROCARDIOGRAM COMPLETE: CPT | Performed by: INTERNAL MEDICINE

## 2021-01-01 PROCEDURE — 80061 LIPID PANEL: CPT

## 2021-01-01 PROCEDURE — 2140000000 HC CCU INTERMEDIATE R&B

## 2021-01-01 PROCEDURE — 1200000000 HC SEMI PRIVATE

## 2021-01-01 PROCEDURE — 90694 VACC AIIV4 NO PRSRV 0.5ML IM: CPT | Performed by: FAMILY MEDICINE

## 2021-01-01 PROCEDURE — 1123F ACP DISCUSS/DSCN MKR DOCD: CPT | Performed by: NURSE PRACTITIONER

## 2021-01-01 PROCEDURE — 6370000000 HC RX 637 (ALT 250 FOR IP): Performed by: STUDENT IN AN ORGANIZED HEALTH CARE EDUCATION/TRAINING PROGRAM

## 2021-01-01 PROCEDURE — 83605 ASSAY OF LACTIC ACID: CPT

## 2021-01-01 PROCEDURE — 1036F TOBACCO NON-USER: CPT | Performed by: INTERNAL MEDICINE

## 2021-01-01 PROCEDURE — 90715 TDAP VACCINE 7 YRS/> IM: CPT | Performed by: STUDENT IN AN ORGANIZED HEALTH CARE EDUCATION/TRAINING PROGRAM

## 2021-01-01 PROCEDURE — G8420 CALC BMI NORM PARAMETERS: HCPCS | Performed by: INTERNAL MEDICINE

## 2021-01-01 PROCEDURE — 6360000002 HC RX W HCPCS: Performed by: EMERGENCY MEDICINE

## 2021-01-01 PROCEDURE — 99232 SBSQ HOSP IP/OBS MODERATE 35: CPT | Performed by: STUDENT IN AN ORGANIZED HEALTH CARE EDUCATION/TRAINING PROGRAM

## 2021-01-01 PROCEDURE — 2500000003 HC RX 250 WO HCPCS: Performed by: RADIOLOGY

## 2021-01-01 PROCEDURE — 92611 MOTION FLUOROSCOPY/SWALLOW: CPT | Performed by: SPEECH-LANGUAGE PATHOLOGIST

## 2021-01-01 PROCEDURE — 97161 PT EVAL LOW COMPLEX 20 MIN: CPT

## 2021-01-01 PROCEDURE — 4040F PNEUMOC VAC/ADMIN/RCVD: CPT | Performed by: NURSE PRACTITIONER

## 2021-01-01 PROCEDURE — 0HQ1XZZ REPAIR FACE SKIN, EXTERNAL APPROACH: ICD-10-PCS | Performed by: STUDENT IN AN ORGANIZED HEALTH CARE EDUCATION/TRAINING PROGRAM

## 2021-01-01 PROCEDURE — 94660 CPAP INITIATION&MGMT: CPT

## 2021-01-01 PROCEDURE — 94664 DEMO&/EVAL PT USE INHALER: CPT

## 2021-01-01 PROCEDURE — 74018 RADEX ABDOMEN 1 VIEW: CPT

## 2021-01-01 PROCEDURE — 70450 CT HEAD/BRAIN W/O DYE: CPT

## 2021-01-01 PROCEDURE — 74230 X-RAY XM SWLNG FUNCJ C+: CPT

## 2021-01-01 PROCEDURE — 93283 PRGRMG EVAL IMPLANTABLE DFB: CPT | Performed by: NURSE PRACTITIONER

## 2021-01-01 PROCEDURE — 99239 HOSP IP/OBS DSCHRG MGMT >30: CPT | Performed by: INTERNAL MEDICINE

## 2021-01-01 PROCEDURE — 99204 OFFICE O/P NEW MOD 45 MIN: CPT

## 2021-01-01 PROCEDURE — G0378 HOSPITAL OBSERVATION PER HR: HCPCS

## 2021-01-01 PROCEDURE — 93297 REM INTERROG DEV EVAL ICPMS: CPT | Performed by: INTERNAL MEDICINE

## 2021-01-01 PROCEDURE — 2580000003 HC RX 258: Performed by: EMERGENCY MEDICINE

## 2021-01-01 PROCEDURE — 6370000000 HC RX 637 (ALT 250 FOR IP): Performed by: EMERGENCY MEDICINE

## 2021-01-01 PROCEDURE — 93005 ELECTROCARDIOGRAM TRACING: CPT

## 2021-01-01 PROCEDURE — 90471 IMMUNIZATION ADMIN: CPT | Performed by: STUDENT IN AN ORGANIZED HEALTH CARE EDUCATION/TRAINING PROGRAM

## 2021-01-01 PROCEDURE — 87635 SARS-COV-2 COVID-19 AMP PRB: CPT

## 2021-01-01 PROCEDURE — 99233 SBSQ HOSP IP/OBS HIGH 50: CPT | Performed by: FAMILY MEDICINE

## 2021-01-01 PROCEDURE — 93283 PRGRMG EVAL IMPLANTABLE DFB: CPT | Performed by: INTERNAL MEDICINE

## 2021-01-01 PROCEDURE — 96374 THER/PROPH/DIAG INJ IV PUSH: CPT

## 2021-01-01 PROCEDURE — 93016 CV STRESS TEST SUPVJ ONLY: CPT | Performed by: INTERNAL MEDICINE

## 2021-01-01 PROCEDURE — 93296 REM INTERROG EVL PM/IDS: CPT | Performed by: INTERNAL MEDICINE

## 2021-01-01 PROCEDURE — 2500000003 HC RX 250 WO HCPCS: Performed by: STUDENT IN AN ORGANIZED HEALTH CARE EDUCATION/TRAINING PROGRAM

## 2021-01-01 PROCEDURE — 85610 PROTHROMBIN TIME: CPT

## 2021-01-01 PROCEDURE — 99220 PR INITIAL OBSERVATION CARE/DAY 70 MINUTES: CPT | Performed by: FAMILY MEDICINE

## 2021-01-01 PROCEDURE — 93295 DEV INTERROG REMOTE 1/2/MLT: CPT | Performed by: INTERNAL MEDICINE

## 2021-01-01 PROCEDURE — 93018 CV STRESS TEST I&R ONLY: CPT | Performed by: INTERNAL MEDICINE

## 2021-01-01 PROCEDURE — 99239 HOSP IP/OBS DSCHRG MGMT >30: CPT | Performed by: FAMILY MEDICINE

## 2021-01-01 PROCEDURE — 99217 PR OBSERVATION CARE DISCHARGE MANAGEMENT: CPT | Performed by: FAMILY MEDICINE

## 2021-01-01 PROCEDURE — G8420 CALC BMI NORM PARAMETERS: HCPCS | Performed by: NURSE PRACTITIONER

## 2021-01-01 PROCEDURE — 97165 OT EVAL LOW COMPLEX 30 MIN: CPT

## 2021-01-01 PROCEDURE — 99214 OFFICE O/P EST MOD 30 MIN: CPT | Performed by: NURSE PRACTITIONER

## 2021-01-01 PROCEDURE — G8484 FLU IMMUNIZE NO ADMIN: HCPCS | Performed by: INTERNAL MEDICINE

## 2021-01-01 PROCEDURE — 99495 TRANSJ CARE MGMT MOD F2F 14D: CPT | Performed by: FAMILY MEDICINE

## 2021-01-01 PROCEDURE — 93290 INTERROG DEV EVAL ICPMS IP: CPT | Performed by: NURSE PRACTITIONER

## 2021-01-01 PROCEDURE — 1111F DSCHRG MED/CURRENT MED MERGE: CPT | Performed by: NURSE PRACTITIONER

## 2021-01-01 PROCEDURE — 6360000002 HC RX W HCPCS: Performed by: FAMILY MEDICINE

## 2021-01-01 PROCEDURE — 87449 NOS EACH ORGANISM AG IA: CPT

## 2021-01-01 PROCEDURE — 6370000000 HC RX 637 (ALT 250 FOR IP): Performed by: CLINICAL NURSE SPECIALIST

## 2021-01-01 PROCEDURE — 99213 OFFICE O/P EST LOW 20 MIN: CPT | Performed by: INTERNAL MEDICINE

## 2021-01-01 PROCEDURE — 99285 EMERGENCY DEPT VISIT HI MDM: CPT

## 2021-01-01 PROCEDURE — APPSS180 APP SPLIT SHARED TIME > 60 MINUTES: Performed by: NURSE PRACTITIONER

## 2021-01-01 PROCEDURE — 99214 OFFICE O/P EST MOD 30 MIN: CPT

## 2021-01-01 PROCEDURE — G8427 DOCREV CUR MEDS BY ELIG CLIN: HCPCS | Performed by: INTERNAL MEDICINE

## 2021-01-01 PROCEDURE — 99283 EMERGENCY DEPT VISIT LOW MDM: CPT

## 2021-01-01 PROCEDURE — 93005 ELECTROCARDIOGRAM TRACING: CPT | Performed by: NURSE PRACTITIONER

## 2021-01-01 PROCEDURE — 93005 ELECTROCARDIOGRAM TRACING: CPT | Performed by: STUDENT IN AN ORGANIZED HEALTH CARE EDUCATION/TRAINING PROGRAM

## 2021-01-01 PROCEDURE — G8427 DOCREV CUR MEDS BY ELIG CLIN: HCPCS | Performed by: NURSE PRACTITIONER

## 2021-01-01 PROCEDURE — 99215 OFFICE O/P EST HI 40 MIN: CPT | Performed by: INTERNAL MEDICINE

## 2021-01-01 PROCEDURE — 99223 1ST HOSP IP/OBS HIGH 75: CPT | Performed by: PHYSICIAN ASSISTANT

## 2021-01-01 PROCEDURE — 99496 TRANSJ CARE MGMT HIGH F2F 7D: CPT | Performed by: FAMILY MEDICINE

## 2021-01-01 PROCEDURE — 96372 THER/PROPH/DIAG INJ SC/IM: CPT

## 2021-01-01 PROCEDURE — 99348 HOME/RES VST EST LOW MDM 30: CPT | Performed by: NURSE PRACTITIONER

## 2021-01-01 PROCEDURE — 1111F DSCHRG MED/CURRENT MED MERGE: CPT | Performed by: INTERNAL MEDICINE

## 2021-01-01 PROCEDURE — 93971 EXTREMITY STUDY: CPT

## 2021-01-01 PROCEDURE — 0202U NFCT DS 22 TRGT SARS-COV-2: CPT

## 2021-01-01 PROCEDURE — 84439 ASSAY OF FREE THYROXINE: CPT

## 2021-01-01 PROCEDURE — 84443 ASSAY THYROID STIM HORMONE: CPT

## 2021-01-01 PROCEDURE — 1036F TOBACCO NON-USER: CPT | Performed by: NURSE PRACTITIONER

## 2021-01-01 PROCEDURE — 81003 URINALYSIS AUTO W/O SCOPE: CPT

## 2021-01-01 PROCEDURE — 78452 HT MUSCLE IMAGE SPECT MULT: CPT | Performed by: INTERNAL MEDICINE

## 2021-01-01 PROCEDURE — 74177 CT ABD & PELVIS W/CONTRAST: CPT

## 2021-01-01 PROCEDURE — 93005 ELECTROCARDIOGRAM TRACING: CPT | Performed by: PHYSICIAN ASSISTANT

## 2021-01-01 PROCEDURE — 71046 X-RAY EXAM CHEST 2 VIEWS: CPT

## 2021-01-01 PROCEDURE — 78452 HT MUSCLE IMAGE SPECT MULT: CPT

## 2021-01-01 PROCEDURE — 93005 ELECTROCARDIOGRAM TRACING: CPT | Performed by: INTERNAL MEDICINE

## 2021-01-01 PROCEDURE — G0438 PPPS, INITIAL VISIT: HCPCS | Performed by: FAMILY MEDICINE

## 2021-01-01 PROCEDURE — 81001 URINALYSIS AUTO W/SCOPE: CPT

## 2021-01-01 PROCEDURE — 6360000004 HC RX CONTRAST MEDICATION: Performed by: STUDENT IN AN ORGANIZED HEALTH CARE EDUCATION/TRAINING PROGRAM

## 2021-01-01 RX ORDER — ROSUVASTATIN CALCIUM 5 MG/1
5 TABLET, COATED ORAL NIGHTLY
Status: DISCONTINUED | OUTPATIENT
Start: 2021-01-01 | End: 2021-01-01 | Stop reason: HOSPADM

## 2021-01-01 RX ORDER — FUROSEMIDE 20 MG/1
20 TABLET ORAL DAILY
Status: DISCONTINUED | OUTPATIENT
Start: 2021-01-01 | End: 2021-01-01 | Stop reason: HOSPADM

## 2021-01-01 RX ORDER — TAMSULOSIN HYDROCHLORIDE 0.4 MG/1
CAPSULE ORAL
COMMUNITY
Start: 2021-01-01 | End: 2021-01-01

## 2021-01-01 RX ORDER — MEXILETINE HYDROCHLORIDE 150 MG/1
150 CAPSULE ORAL EVERY 8 HOURS SCHEDULED
Status: DISCONTINUED | OUTPATIENT
Start: 2021-01-01 | End: 2021-01-01 | Stop reason: HOSPADM

## 2021-01-01 RX ORDER — MEGESTROL ACETATE 40 MG/ML
400 SUSPENSION ORAL DAILY
Status: DISCONTINUED | OUTPATIENT
Start: 2021-01-01 | End: 2021-01-01 | Stop reason: HOSPADM

## 2021-01-01 RX ORDER — LORAZEPAM 0.5 MG/1
0.5 TABLET ORAL NIGHTLY PRN
Status: DISCONTINUED | OUTPATIENT
Start: 2021-01-01 | End: 2021-01-01 | Stop reason: HOSPADM

## 2021-01-01 RX ORDER — LORAZEPAM 0.5 MG/1
TABLET ORAL
Qty: 15 TABLET | Refills: 1 | OUTPATIENT
Start: 2021-01-01 | End: 2021-01-01

## 2021-01-01 RX ORDER — MULTIVITAMIN WITH IRON
1 TABLET ORAL DAILY
Status: DISCONTINUED | OUTPATIENT
Start: 2021-01-01 | End: 2021-01-01 | Stop reason: HOSPADM

## 2021-01-01 RX ORDER — ARFORMOTEROL TARTRATE 15 UG/2ML
15 SOLUTION RESPIRATORY (INHALATION) 2 TIMES DAILY
Qty: 120 ML | Refills: 0 | DISCHARGE
Start: 2021-01-01 | End: 2021-01-01 | Stop reason: ALTCHOICE

## 2021-01-01 RX ORDER — VITAMIN B COMPLEX
100 TABLET ORAL DAILY
Qty: 30 CAPSULE | Refills: 5 | Status: SHIPPED
Start: 2021-01-01 | End: 2022-01-01

## 2021-01-01 RX ORDER — CARVEDILOL 3.12 MG/1
3.12 TABLET ORAL 2 TIMES DAILY WITH MEALS
Status: DISCONTINUED | OUTPATIENT
Start: 2021-01-01 | End: 2021-01-01

## 2021-01-01 RX ORDER — SODIUM CHLORIDE 0.9 % (FLUSH) 0.9 %
5-40 SYRINGE (ML) INJECTION EVERY 12 HOURS SCHEDULED
Status: DISCONTINUED | OUTPATIENT
Start: 2021-01-01 | End: 2021-01-01 | Stop reason: HOSPADM

## 2021-01-01 RX ORDER — IPRATROPIUM BROMIDE AND ALBUTEROL SULFATE 2.5; .5 MG/3ML; MG/3ML
3 SOLUTION RESPIRATORY (INHALATION)
Qty: 360 ML | Refills: 0 | Status: ON HOLD | DISCHARGE
Start: 2021-01-01 | End: 2021-01-01 | Stop reason: HOSPADM

## 2021-01-01 RX ORDER — MORPHINE SULFATE 2 MG/ML
2 INJECTION, SOLUTION INTRAMUSCULAR; INTRAVENOUS EVERY 4 HOURS PRN
Status: DISCONTINUED | OUTPATIENT
Start: 2021-01-01 | End: 2021-01-01 | Stop reason: HOSPADM

## 2021-01-01 RX ORDER — LORAZEPAM 0.5 MG/1
TABLET ORAL
Qty: 30 TABLET | Refills: 0 | Status: SHIPPED | OUTPATIENT
Start: 2021-01-01 | End: 2022-02-28

## 2021-01-01 RX ORDER — FUROSEMIDE 20 MG/1
20 TABLET ORAL DAILY
Qty: 90 TABLET | Refills: 0 | Status: ON HOLD
Start: 2021-01-01 | End: 2021-01-01 | Stop reason: HOSPADM

## 2021-01-01 RX ORDER — ALBUTEROL SULFATE 90 UG/1
2 AEROSOL, METERED RESPIRATORY (INHALATION) EVERY 6 HOURS PRN
Status: DISCONTINUED | OUTPATIENT
Start: 2021-01-01 | End: 2021-01-01 | Stop reason: CLARIF

## 2021-01-01 RX ORDER — POTASSIUM CHLORIDE 7.45 MG/ML
10 INJECTION INTRAVENOUS PRN
Status: DISCONTINUED | OUTPATIENT
Start: 2021-01-01 | End: 2021-01-01 | Stop reason: HOSPADM

## 2021-01-01 RX ORDER — ASPIRIN 81 MG/1
324 TABLET, CHEWABLE ORAL ONCE
Status: COMPLETED | OUTPATIENT
Start: 2021-01-01 | End: 2021-01-01

## 2021-01-01 RX ORDER — 0.9 % SODIUM CHLORIDE 0.9 %
500 INTRAVENOUS SOLUTION INTRAVENOUS ONCE
Status: COMPLETED | OUTPATIENT
Start: 2021-01-01 | End: 2021-01-01

## 2021-01-01 RX ORDER — AMOXICILLIN AND CLAVULANATE POTASSIUM 562.5; 437.5; 62.5 MG/1; MG/1; MG/1
1 TABLET, FILM COATED, EXTENDED RELEASE ORAL 2 TIMES DAILY
Qty: 14 TABLET | Refills: 0 | Status: SHIPPED | OUTPATIENT
Start: 2021-01-01 | End: 2021-01-01

## 2021-01-01 RX ORDER — LIDOCAINE HYDROCHLORIDE 10 MG/ML
5 INJECTION, SOLUTION INFILTRATION; PERINEURAL ONCE
Status: COMPLETED | OUTPATIENT
Start: 2021-01-01 | End: 2021-01-01

## 2021-01-01 RX ORDER — POTASSIUM CHLORIDE 20 MEQ/1
20 TABLET, EXTENDED RELEASE ORAL DAILY
Status: DISCONTINUED | OUTPATIENT
Start: 2021-01-01 | End: 2021-01-01 | Stop reason: HOSPADM

## 2021-01-01 RX ORDER — ONDANSETRON 2 MG/ML
4 INJECTION INTRAMUSCULAR; INTRAVENOUS EVERY 6 HOURS PRN
Status: DISCONTINUED | OUTPATIENT
Start: 2021-01-01 | End: 2021-01-01 | Stop reason: HOSPADM

## 2021-01-01 RX ORDER — ASPIRIN 81 MG/1
81 TABLET ORAL EVERY EVENING
Status: DISCONTINUED | OUTPATIENT
Start: 2021-01-01 | End: 2021-01-01 | Stop reason: HOSPADM

## 2021-01-01 RX ORDER — MORPHINE SULFATE 20 MG/ML
2.5 SOLUTION ORAL
Qty: 15 ML | Refills: 0 | Status: SHIPPED
Start: 2021-01-01 | End: 2021-01-01 | Stop reason: SDUPTHER

## 2021-01-01 RX ORDER — ACETAMINOPHEN 650 MG/1
650 SUPPOSITORY RECTAL EVERY 6 HOURS PRN
Status: DISCONTINUED | OUTPATIENT
Start: 2021-01-01 | End: 2021-01-01 | Stop reason: HOSPADM

## 2021-01-01 RX ORDER — ASPIRIN 81 MG/1
81 TABLET ORAL DAILY
Status: DISCONTINUED | OUTPATIENT
Start: 2021-01-01 | End: 2021-01-01 | Stop reason: HOSPADM

## 2021-01-01 RX ORDER — SODIUM CHLORIDE 9 MG/ML
INJECTION, SOLUTION INTRAVENOUS CONTINUOUS
Status: ACTIVE | OUTPATIENT
Start: 2021-01-01 | End: 2021-01-01

## 2021-01-01 RX ORDER — TRAZODONE HYDROCHLORIDE 50 MG/1
50 TABLET ORAL NIGHTLY
Qty: 30 TABLET | Refills: 2 | Status: SHIPPED
Start: 2021-01-01 | End: 2022-01-01

## 2021-01-01 RX ORDER — ISOSORBIDE MONONITRATE 30 MG/1
30 TABLET, EXTENDED RELEASE ORAL DAILY
Status: DISCONTINUED | OUTPATIENT
Start: 2021-01-01 | End: 2021-01-01

## 2021-01-01 RX ORDER — POTASSIUM CHLORIDE 20 MEQ/1
20 TABLET, EXTENDED RELEASE ORAL DAILY
Qty: 90 TABLET | Refills: 0 | OUTPATIENT
Start: 2021-01-01

## 2021-01-01 RX ORDER — FUROSEMIDE 10 MG/ML
40 INJECTION INTRAMUSCULAR; INTRAVENOUS DAILY
Status: DISCONTINUED | OUTPATIENT
Start: 2021-01-01 | End: 2021-01-01

## 2021-01-01 RX ORDER — ONDANSETRON 4 MG/1
4 TABLET, ORALLY DISINTEGRATING ORAL EVERY 8 HOURS PRN
Status: DISCONTINUED | OUTPATIENT
Start: 2021-01-01 | End: 2021-01-01 | Stop reason: HOSPADM

## 2021-01-01 RX ORDER — TRAZODONE HYDROCHLORIDE 50 MG/1
50 TABLET ORAL NIGHTLY
Qty: 30 TABLET | Refills: 2 | Status: SHIPPED
Start: 2021-01-01 | End: 2021-01-01

## 2021-01-01 RX ORDER — FLUTICASONE FUROATE AND VILANTEROL 100; 25 UG/1; UG/1
1 POWDER RESPIRATORY (INHALATION) DAILY
Status: DISCONTINUED | OUTPATIENT
Start: 2021-01-01 | End: 2021-01-01 | Stop reason: CLARIF

## 2021-01-01 RX ORDER — PILOCARPINE HYDROCHLORIDE 5 MG/1
5 TABLET, FILM COATED ORAL NIGHTLY
Qty: 30 TABLET | Refills: 5 | OUTPATIENT
Start: 2021-01-01 | End: 2021-01-01

## 2021-01-01 RX ORDER — FUROSEMIDE 10 MG/ML
40 INJECTION INTRAMUSCULAR; INTRAVENOUS ONCE
Status: DISCONTINUED | OUTPATIENT
Start: 2021-01-01 | End: 2021-01-01

## 2021-01-01 RX ORDER — POLYETHYLENE GLYCOL 3350 17 G/17G
17 POWDER, FOR SOLUTION ORAL DAILY PRN
Status: DISCONTINUED | OUTPATIENT
Start: 2021-01-01 | End: 2021-01-01 | Stop reason: HOSPADM

## 2021-01-01 RX ORDER — BENZONATATE 100 MG/1
200 CAPSULE ORAL 3 TIMES DAILY
Status: DISCONTINUED | OUTPATIENT
Start: 2021-01-01 | End: 2021-01-01 | Stop reason: HOSPADM

## 2021-01-01 RX ORDER — METOPROLOL SUCCINATE 25 MG/1
25 TABLET, EXTENDED RELEASE ORAL DAILY
Status: DISCONTINUED | OUTPATIENT
Start: 2021-01-01 | End: 2021-01-01

## 2021-01-01 RX ORDER — METOPROLOL SUCCINATE 25 MG/1
12.5 TABLET, EXTENDED RELEASE ORAL 2 TIMES DAILY
Status: DISCONTINUED | OUTPATIENT
Start: 2021-01-01 | End: 2021-01-01 | Stop reason: HOSPADM

## 2021-01-01 RX ORDER — IPRATROPIUM BROMIDE AND ALBUTEROL SULFATE 2.5; .5 MG/3ML; MG/3ML
3 SOLUTION RESPIRATORY (INHALATION) ONCE
Status: COMPLETED | OUTPATIENT
Start: 2021-01-01 | End: 2021-01-01

## 2021-01-01 RX ORDER — POTASSIUM CHLORIDE 20 MEQ/1
20 TABLET, EXTENDED RELEASE ORAL DAILY
Qty: 90 TABLET | Refills: 0 | Status: SHIPPED
Start: 2021-01-01 | End: 2021-01-01

## 2021-01-01 RX ORDER — ISOSORBIDE MONONITRATE 30 MG/1
30 TABLET, EXTENDED RELEASE ORAL DAILY
Qty: 30 TABLET | Refills: 0 | Status: SHIPPED
Start: 2021-01-01 | End: 2021-01-01 | Stop reason: SDUPTHER

## 2021-01-01 RX ORDER — MEXILETINE HYDROCHLORIDE 200 MG/1
200 CAPSULE ORAL EVERY 8 HOURS SCHEDULED
Status: DISCONTINUED | OUTPATIENT
Start: 2021-01-01 | End: 2021-01-01 | Stop reason: HOSPADM

## 2021-01-01 RX ORDER — TAMSULOSIN HYDROCHLORIDE 0.4 MG/1
0.4 CAPSULE ORAL DAILY
Qty: 15 CAPSULE | Refills: 0 | Status: SHIPPED | OUTPATIENT
Start: 2021-01-01 | End: 2021-01-01 | Stop reason: ALTCHOICE

## 2021-01-01 RX ORDER — ARFORMOTEROL TARTRATE 15 UG/2ML
15 SOLUTION RESPIRATORY (INHALATION) 2 TIMES DAILY
Status: DISCONTINUED | OUTPATIENT
Start: 2021-01-01 | End: 2021-01-01 | Stop reason: HOSPADM

## 2021-01-01 RX ORDER — PROMETHAZINE HYDROCHLORIDE 25 MG/1
12.5 TABLET ORAL EVERY 6 HOURS PRN
Status: DISCONTINUED | OUTPATIENT
Start: 2021-01-01 | End: 2021-01-01 | Stop reason: HOSPADM

## 2021-01-01 RX ORDER — ALBUTEROL SULFATE 90 UG/1
2 AEROSOL, METERED RESPIRATORY (INHALATION) EVERY 6 HOURS PRN
Status: DISCONTINUED | OUTPATIENT
Start: 2021-01-01 | End: 2021-01-01 | Stop reason: HOSPADM

## 2021-01-01 RX ORDER — FUROSEMIDE 20 MG/1
20 TABLET ORAL DAILY
Qty: 90 TABLET | Refills: 0 | Status: SHIPPED
Start: 2021-01-01 | End: 2021-01-01 | Stop reason: SDUPTHER

## 2021-01-01 RX ORDER — MONTELUKAST SODIUM 10 MG/1
10 TABLET ORAL NIGHTLY
Status: DISCONTINUED | OUTPATIENT
Start: 2021-01-01 | End: 2021-01-01 | Stop reason: HOSPADM

## 2021-01-01 RX ORDER — FLUTICASONE PROPIONATE 50 MCG
1 SPRAY, SUSPENSION (ML) NASAL DAILY PRN
COMMUNITY
End: 2021-01-01

## 2021-01-01 RX ORDER — POLYETHYLENE GLYCOL 3350 17 G/17G
17 POWDER, FOR SOLUTION ORAL DAILY PRN
Qty: 527 G | Refills: 0 | DISCHARGE
Start: 2021-01-01 | End: 2021-01-01

## 2021-01-01 RX ORDER — MAGNESIUM SULFATE IN WATER 40 MG/ML
2000 INJECTION, SOLUTION INTRAVENOUS PRN
Status: DISCONTINUED | OUTPATIENT
Start: 2021-01-01 | End: 2021-01-01 | Stop reason: HOSPADM

## 2021-01-01 RX ORDER — FENTANYL CITRATE 50 UG/ML
25 INJECTION, SOLUTION INTRAMUSCULAR; INTRAVENOUS ONCE
Status: COMPLETED | OUTPATIENT
Start: 2021-01-01 | End: 2021-01-01

## 2021-01-01 RX ORDER — CARVEDILOL 3.12 MG/1
3.12 TABLET ORAL 2 TIMES DAILY WITH MEALS
Status: DISCONTINUED | OUTPATIENT
Start: 2021-01-01 | End: 2021-01-01 | Stop reason: HOSPADM

## 2021-01-01 RX ORDER — PILOCARPINE HYDROCHLORIDE 5 MG/1
5 TABLET, FILM COATED ORAL NIGHTLY
Refills: 0 | DISCHARGE
Start: 2021-01-01 | End: 2021-01-01

## 2021-01-01 RX ORDER — MEGESTROL ACETATE 125 MG/ML
1250 SUSPENSION ORAL DAILY PRN
COMMUNITY
End: 2021-01-01

## 2021-01-01 RX ORDER — SACUBITRIL AND VALSARTAN 24; 26 MG/1; MG/1
TABLET, FILM COATED ORAL
Qty: 60 TABLET | Refills: 0 | Status: SHIPPED
Start: 2021-01-01 | End: 2021-01-01 | Stop reason: SDUPTHER

## 2021-01-01 RX ORDER — METOPROLOL SUCCINATE 25 MG/1
25 TABLET, EXTENDED RELEASE ORAL 2 TIMES DAILY
Status: DISCONTINUED | OUTPATIENT
Start: 2021-01-01 | End: 2021-01-01 | Stop reason: HOSPADM

## 2021-01-01 RX ORDER — SPIRONOLACTONE 25 MG/1
12.5 TABLET ORAL DAILY
Qty: 30 TABLET | Refills: 3 | Status: SHIPPED | OUTPATIENT
Start: 2021-01-01

## 2021-01-01 RX ORDER — SODIUM CHLORIDE 9 MG/ML
25 INJECTION, SOLUTION INTRAVENOUS PRN
Status: DISCONTINUED | OUTPATIENT
Start: 2021-01-01 | End: 2021-01-01 | Stop reason: HOSPADM

## 2021-01-01 RX ORDER — ISOSORBIDE MONONITRATE 30 MG/1
30 TABLET, EXTENDED RELEASE ORAL DAILY
Qty: 30 TABLET | Refills: 2 | Status: SHIPPED
Start: 2021-01-01 | End: 2021-01-01

## 2021-01-01 RX ORDER — MONTELUKAST SODIUM 10 MG/1
10 TABLET ORAL NIGHTLY
Qty: 30 TABLET | Refills: 5 | Status: SHIPPED
Start: 2021-01-01 | End: 2022-01-01

## 2021-01-01 RX ORDER — ACETAMINOPHEN 325 MG/1
650 TABLET ORAL EVERY 6 HOURS PRN
Status: DISCONTINUED | OUTPATIENT
Start: 2021-01-01 | End: 2021-01-01 | Stop reason: HOSPADM

## 2021-01-01 RX ORDER — FLUTICASONE FUROATE AND VILANTEROL 200; 25 UG/1; UG/1
1 POWDER RESPIRATORY (INHALATION) DAILY
COMMUNITY
End: 2021-01-01 | Stop reason: SDUPTHER

## 2021-01-01 RX ORDER — ISOSORBIDE MONONITRATE 30 MG/1
30 TABLET, EXTENDED RELEASE ORAL DAILY
Status: DISCONTINUED | OUTPATIENT
Start: 2021-01-01 | End: 2021-01-01 | Stop reason: HOSPADM

## 2021-01-01 RX ORDER — BUDESONIDE 0.5 MG/2ML
0.5 INHALANT ORAL 2 TIMES DAILY
Status: DISCONTINUED | OUTPATIENT
Start: 2021-01-01 | End: 2021-01-01 | Stop reason: HOSPADM

## 2021-01-01 RX ORDER — POTASSIUM CHLORIDE 20 MEQ/1
40 TABLET, EXTENDED RELEASE ORAL DAILY
Status: DISCONTINUED | OUTPATIENT
Start: 2021-01-01 | End: 2021-01-01

## 2021-01-01 RX ORDER — NITROGLYCERIN 0.4 MG/1
0.4 TABLET SUBLINGUAL ONCE
Status: COMPLETED | OUTPATIENT
Start: 2021-01-01 | End: 2021-01-01

## 2021-01-01 RX ORDER — PILOCARPINE HYDROCHLORIDE 5 MG/1
5 TABLET, FILM COATED ORAL NIGHTLY
Status: DISCONTINUED | OUTPATIENT
Start: 2021-01-01 | End: 2021-01-01 | Stop reason: HOSPADM

## 2021-01-01 RX ORDER — METOPROLOL SUCCINATE 25 MG/1
25 TABLET, EXTENDED RELEASE ORAL 2 TIMES DAILY
Qty: 30 TABLET | Refills: 3 | Status: SHIPPED | OUTPATIENT
Start: 2021-01-01 | End: 2021-01-01 | Stop reason: SDUPTHER

## 2021-01-01 RX ORDER — CARVEDILOL 6.25 MG/1
6.25 TABLET ORAL 2 TIMES DAILY WITH MEALS
Qty: 60 TABLET | Refills: 11 | Status: ON HOLD
Start: 2021-01-01 | End: 2021-01-01 | Stop reason: HOSPADM

## 2021-01-01 RX ORDER — TAMSULOSIN HYDROCHLORIDE 0.4 MG/1
0.4 CAPSULE ORAL DAILY
Status: DISCONTINUED | OUTPATIENT
Start: 2021-01-01 | End: 2021-01-01 | Stop reason: HOSPADM

## 2021-01-01 RX ORDER — NITROGLYCERIN 0.4 MG/1
0.4 TABLET SUBLINGUAL EVERY 5 MIN PRN
Status: DISCONTINUED | OUTPATIENT
Start: 2021-01-01 | End: 2021-01-01 | Stop reason: HOSPADM

## 2021-01-01 RX ORDER — HYDROCODONE BITARTRATE AND ACETAMINOPHEN 5; 325 MG/1; MG/1
1 TABLET ORAL EVERY 4 HOURS PRN
Status: DISCONTINUED | OUTPATIENT
Start: 2021-01-01 | End: 2021-01-01 | Stop reason: HOSPADM

## 2021-01-01 RX ORDER — SODIUM CHLORIDE 0.9 % (FLUSH) 0.9 %
10 SYRINGE (ML) INJECTION EVERY 12 HOURS SCHEDULED
Status: DISCONTINUED | OUTPATIENT
Start: 2021-01-01 | End: 2021-01-01 | Stop reason: HOSPADM

## 2021-01-01 RX ORDER — PREDNISONE 20 MG/1
40 TABLET ORAL DAILY
Status: DISCONTINUED | OUTPATIENT
Start: 2021-01-01 | End: 2021-01-01 | Stop reason: HOSPADM

## 2021-01-01 RX ORDER — FUROSEMIDE 40 MG/1
40 TABLET ORAL DAILY
Status: DISCONTINUED | OUTPATIENT
Start: 2021-01-01 | End: 2021-01-01 | Stop reason: HOSPADM

## 2021-01-01 RX ORDER — MEXILETINE HYDROCHLORIDE 200 MG/1
200 CAPSULE ORAL 3 TIMES DAILY
Qty: 90 CAPSULE | Refills: 3 | Status: SHIPPED
Start: 2021-01-01 | End: 2021-01-01

## 2021-01-01 RX ORDER — IPRATROPIUM BROMIDE AND ALBUTEROL SULFATE 2.5; .5 MG/3ML; MG/3ML
1 SOLUTION RESPIRATORY (INHALATION) ONCE
Status: COMPLETED | OUTPATIENT
Start: 2021-01-01 | End: 2021-01-01

## 2021-01-01 RX ORDER — POTASSIUM CHLORIDE 20 MEQ/1
20 TABLET, EXTENDED RELEASE ORAL DAILY
Qty: 30 TABLET | Refills: 0 | OUTPATIENT
Start: 2021-01-01

## 2021-01-01 RX ORDER — IPRATROPIUM BROMIDE AND ALBUTEROL SULFATE 2.5; .5 MG/3ML; MG/3ML
3 SOLUTION RESPIRATORY (INHALATION)
Status: DISCONTINUED | OUTPATIENT
Start: 2021-01-01 | End: 2021-01-01 | Stop reason: HOSPADM

## 2021-01-01 RX ORDER — ROSUVASTATIN CALCIUM 5 MG/1
5 TABLET, COATED ORAL NIGHTLY
Qty: 30 TABLET | Refills: 5 | Status: SHIPPED
Start: 2021-01-01 | End: 2021-01-01

## 2021-01-01 RX ORDER — METOPROLOL SUCCINATE 25 MG/1
12.5 TABLET, EXTENDED RELEASE ORAL 2 TIMES DAILY
Status: DISCONTINUED | OUTPATIENT
Start: 2021-01-01 | End: 2021-01-01

## 2021-01-01 RX ORDER — DOCUSATE SODIUM 100 MG/1
100 CAPSULE, LIQUID FILLED ORAL DAILY PRN
COMMUNITY
End: 2021-01-01

## 2021-01-01 RX ORDER — BUDESONIDE AND FORMOTEROL FUMARATE DIHYDRATE 80; 4.5 UG/1; UG/1
2 AEROSOL RESPIRATORY (INHALATION) 2 TIMES DAILY
Status: DISCONTINUED | OUTPATIENT
Start: 2021-01-01 | End: 2021-01-01 | Stop reason: CLARIF

## 2021-01-01 RX ORDER — DIAPER,BRIEF,INFANT-TODD,DISP
EACH MISCELLANEOUS ONCE
Status: COMPLETED | OUTPATIENT
Start: 2021-01-01 | End: 2021-01-01

## 2021-01-01 RX ORDER — FUROSEMIDE 40 MG/1
20 TABLET ORAL DAILY
Status: DISCONTINUED | OUTPATIENT
Start: 2021-01-01 | End: 2021-01-01 | Stop reason: HOSPADM

## 2021-01-01 RX ORDER — MEXILETINE HYDROCHLORIDE 200 MG/1
CAPSULE ORAL
Qty: 90 CAPSULE | Refills: 5 | Status: SHIPPED | OUTPATIENT
Start: 2021-01-01

## 2021-01-01 RX ORDER — BUDESONIDE AND FORMOTEROL FUMARATE DIHYDRATE 160; 4.5 UG/1; UG/1
2 AEROSOL RESPIRATORY (INHALATION) 2 TIMES DAILY
Refills: 3 | Status: DISCONTINUED | OUTPATIENT
Start: 2021-01-01 | End: 2021-01-01 | Stop reason: CLARIF

## 2021-01-01 RX ORDER — CARVEDILOL 6.25 MG/1
6.25 TABLET ORAL 2 TIMES DAILY WITH MEALS
Qty: 60 TABLET | Refills: 0 | Status: SHIPPED | OUTPATIENT
Start: 2021-01-01 | End: 2021-01-01 | Stop reason: SDUPTHER

## 2021-01-01 RX ORDER — AMOXICILLIN AND CLAVULANATE POTASSIUM 875; 125 MG/1; MG/1
1 TABLET, FILM COATED ORAL 2 TIMES DAILY
Qty: 8 TABLET | Refills: 0 | DISCHARGE
Start: 2021-01-01 | End: 2021-01-01

## 2021-01-01 RX ORDER — MAGNESIUM SULFATE 1 G/100ML
1000 INJECTION INTRAVENOUS ONCE
Status: COMPLETED | OUTPATIENT
Start: 2021-01-01 | End: 2021-01-01

## 2021-01-01 RX ORDER — LORAZEPAM 0.5 MG/1
TABLET ORAL
Qty: 30 TABLET | Refills: 1 | Status: ON HOLD
Start: 2021-01-01 | End: 2021-01-01 | Stop reason: HOSPADM

## 2021-01-01 RX ORDER — MEXILETINE HYDROCHLORIDE 150 MG/1
150 CAPSULE ORAL EVERY 8 HOURS SCHEDULED
Qty: 90 CAPSULE | Refills: 0 | Status: SHIPPED | OUTPATIENT
Start: 2021-01-01 | End: 2021-01-01 | Stop reason: ALTCHOICE

## 2021-01-01 RX ORDER — LORAZEPAM 0.5 MG/1
TABLET ORAL
Qty: 30 TABLET | Refills: 0 | Status: SHIPPED
Start: 2021-01-01 | End: 2021-01-01 | Stop reason: SDUPTHER

## 2021-01-01 RX ORDER — ONDANSETRON 8 MG/1
8 TABLET, ORALLY DISINTEGRATING ORAL EVERY 8 HOURS PRN
Qty: 12 TABLET | Refills: 0 | Status: SHIPPED | OUTPATIENT
Start: 2021-01-01 | End: 2021-01-01

## 2021-01-01 RX ORDER — MORPHINE SULFATE 20 MG/ML
2.5 SOLUTION ORAL
Qty: 30 ML | Refills: 0 | Status: SHIPPED | OUTPATIENT
Start: 2021-01-01 | End: 2021-01-01

## 2021-01-01 RX ORDER — IPRATROPIUM BROMIDE AND ALBUTEROL SULFATE 2.5; .5 MG/3ML; MG/3ML
1 SOLUTION RESPIRATORY (INHALATION)
Status: DISCONTINUED | OUTPATIENT
Start: 2021-01-01 | End: 2021-01-01 | Stop reason: HOSPADM

## 2021-01-01 RX ORDER — PREDNISONE 20 MG/1
40 TABLET ORAL DAILY
Qty: 8 TABLET | Refills: 0 | DISCHARGE
Start: 2021-01-01 | End: 2021-01-01

## 2021-01-01 RX ORDER — METOPROLOL SUCCINATE 25 MG/1
25 TABLET, EXTENDED RELEASE ORAL 2 TIMES DAILY
Qty: 60 TABLET | Refills: 3 | Status: SHIPPED | OUTPATIENT
Start: 2021-01-01

## 2021-01-01 RX ORDER — POTASSIUM CHLORIDE 20 MEQ/1
20 TABLET, EXTENDED RELEASE ORAL DAILY
Qty: 90 TABLET | Refills: 1 | Status: SHIPPED
Start: 2021-01-01 | End: 2021-01-01

## 2021-01-01 RX ORDER — MEGESTROL ACETATE 40 MG/ML
400 SUSPENSION ORAL DAILY
Qty: 240 ML | Refills: 3 | Status: SHIPPED
Start: 2021-01-01 | End: 2021-01-01

## 2021-01-01 RX ORDER — ONDANSETRON 2 MG/ML
4 INJECTION INTRAMUSCULAR; INTRAVENOUS ONCE
Status: COMPLETED | OUTPATIENT
Start: 2021-01-01 | End: 2021-01-01

## 2021-01-01 RX ORDER — SODIUM CHLORIDE 0.9 % (FLUSH) 0.9 %
10 SYRINGE (ML) INJECTION PRN
Status: DISCONTINUED | OUTPATIENT
Start: 2021-01-01 | End: 2021-01-01 | Stop reason: HOSPADM

## 2021-01-01 RX ORDER — BUDESONIDE 0.25 MG/2ML
0.25 INHALANT ORAL 2 TIMES DAILY
Status: DISCONTINUED | OUTPATIENT
Start: 2021-01-01 | End: 2021-01-01 | Stop reason: HOSPADM

## 2021-01-01 RX ORDER — SODIUM CHLORIDE 0.9 % (FLUSH) 0.9 %
5-40 SYRINGE (ML) INJECTION PRN
Status: DISCONTINUED | OUTPATIENT
Start: 2021-01-01 | End: 2021-01-01 | Stop reason: HOSPADM

## 2021-01-01 RX ORDER — LOSARTAN POTASSIUM 25 MG/1
12.5 TABLET ORAL DAILY
Qty: 30 TABLET | Refills: 5 | Status: SHIPPED
Start: 2021-01-01 | End: 2021-01-01 | Stop reason: ALTCHOICE

## 2021-01-01 RX ORDER — METOPROLOL SUCCINATE 25 MG/1
12.5 TABLET, EXTENDED RELEASE ORAL 2 TIMES DAILY
Qty: 30 TABLET | Refills: 3 | Status: SHIPPED | OUTPATIENT
Start: 2021-01-01 | End: 2021-01-01

## 2021-01-01 RX ORDER — CARVEDILOL 6.25 MG/1
6.25 TABLET ORAL 2 TIMES DAILY WITH MEALS
Status: DISCONTINUED | OUTPATIENT
Start: 2021-01-01 | End: 2021-01-01 | Stop reason: HOSPADM

## 2021-01-01 RX ORDER — SACUBITRIL AND VALSARTAN 24; 26 MG/1; MG/1
1 TABLET, FILM COATED ORAL 2 TIMES DAILY
Qty: 60 TABLET | Refills: 2 | Status: SHIPPED
Start: 2021-01-01 | End: 2021-01-01

## 2021-01-01 RX ORDER — VITAMIN B COMPLEX
100 TABLET ORAL DAILY
Status: DISCONTINUED | OUTPATIENT
Start: 2021-01-01 | End: 2021-01-01

## 2021-01-01 RX ORDER — FUROSEMIDE 40 MG/1
40 TABLET ORAL DAILY
Qty: 60 TABLET | Refills: 3 | Status: SHIPPED | OUTPATIENT
Start: 2021-01-01

## 2021-01-01 RX ORDER — BENZONATATE 200 MG/1
200 CAPSULE ORAL 3 TIMES DAILY
Qty: 21 CAPSULE | Refills: 0 | DISCHARGE
Start: 2021-01-01 | End: 2021-01-01

## 2021-01-01 RX ORDER — METHYLPREDNISOLONE SODIUM SUCCINATE 125 MG/2ML
80 INJECTION, POWDER, LYOPHILIZED, FOR SOLUTION INTRAMUSCULAR; INTRAVENOUS ONCE
Status: COMPLETED | OUTPATIENT
Start: 2021-01-01 | End: 2021-01-01

## 2021-01-01 RX ORDER — BUDESONIDE 0.5 MG/2ML
0.5 INHALANT ORAL 2 TIMES DAILY
Qty: 60 AMPULE | Refills: 0 | DISCHARGE
Start: 2021-01-01 | End: 2021-01-01

## 2021-01-01 RX ORDER — SPIRONOLACTONE 25 MG/1
12.5 TABLET ORAL DAILY
Status: DISCONTINUED | OUTPATIENT
Start: 2021-01-01 | End: 2021-01-01 | Stop reason: HOSPADM

## 2021-01-01 RX ORDER — POTASSIUM CHLORIDE 7.45 MG/ML
10 INJECTION INTRAVENOUS
Status: COMPLETED | OUTPATIENT
Start: 2021-01-01 | End: 2021-01-01

## 2021-01-01 RX ORDER — NITROGLYCERIN 0.3 MG/1
0.3 TABLET SUBLINGUAL EVERY 5 MIN PRN
Qty: 30 TABLET | Refills: 0 | Status: SHIPPED | OUTPATIENT
Start: 2021-01-01

## 2021-01-01 RX ORDER — FLUTICASONE PROPIONATE 50 MCG
1 SPRAY, SUSPENSION (ML) NASAL DAILY
Status: DISCONTINUED | OUTPATIENT
Start: 2021-01-01 | End: 2021-01-01 | Stop reason: HOSPADM

## 2021-01-01 RX ORDER — MEGESTROL ACETATE 40 MG/ML
400 SUSPENSION ORAL DAILY
Qty: 300 ML | Refills: 2 | Status: ON HOLD
Start: 2021-01-01 | End: 2021-01-01 | Stop reason: HOSPADM

## 2021-01-01 RX ORDER — ACETAMINOPHEN 325 MG/1
650 TABLET ORAL EVERY 6 HOURS PRN
Qty: 120 TABLET | Refills: 3 | COMMUNITY
Start: 2021-01-01 | End: 2021-01-01

## 2021-01-01 RX ORDER — MEGESTROL ACETATE 40 MG/ML
SUSPENSION ORAL
Qty: 240 ML | Refills: 0 | Status: SHIPPED
Start: 2021-01-01 | End: 2022-01-01 | Stop reason: SDUPTHER

## 2021-01-01 RX ORDER — ALBUTEROL SULFATE 2.5 MG/3ML
2.5 SOLUTION RESPIRATORY (INHALATION) EVERY 6 HOURS PRN
Status: DISCONTINUED | OUTPATIENT
Start: 2021-01-01 | End: 2021-01-01 | Stop reason: HOSPADM

## 2021-01-01 RX ORDER — MEGESTROL ACETATE 40 MG/ML
SUSPENSION ORAL
COMMUNITY
Start: 2021-01-01 | End: 2021-01-01 | Stop reason: SDUPTHER

## 2021-01-01 RX ORDER — FUROSEMIDE 10 MG/ML
20 INJECTION INTRAMUSCULAR; INTRAVENOUS ONCE
Status: COMPLETED | OUTPATIENT
Start: 2021-01-01 | End: 2021-01-01

## 2021-01-01 RX ORDER — ISOSORBIDE MONONITRATE 30 MG/1
TABLET, EXTENDED RELEASE ORAL
COMMUNITY
Start: 2021-01-01 | End: 2021-01-01

## 2021-01-01 RX ORDER — ASPIRIN 81 MG/1
81 TABLET ORAL DAILY
Qty: 30 TABLET | Refills: 5 | Status: SHIPPED
Start: 2021-01-01 | End: 2021-01-01 | Stop reason: CLARIF

## 2021-01-01 RX ORDER — FUROSEMIDE 10 MG/ML
40 INJECTION INTRAMUSCULAR; INTRAVENOUS ONCE
Status: COMPLETED | OUTPATIENT
Start: 2021-01-01 | End: 2021-01-01

## 2021-01-01 RX ORDER — SACUBITRIL AND VALSARTAN 24; 26 MG/1; MG/1
TABLET, FILM COATED ORAL
Qty: 60 TABLET | Refills: 2 | Status: SHIPPED
Start: 2021-01-01 | End: 2021-01-01

## 2021-01-01 RX ADMIN — FLUTICASONE PROPIONATE 1 SPRAY: 50 SPRAY, METERED NASAL at 23:28

## 2021-01-01 RX ADMIN — SACUBITRIL AND VALSARTAN 1 TABLET: 24; 26 TABLET, FILM COATED ORAL at 21:20

## 2021-01-01 RX ADMIN — ALBUTEROL SULFATE 2 PUFF: 90 AEROSOL, METERED RESPIRATORY (INHALATION) at 04:18

## 2021-01-01 RX ADMIN — POTASSIUM CHLORIDE 20 MEQ: 1500 TABLET, EXTENDED RELEASE ORAL at 09:18

## 2021-01-01 RX ADMIN — MEXILETINE HYDROCHLORIDE 150 MG: 150 CAPSULE ORAL at 06:31

## 2021-01-01 RX ADMIN — ROSUVASTATIN CALCIUM 5 MG: 5 TABLET, FILM COATED ORAL at 20:43

## 2021-01-01 RX ADMIN — ENOXAPARIN SODIUM 40 MG: 40 INJECTION SUBCUTANEOUS at 09:50

## 2021-01-01 RX ADMIN — ENOXAPARIN SODIUM 40 MG: 40 INJECTION SUBCUTANEOUS at 08:00

## 2021-01-01 RX ADMIN — ENOXAPARIN SODIUM 40 MG: 40 INJECTION SUBCUTANEOUS at 09:10

## 2021-01-01 RX ADMIN — IPRATROPIUM BROMIDE AND ALBUTEROL SULFATE 1 AMPULE: .5; 3 SOLUTION RESPIRATORY (INHALATION) at 12:49

## 2021-01-01 RX ADMIN — SACUBITRIL AND VALSARTAN 1 TABLET: 24; 26 TABLET, FILM COATED ORAL at 08:08

## 2021-01-01 RX ADMIN — ARFORMOTEROL TARTRATE 15 MCG: 15 SOLUTION RESPIRATORY (INHALATION) at 21:09

## 2021-01-01 RX ADMIN — PREDNISONE 40 MG: 20 TABLET ORAL at 08:41

## 2021-01-01 RX ADMIN — IPRATROPIUM BROMIDE AND ALBUTEROL SULFATE 1 AMPULE: .5; 3 SOLUTION RESPIRATORY (INHALATION) at 08:40

## 2021-01-01 RX ADMIN — POTASSIUM BICARBONATE 20 MEQ: 782 TABLET, EFFERVESCENT ORAL at 09:17

## 2021-01-01 RX ADMIN — FLUTICASONE PROPIONATE 1 SPRAY: 50 SPRAY, METERED NASAL at 09:10

## 2021-01-01 RX ADMIN — IPRATROPIUM BROMIDE AND ALBUTEROL SULFATE 3 ML: .5; 3 SOLUTION RESPIRATORY (INHALATION) at 21:09

## 2021-01-01 RX ADMIN — ASPIRIN 81 MG: 81 TABLET, COATED ORAL at 08:08

## 2021-01-01 RX ADMIN — ASPIRIN 81 MG: 81 TABLET, COATED ORAL at 17:16

## 2021-01-01 RX ADMIN — SODIUM CHLORIDE 3000 MG: 900 INJECTION INTRAVENOUS at 15:31

## 2021-01-01 RX ADMIN — Medication 10 ML: at 08:09

## 2021-01-01 RX ADMIN — MEXILETINE HYDROCHLORIDE 200 MG: 200 CAPSULE ORAL at 14:45

## 2021-01-01 RX ADMIN — BUDESONIDE 500 MCG: 0.5 SUSPENSION RESPIRATORY (INHALATION) at 21:31

## 2021-01-01 RX ADMIN — ONDANSETRON 4 MG: 2 INJECTION INTRAMUSCULAR; INTRAVENOUS at 22:42

## 2021-01-01 RX ADMIN — ENOXAPARIN SODIUM 40 MG: 40 INJECTION SUBCUTANEOUS at 08:07

## 2021-01-01 RX ADMIN — ARFORMOTEROL TARTRATE 15 MCG: 15 SOLUTION RESPIRATORY (INHALATION) at 09:28

## 2021-01-01 RX ADMIN — ASPIRIN 81 MG: 81 TABLET, COATED ORAL at 09:14

## 2021-01-01 RX ADMIN — MEXILETINE HYDROCHLORIDE 200 MG: 200 CAPSULE ORAL at 13:23

## 2021-01-01 RX ADMIN — BENZONATATE 200 MG: 100 CAPSULE ORAL at 09:10

## 2021-01-01 RX ADMIN — MEXILETINE HYDROCHLORIDE 200 MG: 200 CAPSULE ORAL at 13:49

## 2021-01-01 RX ADMIN — MEXILETINE HYDROCHLORIDE 200 MG: 200 CAPSULE ORAL at 05:58

## 2021-01-01 RX ADMIN — SODIUM CHLORIDE 3000 MG: 900 INJECTION INTRAVENOUS at 15:36

## 2021-01-01 RX ADMIN — SODIUM CHLORIDE, PRESERVATIVE FREE 10 ML: 5 INJECTION INTRAVENOUS at 22:17

## 2021-01-01 RX ADMIN — FUROSEMIDE 40 MG: 40 TABLET ORAL at 06:25

## 2021-01-01 RX ADMIN — ROSUVASTATIN CALCIUM 5 MG: 5 TABLET, FILM COATED ORAL at 20:22

## 2021-01-01 RX ADMIN — IPRATROPIUM BROMIDE AND ALBUTEROL SULFATE 3 ML: .5; 3 SOLUTION RESPIRATORY (INHALATION) at 17:47

## 2021-01-01 RX ADMIN — LORAZEPAM 0.5 MG: 0.5 TABLET ORAL at 20:31

## 2021-01-01 RX ADMIN — Medication 32 MILLICURIE: at 12:10

## 2021-01-01 RX ADMIN — ROSUVASTATIN CALCIUM 5 MG: 5 TABLET, FILM COATED ORAL at 21:22

## 2021-01-01 RX ADMIN — MONTELUKAST SODIUM 10 MG: 10 TABLET ORAL at 22:07

## 2021-01-01 RX ADMIN — LIDOCAINE HYDROCHLORIDE 5 ML: 10 INJECTION, SOLUTION INFILTRATION; PERINEURAL at 16:29

## 2021-01-01 RX ADMIN — BENZONATATE 200 MG: 100 CAPSULE ORAL at 20:09

## 2021-01-01 RX ADMIN — PREDNISONE 40 MG: 20 TABLET ORAL at 08:00

## 2021-01-01 RX ADMIN — ARFORMOTEROL TARTRATE 15 MCG: 15 SOLUTION RESPIRATORY (INHALATION) at 21:31

## 2021-01-01 RX ADMIN — SACUBITRIL AND VALSARTAN 1 TABLET: 24; 26 TABLET, FILM COATED ORAL at 22:07

## 2021-01-01 RX ADMIN — FUROSEMIDE 20 MG: 20 TABLET ORAL at 09:16

## 2021-01-01 RX ADMIN — ASPIRIN 81 MG: 81 TABLET, COATED ORAL at 09:09

## 2021-01-01 RX ADMIN — MEXILETINE HYDROCHLORIDE 200 MG: 200 CAPSULE ORAL at 20:43

## 2021-01-01 RX ADMIN — MEXILETINE HYDROCHLORIDE 150 MG: 150 CAPSULE ORAL at 14:42

## 2021-01-01 RX ADMIN — BUDESONIDE 250 MCG: 0.25 SUSPENSION RESPIRATORY (INHALATION) at 08:20

## 2021-01-01 RX ADMIN — ENOXAPARIN SODIUM 40 MG: 40 INJECTION SUBCUTANEOUS at 09:14

## 2021-01-01 RX ADMIN — SACUBITRIL AND VALSARTAN 1 TABLET: 24; 26 TABLET, FILM COATED ORAL at 22:13

## 2021-01-01 RX ADMIN — SODIUM CHLORIDE 3000 MG: 900 INJECTION INTRAVENOUS at 04:13

## 2021-01-01 RX ADMIN — CARVEDILOL 6.25 MG: 6.25 TABLET, FILM COATED ORAL at 15:25

## 2021-01-01 RX ADMIN — CARVEDILOL 3.12 MG: 3.12 TABLET, FILM COATED ORAL at 08:41

## 2021-01-01 RX ADMIN — SODIUM CHLORIDE, PRESERVATIVE FREE 10 ML: 5 INJECTION INTRAVENOUS at 09:10

## 2021-01-01 RX ADMIN — ASPIRIN 81 MG: 81 TABLET, COATED ORAL at 17:00

## 2021-01-01 RX ADMIN — TAMSULOSIN HYDROCHLORIDE 0.4 MG: 0.4 CAPSULE ORAL at 16:32

## 2021-01-01 RX ADMIN — ALBUTEROL SULFATE 2 PUFF: 90 AEROSOL, METERED RESPIRATORY (INHALATION) at 09:35

## 2021-01-01 RX ADMIN — METOPROLOL SUCCINATE 25 MG: 25 TABLET, EXTENDED RELEASE ORAL at 09:07

## 2021-01-01 RX ADMIN — BARIUM SULFATE 10 ML: 400 PASTE ORAL at 10:43

## 2021-01-01 RX ADMIN — IPRATROPIUM BROMIDE AND ALBUTEROL SULFATE 3 ML: .5; 3 SOLUTION RESPIRATORY (INHALATION) at 12:23

## 2021-01-01 RX ADMIN — MEXILETINE HYDROCHLORIDE 200 MG: 200 CAPSULE ORAL at 06:09

## 2021-01-01 RX ADMIN — MONTELUKAST SODIUM 10 MG: 10 TABLET, FILM COATED ORAL at 21:09

## 2021-01-01 RX ADMIN — ROSUVASTATIN CALCIUM 5 MG: 5 TABLET, FILM COATED ORAL at 21:36

## 2021-01-01 RX ADMIN — MONTELUKAST SODIUM 10 MG: 10 TABLET, FILM COATED ORAL at 21:35

## 2021-01-01 RX ADMIN — MEXILETINE HYDROCHLORIDE 200 MG: 200 CAPSULE ORAL at 21:09

## 2021-01-01 RX ADMIN — POTASSIUM BICARBONATE 20 MEQ: 782 TABLET, EFFERVESCENT ORAL at 21:22

## 2021-01-01 RX ADMIN — POLYETHYLENE GLYCOL 3350 17 G: 17 POWDER, FOR SOLUTION ORAL at 12:58

## 2021-01-01 RX ADMIN — Medication 10 MILLICURIE: at 10:43

## 2021-01-01 RX ADMIN — IPRATROPIUM BROMIDE AND ALBUTEROL SULFATE 3 AMPULE: .5; 3 SOLUTION RESPIRATORY (INHALATION) at 17:44

## 2021-01-01 RX ADMIN — SODIUM CHLORIDE, PRESERVATIVE FREE 10 ML: 5 INJECTION INTRAVENOUS at 17:09

## 2021-01-01 RX ADMIN — BUDESONIDE 500 MCG: 0.5 SUSPENSION RESPIRATORY (INHALATION) at 08:29

## 2021-01-01 RX ADMIN — SACUBITRIL AND VALSARTAN 1 TABLET: 24; 26 TABLET, FILM COATED ORAL at 20:43

## 2021-01-01 RX ADMIN — SODIUM CHLORIDE, PRESERVATIVE FREE 10 ML: 5 INJECTION INTRAVENOUS at 08:41

## 2021-01-01 RX ADMIN — BENZONATATE 200 MG: 100 CAPSULE ORAL at 15:15

## 2021-01-01 RX ADMIN — MEXILETINE HYDROCHLORIDE 200 MG: 200 CAPSULE ORAL at 06:25

## 2021-01-01 RX ADMIN — CARVEDILOL 3.12 MG: 3.12 TABLET, FILM COATED ORAL at 17:00

## 2021-01-01 RX ADMIN — METOPROLOL SUCCINATE 25 MG: 25 TABLET, EXTENDED RELEASE ORAL at 19:37

## 2021-01-01 RX ADMIN — MAGNESIUM SULFATE HEPTAHYDRATE 1000 MG: 1 INJECTION, SOLUTION INTRAVENOUS at 17:10

## 2021-01-01 RX ADMIN — MEGESTROL ACETATE 400 MG: 40 SUSPENSION ORAL at 20:06

## 2021-01-01 RX ADMIN — SODIUM CHLORIDE 3000 MG: 900 INJECTION INTRAVENOUS at 09:28

## 2021-01-01 RX ADMIN — BUDESONIDE 250 MCG: 0.25 SUSPENSION RESPIRATORY (INHALATION) at 09:28

## 2021-01-01 RX ADMIN — ISOSORBIDE MONONITRATE 30 MG: 30 TABLET, EXTENDED RELEASE ORAL at 09:45

## 2021-01-01 RX ADMIN — CARVEDILOL 3.12 MG: 3.12 TABLET, FILM COATED ORAL at 23:26

## 2021-01-01 RX ADMIN — SPIRONOLACTONE 12.5 MG: 25 TABLET ORAL at 08:07

## 2021-01-01 RX ADMIN — FUROSEMIDE 40 MG: 40 TABLET ORAL at 05:58

## 2021-01-01 RX ADMIN — MEXILETINE HYDROCHLORIDE 150 MG: 150 CAPSULE ORAL at 20:22

## 2021-01-01 RX ADMIN — TETANUS TOXOID, REDUCED DIPHTHERIA TOXOID AND ACELLULAR PERTUSSIS VACCINE, ADSORBED 0.5 ML: 5; 2.5; 8; 8; 2.5 SUSPENSION INTRAMUSCULAR at 16:30

## 2021-01-01 RX ADMIN — SODIUM CHLORIDE 3000 MG: 900 INJECTION INTRAVENOUS at 03:16

## 2021-01-01 RX ADMIN — Medication 10 ML: at 09:50

## 2021-01-01 RX ADMIN — BARIUM SULFATE 120 ML: 400 SUSPENSION ORAL at 10:43

## 2021-01-01 RX ADMIN — MEXILETINE HYDROCHLORIDE 150 MG: 150 CAPSULE ORAL at 22:07

## 2021-01-01 RX ADMIN — MONTELUKAST SODIUM 10 MG: 10 TABLET, FILM COATED ORAL at 21:36

## 2021-01-01 RX ADMIN — FUROSEMIDE 40 MG: 10 INJECTION, SOLUTION INTRAMUSCULAR; INTRAVENOUS at 12:54

## 2021-01-01 RX ADMIN — ASPIRIN 81 MG: 81 TABLET, COATED ORAL at 16:34

## 2021-01-01 RX ADMIN — PILOCARPINE HYDROCHLORIDE 5 MG: 5 TABLET, FILM COATED ORAL at 21:36

## 2021-01-01 RX ADMIN — ROSUVASTATIN CALCIUM 5 MG: 5 TABLET, FILM COATED ORAL at 19:42

## 2021-01-01 RX ADMIN — ENOXAPARIN SODIUM 40 MG: 40 INJECTION SUBCUTANEOUS at 08:25

## 2021-01-01 RX ADMIN — TAMSULOSIN HYDROCHLORIDE 0.4 MG: 0.4 CAPSULE ORAL at 08:22

## 2021-01-01 RX ADMIN — Medication 10 ML: at 21:13

## 2021-01-01 RX ADMIN — SODIUM CHLORIDE 3000 MG: 900 INJECTION INTRAVENOUS at 20:32

## 2021-01-01 RX ADMIN — BARIUM SULFATE 120 ML: 400 SUSPENSION ORAL at 11:10

## 2021-01-01 RX ADMIN — ROSUVASTATIN CALCIUM 5 MG: 5 TABLET, FILM COATED ORAL at 21:09

## 2021-01-01 RX ADMIN — MONTELUKAST SODIUM 10 MG: 10 TABLET, FILM COATED ORAL at 21:23

## 2021-01-01 RX ADMIN — ARFORMOTEROL TARTRATE 15 MCG: 15 SOLUTION RESPIRATORY (INHALATION) at 20:46

## 2021-01-01 RX ADMIN — BENZONATATE 200 MG: 100 CAPSULE ORAL at 14:08

## 2021-01-01 RX ADMIN — BUDESONIDE 250 MCG: 0.25 SUSPENSION RESPIRATORY (INHALATION) at 12:22

## 2021-01-01 RX ADMIN — SODIUM CHLORIDE, PRESERVATIVE FREE 10 ML: 5 INJECTION INTRAVENOUS at 15:31

## 2021-01-01 RX ADMIN — CARVEDILOL 3.12 MG: 3.12 TABLET, FILM COATED ORAL at 07:59

## 2021-01-01 RX ADMIN — SODIUM CHLORIDE, PRESERVATIVE FREE 10 ML: 5 INJECTION INTRAVENOUS at 21:37

## 2021-01-01 RX ADMIN — CARVEDILOL 6.25 MG: 6.25 TABLET, FILM COATED ORAL at 08:25

## 2021-01-01 RX ADMIN — TAMSULOSIN HYDROCHLORIDE 0.4 MG: 0.4 CAPSULE ORAL at 01:05

## 2021-01-01 RX ADMIN — ENOXAPARIN SODIUM 40 MG: 40 INJECTION SUBCUTANEOUS at 09:43

## 2021-01-01 RX ADMIN — REGADENOSON 0.4 MG: 0.08 INJECTION, SOLUTION INTRAVENOUS at 12:08

## 2021-01-01 RX ADMIN — ASPIRIN 81 MG: 81 TABLET, COATED ORAL at 09:07

## 2021-01-01 RX ADMIN — SACUBITRIL AND VALSARTAN 1 TABLET: 24; 26 TABLET, FILM COATED ORAL at 21:35

## 2021-01-01 RX ADMIN — BUDESONIDE 250 MCG: 0.25 SUSPENSION RESPIRATORY (INHALATION) at 20:59

## 2021-01-01 RX ADMIN — SODIUM CHLORIDE, PRESERVATIVE FREE 10 ML: 5 INJECTION INTRAVENOUS at 09:17

## 2021-01-01 RX ADMIN — IPRATROPIUM BROMIDE AND ALBUTEROL SULFATE 1 AMPULE: .5; 3 SOLUTION RESPIRATORY (INHALATION) at 12:04

## 2021-01-01 RX ADMIN — Medication 10 ML: at 09:18

## 2021-01-01 RX ADMIN — Medication 10 ML: at 09:26

## 2021-01-01 RX ADMIN — BARIUM SULFATE 10 ML: 400 PASTE ORAL at 11:09

## 2021-01-01 RX ADMIN — SACUBITRIL AND VALSARTAN 1 TABLET: 24; 26 TABLET, FILM COATED ORAL at 20:22

## 2021-01-01 RX ADMIN — MEXILETINE HYDROCHLORIDE 150 MG: 150 CAPSULE ORAL at 15:24

## 2021-01-01 RX ADMIN — ALBUTEROL SULFATE 2 PUFF: 90 AEROSOL, METERED RESPIRATORY (INHALATION) at 04:59

## 2021-01-01 RX ADMIN — SODIUM CHLORIDE 3000 MG: 900 INJECTION INTRAVENOUS at 21:56

## 2021-01-01 RX ADMIN — METOPROLOL SUCCINATE 25 MG: 25 TABLET, EXTENDED RELEASE ORAL at 21:09

## 2021-01-01 RX ADMIN — SPIRONOLACTONE 12.5 MG: 25 TABLET ORAL at 09:14

## 2021-01-01 RX ADMIN — SODIUM CHLORIDE 3000 MG: 900 INJECTION INTRAVENOUS at 09:15

## 2021-01-01 RX ADMIN — POTASSIUM CHLORIDE 10 MEQ: 7.46 INJECTION, SOLUTION INTRAVENOUS at 11:49

## 2021-01-01 RX ADMIN — SACUBITRIL AND VALSARTAN 1 TABLET: 24; 26 TABLET, FILM COATED ORAL at 21:10

## 2021-01-01 RX ADMIN — CARVEDILOL 3.12 MG: 3.12 TABLET, FILM COATED ORAL at 17:09

## 2021-01-01 RX ADMIN — ASPIRIN 324 MG: 81 TABLET, CHEWABLE ORAL at 02:43

## 2021-01-01 RX ADMIN — CARVEDILOL 3.12 MG: 3.12 TABLET, FILM COATED ORAL at 16:34

## 2021-01-01 RX ADMIN — FUROSEMIDE 40 MG: 10 INJECTION, SOLUTION INTRAMUSCULAR; INTRAVENOUS at 12:58

## 2021-01-01 RX ADMIN — NITROGLYCERIN 0.4 MG: 0.4 TABLET SUBLINGUAL at 02:44

## 2021-01-01 RX ADMIN — ENOXAPARIN SODIUM 40 MG: 40 INJECTION SUBCUTANEOUS at 22:13

## 2021-01-01 RX ADMIN — MONTELUKAST SODIUM 10 MG: 10 TABLET, FILM COATED ORAL at 22:25

## 2021-01-01 RX ADMIN — MONTELUKAST SODIUM 10 MG: 10 TABLET, FILM COATED ORAL at 20:43

## 2021-01-01 RX ADMIN — SODIUM CHLORIDE, PRESERVATIVE FREE 10 ML: 5 INJECTION INTRAVENOUS at 23:28

## 2021-01-01 RX ADMIN — ARFORMOTEROL TARTRATE 15 MCG: 15 SOLUTION RESPIRATORY (INHALATION) at 12:23

## 2021-01-01 RX ADMIN — ASPIRIN 81 MG: 81 TABLET, COATED ORAL at 22:13

## 2021-01-01 RX ADMIN — ASPIRIN 81 MG: 81 TABLET, COATED ORAL at 08:22

## 2021-01-01 RX ADMIN — BARIUM SULFATE 70 G: 0.81 POWDER, FOR SUSPENSION ORAL at 10:43

## 2021-01-01 RX ADMIN — SPIRONOLACTONE 12.5 MG: 25 TABLET ORAL at 08:22

## 2021-01-01 RX ADMIN — LORAZEPAM 0.25 MG: 0.5 TABLET ORAL at 22:07

## 2021-01-01 RX ADMIN — MEGESTROL ACETATE 400 MG: 40 SUSPENSION ORAL at 09:10

## 2021-01-01 RX ADMIN — FENTANYL CITRATE 25 MCG: 50 INJECTION, SOLUTION INTRAMUSCULAR; INTRAVENOUS at 22:43

## 2021-01-01 RX ADMIN — PILOCARPINE HYDROCHLORIDE 5 MG: 5 TABLET, FILM COATED ORAL at 20:09

## 2021-01-01 RX ADMIN — SODIUM CHLORIDE, PRESERVATIVE FREE 10 ML: 5 INJECTION INTRAVENOUS at 22:07

## 2021-01-01 RX ADMIN — SODIUM CHLORIDE, PRESERVATIVE FREE 10 ML: 5 INJECTION INTRAVENOUS at 16:34

## 2021-01-01 RX ADMIN — Medication 10 ML: at 08:25

## 2021-01-01 RX ADMIN — ASPIRIN 81 MG: 81 TABLET, COATED ORAL at 17:34

## 2021-01-01 RX ADMIN — BUDESONIDE 250 MCG: 0.25 SUSPENSION RESPIRATORY (INHALATION) at 21:09

## 2021-01-01 RX ADMIN — POTASSIUM CHLORIDE 10 MEQ: 7.46 INJECTION, SOLUTION INTRAVENOUS at 11:12

## 2021-01-01 RX ADMIN — ASPIRIN 81 MG: 81 TABLET, COATED ORAL at 09:45

## 2021-01-01 RX ADMIN — MEXILETINE HYDROCHLORIDE 150 MG: 150 CAPSULE ORAL at 06:07

## 2021-01-01 RX ADMIN — ROSUVASTATIN CALCIUM 5 MG: 5 TABLET, FILM COATED ORAL at 22:25

## 2021-01-01 RX ADMIN — ENOXAPARIN SODIUM 40 MG: 40 INJECTION SUBCUTANEOUS at 08:41

## 2021-01-01 RX ADMIN — IPRATROPIUM BROMIDE AND ALBUTEROL SULFATE 1 AMPULE: .5; 3 SOLUTION RESPIRATORY (INHALATION) at 20:46

## 2021-01-01 RX ADMIN — Medication 10 ML: at 22:25

## 2021-01-01 RX ADMIN — PREDNISONE 40 MG: 20 TABLET ORAL at 09:10

## 2021-01-01 RX ADMIN — SODIUM CHLORIDE, PRESERVATIVE FREE 10 ML: 5 INJECTION INTRAVENOUS at 09:00

## 2021-01-01 RX ADMIN — ENOXAPARIN SODIUM 40 MG: 40 INJECTION SUBCUTANEOUS at 20:06

## 2021-01-01 RX ADMIN — FUROSEMIDE 20 MG: 20 TABLET ORAL at 09:01

## 2021-01-01 RX ADMIN — ROSUVASTATIN CALCIUM 5 MG: 5 TABLET, FILM COATED ORAL at 23:26

## 2021-01-01 RX ADMIN — HYDROCODONE BITARTRATE AND ACETAMINOPHEN 1 TABLET: 5; 325 TABLET ORAL at 03:57

## 2021-01-01 RX ADMIN — MONTELUKAST SODIUM 10 MG: 10 TABLET ORAL at 20:22

## 2021-01-01 RX ADMIN — FUROSEMIDE 40 MG: 40 TABLET ORAL at 06:17

## 2021-01-01 RX ADMIN — CARVEDILOL 6.25 MG: 6.25 TABLET, FILM COATED ORAL at 17:34

## 2021-01-01 RX ADMIN — IPRATROPIUM BROMIDE AND ALBUTEROL SULFATE 3 ML: .5; 3 SOLUTION RESPIRATORY (INHALATION) at 21:48

## 2021-01-01 RX ADMIN — SACUBITRIL AND VALSARTAN 1 TABLET: 24; 26 TABLET, FILM COATED ORAL at 22:25

## 2021-01-01 RX ADMIN — ISOSORBIDE MONONITRATE 30 MG: 30 TABLET, EXTENDED RELEASE ORAL at 09:09

## 2021-01-01 RX ADMIN — ROSUVASTATIN CALCIUM 5 MG: 5 TABLET, FILM COATED ORAL at 23:38

## 2021-01-01 RX ADMIN — CARVEDILOL 3.12 MG: 3.12 TABLET, FILM COATED ORAL at 09:16

## 2021-01-01 RX ADMIN — TAMSULOSIN HYDROCHLORIDE 0.4 MG: 0.4 CAPSULE ORAL at 09:18

## 2021-01-01 RX ADMIN — POTASSIUM CHLORIDE 20 MEQ: 1500 TABLET, EXTENDED RELEASE ORAL at 08:25

## 2021-01-01 RX ADMIN — MEXILETINE HYDROCHLORIDE 200 MG: 200 CAPSULE ORAL at 13:17

## 2021-01-01 RX ADMIN — ENOXAPARIN SODIUM 40 MG: 40 INJECTION SUBCUTANEOUS at 23:26

## 2021-01-01 RX ADMIN — ARFORMOTEROL TARTRATE 15 MCG: 15 SOLUTION RESPIRATORY (INHALATION) at 08:20

## 2021-01-01 RX ADMIN — IPRATROPIUM BROMIDE AND ALBUTEROL SULFATE 1 AMPULE: .5; 3 SOLUTION RESPIRATORY (INHALATION) at 09:02

## 2021-01-01 RX ADMIN — SODIUM CHLORIDE, PRESERVATIVE FREE 10 ML: 5 INJECTION INTRAVENOUS at 20:23

## 2021-01-01 RX ADMIN — Medication: at 23:51

## 2021-01-01 RX ADMIN — MEXILETINE HYDROCHLORIDE 200 MG: 200 CAPSULE ORAL at 06:17

## 2021-01-01 RX ADMIN — BARIUM SULFATE 70 G: 0.81 POWDER, FOR SUSPENSION ORAL at 11:09

## 2021-01-01 RX ADMIN — IOPAMIDOL 75 ML: 755 INJECTION, SOLUTION INTRAVENOUS at 22:53

## 2021-01-01 RX ADMIN — ARFORMOTEROL TARTRATE 15 MCG: 15 SOLUTION RESPIRATORY (INHALATION) at 08:29

## 2021-01-01 RX ADMIN — FUROSEMIDE 20 MG: 40 TABLET ORAL at 09:09

## 2021-01-01 RX ADMIN — MULTIVITAMIN TABLET 1 TABLET: TABLET at 09:09

## 2021-01-01 RX ADMIN — MULTIVITAMIN TABLET 1 TABLET: TABLET at 20:06

## 2021-01-01 RX ADMIN — ARFORMOTEROL TARTRATE 15 MCG: 15 SOLUTION RESPIRATORY (INHALATION) at 20:59

## 2021-01-01 RX ADMIN — MONTELUKAST SODIUM 10 MG: 10 TABLET ORAL at 22:13

## 2021-01-01 RX ADMIN — SACUBITRIL AND VALSARTAN 1 TABLET: 24; 26 TABLET, FILM COATED ORAL at 08:25

## 2021-01-01 RX ADMIN — IPRATROPIUM BROMIDE AND ALBUTEROL SULFATE 1 AMPULE: .5; 3 SOLUTION RESPIRATORY (INHALATION) at 16:29

## 2021-01-01 RX ADMIN — IPRATROPIUM BROMIDE AND ALBUTEROL SULFATE 1 AMPULE: .5; 3 SOLUTION RESPIRATORY (INHALATION) at 08:29

## 2021-01-01 RX ADMIN — BUDESONIDE 500 MCG: 0.5 SUSPENSION RESPIRATORY (INHALATION) at 20:46

## 2021-01-01 RX ADMIN — BENZONATATE 200 MG: 100 CAPSULE ORAL at 15:36

## 2021-01-01 RX ADMIN — BUDESONIDE 250 MCG: 0.25 SUSPENSION RESPIRATORY (INHALATION) at 21:48

## 2021-01-01 RX ADMIN — IPRATROPIUM BROMIDE AND ALBUTEROL SULFATE 1 AMPULE: .5; 3 SOLUTION RESPIRATORY (INHALATION) at 19:58

## 2021-01-01 RX ADMIN — SODIUM CHLORIDE, PRESERVATIVE FREE 10 ML: 5 INJECTION INTRAVENOUS at 15:36

## 2021-01-01 RX ADMIN — ROSUVASTATIN CALCIUM 5 MG: 5 TABLET, FILM COATED ORAL at 21:13

## 2021-01-01 RX ADMIN — ROSUVASTATIN CALCIUM 5 MG: 5 TABLET, FILM COATED ORAL at 20:09

## 2021-01-01 RX ADMIN — SACUBITRIL AND VALSARTAN 1 TABLET: 24; 26 TABLET, FILM COATED ORAL at 08:21

## 2021-01-01 RX ADMIN — IPRATROPIUM BROMIDE AND ALBUTEROL SULFATE 3 ML: .5; 3 SOLUTION RESPIRATORY (INHALATION) at 09:27

## 2021-01-01 RX ADMIN — SPIRONOLACTONE 12.5 MG: 25 TABLET ORAL at 09:07

## 2021-01-01 RX ADMIN — SODIUM CHLORIDE 3000 MG: 900 INJECTION INTRAVENOUS at 22:15

## 2021-01-01 RX ADMIN — MEXILETINE HYDROCHLORIDE 200 MG: 200 CAPSULE ORAL at 15:45

## 2021-01-01 RX ADMIN — ARFORMOTEROL TARTRATE 15 MCG: 15 SOLUTION RESPIRATORY (INHALATION) at 21:49

## 2021-01-01 RX ADMIN — BENZONATATE 200 MG: 100 CAPSULE ORAL at 21:36

## 2021-01-01 RX ADMIN — SODIUM CHLORIDE 3000 MG: 900 INJECTION INTRAVENOUS at 21:30

## 2021-01-01 RX ADMIN — CARVEDILOL 3.12 MG: 3.12 TABLET, FILM COATED ORAL at 09:10

## 2021-01-01 RX ADMIN — METOPROLOL SUCCINATE 25 MG: 25 TABLET, EXTENDED RELEASE ORAL at 20:43

## 2021-01-01 RX ADMIN — IPRATROPIUM BROMIDE AND ALBUTEROL SULFATE 1 AMPULE: .5; 3 SOLUTION RESPIRATORY (INHALATION) at 12:03

## 2021-01-01 RX ADMIN — LORAZEPAM 0.5 MG: 0.5 TABLET ORAL at 23:38

## 2021-01-01 RX ADMIN — ENOXAPARIN SODIUM 40 MG: 40 INJECTION SUBCUTANEOUS at 08:24

## 2021-01-01 RX ADMIN — SODIUM CHLORIDE 500 ML: 9 INJECTION, SOLUTION INTRAVENOUS at 18:10

## 2021-01-01 RX ADMIN — IPRATROPIUM BROMIDE AND ALBUTEROL SULFATE 1 AMPULE: .5; 3 SOLUTION RESPIRATORY (INHALATION) at 21:31

## 2021-01-01 RX ADMIN — METHYLPREDNISOLONE SODIUM SUCCINATE 80 MG: 125 INJECTION, POWDER, FOR SOLUTION INTRAMUSCULAR; INTRAVENOUS at 17:33

## 2021-01-01 RX ADMIN — TAMSULOSIN HYDROCHLORIDE 0.4 MG: 0.4 CAPSULE ORAL at 08:08

## 2021-01-01 RX ADMIN — SACUBITRIL AND VALSARTAN 1 TABLET: 24; 26 TABLET, FILM COATED ORAL at 09:16

## 2021-01-01 RX ADMIN — SODIUM CHLORIDE 500 ML: 9 INJECTION, SOLUTION INTRAVENOUS at 09:45

## 2021-01-01 RX ADMIN — SODIUM CHLORIDE, PRESERVATIVE FREE 10 ML: 5 INJECTION INTRAVENOUS at 08:25

## 2021-01-01 RX ADMIN — SACUBITRIL AND VALSARTAN 1 TABLET: 24; 26 TABLET, FILM COATED ORAL at 09:48

## 2021-01-01 RX ADMIN — IPRATROPIUM BROMIDE AND ALBUTEROL SULFATE 3 ML: .5; 3 SOLUTION RESPIRATORY (INHALATION) at 08:20

## 2021-01-01 RX ADMIN — IPRATROPIUM BROMIDE AND ALBUTEROL SULFATE 1 AMPULE: .5; 3 SOLUTION RESPIRATORY (INHALATION) at 16:39

## 2021-01-01 RX ADMIN — FUROSEMIDE 20 MG: 20 TABLET ORAL at 08:25

## 2021-01-01 RX ADMIN — SODIUM CHLORIDE 3000 MG: 900 INJECTION INTRAVENOUS at 15:14

## 2021-01-01 RX ADMIN — SPIRONOLACTONE 12.5 MG: 25 TABLET ORAL at 16:32

## 2021-01-01 RX ADMIN — METOPROLOL SUCCINATE 12.5 MG: 25 TABLET, EXTENDED RELEASE ORAL at 09:10

## 2021-01-01 RX ADMIN — BUDESONIDE 500 MCG: 0.5 SUSPENSION RESPIRATORY (INHALATION) at 08:40

## 2021-01-01 RX ADMIN — ALBUTEROL SULFATE 2 PUFF: 90 AEROSOL, METERED RESPIRATORY (INHALATION) at 04:20

## 2021-01-01 RX ADMIN — METOPROLOL SUCCINATE 12.5 MG: 25 TABLET, EXTENDED RELEASE ORAL at 21:20

## 2021-01-01 RX ADMIN — ENOXAPARIN SODIUM 40 MG: 40 INJECTION SUBCUTANEOUS at 09:12

## 2021-01-01 RX ADMIN — IPRATROPIUM BROMIDE AND ALBUTEROL SULFATE 3 ML: .5; 3 SOLUTION RESPIRATORY (INHALATION) at 15:58

## 2021-01-01 RX ADMIN — ARFORMOTEROL TARTRATE 15 MCG: 15 SOLUTION RESPIRATORY (INHALATION) at 08:40

## 2021-01-01 RX ADMIN — TAMSULOSIN HYDROCHLORIDE 0.4 MG: 0.4 CAPSULE ORAL at 09:07

## 2021-01-01 RX ADMIN — FUROSEMIDE 20 MG: 10 INJECTION INTRAMUSCULAR; INTRAVENOUS at 20:35

## 2021-01-01 RX ADMIN — SODIUM CHLORIDE, PRESERVATIVE FREE 10 ML: 5 INJECTION INTRAVENOUS at 08:00

## 2021-01-01 RX ADMIN — ASPIRIN 81 MG: 81 TABLET, COATED ORAL at 17:09

## 2021-01-01 RX ADMIN — MEXILETINE HYDROCHLORIDE 200 MG: 200 CAPSULE ORAL at 21:36

## 2021-01-01 RX ADMIN — BUDESONIDE 500 MCG: 0.5 SUSPENSION RESPIRATORY (INHALATION) at 09:02

## 2021-01-01 RX ADMIN — SODIUM CHLORIDE: 9 INJECTION, SOLUTION INTRAVENOUS at 21:53

## 2021-01-01 RX ADMIN — FLUTICASONE PROPIONATE 1 SPRAY: 50 SPRAY, METERED NASAL at 08:43

## 2021-01-01 RX ADMIN — MONTELUKAST SODIUM 10 MG: 10 TABLET, FILM COATED ORAL at 20:08

## 2021-01-01 RX ADMIN — ROSUVASTATIN CALCIUM 5 MG: 5 TABLET, FILM COATED ORAL at 22:13

## 2021-01-01 RX ADMIN — SODIUM CHLORIDE 3000 MG: 900 INJECTION INTRAVENOUS at 03:43

## 2021-01-01 RX ADMIN — FLUTICASONE PROPIONATE 1 SPRAY: 50 SPRAY, METERED NASAL at 08:00

## 2021-01-01 RX ADMIN — BENZONATATE 200 MG: 100 CAPSULE ORAL at 08:41

## 2021-01-01 RX ADMIN — SODIUM CHLORIDE, PRESERVATIVE FREE 10 ML: 5 INJECTION INTRAVENOUS at 20:09

## 2021-01-01 RX ADMIN — SACUBITRIL AND VALSARTAN 1 TABLET: 24; 26 TABLET, FILM COATED ORAL at 09:09

## 2021-01-01 RX ADMIN — SACUBITRIL AND VALSARTAN 1 TABLET: 24; 26 TABLET, FILM COATED ORAL at 09:07

## 2021-01-01 RX ADMIN — SODIUM CHLORIDE 3000 MG: 900 INJECTION INTRAVENOUS at 09:35

## 2021-01-01 RX ADMIN — MONTELUKAST SODIUM 10 MG: 10 TABLET, FILM COATED ORAL at 21:13

## 2021-01-01 RX ADMIN — POTASSIUM CHLORIDE 40 MEQ: 1500 TABLET, EXTENDED RELEASE ORAL at 12:55

## 2021-01-01 RX ADMIN — ARFORMOTEROL TARTRATE 15 MCG: 15 SOLUTION RESPIRATORY (INHALATION) at 09:02

## 2021-01-01 RX ADMIN — SODIUM CHLORIDE, PRESERVATIVE FREE 10 ML: 5 INJECTION INTRAVENOUS at 21:23

## 2021-01-01 RX ADMIN — FUROSEMIDE 40 MG: 40 TABLET ORAL at 09:18

## 2021-01-01 SDOH — HEALTH STABILITY: MENTAL HEALTH: HOW MANY STANDARD DRINKS CONTAINING ALCOHOL DO YOU HAVE ON A TYPICAL DAY?: NOT ASKED

## 2021-01-01 SDOH — HEALTH STABILITY: MENTAL HEALTH: HOW OFTEN DO YOU HAVE A DRINK CONTAINING ALCOHOL?: NOT ASKED

## 2021-01-01 SDOH — ECONOMIC STABILITY: FOOD INSECURITY: WITHIN THE PAST 12 MONTHS, YOU WORRIED THAT YOUR FOOD WOULD RUN OUT BEFORE YOU GOT MONEY TO BUY MORE.: NEVER TRUE

## 2021-01-01 SDOH — ECONOMIC STABILITY: FOOD INSECURITY: WITHIN THE PAST 12 MONTHS, THE FOOD YOU BOUGHT JUST DIDN'T LAST AND YOU DIDN'T HAVE MONEY TO GET MORE.: NEVER TRUE

## 2021-01-01 ASSESSMENT — PAIN DESCRIPTION - FREQUENCY
FREQUENCY: CONTINUOUS
FREQUENCY: CONTINUOUS

## 2021-01-01 ASSESSMENT — ENCOUNTER SYMPTOMS
CHEST TIGHTNESS: 0
COUGH: 1
BACK PAIN: 0
EYE REDNESS: 0
STRIDOR: 0
SHORTNESS OF BREATH: 1
COUGH: 0
SINUS PAIN: 0
CONSTIPATION: 0
ABDOMINAL PAIN: 0
VOMITING: 0
VOMITING: 0
WHEEZING: 0
SHORTNESS OF BREATH: 1
COUGH: 1
ABDOMINAL PAIN: 0
SPUTUM PRODUCTION: 0
COUGH: 0
BACK PAIN: 0
SINUS PRESSURE: 0
COUGH: 1
VOMITING: 0
CHEST TIGHTNESS: 1
EYE DISCHARGE: 0
NAUSEA: 0
ABDOMINAL DISTENTION: 0
WHEEZING: 0
SORE THROAT: 0
NAUSEA: 0
SHORTNESS OF BREATH: 1
SHORTNESS OF BREATH: 0
VOMITING: 0
DIARRHEA: 0
SHORTNESS OF BREATH: 0
COUGH: 0
SINUS PRESSURE: 0
ABDOMINAL PAIN: 1
STRIDOR: 0
WHEEZING: 0
SHORTNESS OF BREATH: 1
CHOKING: 0
COLOR CHANGE: 0
SHORTNESS OF BREATH: 0
EYE PAIN: 0
NAUSEA: 0
SORE THROAT: 0
EYE REDNESS: 0
WHEEZING: 0
NAUSEA: 0
SHORTNESS OF BREATH: 0
BACK PAIN: 0
DIARRHEA: 0
ABDOMINAL PAIN: 0
COUGH: 0
BACK PAIN: 0
SHORTNESS OF BREATH: 1
ABDOMINAL PAIN: 0
SHORTNESS OF BREATH: 1
CONSTIPATION: 0
EYE PAIN: 0

## 2021-01-01 ASSESSMENT — PATIENT HEALTH QUESTIONNAIRE - PHQ9
SUM OF ALL RESPONSES TO PHQ9 QUESTIONS 1 & 2: 0
SUM OF ALL RESPONSES TO PHQ QUESTIONS 1-9: 0
1. LITTLE INTEREST OR PLEASURE IN DOING THINGS: 0
SUM OF ALL RESPONSES TO PHQ QUESTIONS 1-9: 0
SUM OF ALL RESPONSES TO PHQ9 QUESTIONS 1 & 2: 0
2. FEELING DOWN, DEPRESSED OR HOPELESS: 0
2. FEELING DOWN, DEPRESSED OR HOPELESS: 0
1. LITTLE INTEREST OR PLEASURE IN DOING THINGS: 0
SUM OF ALL RESPONSES TO PHQ QUESTIONS 1-9: 0

## 2021-01-01 ASSESSMENT — PAIN SCALES - GENERAL
PAINLEVEL_OUTOF10: 3
PAINLEVEL_OUTOF10: 0
PAINLEVEL_OUTOF10: 4
PAINLEVEL_OUTOF10: 0
PAINLEVEL_OUTOF10: 10
PAINLEVEL_OUTOF10: 0
PAINLEVEL_OUTOF10: 5
PAINLEVEL_OUTOF10: 0
PAINLEVEL_OUTOF10: 0
PAINLEVEL_OUTOF10: 5
PAINLEVEL_OUTOF10: 0

## 2021-01-01 ASSESSMENT — PAIN DESCRIPTION - ONSET
ONSET: SUDDEN
ONSET: ON-GOING

## 2021-01-01 ASSESSMENT — LIFESTYLE VARIABLES
HAS A RELATIVE, FRIEND, DOCTOR, OR ANOTHER HEALTH PROFESSIONAL EXPRESSED CONCERN ABOUT YOUR DRINKING OR SUGGESTED YOU CUT DOWN: 0
HOW OFTEN DURING THE LAST YEAR HAVE YOU FOUND THAT YOU WERE NOT ABLE TO STOP DRINKING ONCE YOU HAD STARTED: 0
HOW OFTEN DO YOU HAVE A DRINK CONTAINING ALCOHOL: 3
HOW MANY STANDARD DRINKS CONTAINING ALCOHOL DO YOU HAVE ON A TYPICAL DAY: 0
HOW OFTEN DO YOU HAVE SIX OR MORE DRINKS ON ONE OCCASION: 0
HAVE YOU OR SOMEONE ELSE BEEN INJURED AS A RESULT OF YOUR DRINKING: 0
HOW OFTEN DURING THE LAST YEAR HAVE YOU BEEN UNABLE TO REMEMBER WHAT HAPPENED THE NIGHT BEFORE BECAUSE YOU HAD BEEN DRINKING: 0
AUDIT-C TOTAL SCORE: 3
HOW OFTEN DURING THE LAST YEAR HAVE YOU NEEDED AN ALCOHOLIC DRINK FIRST THING IN THE MORNING TO GET YOURSELF GOING AFTER A NIGHT OF HEAVY DRINKING: 0
HOW OFTEN DURING THE LAST YEAR HAVE YOU HAD A FEELING OF GUILT OR REMORSE AFTER DRINKING: 0
AUDIT TOTAL SCORE: 3
HOW OFTEN DURING THE LAST YEAR HAVE YOU FAILED TO DO WHAT WAS NORMALLY EXPECTED FROM YOU BECAUSE OF DRINKING: 0

## 2021-01-01 ASSESSMENT — EJECTION FRACTION
EF_VALUE: 25-30%
EF_SOURCE: 2D ECHO

## 2021-01-01 ASSESSMENT — PAIN DESCRIPTION - PROGRESSION: CLINICAL_PROGRESSION: NOT CHANGED

## 2021-01-01 ASSESSMENT — PAIN DESCRIPTION - PAIN TYPE
TYPE: ACUTE PAIN
TYPE: ACUTE PAIN

## 2021-01-01 ASSESSMENT — PAIN DESCRIPTION - DESCRIPTORS: DESCRIPTORS: ACHING

## 2021-01-01 ASSESSMENT — PAIN DESCRIPTION - LOCATION
LOCATION: CHEST
LOCATION: ABDOMEN

## 2021-01-01 ASSESSMENT — SOCIAL DETERMINANTS OF HEALTH (SDOH): HOW HARD IS IT FOR YOU TO PAY FOR THE VERY BASICS LIKE FOOD, HOUSING, MEDICAL CARE, AND HEATING?: NOT HARD AT ALL

## 2021-01-11 NOTE — PROGRESS NOTES
See [employer] [place] report. Remote monitoring reviewed over a 90 day period. End of 90 day monitoring period date of service 12.9.2020.

## 2021-02-01 NOTE — PROGRESS NOTES
Patient in today for nail care. Patient does not have any complaints of pain at this time.  Patient's PCP is Navjot Mendoza DO date of last ov 11/16/2020       Koleen Bumpers, MA

## 2021-02-02 NOTE — PROGRESS NOTES
21     Karena Blount    : 1935  Sex: male  Age: 80 y.o. Subjective: The patient is seen today for evaluation regarding foot evaluation and mycotic nail care. No other complaints noted.     Chief Complaint   Patient presents with    Nail Problem     nail care       Current Medications:    Current Outpatient Medications:     albuterol sulfate  (90 Base) MCG/ACT inhaler, Inhale 2 puffs into the lungs every 6 hours as needed for Wheezing, Disp: 3 Inhaler, Rfl: 0    losartan (COZAAR) 25 MG tablet, Take 0.5 tablets by mouth daily, Disp: 30 tablet, Rfl: 5    pilocarpine (SALAGEN) 5 MG tablet, Take 1 tablet by mouth nightly, Disp: 30 tablet, Rfl: 1    montelukast (SINGULAIR) 10 MG tablet, Take 10 mg by mouth nightly, Disp: , Rfl:     predniSONE (DELTASONE) 1 MG tablet, Take 1 tablet by mouth daily, Disp: 30 tablet, Rfl: 5    rosuvastatin (CRESTOR) 5 MG tablet, Take 1 tablet by mouth nightly, Disp: 30 tablet, Rfl: 5    carvedilol (COREG) 6.25 MG tablet, Take 1 tablet by mouth 2 times daily (with meals) 9.375 bid, Disp: 60 tablet, Rfl: 11    carvedilol (COREG) 3.125 MG tablet, Take 1 tablet by mouth 2 times daily (with meals), Disp: 60 tablet, Rfl: 11    albuterol (PROVENTIL) (2.5 MG/3ML) 0.083% nebulizer solution, USE 1 VIAL IN NEBULIZER 4 TIMES DAILY (Patient taking differently: Take by nebulization as needed ), Disp: 120 vial, Rfl: 11    fluticasone (FLONASE) 50 MCG/ACT nasal spray, 1 spray by Each Nostril route daily, Disp: 1 Bottle, Rfl: 5    azelastine (ASTELIN) 0.1 % nasal spray, 2 sprays by Nasal route 2 times daily Use in each nostril as directed (Patient taking differently: 2 sprays by Nasal route as needed Use in each nostril as directed), Disp: 1 Bottle, Rfl: 3    albuterol sulfate HFA (VENTOLIN HFA) 108 (90 Base) MCG/ACT inhaler, Inhale 2 puffs into the lungs every 6 hours as needed for Wheezing or Shortness of Breath (Patient taking differently: Inhale 2 puffs into the lungs as needed for Wheezing or Shortness of Breath ), Disp: 1 Inhaler, Rfl: 0    Fluticasone furoate-vilanterol (BREO ELLIPTA) 200-25 MCG/INH AEPB inhaler, USE 1 INHALATION ORALLY    ONCE A DAY (Patient taking differently: as needed USE 1 INHALATION ORALLY    ONCE A DAY), Disp: 180 each, Rfl: 3    potassium chloride (KLOR-CON M) 20 MEQ extended release tablet, Take 1 tablet by mouth daily, Disp: 90 tablet, Rfl: 0    furosemide (LASIX) 20 MG tablet, Take 1 tablet by mouth daily, Disp: 90 tablet, Rfl: 0    OFEV 150 MG CAPS, TAKE 1 CAPSULE BY MOUTH TWICE A DAY  EVERY 12 HOURS  AS DIRECTED WITH FOOD, Disp: , Rfl: 12    OXYGEN, Inhale 2 L into the lungs as needed, Disp: , Rfl:     Multiple Vitamins-Minerals (MULTIVITAMIN ADULT) TABS, Take by mouth daily, Disp: , Rfl:     Coenzyme Q10 (COQ10 PO), Take 100 mg by mouth daily , Disp: , Rfl:     aspirin 81 MG EC tablet, Take 81 mg by mouth every evening , Disp: , Rfl:     Allergies:  No Known Allergies    Past Surgical History:   Procedure Laterality Date    CARDIAC CATHETERIZATION  4-    Dr. Gregg Boogie cath used   [address]  4/15/2014    CORONARY ARTERY BYPASS GRAFT   4/16/2014    x3     Past Medical History:   Diagnosis Date    [facility]     iliacs    Asthma     CAD (coronary artery disease)     Cardiomyopathy [facility]     [facility]est[facility]     Dilatation of aorta ([facility] 10/4/2018    Hyperlipidemia     Iliac artery aneurysm, bilateral ([address] 10/4/2018    Inferoposterior myocardial infarction (HCC)     NSVT (nonsustained ventricular tachycardia) ([facility])        Vitals:    02/01/21 1622   Weight: 150 lb (68 kg)   Height: 5' 10\" (1.778 m)       Exam:  Neurovascular status unchanged. At this time the nail/s 1, 2, 5 right foot and nail/s 1, 2, 5 left foot are noted to be thickened, dystrophic and discolored with subungual debris present. Tenderness noted to palpation.   Minimal hair growth is noted to both lower extremities. Edema noted with both varicosities and stasis skin changes present bilaterally. Coolness is noted to the digital regions to palpation. Capillary fill time delayed digital areas bilateral foot. No heel fissuring or macerations of the web spaces. No plantar calluses and/or ulcerative areas are noted. Patient is having difficulty with gait/walking. Plan Per Assessment  Klaus Mercado was seen today for nail problem. Diagnoses and all orders for this visit:    Onychomycosis    Pain of toe of right foot    Pain of toe of left foot    PVD (peripheral vascular disease) ([address]    Difficulty walking        1. Evaluation and Management  2. Manual and electrical debridement of the mycotic nails was performed for thickness and length to prevent injection and/or ulceration. 3. I recommended antifungal cream to the nails daily. 4. It was discussed in detail with the patient proper caring for the vascular compromised foot. The fact that they have compromised blood flow put the patient at risk for infection/gangrene/amputation. The patient should not walk barefoot. Shoe gear should fit properly and socks should be worn with shoes. Exercise is very important to prevent worsening of the disease process but before performing an exercise program should check with their family physician first.  If any skin lesions are noted, they are instructed to contact the office immediately. 5. We will see the patient back at a later date for continued podiatric management and care. Patient was advised to call the office with any questions or concerns prior to their next appointment if needed. Seen By:    Ok Faith DPM    Electronically signed by Ok Faith DPM on 2/2/2021 at 10:06 AM      This note was created using voice recognition software. The note was reviewed however may contain grammatical errors.

## 2021-02-03 NOTE — PROGRESS NOTES
Xiomara Fairbanks, a male of 80 y.o. came to the office 2/3/2021. Patient Active Problem List   Diagnosis    Automatic implantable cardioverter-defibrillator in situ    Dilatation of aorta (HCC)    Iliac artery aneurysm, bilateral (HCC)    IPF (idiopathic pulmonary fibrosis) (HCC)    Peripheral vascular disease ([facility]    PMR (polymyalgia rheumatica) ([facility])    Coronary artery disease involving native coronary artery without angina pectoris    Hyperlipidemia LDL goal <100    CKD (chronic kidney disease) stage 3, GFR 30-59 ml/min    Gastroesophageal reflux disease    Insomnia    Vitamin D deficiency          Coronary Artery Disease  Presents for follow-up visit. Symptoms include shortness of breath (so wearing his oxygen at 2.5- 3 l nc.). Pertinent negatives include no chest pain, chest pressure, chest tightness, leg swelling or palpitations. The symptoms have been stable. Compliance with diet is good. Compliance with exercise is variable. Compliance with medications is good. - leg cramps with 10 mg of Crestor but not at 5 mg and with addition of Tumeric. Instability: no improvement with PT for balance training. Has a cane and walker that he uses prn. Wt loss: trying to eat more and drink Ensure shakes. Having hard time with K pill.      No Known Allergies    Current Outpatient Medications on File Prior to Visit   Medication Sig Dispense Refill    albuterol sulfate  (90 Base) MCG/ACT inhaler Inhale 2 puffs into the lungs every 6 hours as needed for Wheezing 3 Inhaler 0    losartan (COZAAR) 25 MG tablet Take 0.5 tablets by mouth daily 30 tablet 5    pilocarpine (SALAGEN) 5 MG tablet Take 1 tablet by mouth nightly 30 tablet 1    montelukast (SINGULAIR) 10 MG tablet Take 10 mg by mouth nightly      predniSONE (DELTASONE) 1 MG tablet Take 1 tablet by mouth daily 30 tablet 5    rosuvastatin (CRESTOR) 5 MG tablet Take 1 tablet by mouth nightly 30 tablet 5    carvedilol (COREG) 6.25 MG tablet Take 1 tablet by mouth 2 times daily (with meals) 9.375 bid 60 tablet 11    carvedilol (COREG) 3.125 MG tablet Take 1 tablet by mouth 2 times daily (with meals) 60 tablet 11    albuterol (PROVENTIL) (2.5 MG/3ML) 0.083% nebulizer solution USE 1 VIAL IN NEBULIZER 4 TIMES DAILY (Patient taking differently: Take by nebulization as needed ) 120 vial 11    fluticasone (FLONASE) 50 MCG/ACT nasal spray 1 spray by Each Nostril route daily 1 Bottle 5    azelastine (ASTELIN) 0.1 % nasal spray 2 sprays by Nasal route 2 times daily Use in each nostril as directed (Patient taking differently: 2 sprays by Nasal route as needed Use in each nostril as directed) 1 Bottle 3    Fluticasone furoate-vilanterol (BREO ELLIPTA) 200-25 MCG/INH AEPB inhaler USE 1 INHALATION ORALLY    ONCE A DAY (Patient taking differently: as needed USE 1 INHALATION ORALLY    ONCE A DAY) 180 each 3    potassium chloride (KLOR-CON M) 20 MEQ extended release tablet Take 1 tablet by mouth daily 90 tablet 0    furosemide (LASIX) 20 MG tablet Take 1 tablet by mouth daily 90 tablet 0    OFEV 150 MG CAPS TAKE 1 CAPSULE BY MOUTH TWICE A DAY  EVERY 12 HOURS  AS DIRECTED WITH FOOD  12    OXYGEN Inhale 2 L into the lungs as needed      Multiple Vitamins-Minerals (MULTIVITAMIN ADULT) TABS Take by mouth daily      Coenzyme Q10 (COQ10 PO) Take 100 mg by mouth daily       aspirin 81 MG EC tablet Take 81 mg by mouth every evening        No current facility-administered medications on file prior to visit. Review of Systems   Constitutional: Positive for appetite change (dec) and unexpected weight change. Respiratory: Positive for shortness of breath (so wearing his oxygen at 2.5- 3 l nc.). Negative for chest tightness. Cardiovascular: Negative for chest pain, palpitations and leg swelling.    other review of systems reviewed and are negative    OBJECTIVE:  /64   Pulse 101   Temp 97.5 °F (36.4 °C)   Wt 152 lb (68.9 kg)   SpO2 91%   BMI 21.81 kg/m²      Physical Exam  Constitutional:       General: He is not in acute distress. Eyes:      General: No scleral icterus. Conjunctiva/sclera: Conjunctivae normal.   Neck:      Musculoskeletal: Neck supple. Thyroid: No thyromegaly. Cardiovascular:      Rate and Rhythm: Normal rate and regular rhythm. Heart sounds: No murmur. Pulmonary:      Effort: Pulmonary effort is normal.      Breath sounds: Normal breath sounds. Lymphadenopathy:      Cervical: No cervical adenopathy. Skin:     General: Skin is warm and dry. Neurological:      Mental Status: He is alert and oriented to person, place, and time. wt down 8 lb in 5 months. ASSESSMENT AND PLAN:    Juanjo Valdez was seen today for 3 month follow-up. Diagnoses and all orders for this visit:    Coronary artery disease involving native coronary artery of native heart without angina pectoris    Imbalance    Anorexia  -     megestrol (MEGACE ORAL) 40 MG/ML suspension; Take 10 mLs by mouth daily    - await lab results from [address] [facility]  - continue current meds  - try Megace to stimulate appetite. Continue Ensure shakes   - use cane or walker  - ok to try and decrease K pill to 1/2 a day. Return in about 3 months (around 5/3/2021) for or for acute problem.     Ishaan Banuelos, DO

## 2021-02-21 PROBLEM — J96.21 ACUTE ON CHRONIC RESPIRATORY FAILURE WITH HYPOXIA (HCC): Status: ACTIVE | Noted: 2021-01-01

## 2021-02-21 NOTE — ED PROVIDER NOTES
[address]      Pt Name: Jose Mcleod  MRN: [id number]  [place] 1935  Date of evaluation: 2/21/2021      CHIEF COMPLAINT       Chief Complaint   Patient presents with    Shortness of Breath     x week    Cough          Shortness of Breath  Severity:  Mild  Onset quality:  Gradual  Duration:  5 days  Timing:  Constant  Progression:  Unchanged  Chronicity:  Recurrent  Context: not activity, not animal exposure and not emotional upset    Relieved by:  Nothing  Worsened by:  Nothing  Ineffective treatments:  None tried  Associated symptoms: cough    Associated symptoms: no abdominal pain, no chest pain, no diaphoresis, no fever, no rash, no vomiting and no wheezing    Risk factors: no recent alcohol use      Jose Mcleod is a 80 y.o. male with past medical history of COPD on 2 L nasal cannula at home, hypertension, presents with complaints of shortness of breath progressively worsening for 1 week and a cough. Patient called his primary care physician on Monday and has been on prednisone since Friday. States that he has had no relief of symptoms. Shortness of breath exacerbated with movement. Alleviated with rest.  He is attempted to increase his oxygen use to 4 L with mild relief of his symptoms. He has been taking albuterol and DuoNeb tailors at home with also no relief. Admits to a nonproductive cough. Denies any fevers, chills, nausea, ageusia, anosmia, vomiting, chest pain, abdominal pain, flank pain, dysuria, hematuria, diarrhea, constipation, new rashes or sores. Except as noted above the remainder of the review of systems was reviewed and negative. Review of Systems   Constitutional: Negative for activity change, appetite change, diaphoresis, fatigue, fever and unexpected weight change. HENT: Negative for congestion. Eyes: Negative for visual disturbance.    Respiratory: Positive for cough and shortness of breath. Negative for wheezing and stridor. Cardiovascular: Negative for chest pain, palpitations and leg swelling. Gastrointestinal: Negative for abdominal distention, abdominal pain, constipation, diarrhea, nausea and vomiting. Endocrine: Negative for polyphagia and polyuria. Genitourinary: Negative for dysuria and hematuria. Musculoskeletal: Negative for back pain. Skin: Negative for color change, pallor, rash and wound. Neurological: Negative for dizziness. Hematological: Negative for adenopathy. Does not bruise/bleed easily. Psychiatric/Behavioral: Negative for agitation. Physical Exam  Constitutional:       General: He is not in acute distress. Appearance: He is well-developed. He is not ill-appearing or diaphoretic. Interventions: He is not intubated. Comments: No acute distress. 4 L nasal cannula when patient is on 2 L of nasal cannula normally. HENT:      Head: Normocephalic and atraumatic. Eyes:      Extraocular Movements: Extraocular movements intact. Pupils: Pupils are equal, round, and reactive to light. Neck:      Musculoskeletal: Normal range of motion and neck supple. Cardiovascular:      Rate and Rhythm: Normal rate and regular rhythm. Pulmonary:      Effort: Pulmonary effort is normal. No tachypnea, accessory muscle usage or respiratory distress. He is not intubated. Breath sounds: Examination of the right-upper field reveals decreased breath sounds. Examination of the left-upper field reveals decreased breath sounds. Examination of the right-middle field reveals decreased breath sounds. Examination of the left-middle field reveals decreased breath sounds. Examination of the right-lower field reveals decreased breath sounds. Examination of the left-lower field reveals decreased breath sounds. Decreased breath sounds present. No wheezing, rhonchi or rales. Chest:      Chest wall: No mass, deformity or tenderness. 29.9 26.0 - 35.0 pg    MCHC 32.1 32.0 - 34.5 %    RDW 16.7 (H) 11.5 - 15.0 fL    Platelets 470 792 - 625 E9/L    MPV 8.4 7.0 - 12.0 fL    Neutrophils % 62.7 43.0 - 80.0 %    Immature Granulocytes % 0.4 0.0 - 5.0 %    Lymphocytes % 29.0 20.0 - 42.0 %    Monocytes % 6.9 2.0 - 12.0 %    Eosinophils % 0.9 0.0 - 6.0 %    Basophils % 0.1 0.0 - 2.0 %    Neutrophils Absolute 8.68 (H) 1.80 - 7.30 E9/L    Immature Granulocytes # 0.06 E9/L    Lymphocytes Absolute 4.02 (H) 1.50 - 4.00 E9/L    Monocytes Absolute 0.95 0.10 - 0.95 E9/L    Eosinophils Absolute 0.12 0.05 - 0.50 E9/L    Basophils Absolute 0.02 0.00 - 0.20 E9/L   Comprehensive Metabolic Panel   Result Value Ref Range    Sodium 137 132 - 146 mmol/L    Potassium 4.2 3.5 - 5.0 mmol/L    Chloride 101 98 - 107 mmol/L    CO2 27 22 - 29 mmol/L    Anion Gap 9 7 - 16 mmol/L    Glucose 123 (H) 74 - 99 mg/dL    BUN 28 (H) 8 - 23 mg/dL    CREATININE 1.1 0.7 - 1.2 mg/dL    GFR Non-African American >60 >=60 mL/min/1.73    GFR African American >60     Calcium 9.3 8.6 - 10.2 mg/dL    Total Protein 7.1 6.4 - 8.3 g/dL    Albumin 3.7 3.5 - 5.2 g/dL    Total Bilirubin 0.9 0.0 - 1.2 mg/dL    Alkaline Phosphatase 45 40 - 129 U/L    ALT 13 0 - 40 U/L    AST 17 0 - 39 U/L   Magnesium   Result Value Ref Range    Magnesium 2.3 1.6 - 2.6 mg/dL   Troponin   Result Value Ref Range    Troponin 0.02 0.00 - 0.03 ng/mL   Lactic Acid, Plasma   Result Value Ref Range    Lactic Acid 1.7 0.5 - 2.2 mmol/L   Brain Natriuretic Peptide   Result Value Ref Range    Pro-BNP 1,125 (H) 0 - 450 pg/mL   EKG 12 Lead   Result Value Ref Range    Ventricular Rate 65 BPM    Atrial Rate 65 BPM    P-R Interval 190 ms    QRS Duration 140 ms    Q-T Interval 446 ms    QTc Calculation (Bazett) 463 ms    P Axis 29 degrees    R Axis -15 degrees    T Axis 30 degrees   EKG 12 Lead   Result Value Ref Range    Ventricular Rate 64 BPM    Atrial Rate 64 BPM    P-R Interval 182 ms    QRS Duration 134 ms    Q-T Interval 430 ms    QTc Calculation (Bazemarzena) 443 ms    P Axis 20 degrees    R Axis -24 degrees    T Axis 20 degrees       RADIOLOGY:  XR CHEST PORTABLE   Final Result   Mild cardiomegaly status post median sternotomy. Chronic interstitial lung disease, appears stable since the prior examination. EKG: This EKG is signed and interpreted by me. Rate: Heart rate 65. Sinus rhythm with occasional atrial paced complexes. Left axis deviation. No QTC prolongation. No ST elevations or depressions. V2 is nondiagnostic. Stable as compared to previous EKG.      ------------------------- NURSING NOTES AND VITALS REVIEWED ---------------------------  Date / Time Roomed:  2/21/2021  4:38 PM  ED Bed Assignment:  03/03    The nursing notes within the ED encounter and vital signs as below have been reviewed. Patient Vitals for the past 24 hrs:   BP Temp Temp src Pulse Resp SpO2 Height Weight   02/21/21 2030 114/60 97.8 °F (36.6 °C) Oral 67 20 96 % -- --   02/21/21 1939 -- -- -- -- -- 93 % -- --   02/21/21 1924 -- -- -- -- -- (!) 84 % -- --   02/21/21 1822 (!) 106/57 -- -- 65 18 96 % -- --   02/21/21 1744 -- -- -- -- 18 -- -- --   02/21/21 1647 129/64 97.9 °F (36.6 °C) Oral 78 20 96 % 5' 10\" (1.778 m) 152 lb (68.9 kg)       Oxygen Saturation Interpretation: ABNormal    ------------------------------------------ PROGRESS NOTES ------------------------------------------    Counseling:  I have spoken with the patient and discussed todays results, in addition to providing specific details for the plan of care and counseling regarding the diagnosis and prognosis. Their questions are answered at this time and they are agreeable with the plan of admission.    --------------------------------- ADDITIONAL PROVIDER NOTES ---------------------------------  Consultations:   Spoke with Dr. Mikayla Manrique. Discussed case. They will admit the patient.   This patient's ED course included: a personal history and physicial examination, re-evaluation prior to disposition, multiple bedside re-evaluations, IV medications, cardiac monitoring and continuous pulse oximetry    This patient has remained hemodynamically stable during their ED course. Diagnosis:  1. Acute on chronic respiratory failure with hypoxia (HCC)    2. Supplemental oxygen dependent    3. Dyspnea and respiratory abnormalities    4. COVID-19 virus not detected        Disposition:  Patient's disposition: Admit to telemetry  Patient's condition is stable. Paticia Bumpers, MD  Resident  02/21/21 2038      ATTENDING PROVIDER ATTESTATION:     Mary Love presented to the emergency department for evaluation of Shortness of Breath (x week) and Cough    I have reviewed and discussed the case, including pertinent history (medical, surgical, family and social) and exam findings with the Midlevel and the Nurse assigned to Mary Love. I have personally performed and/or participated in the history, exam, medical decision making, and procedures and agree with all pertinent clinical information. The nursing notes within the ED encounter and vital signs as below have been reviewed by myself  Vitals:    02/21/21 2030   BP: 114/60   Pulse: 67   Resp: 20   Temp: 97.8 °F (36.6 °C)   SpO2: 96%         Oxygen Saturation Interpretation: Abnormal    The cardiac monitor revealed A paced with a heart rate in the 80s as interpreted by me. The cardiac monitor was ordered secondary to the patient's heart rate and to monitor the patient for dysrhythmia. CPT 95591    The patients available past medical records and past encounters were reviewed. MDM: Asked to see this patient by ELGIN, required direct physician involvement secondary to complexity of case, patient with acute on chronic respiratory failure. Td Childs improved after DuoNeb's, he was observed, desaturating down to 84% on 4 L. Reports he is normally on 2 L and recently increased himself to 3-1/2.   Mild leukocytosis, states has been coughing and choking on his phlegm, antibiotics initiated cover aspiration. Spoke with medicine patient will be admitted    I, Dr. Sherri Villanueva am the primary provider of record      I have reviewed my findings and recommendations with Cira Fabian and members of family present at the time of disposition.       --------------------------------- IMPRESSION AND DISPOSITION ---------------------------------    IMPRESSION  1. Acute on chronic respiratory failure with hypoxia (HCC)    2. Supplemental oxygen dependent    3. Dyspnea and respiratory abnormalities    4.  COVID-19 virus not detected        DISPOSITION  Disposition: Admit  Patient condition is stable       Yarelis Ashley DO  02/21/21 2055

## 2021-02-22 NOTE — PROGRESS NOTES
Occupational Therapy  OCCUPATIONAL THERAPY INITIAL EVALUATION      Date:2021  Patient Name: Cicero Homans  MRN: [id number]  : 1935  Room: [address] [place], [place] [address]    Referring Provider: Olivia Canela MD    Evaluating OT: Kimberli Mccoy OTR/L [id number]    AM-PAC Daily Activity Raw Score: 17/24    Recommended Adaptive Equipment: TBD    Diagnosis: acute respiratory failure. Pt presents to ED from home with SOB. Pertinent Medical History: asthma, CAD, CHF   Precautions:  Falls, high flow O2     Home Living: Pt lives with wife in a single story home. Bathroom setup: tub/shower combo with grab bar, standard commode     Prior Level of Function: Independent with ADLs, wife assists PRN with IADLs; completed functional mobility with no AD. O2 home 2-3L. Pain Level: no reported pain    Cognition: A&O: . Problem solving:  WFL   Judgement/safety:  WFL     Functional Assessment:   Initial Eval Status  Date: 21 Treatment session:  Short Term Goals     Feeding Set up     Grooming Set up  Independent   UB Dressing Set up  Independent   LB Dressing Min A  Donning B socks, limited by SOB with forward trunk flexion  Mod I    Bathing Mod A  Mod I   Toileting Min A  Mod I   Bed Mobility  Supine to sit: Min A     Functional Transfers STS: CGA  Mod I   Functional Mobility CGA with no AD  Small steps bed to armchair  Limited further d/t short O2 cord and SOB  Mod I during ADLs   Balance Sitting: fair plus    Standing: fair no AD     Activity Tolerance Poor  9L O2  Restin%  With minimal exertion: 89%  Moderate recovery time with pursed lip breathing  standing griselda x7-8 min with good minus balance during self care tasks             Treatment: Patient educated on techniques for completion of ADL, safe functional transfers and functional mobility.   Patient required cues for follow through with proper hand/foot placement, pacing, safety, compensatory strategies, breathing and technique in bed mobility, functional transfers, functional mobility and LB dressing in preparation for maximum independence in all self care tasks.      Hand Dominance: Right []  Left []   Strength ROM Additional Info:    RUE  4-/5 WFL good  and FMC/dexterity noted during ADL tasks     LUE 4-/5 WFL good  and FMC/dexterity noted during ADL tasks         Hearing: WFL   Vision: WFL   Sensation:  No c/o numbness or tingling   Tone: WFL   Edema: none                             Long Term Goal (1-3 wks): Pt will maximize functional performance in all self care tasks/functional transfers with good follow through of all trained techniques for safe transition to next level of care    Assessment of current deficits   Functional mobility [x]  ADLs [x] Strength [x]  Cognition []  Functional transfers  [x] IADLs [x] Safety Awareness [x]  Endurance [x]  Fine Motor Coordination [] Balance [x] Vision/perception [] Sensation []   Gross Motor Coordination [] ROM [] Delirium []                  Motor Control []    Plan of Care: 2-5 days/week for 1-2 weeks PRN   [x]ADL retraining/adaptive techniques and AE recommendations to increase functional independence within precautions                    [x]Energy conservation techniques to improve tolerance for ADL/IADLs  [x]Functional transfer/mobility training/DME recommendations for increased independence, safety and fall prevention         [x]Patient/family education to increase safety and functional independence during daily routine          [x]Environmental modifications for safe mobility and completion of ADLs                             []Cognitive retraining to improve problem solving skills & safe participation in ADLs/IADLs     []Sensory re-education techniques to improve extremity awareness, maintain skin integrity and improve hand function                             []Visual/Perceptual retraining to improve body awareness and safety during transfers and ADLs  []Splinting/positioning needs to maintain joint/skin integrity and contracture prevention  [x]Therapeutic activity to improve functional performance during ADLs                                        [x]Therapeutic exercise to improve tolerance and functional strength for ADLs   [x]Balance retraining/tolerance tasks for facilitation of postural control with dynamic challenges during ADLs  []Neuromuscular re-education to facilitate righting/equilibrium reactions, midline orientation, scapular stability/mobility, normalize muscle tone and facilitate active functional movement                        []Delirium prevention/treatment    [x]Positioning to improve functional independence and decrease risk of skin breakdown  []Other:     Rehab Potential: Good for established goals     Patient/Family Goal: To get home. Patient and/or family were instructed on functional diagnosis, prognosis/goals and OT plan of care. Pt verbalized understanding. Upon arrival, patient supine in bed. At end of session, patient seated in armchair with call light and phone within reach, all lines and tubes intact. Pt would benefit from continued skilled OT to increase safety and independence with completion of ADL/IADL tasks for functional independence and quality of life.  Bed/chair alarm: ON    Low Evaluation [id number]  Time In: 1041   Time Out: 1052      Evaluation time includes thorough review of current medical information, gathering information on past medical history/social history and prior level of function, completion of standardized testing/informal observation of tasks, assessment of data, and development of POC/Goals    Caitlin Laureano OTR/L  [id number]

## 2021-02-22 NOTE — CONSULTS
Maury Silva M.D.,[facility]  Russell Juarez D.O., F.A.C.O.I., Zander Kumar M.D. Hipolito Ham M.D., Lorenzo Randle M.D. Calvin Snyder D.O. Patient:  Dez Otoole 80 y.o. male MRN: [id number]     Date of Service: 2/22/2021      PULMONARY CONSULTATION    Reason for Consultation: hypoxia  Referring Physician: Dr. Caleb Strong MD     Communication with the referring physician will be sent via the electronic medical record. Chief Complaint: shortness of breath     CODE STATUS: DNR CCA    SUBJECTIVE:  HPI:  Dez Otoole is a 80 y.o. male who we are asked to evaluate for hypoxia. He has a past medical hx significant for Iliac aneurysm, asthma, cad, hx cabg, Inferior wall MI, SVT, HLD, ischemic cardiomyopathy, defibrillator in situ, combined systolic diastolic CHF, polymyalgia rheumatica no longer on prednisone prolonged 2 year taper now completed, hx tobacco abuse in remission. He is a known patient to our service followed by Dr. Karen Talley last seen in office January 2021  for combined pulmonary fibrosis and emphysema. He has idiopathic PF with minimal asbestos exposure by hx. His pulmonary regimen at home includes breo daily, duo nebs once daily and PRN for rescue, 2 liters oxygen continuous NC, Ofev 150 mg BID, monitoring LFTs Q 3 months. Pulse ox testing: January 2021   Pulse oximetry testing at rest on room air 74% . Pulse oximetry testing at rest on 3 liters oxygen 92%. Pulse oximetry testing after ambulating 60 meters on room air 76%  Pulse oximetry testing after ambulating 60 meters on 3 liters oxygen 91%. Pulse oximetry testing at rest on 3 liters oxygen 95%. He presented to the ED at [facility] on 2/21 with a 1 wk hx of dyspnea and cough. He previously coughed after swallowing large pill, concern for aspiration.  He was not following thickened liquid recommendations from previous speech therapy eval.  He was given prednisone last week by PCP with little relief in symptoms. He feels worse with exertion. Increase in O2 from 2  To 4 L at home and use of nebulizer did not help his symptoms. He denies fever, chills, no loss of taste or smell, no nausea or emesis. No abd pain or diarrhea. No mucus production with cough. CXR with increased reticular opacities in both lungs. CT chest without contrast with pulmonary consolidation in RLL, Stable lymphadenopathy in the infracarinal and right hilar regions, Extensive fibrotic changes in both lungs with peripheral and basilar predominance with honeycombing appears stable compared to prior ct chest imaging 2019. WBC 11.2, Hgb 10.4, BUN 30 creat 1.0, Na 139, K 4.2, Pro , Procal 0.10, legionella and strep pneumo urine Ag negative, rapid covid testing negative. Last PFT testing July 2020 FEV1/FVC ratio is 84%.  The FEV 1 is 2.06L, 77 % predicted. The FVC is  2.46L, 68 % predicted. Revealed no obstruction, reduced FVC is suggestive of restriction. Compared to previous PFTs flow rates had slightly worsened and DLCO had improved. Concern for aspiration, he has hx of dysphagia, not following thickened liquid recommendations. He coughed after swallowing pill at home and his symptoms have worsened ever since. Concern for previous aspiration event. Today attempt at video swallow unsuccessful due to high oxygen requirements today up to 15 L HF cannula for episode of desaturation 79-80%. ABG 7.44/32/70/21/-1.6/93% on 6 liters NC. Episode of wheezing on admission IV solumedrol given in ED, placed on oral prednisone 40 mg po daily.     Past Medical History:   Diagnosis Date    Aneurysm ([address]     iliacs    Asthma     CAD (coronary artery disease)     Cardiomyopathy ([address]     CHF [facility]congestive heart failure) ([facility])     Dilatation of aorta ([address] 10/4/2018    Hyperlipidemia     Iliac artery aneurysm, bilateral ([address] 10/4/2018    Inferoposterior myocardial infarction ([facility])     NSVT (nonsustained ventricular tachycardia) ([facility])        Past Concern    Not on file   Social History Narrative    Not on file     Smoking history: The patient is a former smoker in remission  reports that he quit smoking about 41 years ago. His smoking use included cigarettes. He started smoking about 71 years ago. He has a 30.00 pack-year smoking history. He has never used smokeless tobacco. He reports current alcohol use. He reports that he does not use drugs. ETOH:   reports current alcohol use. Exposures: There  is not history of TB or TB exposure. There is asbestos or silica dust exposure. The patient reports does not have coal, foundry, quarry or [employer] exposure. Recent travel history none. There is not  history of recreational or IV drug use. There is not hot tub exposure. The patient does not have any exotic pets, turtles or exotic birds.        Vaccines:       Immunization History   Administered Date(s) Administered    Influenza A (J1X9-23) Vaccine PF IM 12/10/2009    Influenza A ([id number]),all Formulations 12/01/2009    Influenza Vaccine, unspecified formulation 11/09/2016    Influenza Virus Vaccine 11/01/2015    Influenza, High Dose (Fluzone 65 yrs and older) 11/05/2015, 11/13/2015, 11/09/2016, 09/15/2017, 09/07/2018, 09/05/2019    Influenza, Quadv, adjuvanted, 65 yrs +, IM, PF (Fluad) 09/24/2020    Pneumococcal Conjugate 13-valent ([id number]) 12/01/2015, 10/01/2016    Pneumococcal Conjugate Vaccine 09/18/2018    Pneumococcal Polysaccharide ([id number]) 07/08/2020    Zoster Live (Zostavax) 02/06/2013, 11/01/2015    Zoster Recombinant (Shingrix) 11/14/2018        Home Meds: Medications Prior to Admission: megestrol (MEGACE ORAL) 40 MG/ML suspension, Take 10 mLs by mouth daily  albuterol sulfate  (90 Base) MCG/ACT inhaler, Inhale 2 puffs into the lungs every 6 hours as needed for Wheezing  losartan (COZAAR) 25 MG tablet, Take 0.5 tablets by mouth daily  montelukast (SINGULAIR) 10 MG tablet, Take 10 mg by mouth nightly  predniSONE (DELTASONE) 1 MG tablet, Take 1 tablet by mouth daily  rosuvastatin (CRESTOR) 5 MG tablet, Take 1 tablet by mouth nightly  carvedilol (COREG) 6.25 MG tablet, Take 1 tablet by mouth 2 times daily (with meals) 9.375 bid  carvedilol (COREG) 3.125 MG tablet, Take 1 tablet by mouth 2 times daily (with meals)  albuterol (PROVENTIL) (2.5 MG/3ML) 0.083% nebulizer solution, USE 1 VIAL IN NEBULIZER 4 TIMES DAILY (Patient taking differently: Take by nebulization as needed )  fluticasone (FLONASE) 50 MCG/ACT nasal spray, 1 spray by Each Nostril route daily  azelastine (ASTELIN) 0.1 % nasal spray, 2 sprays by Nasal route 2 times daily Use in each nostril as directed (Patient taking differently: 2 sprays by Nasal route as needed Use in each nostril as directed)  Fluticasone furoate-vilanterol (BREO ELLIPTA) 200-25 MCG/INH AEPB inhaler, USE 1 INHALATION ORALLY    ONCE A DAY (Patient taking differently: as needed USE 1 INHALATION ORALLY    ONCE A DAY)  potassium chloride (KLOR-CON M) 20 MEQ extended release tablet, Take 1 tablet by mouth daily  furosemide (LASIX) 20 MG tablet, Take 1 tablet by mouth daily  OFEV 150 MG CAPS, TAKE 1 CAPSULE BY MOUTH TWICE A DAY  EVERY 12 HOURS  AS DIRECTED WITH FOOD  OXYGEN, Inhale 2 L into the lungs as needed  Multiple Vitamins-Minerals (MULTIVITAMIN ADULT) TABS, Take by mouth daily  Coenzyme Q10 (COQ10 PO), Take 100 mg by mouth daily   aspirin 81 MG EC tablet, Take 81 mg by mouth every evening     CURRENT MEDS :  Scheduled Meds:   Arformoterol Tartrate  15 mcg Nebulization BID    And    budesonide  0.5 mg Nebulization BID    aspirin  81 mg Oral QPM    carvedilol  3.125 mg Oral BID WC    fluticasone  1 spray Each Nostril Daily    montelukast  10 mg Oral Nightly    Nintedanib Esylate  1 capsule Oral Q12H    pilocarpine  5 mg Oral Nightly    rosuvastatin  5 mg Oral Nightly    sodium chloride flush  10 mL Intravenous 2 times per day    enoxaparin  40 mg Subcutaneous Daily    predniSONE  40 mg Oral Daily    ipratropium-albuterol  1 ampule Inhalation Q4H WA    ampicillin-sulbactam  3,000 mg Intravenous Q6H       Continuous Infusions:  none     No Known Allergies    REVIEW OF SYSTEMS:  Constitutional: Denies fever, weight loss, night sweats, and fatigue  Skin: Denies pigmentation, dark lesions, and rashes   HEENT: Denies hearing loss, tinnitus, ear drainage, epistaxis, sore throat, and hoarseness. Cardiovascular: Denies palpitations, chest pain, and chest pressure. Respiratory: +dyspnea with exertion, cough  Gastrointestinal: Denies nausea, vomiting, poor appetite, diarrhea, heartburn or reflux  Genitourinary: Denies dysuria, frequency, urgency or hematuria  Musculoskeletal: Denies myalgias, muscle weakness, and bone pain  Neurological: Denies dizziness, vertigo, headache, and focal weakness  Psychological: Denies anxiety and depression  Endocrine: Denies heat intolerance and cold intolerance  Hematopoietic/Lymphatic: Denies bleeding problems and blood transfusions    OBJECTIVE:   /68   Pulse 63   Temp 97.8 °F (36.6 °C) (Oral)   Resp 22   Ht 5' 10\" (1.778 m)   Wt 147 lb 9.6 oz (67 kg)   SpO2 90%   BMI 21.18 kg/m²   SpO2 Readings from Last 1 Encounters:   02/22/21 90%        I/O:  No intake or output data in the 24 hours ending 02/22/21 1143  Vent Information  SpO2: 90 %                Physical Exam:  General: The patient is lying in bed comfortably without any distress. Breathing is not labored  HEENT: Pupils are equal round and reactive to light, there are no oral lesions and no post-nasal drip   Neck: supple without adenopathy  Cardiovascular: regular rate and rhythm without murmur or gallop  Respiratory: crackles to auscultation bilaterally without wheezing or crackles.   Air entry is symmetric  Abdomen: soft, non-tender, non-distended, normal bowel sounds  Extremities: warm, no edema, no clubbing  Skin: no rash or lesion  Neurologic: CN II-XII grossly intact, no focal deficits    Pulmonary Function Testing  Per Dr Fitzgerald Ends   7/8/20   FEV1/FVC 84%  FEV1 2.06L, 77%  FVC 2.46L, 68%  DLCOc 9.54, 41%   INTERPRETATION   This is a good patient effort. FEV1/FVC ratio is 84%.  The FEV 1 is 2.06L, 77 % predicted. The FVC is  2.46L, 68 % predicted.  Mid-flows are 110%.  Forced expiratory time is 6.92s.  The DLCOc is 9.54ml/min/mmHg, 41% predicted.  This does not correct for alveolar volume.  The flow volume loop demonstrates a normal pattern. IMPRESSION   1. Spirometry reveals no obstruction. 2. The reduced FVC is suggestive of restriction. 3. Lung volumes were not completed. 4. The diffusing capacity is moderately reduced. 5. Compared to previous PFTs flow rates have slightly worsened and DLCO has improved.        11/13/19  FEV1/FVC 83%  FEV1 2.36L, 88%  FVC 2.80L, 77%  TLC 4.46L, 64%  DLCO 8.69, 37%     4/8/19  FEV1/FVC  84%  FEV1 2.08L, 77%  FVC 2.47L, 67%  TLC 4.63L, 67%  DLCO 8.79, 37%     PFTs  8/28/18  FEV1/FVC 84%  FEV1 2.00L, 71%  FVC 2.37L, 62%  TLC 3.45L, 48%  DLCO 6.03, 18%        3/15/18  FEV1/FVC 80%  FEV1 2.55L, 92%  FVC 3.17L, 80%  TLC 5.23L, 73%  DLCO 12.14, 37    Imaging personally reviewed:    Prominent bilateral pulmonary consolidations are seen in the right   lower lobe. There are extensive pulmonary fibrotic changes with a   peripheral and basilar predominance, along with honeycombing. The   central airway is clear.       The heart is mildly enlarged. Severe calcified coronary   atherosclerosis noted. AICD in situ. Mild calcified atherosclerosis is   seen in the thoracic aorta. No aneurysm. The pulmonary arterial trunk   is of normal caliber. Enlarged lymph nodes are seen in the mediastinum   and the right hilar region.         Cholelithiasis is noted. 2 mm nonobstructive right renal calculus is   visualized. Evaluation of the bones reveals no fracture or destructive   lesion.  Stable lucency in the T12 vertebral body likely represents a   hemangioma.         Impression       1. Consolidative opacity in the right lower lobe is likely   infectious/inflammatory. Given the history of trauma, hemorrhage   cannot be excluded. 2. Grossly stable severe fibrotic changes in the lungs with a   peripheral and basilar predominance as is commonly seen in the usual   interstitial pneumonia. Differential diagnosis includes sequela of   collagen vascular disease. 3. Stable lymphadenopathy in the infracarinal and right hilar regions. 4. Severe calcified coronary atherosclerosis. Echo:    Conclusions      Summary   Moderately reduced left ventricular systolic function. Ejection fraction is visually estimated at 35-40%. Normal right ventricular size with mildly reduced right ventricular   function. There is doppler evidence of stage I diastolic dysfunction. Moderately dilated left atrium by volume index. Mild mitral regurgitation. Mild tricuspid regurgitation. PASP is estimated at 31 mmHg. Labs:  Lab Results   Component Value Date    WBC 11.2 02/22/2021    HGB 10.4 02/22/2021    HCT 32.8 02/22/2021    MCV 92.1 02/22/2021    MCH 29.2 02/22/2021    MCHC 31.7 02/22/2021    RDW 16.3 02/22/2021     02/22/2021    MPV 8.7 02/22/2021     Lab Results   Component Value Date     02/22/2021    K 4.2 02/22/2021     02/22/2021    CO2 24 02/22/2021    BUN 30 02/22/2021    CREATININE 1.0 02/22/2021    LABALBU 3.7 02/21/2021    LABALBU 4.2 05/26/2011    CALCIUM 8.9 02/22/2021    GFRAA >60 02/22/2021    LABGLOM >60 02/22/2021     Lab Results   Component Value Date    PROTIME 13.6 01/07/2018    INR 1.3 01/07/2018     Recent Labs     02/22/21  0429   PROBNP 995*     Recent Labs     02/21/21  1656   TROPONINI 0.02     Recent Labs     02/21/21  2320   PROCAL 0.10*     This SmartLink has not been configured with any valid records. Micro:  No results for input(s): CULTRESP in the last 72 hours. No results for input(s): LABGRAM in the last 72 hours.   No results for input(s): LEGUR in the last 72 hours. Recent Labs     02/22/21  0007   STREPNEUMAGU Presumptive negative- suggests no current or recent  pneumococcal infection. Infection due to Strep pneumoniae cannot be  ruled out since the antigen present in the sample  may be below the detection limit of the test.  Normal Range:Presumptive Negative       Recent Labs     02/22/21  0007   LP1UAG Presumptive Negative -suggesting no recent or current infections  with Legionella pneumophila serogroup 1. Infection to Legionella cannot be ruled out since other serogroups  and species may cause infection, antigen may not be present in  early infection, or level of antigen may be below the  detection limit. Normal Range: Presumptive Negative           Assessment:  1. Acute on chronic respiratory failure with  Hypoxia  2. RLL pneumonia concern for aspiration   3. IPF on Ofev at home  4. Acute on Chronic home O2 dependence  5. 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 Asthma, COPD overlap with exacerbation . Emphysema and honeycombing on CT chest imaging  7. Hx tobacco abuse in remission  8. Ischemic cardiomyopathy  9. CAD, hx Inferior wall MI, CABG  10. PPM/defib in situ  11. CKD stage II  12. HTN  13. Dysphagia     Plan:  1. Oxygen therapy 15 L HF nasal cannula-add humidification  2. ABG 7.44/32/70/21/-1.6/93% on 6 liters NC.  3. Patient has home supply of Ofev, ok to take while inpatient   4. Follow CXR  5. Video swallow when able. Speech following-to make recommendations on liquids   6. Bronchodilators: Duonebs, Brovana, Budesonide. Can resume breo for dc  7. Singulair, flonase daily   8. Add tessalon for cough  9. Prednisone 40 po daily   10. Unasyn for asp coverage. procal 0.10  11. DVT, GI prophylaxis    Thank you for allowing me to participate in the care of Jeromy Tatum. Please feel free to call with questions.      This plan of care was reviewed in collaboration with  [employer]    Electronically signed by WILFRIDO Jackson CNP on 2/22/2021 at 11:43 AM      Note: This report was completed utilizing computer voice recognition software. Every effort has been made to ensure accuracy, however; inadvertent computerized transcription errors may be present    I personally saw, examined, and cared for the patient. Labs, medications, radiographs reviewed. I agree with history exam and plans detailed in NP note. History of choking and likely aspirated. Treat with unasyn   Noncompliant with modified diet. He signed a waiver to eat. Will also consult palliative care. Spot dose lasix   Wean o2 as tolerated   Prednisone 40 mg should be fine   bipap for respiratory support       Jeffery Soliman M.D.    Pulmonary/Critical Care Medicine

## 2021-02-22 NOTE — H&P
[facility] History and Physical      CHIEF COMPLAINT:  Shortness of breath    History of Present Illness: 55-year-old male with history of atherosclerotic heart disease status post CABG, combined heart failure LVEF 35 to 40%, chronic respiratory failure on 2 L with exertion, pulmonary fibrosis on Ofev, emphysema/COPD follows with Dr. Daniel Moran. For the past few weeks he has noticed dyspnea with exertion but he did not increase his oxygen level. After rest his symptoms resolve. Also has a cough productive of clear sputum. Recently his symptoms have worsened therefore presented to the ED for further evaluation. Does not appear to have seen Dr. Daniel Moran since November. He has a history of dysphagia with clear liquids therefore placed on thickened liquid but he is not adherent to it. A few days ago he had a coughing episode after swallowing a large vitamin pill and since then his symptoms are worsened. No fever or chills. No sore throat. No orthopnea. In the ED he was found to be hypoxic therefore placed on 5 L. When seen his O2 levels dropped to 4 L and he maintained 100%.     Informant(s) for H&P: Patient    REVIEW OF SYSTEMS:  A comprehensive 14 point review of systems was negative except for: what is in the HPI    PMH:  Past Medical History:   Diagnosis Date    Aneurysm ([address]     iliacs    Asthma     CAD (coronary artery disease)     Cardiomyopathy ([address]     [facility]) ([facility])     Dilatation of aorta ([address] 10/4/2018    Hyperlipidemia     Iliac artery aneurysm, bilateral ([address] 10/4/2018    Inferoposterior myocardial infarction (HCC)     NSVT (nonsustained ventricular tachycardia) ([address]        Surgical History:  Past Surgical History:   Procedure Laterality Date    CARDIAC CATHETERIZATION  4-    Dr. Summer Seymour cath used    CARDIAC DEFIBRILLATOR PLACEMENT      CORONARY ANGIOPLASTY  4/15/2014    CORONARY ARTERY BYPASS GRAFT   4/16/2014    x3 Medications Prior to Admission:    Prior to Admission medications    Medication Sig Start Date End Date Taking?  Authorizing Provider   megestrol (MEGACE ORAL) 40 MG/ML suspension Take 10 mLs by mouth daily 2/3/21   Shruti Banuelos DO   albuterol sulfate  (90 Base) MCG/ACT inhaler Inhale 2 puffs into the lungs every 6 hours as needed for Wheezing 1/29/21   Ramesh Mancuso MD   losartan (COZAAR) 25 MG tablet Take 0.5 tablets by mouth daily 1/18/21   Steph Murrieta MD   pilocarpine (SALAGEN) 5 MG tablet Take 1 tablet by mouth nightly 12/28/20   Luis Banuelos DO   montelukast (SINGULAIR) 10 MG tablet Take 10 mg by mouth nightly    Historical Provider, MD   predniSONE (DELTASONE) 1 MG tablet Take 1 tablet by mouth daily 9/24/20   Shruti Banuelos DO   rosuvastatin (CRESTOR) 5 MG tablet Take 1 tablet by mouth nightly 6/16/20   Steph Murrieta MD   carvedilol (COREG) 6.25 MG tablet Take 1 tablet by mouth 2 times daily (with meals) 9.375 bid 6/16/20   Desiree Hillman MD   carvedilol (COREG) 3.125 MG tablet Take 1 tablet by mouth 2 times daily (with meals) 6/16/20   Desiree Hillman MD   albuterol (PROVENTIL) (2.5 MG/3ML) 0.083% nebulizer solution USE 1 VIAL IN NEBULIZER 4 TIMES DAILY  Patient taking differently: Take by nebulization as needed  6/2/20   Christian Kaplan MD   fluticasone (FLONASE) 50 MCG/ACT nasal spray 1 spray by Each Nostril route daily 5/21/20   Ramesh Mancuso MD   azelastine (ASTELIN) 0.1 % nasal spray 2 sprays by Nasal route 2 times daily Use in each nostril as directed  Patient taking differently: 2 sprays by Nasal route as needed Use in each nostril as directed 4/9/20   Ramesh Mancuso MD   Fluticasone furoate-vilanterol (BREO ELLIPTA) 200-25 MCG/INH AEPB inhaler USE 1 INHALATION ORALLY    ONCE A DAY  Patient taking differently: as needed USE 1 INHALATION ORALLY    ONCE A DAY 11/27/19   Ramesh Mancuso MD   potassium chloride (KLOR-CON M) 20 MEQ extended cranial nerve deficit and speech normal    LABS:  Recent Labs     02/21/21  1656      K 4.2      CO2 27   BUN 28*   CREATININE 1.1   GLUCOSE 123*   CALCIUM 9.3       Recent Labs     02/21/21  1656   WBC 13.9*   RBC 3.85   HGB 11.5*   HCT 35.8*   MCV 93.0   MCH 29.9   MCHC 32.1   RDW 16.7*      MPV 8.4       No results for input(s): POCGLU in the last 72 hours. Radiology:   XR CHEST PORTABLE   Final Result   Mild cardiomegaly status post median sternotomy. Chronic interstitial lung disease, appears stable since the prior examination. EKG: paced    ASSESSMENT:    Acute on chronic respiratory failure with hypoxia (HCC)  COPD with ?acute exacerbation  Oropharyngeal dysphagia  Combined heart failure lvef 35-40%  IPF on chronic 2L O2 with exertion  Pacemaker  ASHD s/p CABG  CKD stage 2  Essential hypertension    PLAN:  Hypoxia: covid negative. ?aspiration pneumonitis. -received unasyn in the ED. Check procalcitonin. Continue for now. -mild end expiratory wheezing. Steroids, bronchodilators. -continue ofev  -swallow eval again  -consult pulmonology. Hx of heart failure:  -doesn't appear to be fluid overloaded. bp on low side. 500 cc ivf. Hold lasix. Monitor. Essential hypertension: BP on low side. Coreg 3.125 mg bid. Hold losartan until bp improves. ASHD: continue home regimen    Code Status: DNR arrest  DVT prophylaxis: Lovenox      NOTE: This report was transcribed using voice recognition software. Every effort was made to ensure accuracy; however, inadvertent computerized transcription errors may be present.   Electronically signed by Jessi Young MD on 2/21/2021 at 8:04 PM

## 2021-02-22 NOTE — PROGRESS NOTES
[facility] Progress Note    Admitting Date and Time: 2/21/2021  4:38 PM  Admit Dx: Acute on chronic respiratory failure with hypoxia (HCC) [J96.21]    Subjective:  Patient is being followed for Acute on chronic respiratory failure with hypoxia ([address] [J96.21]     Pt feels SOB with exertion. Cancelled Video Swallow due to this. Speech therapy thought he did better this time. Requesting return to thin liquids. To verify and order accordingly. Per RN:  Pulmonary saw today. ROS: denies fever, chills, cp, sob, n/v, HA unless stated above.       Arformoterol Tartrate  15 mcg Nebulization BID    And    budesonide  0.5 mg Nebulization BID    benzonatate  200 mg Oral TID    aspirin  81 mg Oral QPM    carvedilol  3.125 mg Oral BID WC    fluticasone  1 spray Each Nostril Daily    montelukast  10 mg Oral Nightly    Nintedanib Esylate  1 capsule Oral Q12H    pilocarpine  5 mg Oral Nightly    rosuvastatin  5 mg Oral Nightly    sodium chloride flush  10 mL Intravenous 2 times per day    enoxaparin  40 mg Subcutaneous Daily    predniSONE  40 mg Oral Daily    ipratropium-albuterol  1 ampule Inhalation Q4H WA    ampicillin-sulbactam  3,000 mg Intravenous Q6H         albuterol sulfate HFA, 2 puff, Q6H PRN      sodium chloride flush, 10 mL, PRN      polyethylene glycol, 17 g, Daily PRN      acetaminophen, 650 mg, Q6H PRN    Or      acetaminophen, 650 mg, Q6H PRN         Objective:    /68   Pulse 63   Temp 97.8 °F (36.6 °C) (Oral)   Resp 22   Ht 5' 10\" (1.778 m)   Wt 147 lb 9.6 oz (67 kg)   SpO2 93%   BMI 21.18 kg/m²     General Appearance: alert and oriented to person, place and time and in no acute distress  Skin: warm and dry  Head: normocephalic and atraumatic  Eyes: pupils equal, round, and reactive to light, extraocular eye movements intact, conjunctivae normal  Neck: neck supple and non tender without mass   Pulmonary/Chest: clear to auscultation bilaterally- no wheezes, rales or rhonchi, normal air movement, no respiratory distress  Cardiovascular: normal rate, normal S1 and S2 and no carotid bruits  Abdomen: soft, non-tender, non-distended, normal bowel sounds, no masses or organomegaly  Extremities: no cyanosis, no clubbing and no edema  Neurologic: no cranial nerve deficit and speech normal        Recent Labs     02/21/21  1656 02/22/21  0429    139   K 4.2 4.2    106   CO2 27 24   BUN 28* 30*   CREATININE 1.1 1.0   GLUCOSE 123* 205*   CALCIUM 9.3 8.9       Recent Labs     02/21/21  1656 02/22/21  0429   WBC 13.9* 11.2   RBC 3.85 3.56*   HGB 11.5* 10.4*   HCT 35.8* 32.8*   MCV 93.0 92.1   MCH 29.9 29.2   MCHC 32.1 31.7*   RDW 16.7* 16.3*    212   MPV 8.4 8.7       Radiology:   Xr Chest Portable    Result Date: 2/22/2021  EXAMINATION: ONE XRAY VIEW OF THE CHEST 2/22/2021 6:34 am COMPARISON: Multiple priors, most recent from yesterday HISTORY: ORDERING SYSTEM PROVIDED HISTORY: cough TECHNOLOGIST PROVIDED HISTORY: Reason for exam:->cough FINDINGS: There is a left chest wall battery pack with associated pacer/AICD leads. Heart size is unable to be accurately assessed on this single portable view of the chest, but appears to be stable. Postsurgical changes related to sternotomy are noted. There are increased reticular airspace opacities throughout both lungs, grossly stable compared with yesterday's examination. Difficult to exclude small bilateral pleural effusions. No evidence of a pneumothorax. Increased reticular opacities throughout both lungs are consistent with pulmonary fibrotic changes. Difficult to exclude superimposed pulmonary edema or multifocal pneumonia.   Findings are not significantly changed compared with yesterday's exam.    Xr Chest Portable    Result Date: 2/21/2021  EXAMINATION: ONE XRAY VIEW OF THE CHEST 2/21/2021 4:10 pm COMPARISON: February 3, 2020 HISTORY: ORDERING SYSTEM PROVIDED HISTORY: sob TECHNOLOGIST PROVIDED HISTORY: Reason for exam:->sob FINDINGS: Cardiac silhouette is mildly enlarged. Status post median sternotomy with pacemaker in place. Coarse interstitial changes in the lungs bilaterally are unchanged since the prior examination and likely chronic. No new areas of airspace consolidation or pleural effusions. The pulmonary vasculature is within normal limits. Mild cardiomegaly status post median sternotomy. Chronic interstitial lung disease, appears stable since the prior examination. Assessment:    Active Problems:    Acute on chronic respiratory failure with hypoxia (HCC)    IPF    COPD    Combined HF    Chronic Respiratory failure on 2 L    Resolved Problems:    * No resolved hospital problems. *    Plan:  1. Acute on Chronic Respiratory Failure - currently Oxygen needs increased. Pulmonary consult. Double check with Speech Therapy if can have thin liquids and adjust diet accordingly. 2.  IPF - as per Pulmonary recommendations. 3.  Combined HF - continue current medications. Strict Is and Os. Trend labs and treat accordingly. 4.  COPD - continue steroids, Pulmicort and Unasyn  5. Dysphagia - double check if okay for thin liquids per Speech and adjust accordingly.     Electronically signed by Toni Marques MD on 2/22/2021 at 1:59 PM

## 2021-02-22 NOTE — CARE COORDINATION
COVID negative 2/21. Increased O2 demand to 15 liters high flow NC- video swallow held d/t low sats. On iv abx. Met w/ patient. Explained role of  and plan of care. Lives w/ wife in a 1 story condo- 1 step to entrance. Uses [employer],cane. Has home nebulizer. Wears home O2 2lNC through [employer]-verified w/ Umer @ [employer]-will need O2 testing/order on discharge if unable to wean to 2liters. Hx [facility] BRIAN-states does not want to go back to a nursing home. [facility]- awaiting PT/OT evals- requesting [facility] on discharge- Mike Lubin @ [facility] notified of referral. Backdoor referral made to [employer] to follow. PCP is Dr. Zeynep Brasher and pharmacy is [facility]. Will follow Benja Verma     The Plan for Transition of Care is related to the following treatment goals: respiratory and physical condidioning    The Patient and/or patient representative Chris Angel was provided with a choice of provider and agrees   with the discharge plan. [x] Yes [] No    Freedom of choice list was provided with basic dialogue that supports the patient's individualized plan of care/goals, treatment preferences and shares the quality data associated with the providers.  [x] Yes [] No

## 2021-02-22 NOTE — PROGRESS NOTES
SPEECH/LANGUAGE PATHOLOGY  CLINICAL ASSESSMENT OF SWALLOWING FUNCTION    PATIENT NAME:  Claritza Alvarado      :  1935      TODAY'S DATE:  2021  ROOM:  [address] [place] [address]    SUMMARY OF EVALUATION  Chart reviewed, including chest radiograph and most recent clinical bedside swallow evaluation from 2020, which reports clinical indicators of aspiration with thin liquids. Pt reports recurrent hospitalizations for respiratory status and that he did not continue use of thickener at home. Pt signed Authur Shaye yesterday refusing HTL. Pt was educated on risks of aspiration related to pneumonia/respiratory status. DYSPHAGIA DIAGNOSIS:  Oropharyngeal swallow within functional limits, however, silent aspiration cannot be ruled out at bedside. Based on pt's current respiratory status and previous clinical bedside evaluation, a video swallow study is recommended and requires a physician order. Discussed video swallow study with pt. Pt agreed to continue with video swallow study to aid in decision of using thickener. DIET RECOMMENDATIONS:  Continue current diet until video swallow study is completed to further assess      FEEDING RECOMMENDATIONS:     Assistance level:  No assistance needed      Compensatory strategies recommended: Small bites/sips and Alternate solids and liquids    THERAPY RECOMMENDATIONS:         A Video Swallow Study (MBSS) is recommended and requires a physician order                 PROCEDURE     Consistencies Administered During the Evaluation   Liquids: thin liquid   Solids:  pureed foods and solid foods      Method of Intake:   cup, straw, spoon  Self fed, Fed by clinician      Position:   Seated, upright                  RESULTS     Oral Stage: The oral stage of swallowing was within functional limits      Pharyngeal Stage:      No signs of aspiration were noted during this evaluation however, silent aspiration cannot be ruled out at bedside.   If silent aspiration is suspected, a Videofluoroscopic Study of Swallowing (MBS) is recommended and requires a physician order. Pt with hx of coughing on liquids reports occasional coughing. The Speech Language Pathologist (SLP) completed education with the patient regarding results of evaluation. Explained that Speech Pathology intervention is warranted  at this time   Prognosis for improvements is fair +     This plan will be re-evaluated and revised in 1 week  if warranted. Patient stated goals: Agreed with above,   Treatment goals discussed with Patient   The Patient understand(s) the diagnosis, prognosis and plan of care         INTERVENTION/EDUCATION    Pt educated on above results and plan of care. Pt trained on compensatory strategies for safe swallow with good outcome. Pt encouraged to engage in question/answer session. All questions answered and pt verbalized understanding of above. CPT code:  24719  bedside swallow eval, 22037 dysphagia therapy 15 mins      [x]The admitting diagnosis and active problem list, as listed below have been reviewed prior to initiation of this evaluation. ADMITTING DIAGNOSIS: Acute on chronic respiratory failure with hypoxia ([address] [J96.21]     ACTIVE PROBLEM LIST:   Patient Active Problem List   Diagnosis    Automatic implantable cardioverter-defibrillator in situ    Dilatation of aorta (HCC)    Iliac artery aneurysm, bilateral (HCC)    IPF (idiopathic pulmonary fibrosis) (HCC)    Peripheral vascular disease ([address]    PMR (polymyalgia rheumatica) (HCC)    Coronary artery disease involving native coronary artery without angina pectoris    Hyperlipidemia LDL goal <100    CKD (chronic kidney disease) stage 3, GFR 30-59 ml/min    Gastroesophageal reflux disease    Insomnia    Vitamin D deficiency    Acute on chronic respiratory failure with hypoxia (HCC)     MARQUEZ Shahid. Speech-Language Pathology Student    Dreama Krabbe A. [facility]/SLP [id number]  Speech-Language

## 2021-02-22 NOTE — PROGRESS NOTES
Physical Therapy    Facility/Department: [address] INTERMEDIATE  Initial Assessment    NAME: Marva Polanco  : 1935  MRN: [id number]    Date of Service: 2021      Patient Diagnosis(es): The primary encounter diagnosis was Acute on chronic respiratory failure with hypoxia ([address] Diagnoses of Supplemental oxygen dependent, Dyspnea and respiratory abnormalities, and COVID-19 virus not detected were also pertinent to this visit. has a past medical history of Aneurysm ([address] Asthma, CAD (coronary artery disease), Cardiomyopathy ([address] CHF (congestive heart failure) ([address] Dilatation of aorta ([address] Hyperlipidemia, Iliac artery aneurysm, bilateral ([address] Inferoposterior myocardial infarction ([address] and NSVT (nonsustained ventricular tachycardia) ([address] has a past surgical history that includes Cardiac catheterization (4-); Coronary artery bypass graft ( 2014); Coronary angioplasty (4/15/2014); and Cardiac defibrillator placement. Referring Provider:  Arvin Bhatt MD      Evaluating Therapist: Edgard Alva PT      Room #:631  DIAGNOSIS: Acute on Chronic respiratory failure  Additional Pertinent History:CAd, CHF  PRECAUTIONS: falls, O2    Social:  Pt lives with wife in a 1 floor plan 3 steps  to enter. Prior to admission independent with either cane or walker. Initial Evaluation  Date: 21 Treatment      Short Term/ Long Term   Goals   Was pt agreeable to Eval/treatment? yes     Does pt have pain?  No c/o pain     Bed Mobility  Rolling: min assist  Supine to sit: min assist  Sit to supine: NT  Scooting: min assist  independent   Transfers Sit to stand: CGA  Stand to sit: CGA  Stand pivot: CGA  independent   Ambulation    3 feet with no devie with CGA  100 feet with AAD with supervision   Stair Negotiation  Ascended and descended  NT   3 steps with 1 rail with supervision   LE strength     4-/5    4/5   balance      fair     AM-PAC Raw score                        Pt is alert and Oriented   LE ROM: WFL  Sensation: intact  Edema: none  Endurance: fair-  Chair alarm: yes     ASSESSMENT  Pt displays functional ability as noted in the objective portion of this evaluation. Patient education  Pt educated on PT objecitves    Patient response to education:   Pt verbalized understanding Pt demonstrated skill Pt requires further education in this area   yes         ASSESSMENT:    Comments:  SpO2 on 9L at rest 90%. After taking a few steps bed to chair 89%. Pt short of breath with minimal activity      Pt's/ family goals   1. To be up and moving more    Patient and or family understand(s) diagnosis, prognosis, and plan of care. PLAN OF CARE:    Current Treatment Recommendations     [x] Strengthening     [] ROM   [x] Balance Training   [x] Endurance Training   [x] Transfer Training   [x] Gait Training   [x] Stair Training   [] Positioning   [x] Safety and Education Training   [x] Patient/Caregiver Education   [] HEP  [] Other     Frequency of treatments: 2-5x/week x 5.days    Time in  1040  Time out  1052        Evaluation Time includes thorough review of current medical information, gathering information on past medical history/social history and prior level of function, completion of standardized testing/informal observation of tasks, assessment of data and education on plan of care and goals.     CPT codes:  [x] Low Complexity PT evaluation 44351  [] Moderate Complexity PT evaluation 71788  [] High Complexity PT evaluation 87615  [] PT Re-evaluation 99899  [] Gait training 83502 minutes  [] Manual therapy 02537 minutes  [] Therapeutic activities 34033 minutes  [] Therapeutic exercises 41506 minutes  [] Neuromuscular reeducation 17476 minutes     [facility] PSYCHIATRY PT [id number]

## 2021-02-23 NOTE — PROGRESS NOTES
Date: 2/22/2021    Time: 11:18 PM    Patient Placed On BIPAP/CPAP/ Non-Invasive Ventilation? Yes    If no must comment. Facial area red/color change? No           If YES are Blister/Lesion present? No   If yes must notify nursing staff  BIPAP/CPAP skin barrier?   Yes    Skin barrier type:mepilexlite       Comments:        Valerio David, RRT

## 2021-02-23 NOTE — PLAN OF CARE
Problem: Falls - Risk of:  Goal: Will remain free from falls  Description: Will remain free from falls  2/23/2021 1008 by Jose Monson RN  Outcome: Met This Shift     Problem: Falls - Risk of:  Goal: Absence of physical injury  Description: Absence of physical injury  2/23/2021 1008 by Jose Monson RN  Outcome: Met This Shift

## 2021-02-23 NOTE — PROGRESS NOTES
Speech Language Pathology      NAME:  Chani Palma  :  1935  DATE: 2021  ROOM:  [address] [place] [address]         Chart reviewed. Attempted to complete video swallow eval in AM and PM.      Pt unavailable at this time due to:  [x] HOLD per RN due to current medical status  [] Off unit for testing/ procedure    [] With medical staff   [] Declined intervention  [] Sleeping/ Lethargic   [] Other:       Will re-attempt as able. Thank you. Acute on chronic respiratory failure with hypoxia ([facility] [J96.21]            Albino FISCHER CCC/SLP [id number]  Speech-Language Pathologist

## 2021-02-23 NOTE — PROGRESS NOTES
94 %  I Time/ I Time %: 0.9 s  Mask Type: Full face mask  Mask Size: Medium       IPAP: 10 cmH20  CPAP/EPAP: 5 cmH2O     CURRENT MEDS :  Scheduled Meds:   Arformoterol Tartrate  15 mcg Nebulization BID    And    budesonide  0.5 mg Nebulization BID    benzonatate  200 mg Oral TID    aspirin  81 mg Oral QPM    carvedilol  3.125 mg Oral BID WC    fluticasone  1 spray Each Nostril Daily    montelukast  10 mg Oral Nightly    Nintedanib Esylate  1 capsule Oral Q12H    pilocarpine  5 mg Oral Nightly    rosuvastatin  5 mg Oral Nightly    sodium chloride flush  10 mL Intravenous 2 times per day    enoxaparin  40 mg Subcutaneous Daily    predniSONE  40 mg Oral Daily    ipratropium-albuterol  1 ampule Inhalation Q4H WA    ampicillin-sulbactam  3,000 mg Intravenous Q6H       Physical Exam:  General: The patient is lying in bed comfortably without any distress. Breathing is not labored  HEENT: Pupils are equal round and reactive to light, there are no oral lesions and no post-nasal drip   Neck: supple without adenopathy  Cardiovascular: regular rate and rhythm without murmur or gallop  Respiratory: crackles to auscultation bilaterally without wheezing or crackles. Air entry is symmetric  Abdomen: soft, non-tender, non-distended, normal bowel sounds  Extremities: warm, no edema, no clubbing  Skin: no rash or lesion  Neurologic: CN II-XII grossly intact, no focal deficits    Pertinent/ New Labs and Imaging Studies     Imaging Personally Reviewed:  Prominent bilateral pulmonary consolidations are seen in the right   lower lobe. There are extensive pulmonary fibrotic changes with a   peripheral and basilar predominance, along with honeycombing. The   central airway is clear.       The heart is mildly enlarged. Severe calcified coronary   atherosclerosis noted. AICD in situ. Mild calcified atherosclerosis is   seen in the thoracic aorta. No aneurysm. The pulmonary arterial trunk   is of normal caliber.  Enlarged lymph nodes are seen in the mediastinum   and the right hilar region.         Cholelithiasis is noted. 2 mm nonobstructive right renal calculus is   visualized. Evaluation of the bones reveals no fracture or destructive   lesion. Stable lucency in the T12 vertebral body likely represents a   hemangioma.           Impression       1. Consolidative opacity in the right lower lobe is likely   infectious/inflammatory. Given the history of trauma, hemorrhage   cannot be excluded. 2. Grossly stable severe fibrotic changes in the lungs with a   peripheral and basilar predominance as is commonly seen in the usual   interstitial pneumonia. Differential diagnosis includes sequela of   collagen vascular disease. 3. Stable lymphadenopathy in the infracarinal and right hilar regions.    4. Severe calcified coronary atherosclerosis.        Echo:     Conclusions      Summary   Moderately reduced left ventricular systolic function.   Ejection fraction is visually estimated at 35-40%.   Normal right ventricular size with mildly reduced right ventricular   function.   There is doppler evidence of stage I diastolic dysfunction.   Moderately dilated left atrium by volume index.   Mild mitral regurgitation.   Mild tricuspid regurgitation.   PASP is estimated at 31 mmHg.           Labs:  Lab Results   Component Value Date    WBC 15.9 02/23/2021    HGB 10.8 02/23/2021    HCT 33.5 02/23/2021    MCV 93.8 02/23/2021    MCH 30.3 02/23/2021    MCHC 32.2 02/23/2021    RDW 16.3 02/23/2021     02/23/2021    MPV 8.8 02/23/2021     Lab Results   Component Value Date     02/23/2021    K 3.9 02/23/2021     02/23/2021    CO2 23 02/23/2021    BUN 29 02/23/2021    CREATININE 1.0 02/23/2021    LABALBU 3.7 02/21/2021    LABALBU 4.2 05/26/2011    CALCIUM 8.9 02/23/2021    GFRAA >60 02/23/2021    LABGLOM >60 02/23/2021     Lab Results   Component Value Date    PROTIME 13.6 01/07/2018    INR 1.3 01/07/2018     Recent Labs     02/23/21  0340 PROBNP 2,142*     Recent Labs     02/21/21  2320   PROCAL 0.10*     This SmartLink has not been configured with any valid records. Micro:  No results for input(s): CULTRESP in the last 72 hours. No results for input(s): LABGRAM in the last 72 hours. No results for input(s): LEGUR in the last 72 hours. Recent Labs     02/22/21  0007   STREPNEUMAGU Presumptive negative- suggests no current or recent  pneumococcal infection. Infection due to Strep pneumoniae cannot be  ruled out since the antigen present in the sample  may be below the detection limit of the test.  Normal Range:Presumptive Negative       Recent Labs     02/22/21  0007   LP1UAG Presumptive Negative -suggesting no recent or current infections  with Legionella pneumophila serogroup 1. Infection to Legionella cannot be ruled out since other serogroups  and species may cause infection, antigen may not be present in  early infection, or level of antigen may be below the  detection limit. Normal Range: Presumptive Negative            Assessment:    1. Acute on chronic respiratory failure with  Hypoxia  2. RLL pneumonia concern for aspiration , hx choking on pills   3. IPF on Ofev at home  4. Acute on Chronic home O2 dependence  5. 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 Asthma, COPD overlap with exacerbation . Emphysema and honeycombing on CT chest imaging  7. Hx tobacco abuse in remission  8. Ischemic cardiomyopathy  9. CAD, hx Inferior wall MI, CABG  10. PPM/defib in situ  11. CKD stage II  12. HTN  13. Dysphagia       Plan:     14. Oxygen therapy 15 L HF nasal cannula-add humidification  15. Add bipap for respiratory support wore last HS, 10/5  16. ABG 7.44/32/70/21/-1.6/93% on 6 liters NC.  17. Patient has home supply of Ofev, ok to take while inpatient   18. Follow CXR in am  19. Video swallow when able. Speech following- pt signed waiver to [facility] [address]. Will likely continue to aspirate  20.  Bronchodilators: Jai Brovana, Budesonide. Can resume breo for dc  21. Singulair, flonase daily   22. tessalon for cough  23. Prednisone 40 po daily   24. Unasyn for asp coverage. procal 0.10  25. DVT, GI prophylaxis  This plan of care was reviewed in collaboration with Dr. Isatu Hoyt  Electronically signed by Cresencio Denver, APRN - CNP on 2/23/2021 at 12:15 PM      I personally saw, examined, and cared for the patient. Labs, medications, radiographs reviewed. I agree with history exam and plans detailed in NP note. Abx   Palliative care   Encouraged to use niv     Mei Martin M.D.    Pulmonary/Critical Care Medicine

## 2021-02-23 NOTE — PROGRESS NOTES
[facility] Progress Note    Admitting Date and Time: 2/21/2021  4:38 PM  Admit Dx: Acute on chronic respiratory failure with hypoxia (HCC) [J96.21]    Subjective:  Patient is being followed for Acute on chronic respiratory failure with hypoxia ([address] [J96.21] and Pulmonary Fibrosis. Pt feels breathing still an issue with slight exertion    Per RN:  Had a 6 bt of V tach when trying to get up today. Developed hypoxia and required non-rebreather. Now on 15 L and O2 sat in mid 90s. ROS: denies fever, chills, cp, sob, n/v, HA unless stated above.  Arformoterol Tartrate  15 mcg Nebulization BID    And    budesonide  0.5 mg Nebulization BID    benzonatate  200 mg Oral TID    aspirin  81 mg Oral QPM    carvedilol  3.125 mg Oral BID WC    fluticasone  1 spray Each Nostril Daily    montelukast  10 mg Oral Nightly    Nintedanib Esylate  1 capsule Oral Q12H    pilocarpine  5 mg Oral Nightly    rosuvastatin  5 mg Oral Nightly    sodium chloride flush  10 mL Intravenous 2 times per day    enoxaparin  40 mg Subcutaneous Daily    predniSONE  40 mg Oral Daily    ipratropium-albuterol  1 ampule Inhalation Q4H WA    ampicillin-sulbactam  3,000 mg Intravenous Q6H         albuterol sulfate HFA, 2 puff, Q6H PRN      sodium chloride flush, 10 mL, PRN      polyethylene glycol, 17 g, Daily PRN      acetaminophen, 650 mg, Q6H PRN    Or      acetaminophen, 650 mg, Q6H PRN         Objective:    /63   Pulse 63   Temp 97.6 °F (36.4 °C) (Oral)   Resp 20   Ht 5' 10\" (1.778 m)   Wt 147 lb 9.6 oz (67 kg)   SpO2 94%   BMI 21.18 kg/m²     General Appearance: alert and oriented to person, place and time and in no acute distress  Skin: warm and dry  Head: normocephalic and atraumatic  Eyes: pupils equal, round, and reactive to light, extraocular eye movements intact, conjunctivae normal  Neck: neck supple and non tender without mass   Pulmonary/Chest:  Rhonchi on the right . Left clear. Repeat CXR yesterday showed no worsening  Cardiovascular: normal rate, normal S1 and S2 and no carotid bruits  Abdomen: soft, non-tender, non-distended, normal bowel sounds, no masses or organomegaly  Extremities: no cyanosis, no clubbing and no edema  Neurologic: no cranial nerve deficit and speech normal        Recent Labs     02/21/21 1656 02/22/21 0429 02/23/21  0340    139 140   K 4.2 4.2 3.9    106 108*   CO2 27 24 23   BUN 28* 30* 29*   CREATININE 1.1 1.0 1.0   GLUCOSE 123* 205* 108*   CALCIUM 9.3 8.9 8.9       Recent Labs     02/21/21 1656 02/22/21  0429 02/23/21  0340   WBC 13.9* 11.2 15.9*   RBC 3.85 3.56* 3.57*   HGB 11.5* 10.4* 10.8*   HCT 35.8* 32.8* 33.5*   MCV 93.0 92.1 93.8   MCH 29.9 29.2 30.3   MCHC 32.1 31.7* 32.2   RDW 16.7* 16.3* 16.3*    212 214   MPV 8.4 8.7 8.8       Radiology:   Xr Chest Portable    Result Date: 2/22/2021  EXAMINATION: ONE XRAY VIEW OF THE CHEST 2/22/2021 6:34 am COMPARISON: Multiple priors, most recent from yesterday HISTORY: ORDERING SYSTEM PROVIDED HISTORY: cough TECHNOLOGIST PROVIDED HISTORY: Reason for exam:->cough FINDINGS: There is a left chest wall battery pack with associated pacer/AICD leads. Heart size is unable to be accurately assessed on this single portable view of the chest, but appears to be stable. Postsurgical changes related to sternotomy are noted. There are increased reticular airspace opacities throughout both lungs, grossly stable compared with yesterday's examination. Difficult to exclude small bilateral pleural effusions. No evidence of a pneumothorax. Increased reticular opacities throughout both lungs are consistent with pulmonary fibrotic changes. Difficult to exclude superimposed pulmonary edema or multifocal pneumonia.   Findings are not significantly changed compared with yesterday's exam.    Xr Chest Portable    Result Date: 2/21/2021  EXAMINATION: ONE XRAY VIEW OF THE CHEST 2/21/2021 4:10 pm COMPARISON: February 3, 2020 HISTORY: ORDERING SYSTEM PROVIDED HISTORY: sob TECHNOLOGIST PROVIDED HISTORY: Reason for exam:->sob FINDINGS: Cardiac silhouette is mildly enlarged. Status post median sternotomy with pacemaker in place. Coarse interstitial changes in the lungs bilaterally are unchanged since the prior examination and likely chronic. No new areas of airspace consolidation or pleural effusions. The pulmonary vasculature is within normal limits. Mild cardiomegaly status post median sternotomy. Chronic interstitial lung disease, appears stable since the prior examination. Assessment:    Active Problems:    Acute on chronic respiratory failure with hypoxia (HCC)    IPF    Combined HF    COPD    Dysphagia    Resolved Problems:    * No resolved hospital problems. *    Plan:  1. Acute on Chronic Respiratory Failure - currently Oxygen needs increased. Pulmonary consult. Speech Therapy okayed patient to have thin liquids   2.  V tach - exacerbated by Hypoxia. K+ 3.9 today; to give 20 meq bolus IV. Check Magnesium. Goal for > 2.  3.  IPF - as per Pulmonary recommendations. 4.  Combined HF - continue current medications. Strict Is and Os. Trend labs and treat accordingly. 5.  COPD - continue steroids, Pulmicort and Unasyn  6. Dysphagia -  Continue to monitor for aspiration.      Electronically signed by Curtis Velazquez MD on 2/23/2021 at 10:32 AM

## 2021-02-23 NOTE — CARE COORDINATION
COVID negative 2/21. Increased O2 demand to 15 liters high flow NC along w/ 15 liters non rebreather- wears home O2 2lNC through [address] On iv abx. [facility] following. From home w/ wife PTA. Uses [facility],cane. Has home nebulizer. Declining BRIAN at present. [facility] following(backdoor referral). Palliative care following-DNR-[facility].  Sosa Bianchi

## 2021-02-23 NOTE — CONSULTS
Palliative Care Department  [phone]  Palliative Care Initial Consult  Provider Elvin Nova PA-C    Shruthi Mares  [id number]  Hospital Day: 3    Referring Provider: Young Pacheco MD  Palliative Medicine was consulted for assistance with: goals of care and code status    CHIEF COMPLAINT: shortness of breath and cough  Brief summary:      ASSESSMENT/PLAN:     Pertinent hospital diagnoses:  1. Acute on chronic respiratory failure with hypoxia  -Pulmonology following  -Unasyn  -Evaluation for aspiration pneumonia  2. CHF  -Echo 1/28 with EF 35 to 40%, mild MR and TR  3. Idiopathic pulmonary fibrosis  -O2 dependent  -Pulmonology following    PALLIATIVE CARE ENCOUNTER  - Outcome of goals of care meeting:   - change code status limited  - Capacity: At this time, Shruthi Mares, Does have capacity for medical decision-making. Capacity is time limited and situation/question specific  - Surrogate decision maker/Legal NOK:    Elmo Steen [phone] wife   -  Alternate, daughter Nawaf Guerra [phone]  - Code Status:   Limited-no intubation, CPR, cardioversion. Patient has a defibrillator. Medication acceptable  - Advanced directives: Patient reported completed  - Spiritual assessment: No spiritual needs identified  - Bereavement and grief: None identified    - DISPO: Treatment of pneumonia    Referrals to: none today    HPI:   Shruthi Mares is a 80 y.o. with a past medical history of pulmonary fibrosis and emphysema, congestive heart failure (echo 1/2020, EF 35 to 40%, mild MR and TR, stage I diastolic dysfunction), coronary artery disease with history of MI, history of cardiac catheterization and CABG, AICD placement history of nonsustained ventricular tachycardia who presented to the ED from home with shortness of breath.       Patient completed workup in the ED and was admitted on 2/21/2021 with diagnosis(ses) of acute on chronic respiratory failure with hypoxia, supplemental oxygen dependent, dyspnea and respiratory abnormalities. Concerns with aspiration pneumonitis and swallow evaluation ordered. Video swallow ordered to rule out silent aspiration. Details of Conversation: Palliative medicine services introduced to the patient, seen in previous admission. Christina Bridges is awake and alert in bed. He was able to corroborate the history above. We reviewed and discussed goals for his care. He hopes to continue current treatment with improvement of his shortness of breath and hypoxia. He does wish to return home and he was independent for ADLs and active at home still. We reviewed CODE STATUS and he identified that he did not wish to be intubated for any reason including respiratory distress or hypoxia. He additionally does not want CPR or cardioversion/defibrillation. He did state he has a defibrillator and I identified that he could be shocked from that if he went into an arrhythmia however he did not wish for her to be addressed at this point. He has shortness of breath and dyspnea on exertion chronically. Currently he is feeling better with his oxygen. He denies additional symptoms including headache, blurred vision, pain, nausea, vomiting, diarrhea or constipation.     Past Medical History:   Diagnosis Date    Aneurysm ([address]     iliacs    Asthma     CAD (coronary artery disease)     Cardiomyopathy ([address]     CH[facility]congestive heart failure) ([facility])     Dilatation of aorta ([address] 10/4/2018    Hyperlipidemia     Iliac artery aneurysm, bilateral ([address] 10/4/2018    Inferoposterior myocardial infarction [facility])     NSVT (nonsustained ventricular tachycardia) ([address]        Past Surgical History:   Procedure Laterality Date    CARDIAC CATHETERIZATION  4-    Dr. Hanna Ochoa cath used    CARDIAC DEFIBRILLATOR PLACEMENT      CORONARY ANGIOPLASTY  4/15/2014    CORONARY ARTERY BYPASS GRAFT   4/16/2014    x3     Inpatient medications reviewed: yes  Home Medications reviewed:

## 2021-02-24 NOTE — PROGRESS NOTES
Liquids: nectar thick liquid and honey thick liquid   Solids:  pureed foods and solid foods      Method of Intake:   cup, spoon  Self fed, Fed by clinician      Position:   Seated, upright, Lateral plane    Current Respiratory Status   nasal canula                RESULTS     ORAL STAGE     Oral residuals were noted :  throughout the oral cavity        PHARYNGEAL STAGE           ONSET TIME     Significant delayed initiation of the pharyngeal swallow was noted with swallow reflex triggered at the level of the pyriform sinus       PHARYNGEAL RESIDUALS          Vallecula/Pharyngeal Wall           Reduced tongue base retraction resulting in residuals in vallecula and/or posterior pharyngeal wall for all consistencies administered which minimally cleared with cued double swallow. Pyriform Sinuses      Residuals in the pyriform sinuses were noted for all consistencies administered  which  minimally cleared with cued double swallow    LARYNGEAL PENETRATION     Laryngeal penetration occurred prior to aspiration. Further details under aspiration section. ASPIRATION    Aspiration occurred DURING the swallow for nectar consistency liquid and honey consistency liquid due to  inadequate laryngeal closure. Aspiration was moderate and occurred consistently and an absent cough/throat clear was noted. COMPENSATORY STRATEGIES       Compensatory strategies were not attempted      STRUCTURAL/FUNCTIONAL ANOMALIES       No structural/functional anomalies were noted      CERVICAL ESOPHAGEAL STAGE :        The cervical esophagus appeared adequate                            The Speech Language Pathologist (SLP) completed education with the patient regarding results of evaluation. Explained that Speech Pathology intervention is warranted  at this time   Prognosis for improvements is fair +     This plan will be re-evaluated and revised in 1 week  if warranted.     Patient stated goals: Agreed with above, Treatment goals discussed with Patient   The Patient understand(s) the diagnosis, prognosis and plan of care         INTERVENTION/EDUCATION    Pt educated on above results and plan of care with visuals. Pt trained on compensatory strategies for safe swallow with good outcome. Pt encouraged to engage in question/answer session. Pt educated on risks of silent aspiration if continuing use of thin liquids, including aspiration pneumonia. All questions answered and pt verbalized understanding of above. CPT code:  53221  dysphagia study, 99697 dysphagia therapy 15 mins        [x]The admitting diagnosis and active problem list, as listed below have been reviewed prior to initiation of this evaluation. ADMITTING DIAGNOSIS: Acute on chronic respiratory failure with hypoxia ([facility] [J96.21]     ACTIVE PROBLEM LIST:   Patient Active Problem List   Diagnosis    Automatic implantable cardioverter-defibrillator in situ    Dilatation of aorta (HCC)    Iliac artery aneurysm, bilateral (HCC)    IPF (idiopathic pulmonary fibrosis) (HCC)    Peripheral vascular disease ([facility]    PMR (polymyalgia rheumatica) (HCC)    Coronary artery disease involving native coronary artery without angina pectoris    Hyperlipidemia LDL goal <100    CKD (chronic kidney disease) stage 3, GFR 30-59 ml/min    Gastroesophageal reflux disease    Insomnia    Vitamin D deficiency    Acute on chronic respiratory failure with hypoxia (HCC)       MARQUEZ Ríos. Speech-Language Pathology Student    Greg FISCHER CCC/SLP [id number]  Speech-Language Pathologist

## 2021-02-24 NOTE — DISCHARGE SUMMARY
[facility] ER Physician Discharge Summary       No follow-up provider specified. Activity level: As tolerated     Dispo:  LTAC      Condition on discharge: Stable     Patient ID:  Awilda Holder  [id number]  68 y.o.  1935    Admit date: 2/21/2021    Discharge date and time:  2/24/2021  9:34 AM    Admission Diagnoses: Active Problems:    Acute on chronic respiratory failure with hypoxia (HCC)  Resolved Problems:    * No resolved hospital problems. *      Discharge Diagnoses: Active Problems:    Acute on chronic respiratory failure with hypoxia (HCC)  Resolved Problems:    * No resolved hospital problems. *      Consults:  IP CONSULT TO INTERNAL MEDICINE  IP CONSULT TO PULMONOLOGY  IP CONSULT TO PALLIATIVE CARE    Procedures:  none    Hospital Course:   Patient Awilda Holder is a 80 y.o. presented with Acute on chronic respiratory failure with hypoxia ([facility] [address] [J96.21] . He was found with Pneumonia. Concern was for Aspiration. He was placed on IV Unasyn. He required oxygen of upwards to 15 L via High flow NC. His baseline oxygen needs were 2 L. Due to the difficulty weaning to his baseline of 2 L and his need for monitoring of his combined CHF he was accepted at [facility]. Patient is stable for discharge to LTAC.         Discharge Exam:    General Appearance: alert and oriented to person, place and time and in no acute distress  Skin: warm and dry  Head: normocephalic and atraumatic  Eyes: pupils equal, round, and reactive to light, extraocular eye movements intact, conjunctivae normal  Neck: neck supple and non tender without mass   Pulmonary/Chest: clear to auscultation bilaterally- no wheezes, rales or rhonchi, normal air movement, no respiratory distress  Cardiovascular: normal rate, normal S1 and S2 and no carotid bruits  Abdomen: soft, non-tender, non-distended, normal bowel sounds, no masses or organomegaly  Extremities: no cyanosis, no clubbing and no edema  Neurologic: no cranial nerve deficit and speech normal    I/O last 3 completed shifts:  In: -   Out: 200 [Urine:200]  No intake/output data recorded. LABS:  Recent Labs     02/22/21 0429 02/23/21 0340 02/24/21  0330    140 142   K 4.2 3.9 4.2    108* 109*   CO2 24 23 26   BUN 30* 29* 30*   CREATININE 1.0 1.0 1.0   GLUCOSE 205* 108* 102*   CALCIUM 8.9 8.9 8.9       Recent Labs     02/22/21 0429 02/23/21 0340 02/24/21  0330   WBC 11.2 15.9* 13.2*   RBC 3.56* 3.57* 3.52*   HGB 10.4* 10.8* 10.4*   HCT 32.8* 33.5* 33.2*   MCV 92.1 93.8 94.3   MCH 29.2 30.3 29.5   MCHC 31.7* 32.2 31.3*   RDW 16.3* 16.3* 16.2*    214 238   MPV 8.7 8.8 8.8       No results for input(s): POCGLU in the last 72 hours. Imaging:  Xr Chest Portable    Result Date: 2/22/2021  EXAMINATION: ONE XRAY VIEW OF THE CHEST 2/22/2021 6:34 am COMPARISON: Multiple priors, most recent from yesterday HISTORY: [address] [facility] [address]: cough TECHNOLOGIST PROVIDED HISTORY: Reason for exam:->cough FINDINGS: There is a left chest wall battery pack with associated pacer/AICD leads. Heart size is unable to be accurately assessed on this single portable view of the chest, but appears to be stable. Postsurgical changes related to sternotomy are noted. There are increased reticular airspace opacities throughout both lungs, grossly stable compared with yesterday's examination. Difficult to exclude small bilateral pleural effusions. No evidence of a pneumothorax. Increased reticular opacities throughout both lungs are consistent with pulmonary fibrotic changes. Difficult to exclude superimposed pulmonary edema or multifocal pneumonia.   Findings are not significantly changed compared with yesterday's exam.    Xr Chest Portable    Result Date: 2/21/2021  EXAMINATION: ONE XRAY VIEW OF THE CHEST 2/21/2021 4:10 pm COMPARISON: February 3, 2020 HISTORY: ORDERING SYSTEM PROVIDED HISTORY: sob TECHNOLOGIST PROVIDED HISTORY: Reason for exam:->sob FINDINGS: Cardiac silhouette is mildly enlarged. Status post median sternotomy with pacemaker in place. Coarse interstitial changes in the lungs bilaterally are unchanged since the prior examination and likely chronic. No new areas of airspace consolidation or pleural effusions. The pulmonary vasculature is within normal limits. Mild cardiomegaly status post median sternotomy. Chronic interstitial lung disease, appears stable since the prior examination. Patient Instructions:      Medication List      START taking these medications    amoxicillin-clavulanate 875-125 MG per tablet  Commonly known as: AUGMENTIN  Take 1 tablet by mouth 2 times daily for 4 days     Arformoterol Tartrate 15 MCG/2ML Nebu  Commonly known as: BROVANA  Take 2 mLs by nebulization 2 times daily     benzonatate 200 MG capsule  Commonly known as: TESSALON  Take 1 capsule by mouth 3 times daily for 7 days     budesonide 0.5 MG/2ML nebulizer suspension  Commonly known as: PULMICORT  Take 2 mLs by nebulization 2 times daily     enoxaparin 40 MG/0.4ML injection  Commonly known as: LOVENOX  Inject 0.4 mLs into the skin daily for 14 days  Start taking on: February 25, 2021     ipratropium-albuterol 0.5-2.5 (3) MG/3ML Soln nebulizer solution  Commonly known as: DUONEB  Inhale 3 mLs into the lungs every 4 hours (while awake)     polyethylene glycol 17 g packet  Commonly known as: GLYCOLAX  Take 17 g by mouth daily as needed for Constipation        CHANGE how you take these medications    * albuterol (2.5 MG/3ML) 0.083% nebulizer solution  Commonly known as: PROVENTIL  USE 1 VIAL IN NEBULIZER 4 TIMES DAILY  What changed: See the new instructions. * albuterol sulfate  (90 Base) MCG/ACT inhaler  Inhale 2 puffs into the lungs every 6 hours as needed for Wheezing  What changed: Another medication with the same name was changed. Make sure you understand how and when to take each.      carvedilol 3.125 MG tablet  Commonly known as: COREG  Take 1 tablet by mouth 2 times daily (with meals)  What changed: Another medication with the same name was removed. Continue taking this medication, and follow the directions you see here. predniSONE 20 MG tablet  Commonly known as: DELTASONE  Take 2 tablets by mouth daily for 4 days  What changed:   · medication strength  · how much to take         * This list has 2 medication(s) that are the same as other medications prescribed for you. Read the directions carefully, and ask your doctor or other care provider to review them with you.             CONTINUE taking these medications    aspirin 81 MG EC tablet     COQ10 PO     fluticasone 50 MCG/ACT nasal spray  Commonly known as: FLONASE  1 spray by Each Nostril route daily     furosemide 20 MG tablet  Commonly known as: Lasix  Take 1 tablet by mouth daily     losartan 25 MG tablet  Commonly known as: COZAAR  Take 0.5 tablets by mouth daily     megestrol 40 MG/ML suspension  Commonly known as: Megace Oral  Take 10 mLs by mouth daily     montelukast 10 MG tablet  Commonly known as: SINGULAIR     Multivitamin Adult Tabs     Ofev 150 MG Caps  Generic drug: Nintedanib Esylate     pilocarpine 5 MG tablet  Commonly known as: SALAGEN  Take 1 tablet by mouth nightly     potassium chloride 20 MEQ extended release tablet  Commonly known as: KLOR-CON M  Take 1 tablet by mouth daily     rosuvastatin 5 MG tablet  Commonly known as: Crestor  Take 1 tablet by mouth nightly        STOP taking these medications    azelastine 0.1 % nasal spray  Commonly known as: ASTELIN     Fluticasone furoate-vilanterol 200-25 MCG/INH Aepb inhaler  Commonly known as: Breo Ellipta     OXYGEN           Where to Get Your Medications      Information about where to get these medications is not yet available    Ask your nurse or doctor about these medications  · amoxicillin-clavulanate 875-125 MG per tablet  · Arformoterol Tartrate 15 MCG/2ML Nebu  · benzonatate 200 MG capsule  · budesonide 0.5 MG/2ML nebulizer suspension  · enoxaparin 40 MG/0.4ML injection  · ipratropium-albuterol 0.5-2.5 (3) MG/3ML Soln nebulizer solution  · pilocarpine 5 MG tablet  · polyethylene glycol 17 g packet  · predniSONE 20 MG tablet           Note that more than 35 minutes was spent in preparing discharge papers, discussing discharge with patient, medication review, etc.    Signed:  Electronically signed by Juan F Shepherd MD on 2/24/2021 at 9:34 AM

## 2021-02-24 NOTE — DISCHARGE INSTR - COC
Continuity of Care Form    Patient Name: Judie Draper   :  1935  MRN:  [id number]    Admit date:  2021  Discharge date:  ***    Code Status Order: Limited   Advance Directives:   [facility] Documentation     Date/Time Healthcare Directive Type of Healthcare Directive Copy in [address] Agent's Name Healthcare Agent's Phone Number    [phone]  Yes, patient has an advance directive for healthcare treatment  Health care treatment directive; Living will;Durable power of  for health care  Yes, copy in chart  Healthcare power of   wife and daughter  --          Admitting Physician:  Judd Nguyen MD  PCP: Shameka Evans DO    Discharging Nurse: [facility] Unit/Room#: 6697/3348-M  Discharging Unit Phone Number: ***    Emergency Contact:   Extended Emergency Contact Information  Primary Emergency Contact: Supriya Hillman  [place] [facility] [place] [address] Phone: [phone]  Mobile Phone: [phone]  Relation: Spouse  Preferred language: English   needed? No  Secondary Emergency Contact: Shayna Jo  Address: [address] [place] [address] [address] Phone: [phone]  Mobile Phone: [phone]  Relation: Child  Preferred language: English   needed?  No    Past Surgical History:  Past Surgical History:   Procedure Laterality Date    CARDIAC CATHETERIZATION  4-    Dr. Linda Tripp cath used   [address]  4/15/2014    CORONARY ARTERY BYPASS GRAFT   4/16/2014    x3       Immunization History:   Immunization History   Administered Date(s) Administered    Influenza A (B5X2-33) Vaccine PF IM 12/10/2009    Influenza A (N4h3-50),all Formulations 2009    Influenza Vaccine, unspecified formulation 2016    Influenza Virus Vaccine 11/01/2015    Influenza, High Dose (Fluzone 65 yrs and older) 11/05/2015, 11/13/2015, 11/09/2016, 09/15/2017, 09/07/2018, 09/05/2019    Influenza, Quadv, adjuvanted, 65 yrs +, IM, PF (Fluad) 09/24/2020    Pneumococcal Conjugate 13-valent (Rtzdthj82) 12/01/2015, 10/01/2016    Pneumococcal Conjugate Vaccine 09/18/2018    Pneumococcal Polysaccharide (Lxszyqtgf70) 07/08/2020    Zoster Live (Zostavax) 02/06/2013, 11/01/2015    Zoster Recombinant (Shingrix) 11/14/2018       Active Problems:  Patient Active Problem List   Diagnosis Code    Automatic implantable cardioverter-defibrillator in situ Z95.810    Dilatation of aorta ([facility]) I77.819    Iliac artery aneurysm, bilateral ([facility]) I72.3    IPF (idiopathic pulmonary fibrosis) ([facility] [address] J84.112    Peripheral vascular disease ([facility]) I73.9    PMR (polymyalgia rheumatica) ([facility]) M35.3    Coronary artery disease involving native coronary artery without angina pectoris I25.10    Hyperlipidemia LDL goal <100 E78.5    CKD (chronic kidney disease) stage 3, GFR 30-59 ml/min N18.30    Gastroesophageal reflux disease K21.9    Insomnia G47.00    Vitamin D deficiency E55.9    Acute on chronic respiratory failure with hypoxia ([facility]) J96.21       Isolation/Infection:   Isolation          No Isolation        Patient Infection Status     Infection Onset Added Last Indicated Last Indicated By Review Planned Expiration Resolved Resolved By    None active    Resolved    COVID-19 Rule Out 02/23/21 02/23/21 02/23/21 Respiratory Panel, Molecular, with COVID-19 (Restricted: peds pts or suitable admitted adults) (Ordered)   02/23/21 Rule-Out Test Resulted    COVID-19 Rule Out 02/21/21 02/21/21 02/21/21 COVID-19, Rapid (Ordered)   02/21/21 Rule-Out Test Resulted    COVID-19 Rule Out 11/23/20 11/23/20 11/23/20 COVID-19 Ambulatory (Ordered)   11/25/20 Rule-Out Test Resulted    COVID-19 Rule Out 04/13/20 04/13/20 04/13/20 COVID-19 (Ordered)   04/13/20 Rule-Out Test Resulted    NOT DETECTED 2020            Nurse Assessment:  Last Vital Signs: /63   Pulse 64   Temp 97.6 °F (36.4 °C) (Oral)   Resp 20   Ht 5' 10\" (1.778 m)   Wt 147 lb 9.6 oz (67 kg)   SpO2 95%   BMI 21.18 kg/m²     Last documented pain score (0-10 scale): Pain Level: 0  Last Weight:   Wt Readings from Last 1 Encounters:   21 147 lb 9.6 oz (67 kg)     Mental Status:  {IP PT MENTAL STATUS:}    IV Access:  { NIKOLAS IV ACCESS:873807361}    Nursing Mobility/ADLs:  Walking   {CHP DME RVGT:858731176}  Transfer  {CHP DME WWUK:344956519}  Bathing  {CHP DME ZDDD:889677878}  Dressing  {CHP DME AIOZ:617345327}  Toileting  {CHP DME LPAX:306957219}  Feeding  {P DME NVOZ:368614275}  Med Admin  {P DME RZQ}  Med Delivery   { NIKOLAS MED Delivery:639513213}    Wound Care Documentation and Therapy:        Elimination:  Continence:   · Bowel: {YES / SW:59116}  · Bladder: {YES / GE:04877}  Urinary Catheter: {Urinary Catheter:514108544}   Colostomy/Ileostomy/Ileal Conduit: {YES / VL:97141}       Date of Last BM: ***    Intake/Output Summary (Last 24 hours) at 2021 1007  Last data filed at 2021 0326  Gross per 24 hour   Intake --   Output 200 ml   Net -200 ml     I/O last 3 completed shifts:  In: -   Out: 200 [Urine:200]    Safety Concerns:     [address] [employer] Safety Concerns:494210543}    Impairments/Disabilities:      [address] [employer] Impairments/Disabilities:233163242}    Nutrition Therapy:  Current Nutrition Therapy:   [address] [employer] Diet List:154697893}    Routes of Feeding: {CHP DME Other Feedings:309685251}  Liquids: {Slp liquid thickness:20144}  Daily Fluid Restriction: {CHP DME Yes amt example:328700511}  Last Modified Barium Swallow with Video (Video Swallowing Test): {Done Not Done PKEZ:209143825}    Treatments at the Time of Hospital Discharge:   Respiratory Treatments: ***  Oxygen Therapy:  {Therapy; copd oxygen:54685}  Ventilator:    {MH CC Vent [facility]:585144433}    Rehab Therapies: Physical Therapy and Occupational Therapy  Weight Bearing Status/Restrictions: [facility] Weight Bearin}  Other Medical Equipment (for information only, NOT a DME order):  {EQUIPMENT:062001546}  Other Treatments: ***    Patient's personal belongings (please select all that are sent with patient):  {P DME Belongings:238114598}    RN SIGNATURE:  {Esignature:856913978}    CASE MANAGEMENT/SOCIAL WORK SECTION    Inpatient Status Date: ***    Readmission Risk Assessment Score:  Readmission Risk              Risk of Unplanned Readmission:        17           Discharging to Facility/ Agency   · Name: [facility]  · [address] [place] [address]  · Phone:[phone]  · Fax:    Dialysis Facility (if applicable)   · Name:  · Address:  · Dialysis Schedule:  · Phone:  · Fax:    / signature: Electronically signed by RINA Kaufman on 21 at 10:08 AM EST    PHYSICIAN SECTION    Prognosis: {Prognosis:6553009258}    Condition at Discharge: Stable    Rehab Potential (if transferring to Rehab): {Prognosis:8194374344}    Recommended Labs or Other Treatments After Discharge: ***    Physician Certification: I certify the above information and transfer of Rachel Sosa  is necessary for the continuing treatment of the diagnosis listed and that he requires LTAC for less 30 days.      Update Admission H&P: {CHP DME Changes in FOGYR:454973366}    PHYSICIAN SIGNATURE:  {Esignature:888866901}

## 2021-02-24 NOTE — CARE COORDINATION
COVDI negative 2/23. Plan to [facility] today- awaiting bed assignment from [employer]. Daisy @ [facility] notified to cancel referral. Wife Edward Luna [phone] notified of discharge to [facility] today. Lesly Ramires     [facility] [place] bed assignment room 733- can accept pt at 7:30 pm today.  Nursing notified Lesly Ramires

## 2021-02-24 NOTE — PROGRESS NOTES
Trinity Hernandez M.D.,[facility]  Becky Qureshi D.O., F.A.C.O.I., Ciera Wills M.D. Young Pacheco M.D., Starr Issa M.D. Corky Gongora D.O. Daily Pulmonary Progress Note    Patient:  Shruthi Mares 80 y.o. male MRN: [id number]     Date of Service: 2/24/2021      Synopsis     We are following patient for hypoxia    \"CC\" shortness of breath    Code status:    Intubation/Re-intubation No    Defibrillation/Cardioversion No    Chest Compressions No    Resuscitative Medications Yes            Subjective      Patient was seen and examined. Sitting up in chair on 15 L HF cannula. Using additional NRB mask intermittently. Wore bipap last HS 10/5. Dyspnea with exertion. +cough, no mucus. No chest pain. Feeling weakness and fatigue. Review of Systems:  Constitutional: Denies fever, weight loss, night sweats, and fatigue  Skin: Denies pigmentation, dark lesions, and rashes   HEENT: Denies hearing loss, tinnitus, ear drainage, epistaxis, sore throat, and hoarseness. Cardiovascular: Denies palpitations, chest pain, and chest pressure.   Respiratory: +dyspnea with exertion, cough previous choking on pills   Gastrointestinal: Denies nausea, vomiting, poor appetite, diarrhea, heartburn or reflux  Genitourinary: Denies dysuria, frequency, urgency or hematuria  Musculoskeletal: Denies myalgias, muscle weakness, and bone pain  Neurological: Denies dizziness, vertigo, headache, and focal weakness  Psychological: Denies anxiety and depression  Endocrine: Denies heat intolerance and cold intolerance  Hematopoietic/Lymphatic: Denies bleeding problems and blood transfusions     24-hour events:  None     Objective   Vitals: /63   Pulse 64   Temp 97.6 °F (36.4 °C) (Oral)   Resp 20   Ht 5' 10\" (1.778 m)   Wt 147 lb 9.6 oz (67 kg)   SpO2 96%   BMI 21.18 kg/m²     I/O:    Intake/Output Summary (Last 24 hours) at 2/24/2021 1351  Last data filed at 2/24/2021 0815  Gross per 24 hour   Intake --   Output 400 ml   Net -400 ml       Vent Information  Skin Assessment: Clean, dry, & intact  FiO2 : (S) 60 %  SpO2: 96 %  I Time/ I Time %: 0.9 s  Mask Type: Full face mask  Mask Size: Medium       IPAP: 10 cmH20  CPAP/EPAP: 5 cmH2O     CURRENT MEDS :  Scheduled Meds:   Arformoterol Tartrate  15 mcg Nebulization BID    And    budesonide  0.5 mg Nebulization BID    benzonatate  200 mg Oral TID    aspirin  81 mg Oral QPM    carvedilol  3.125 mg Oral BID WC    fluticasone  1 spray Each Nostril Daily    montelukast  10 mg Oral Nightly    Nintedanib Esylate  1 capsule Oral Q12H    pilocarpine  5 mg Oral Nightly    rosuvastatin  5 mg Oral Nightly    sodium chloride flush  10 mL Intravenous 2 times per day    enoxaparin  40 mg Subcutaneous Daily    predniSONE  40 mg Oral Daily    ipratropium-albuterol  1 ampule Inhalation Q4H WA    ampicillin-sulbactam  3,000 mg Intravenous Q6H       Physical Exam:  General: The patient is lying in bed comfortably without any distress. Breathing is not labored  HEENT: Pupils are equal round and reactive to light, there are no oral lesions and no post-nasal drip   Neck: supple without adenopathy  Cardiovascular: regular rate and rhythm without murmur or gallop  Respiratory: crackles to auscultation bilaterally without wheezing or crackles. Air entry is symmetric  Abdomen: soft, non-tender, non-distended, normal bowel sounds  Extremities: warm, no edema, no clubbing  Skin: no rash or lesion  Neurologic: CN II-XII grossly intact, no focal deficits    Pertinent/ New Labs and Imaging Studies     Imaging Personally Reviewed:  2/24 cxr  FINDINGS:   Heart size is normal.  Diffuse interstitial process in both lungs and some   subtle alveolar infiltrates are not significantly changed.  No significant   effusions are noted.       Impression   No interval change           Ct chest  Prominent bilateral pulmonary consolidations are seen in the right   lower lobe.  There are extensive pulmonary fibrotic changes with a   peripheral and basilar predominance, along with honeycombing. The   central airway is clear.       The heart is mildly enlarged. Severe calcified coronary   atherosclerosis noted. AICD in situ. Mild calcified atherosclerosis is   seen in the thoracic aorta. No aneurysm. The pulmonary arterial trunk   is of normal caliber. Enlarged lymph nodes are seen in the mediastinum   and the right hilar region.         Cholelithiasis is noted. 2 mm nonobstructive right renal calculus is   visualized. Evaluation of the bones reveals no fracture or destructive   lesion. Stable lucency in the T12 vertebral body likely represents a   hemangioma.           Impression       1. Consolidative opacity in the right lower lobe is likely   infectious/inflammatory. Given the history of trauma, hemorrhage   cannot be excluded. 2. Grossly stable severe fibrotic changes in the lungs with a   peripheral and basilar predominance as is commonly seen in the usual   interstitial pneumonia. Differential diagnosis includes sequela of   collagen vascular disease. 3. Stable lymphadenopathy in the infracarinal and right hilar regions.    4. Severe calcified coronary atherosclerosis.        Echo:     Conclusions      Summary   Moderately reduced left ventricular systolic function.   Ejection fraction is visually estimated at 35-40%.   Normal right ventricular size with mildly reduced right ventricular   function.   There is doppler evidence of stage I diastolic dysfunction.   Moderately dilated left atrium by volume index.   Mild mitral regurgitation.   Mild tricuspid regurgitation.   PASP is estimated at 31 mmHg.           Labs:  Lab Results   Component Value Date    WBC 13.2 02/24/2021    HGB 10.4 02/24/2021    HCT 33.2 02/24/2021    MCV 94.3 02/24/2021    MCH 29.5 02/24/2021    MCHC 31.3 02/24/2021    RDW 16.2 02/24/2021     02/24/2021    MPV 8.8 02/24/2021     Lab Results   Component Value Date     02/24/2021    K 4.2 02/24/2021     02/24/2021    CO2 26 02/24/2021    BUN 30 02/24/2021    CREATININE 1.0 02/24/2021    LABALBU 3.7 02/21/2021    LABALBU 4.2 05/26/2011    CALCIUM 8.9 02/24/2021    GFRAA >60 02/24/2021    LABGLOM >60 02/24/2021     Lab Results   Component Value Date    PROTIME 13.6 01/07/2018    INR 1.3 01/07/2018     Recent Labs     02/24/21  0330   PROBNP 4,156*     Recent Labs     02/21/21  2320   PROCAL 0.10*     This SmartLink has not been configured with any valid records. Micro:  No results for input(s): CULTRESP in the last 72 hours. No results for input(s): LABGRAM in the last 72 hours. No results for input(s): LEGUR in the last 72 hours. Recent Labs     02/22/21  0007   STREPNEUMAGU Presumptive negative- suggests no current or recent  pneumococcal infection. Infection due to Strep pneumoniae cannot be  ruled out since the antigen present in the sample  may be below the detection limit of the test.  Normal Range:Presumptive Negative       Recent Labs     02/22/21  0007   LP1UAG Presumptive Negative -suggesting no recent or current infections  with Legionella pneumophila serogroup 1. Infection to Legionella cannot be ruled out since other serogroups  and species may cause infection, antigen may not be present in  early infection, or level of antigen may be below the  detection limit. Normal Range: Presumptive Negative            Assessment:    1. Acute on chronic respiratory failure with  Hypoxia  2. RLL pneumonia concern for aspiration , hx choking on pills   3. IPF on Ofev at home  4. Acute on Chronic home O2 dependence  5. 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 Asthma, COPD overlap with exacerbation . Emphysema and honeycombing on CT chest imaging  7. Hx tobacco abuse in remission  8. Ischemic cardiomyopathy  9. CAD, hx Inferior wall MI, CABG  10. PPM/defib in situ  11. CKD stage II  12. HTN  13.  Dysphagia       Plan:     14. Oxygen therapy 15 L HF nasal cannula-add humidification  15. Continue  bipap for respiratory support wore last HS, settings 10/5  16. ABG 2/22/21- 7.44/32/70/21/-1.6/93% on 6 liters NC.  17. Patient has home supply of Ofev, ok to take while inpatient   18. Follow CXR   19. Video swallow when able. Speech following- pt signed waiver to [address]. Will likely continue to aspirate  20. Bronchodilators: Duonebs, Brovana, Budesonide. Can resume breo for dc  21. Singulair, flonase daily   22. tessalon for cough  23. Prednisone 40 po daily   24. Unasyn for asp coverage. procal 0.10  25. DVT, GI prophylaxis  26. Palliative medicine following, limited code status, meds only  27. Ok for [facility] transfer  This plan of care was reviewed in collaboration with Dr. Isabella Wells  Electronically signed by WILFRIDO Portillo CNP on 2/24/2021 at 1:51 PM     I personally saw, examined, and cared for the patient. Labs, medications, radiographs reviewed. I agree with history exam and plans detailed in NP note. Risk of recurrent aspiration and pneumonia discussed with Mr. Selina Unger. He does not seem to be interested in peg tube. Trial lasix 40 mg x 1 and see if oxygen requirements improve. Hermina Schaumann for ltac     Jerri Dietrich M.D.    Pulmonary/Critical Care Medicine

## 2021-02-24 NOTE — PROGRESS NOTES
Sammy Daniels M.D.,[facility]  Yvan Talley D.O., F.A.C.O.I., Thomas Davalos M.D. Jaden Nova M.D., Alfredo Gordillo M.D. Zen Curz D.O. Daily Pulmonary Progress Note    Patient:  Mitchell Foster 80 y.o. male MRN: [id number]     Date of Service: 2/24/2021      Synopsis     We are following patient for hypoxia    \"CC\" shortness of breath    Code status:    Intubation/Re-intubation No    Defibrillation/Cardioversion No    Chest Compressions No    Resuscitative Medications Yes            Subjective      Patient was seen and examined. Sitting up in chair on 15 L HF cannula. Using additional NRB mask intermittently. Wore bipap last HS 10/5. Dyspnea with exertion. +cough, no mucus. No chest pain. Feeling weakness and fatigue. Review of Systems:  Constitutional: Denies fever, weight loss, night sweats, and fatigue  Skin: Denies pigmentation, dark lesions, and rashes   HEENT: Denies hearing loss, tinnitus, ear drainage, epistaxis, sore throat, and hoarseness. Cardiovascular: Denies palpitations, chest pain, and chest pressure.   Respiratory: +dyspnea with exertion, cough previous choking on pills   Gastrointestinal: Denies nausea, vomiting, poor appetite, diarrhea, heartburn or reflux  Genitourinary: Denies dysuria, frequency, urgency or hematuria  Musculoskeletal: Denies myalgias, muscle weakness, and bone pain  Neurological: Denies dizziness, vertigo, headache, and focal weakness  Psychological: Denies anxiety and depression  Endocrine: Denies heat intolerance and cold intolerance  Hematopoietic/Lymphatic: Denies bleeding problems and blood transfusions     24-hour events:  None     Objective   Vitals: /63   Pulse 64   Temp 97.6 °F (36.4 °C) (Oral)   Resp 20   Ht 5' 10\" (1.778 m)   Wt 147 lb 9.6 oz (67 kg)   SpO2 96%   BMI 21.18 kg/m²     I/O:    Intake/Output Summary (Last 24 hours) at 2/24/2021 1351  Last data filed at 2/24/2021 0815  Gross per 24 hour Intake    Output 400 ml   Net -400 ml       Vent Information  Skin Assessment: Clean, dry, & intact  FiO2 : (S) 60 %  SpO2: 96 %  I Time/ I Time %: 0.9 s  Mask Type: Full face mask  Mask Size: Medium       IPAP: 10 cmH20  CPAP/EPAP: 5 cmH2O     CURRENT MEDS :  Scheduled Meds:   Arformoterol Tartrate  15 mcg Nebulization BID    And    budesonide  0.5 mg Nebulization BID    benzonatate  200 mg Oral TID    aspirin  81 mg Oral QPM    carvedilol  3.125 mg Oral BID WC    fluticasone  1 spray Each Nostril Daily    montelukast  10 mg Oral Nightly    Nintedanib Esylate  1 capsule Oral Q12H    pilocarpine  5 mg Oral Nightly    rosuvastatin  5 mg Oral Nightly    sodium chloride flush  10 mL Intravenous 2 times per day    enoxaparin  40 mg Subcutaneous Daily    predniSONE  40 mg Oral Daily    ipratropium-albuterol  1 ampule Inhalation Q4H WA    ampicillin-sulbactam  3,000 mg Intravenous Q6H       Physical Exam:  General: The patient is lying in bed comfortably without any distress. Breathing is not labored  HEENT: Pupils are equal round and reactive to light, there are no oral lesions and no post-nasal drip   Neck: supple without adenopathy  Cardiovascular: regular rate and rhythm without murmur or gallop  Respiratory: crackles to auscultation bilaterally without wheezing or crackles.   Air entry is symmetric  Abdomen: soft, non-tender, non-distended, normal bowel sounds  Extremities: warm, no edema, no clubbing  Skin: no rash or lesion  Neurologic: CN II-XII grossly intact, no focal deficits    Pertinent/ New Labs and Imaging Studies     Imaging Personally Reviewed:  2/24 cxr  FINDINGS:   Heart size is normal.  Diffuse interstitial process in both lungs and some   subtle alveolar infiltrates are not significantly changed.  No significant   effusions are noted.       Impression   No interval change           Ct chest  Prominent bilateral pulmonary consolidations are seen in the right 67yMale being reevaluated for end of life symptom management. Comfortable. No PRNs in last 24H.       Morphine Equivalent Daily Dose (MEDD) 144 mg      Recommendations:    Continue:   albuterol/ipratropium for Nebulization 3 milliLiter(s) Nebulizer every 6 hours  budesonide 160 MICROgram(s)/formoterol 4.5 MICROgram(s) Inhaler 2 Puff(s) Inhalation two times a day  morphine  Infusion 2 mG/Hr (2 mL/Hr) IV Continuous <Continuous>    MEDICATIONS  (PRN):  LORazepam   Injectable 0.5 milliGRAM(s) IV Push every 4 hours PRN Agitation 0 doses/24 H  morphine  - Injectable 2 milliGRAM(s) IV Push every 1 hour PRN extra pain or dyspnea 0 doses/ 24H    Comfort feeds    DNR/I/CMO  End of Life Care      Please Call [phone] PRN lower lobe. There are extensive pulmonary fibrotic changes with a   peripheral and basilar predominance, along with honeycombing. The   central airway is clear.       The heart is mildly enlarged. Severe calcified coronary   atherosclerosis noted. AICD in situ. Mild calcified atherosclerosis is   seen in the thoracic aorta. No aneurysm. The pulmonary arterial trunk   is of normal caliber. Enlarged lymph nodes are seen in the mediastinum   and the right hilar region.         Cholelithiasis is noted. 2 mm nonobstructive right renal calculus is   visualized. Evaluation of the bones reveals no fracture or destructive   lesion. Stable lucency in the T12 vertebral body likely represents a   hemangioma.           Impression       1. Consolidative opacity in the right lower lobe is likely   infectious/inflammatory. Given the history of trauma, hemorrhage   cannot be excluded. 2. Grossly stable severe fibrotic changes in the lungs with a   peripheral and basilar predominance as is commonly seen in the usual   interstitial pneumonia. Differential diagnosis includes sequela of   collagen vascular disease. 3. Stable lymphadenopathy in the infracarinal and right hilar regions.    4. Severe calcified coronary atherosclerosis.        Echo:     Conclusions      Summary   Moderately reduced left ventricular systolic function.   Ejection fraction is visually estimated at 35-40%.   Normal right ventricular size with mildly reduced right ventricular   function.   There is doppler evidence of stage I diastolic dysfunction.   Moderately dilated left atrium by volume index.   Mild mitral regurgitation.   Mild tricuspid regurgitation.   PASP is estimated at 31 mmHg.           Labs:  Lab Results   Component Value Date    WBC 13.2 02/24/2021    HGB 10.4 02/24/2021    HCT 33.2 02/24/2021    MCV 94.3 02/24/2021    MCH 29.5 02/24/2021    MCHC 31.3 02/24/2021    RDW 16.2 02/24/2021     02/24/2021    MPV 8.8 02/24/2021     Lab Results Component Value Date     02/24/2021    K 4.2 02/24/2021     02/24/2021    CO2 26 02/24/2021    BUN 30 02/24/2021    CREATININE 1.0 02/24/2021    LABALBU 3.7 02/21/2021    LABALBU 4.2 05/26/2011    CALCIUM 8.9 02/24/2021    GFRAA >60 02/24/2021    LABGLOM >60 02/24/2021     Lab Results   Component Value Date    PROTIME 13.6 01/07/2018    INR 1.3 01/07/2018     Recent Labs     02/24/21  0330   PROBNP 4,156*     Recent Labs     02/21/21  2320   PROCAL 0.10*     This SmartLink has not been configured with any valid records. Micro:  No results for input(s): CULTRESP in the last 72 hours. No results for input(s): LABGRAM in the last 72 hours. No results for input(s): LEGUR in the last 72 hours. Recent Labs     02/22/21  0007   STREPNEUMAGU Presumptive negative- suggests no current or recent  pneumococcal infection. Infection due to Strep pneumoniae cannot be  ruled out since the antigen present in the sample  may be below the detection limit of the test.  Normal Range:Presumptive Negative       Recent Labs     02/22/21  0007   LP1UAG Presumptive Negative -suggesting no recent or current infections  with Legionella pneumophila serogroup 1. Infection to Legionella cannot be ruled out since other serogroups  and species may cause infection, antigen may not be present in  early infection, or level of antigen may be below the  detection limit. Normal Range: Presumptive Negative            Assessment:    1. Acute on chronic respiratory failure with  Hypoxia  2. RLL pneumonia concern for aspiration , hx choking on pills   3. IPF on Ofev at home  4. Acute on Chronic home O2 dependence  5. 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 Asthma, COPD overlap with exacerbation . Emphysema and honeycombing on CT chest imaging  7. Hx tobacco abuse in remission  8. Ischemic cardiomyopathy  9. CAD, hx Inferior wall MI, CABG  10. PPM/defib in situ  11. CKD stage II  12. HTN  13.  Dysphagia Plan: 14. Oxygen therapy 15 L HF nasal cannula-add humidification  15. Add bipap for respiratory support wore last HS, 10/5  16. ABG 7.44/32/70/21/-1.6/93% on 6 liters NC.  17. Patient has home supply of Ofev, ok to take while inpatient   18. Follow CXR   19. Video swallow when able. Speech following- pt signed waiver to Jennifer 80. Will likely continue to aspirate  20. Bronchodilators: Duonebs, Brovana, Budesonide. Can resume breo for dc  21. Singulair, flonase daily   22. tessalon for cough  23. Prednisone 40 po daily   24. Unasyn for asp coverage. procal 0.10  25. DVT, GI prophylaxis  26.  Palliative medicine following, limited code status, meds only  This plan of care was reviewed in collaboration with Dr. Lopez Duran  Electronically signed by WILFRIDO Martino CNP on 2/24/2021 at 1:51 PM

## 2021-02-24 NOTE — PROGRESS NOTES
[facility] Quality Flow/Interdisciplinary Rounds Progress Note        Quality Flow Rounds held on February 24, 2021    Disciplines Attending:  Bedside Nurse, ,  and Nursing Unit Leadership    Madison Gonzalez was admitted on 2/21/2021  4:38 PM    Anticipated Discharge Date:  Expected Discharge Date: 03/02/21    Disposition:    Major Score:  Major Scale Score: 19    Readmission Risk              Risk of Unplanned Readmission:        17           Discussed patient goal for the day, patient clinical progression, and barriers to discharge. The following Goal(s) of the Day/Commitment(s) have been identified:  Wean O2, swallow eval, possible discharge to [facility].        Tinnie Kehr  February 24, 2021

## 2021-02-24 NOTE — PROGRESS NOTES
Date: 2/23/2021    Time: 11:00 PM    Patient Placed On BIPAP/CPAP/ Non-Invasive Ventilation? Yes    If no must comment. Facial area red/color change? No           If YES are Blister/Lesion present? No   If yes must notify nursing staff  BIPAP/CPAP skin barrier?   Yes    Skin barrier type:mepilexlite       Comments:        Isabel Stacy

## 2021-02-24 NOTE — PROGRESS NOTES
Date: 2/23/21    Time: 2345    Patient Placed On BIPAP/CPAP/ Non-Invasive Ventilation? No    If no must comment. Facial area red/color change? If YES are Blister/Lesion present? If yes must notify nursing staff  BIPAP/CPAP skin barrier?   Yes    Skin barrier type:mepilexlite       Comments:    Red outlet already on  Unable tosee under mepilex    Eugenie Sherman

## 2021-03-10 NOTE — TELEPHONE ENCOUNTER
[place] Grandemigdalia Mcbride  And his mother would like to talk to you about Klaus Mercado been in hospice. They dont feel he is ready for hospice. They would loke your advice.  [phone]

## 2021-03-11 NOTE — TELEPHONE ENCOUNTER
Spoke with patient's son yesterday for 30 minutes regarding his father's care and wish to come out of hospice care back in the  care.

## 2021-03-11 NOTE — PROGRESS NOTES
Singh Soliman, a male of 80 y.o. came to the office 3/11/2021. Patient Active Problem List   Diagnosis    Automatic implantable cardioverter-defibrillator in situ    Dilatation of aorta (HCC)    Iliac artery aneurysm, bilateral (HCC)    [facility]idiopathic pulmonary fibrosis) ([facility])    Peripheral vascular disease ([address]    PMR (polymyalgia rheumatica) ([facility])    Coronary artery disease involving native coronary artery without angina pectoris    Hyperlipidemia LDL goal <100    CKD (chronic kidney disease) stage 3, GFR 30-59 ml/min    Gastroesophageal reflux disease    Insomnia    Vitamin D deficiency    Acute on chronic respiratory failure with hypoxia (HCC)          HPI RLL pnuemonia: he has finished his AB. Breathing ok with oxygen at 7.5-8 L. Was in Hospice but now wants to be discharged from that. He was in the hospital with a possible aspiration pneumonia. Chronic resp failure: sees Dr. Karla Gonzalez. was on Ovef for interstitial lung disease. Not on Pulmicort but taking Breo. Oxygen saturation diminishes with activity. Presently he is in a wheelchair. CHF: put on Entresto in hosp but then stopped once he went into Hospice. Denies chest pain, palpitations.     No Known Allergies    Current Outpatient Medications on File Prior to Visit   Medication Sig Dispense Refill    Arformoterol Tartrate (BROVANA) 15 MCG/2ML NEBU Take 2 mLs by nebulization 2 times daily 120 mL 0    ipratropium-albuterol (DUONEB) 0.5-2.5 (3) MG/3ML SOLN nebulizer solution Inhale 3 mLs into the lungs every 4 hours (while awake) 360 mL 0    budesonide (PULMICORT) 0.5 MG/2ML nebulizer suspension Take 2 mLs by nebulization 2 times daily 60 ampule 0    polyethylene glycol (GLYCOLAX) 17 g packet Take 17 g by mouth daily as needed for Constipation 527 g 0    pilocarpine (SALAGEN) 5 MG tablet Take 1 tablet by mouth nightly  0    megestrol (MEGACE ORAL) 40 MG/ML suspension Take 10 mLs by mouth daily 300 mL 2    albuterol sulfate  (90 Base) MCG/ACT inhaler Inhale 2 puffs into the lungs every 6 hours as needed for Wheezing 3 Inhaler 0    losartan (COZAAR) 25 MG tablet Take 0.5 tablets by mouth daily 30 tablet 5    montelukast (SINGULAIR) 10 MG tablet Take 10 mg by mouth nightly      rosuvastatin (CRESTOR) 5 MG tablet Take 1 tablet by mouth nightly 30 tablet 5    carvedilol (COREG) 3.125 MG tablet Take 1 tablet by mouth 2 times daily (with meals) 60 tablet 11    albuterol (PROVENTIL) (2.5 MG/3ML) 0.083% nebulizer solution USE 1 VIAL IN NEBULIZER 4 TIMES DAILY (Patient taking differently: Take by nebulization as needed ) 120 vial 11    fluticasone (FLONASE) 50 MCG/ACT nasal spray 1 spray by Each Nostril route daily 1 Bottle 5    potassium chloride (KLOR-CON M) 20 MEQ extended release tablet Take 1 tablet by mouth daily 90 tablet 0    furosemide (LASIX) 20 MG tablet Take 1 tablet by mouth daily 90 tablet 0    OFEV 150 MG CAPS TAKE 1 CAPSULE BY MOUTH TWICE A DAY  EVERY 12 HOURS  AS DIRECTED WITH FOOD  12    Multiple Vitamins-Minerals (MULTIVITAMIN ADULT) TABS Take by mouth daily      Coenzyme Q10 (COQ10 PO) Take 100 mg by mouth daily       aspirin 81 MG EC tablet Take 81 mg by mouth every evening        No current facility-administered medications on file prior to visit. Review of Systems   Constitutional: Positive for appetite change (decreased but improving.). Respiratory: Positive for cough and shortness of breath. Negative for wheezing. Cardiovascular: Positive for leg swelling (in his feet). Negative for chest pain and palpitations. Hematological: Bruises/bleeds easily. other review of systems reviewed and are negative    OBJECTIVE:  BP (!) 116/52   Pulse 70   Temp 97 °F (36.1 °C)   SpO2 97% Comment: with o2     Physical Exam  Vitals signs reviewed. Constitutional:       General: He is not in acute distress. Appearance: He is cachectic.  He is not ill-appearing (but looks cachetic), toxic-appearing or diaphoretic. Comments: In wheelchair on 4 units of oxygen via nasal cannula. Eyes:      General: No scleral icterus. Conjunctiva/sclera: Conjunctivae normal.   Neck:      Musculoskeletal: Neck supple. Thyroid: No thyromegaly. Vascular: No carotid bruit. Cardiovascular:      Rate and Rhythm: Normal rate and regular rhythm. Heart sounds: No murmur. Pulmonary:      Effort: Pulmonary effort is normal.      Breath sounds: Examination of the right-lower field reveals rhonchi. Decreased breath sounds and rhonchi present. No wheezing or rales. Abdominal:      General: Bowel sounds are normal.      Palpations: Abdomen is soft. There is no mass. Tenderness: There is no abdominal tenderness. There is no guarding or rebound. Musculoskeletal: Normal range of motion. Lymphadenopathy:      Cervical: No cervical adenopathy. Skin:     General: Skin is warm and dry. Findings: Ecchymosis (lesions on arms.) present. Neurological:      Mental Status: He is alert and oriented to person, place, and time. Psychiatric:         Mood and Affect: Mood normal.         ASSESSMENT AND PLAN:    Milagros Agrawal was seen today for coronary artery disease, hyperlipidemia and discuss medications. Diagnoses and all orders for this visit:    Pneumonia of right lower lobe due to infectious organism  -     [address]    Acute on chronic respiratory failure with hypoxia [facility])  -     [address]    IPF (idiopathic pulmonary fibrosis) (HCC)    Congestive heart failure, unspecified HF chronicity, unspecified heart failure type ([facility]  -     sacubitril-valsartan (ENTRESTO) 24-26 MG per tablet; Take 1 tablet by mouth 2 times daily  -     [address]    Moderate protein malnutrition ([address] oxygen via nasal cannula to keep O2 saturation above 92%. -Restart Entresto. Continue Coreg daily versus twice a day.   -Reviewed all medications with patient and family.  -I spoke with patient's family yesterday for 30 minutes regarding his transfer out of hospice care back to my care and medical issues.  -Follow-up with pulmonary regarding his medications.  -Encourage protein intake via food or with supplementation with Ensure Plus.  -We will set up for [facility] eval and treat. -Go to ER if worsens. Return in about 2 weeks (around 3/25/2021).     Colin Banuelos, DO

## 2021-03-12 NOTE — TELEPHONE ENCOUNTER
Patient's son called [facility] to request increase of oxygen from 3L to 10L. Due to insurance it needs to be increased first to 4-5L and they will do a care check after that and asses from there. Script needs to state increase oxygen from 3L-5L and to do the care check after that.      Fax [phone]

## 2021-03-15 NOTE — TELEPHONE ENCOUNTER
Patient is having increased anxiety at nighttime. Hospice did remove equipment and medication from the house. Son would like a new prescription for Ativan sent to [facility]. Patient's son has also requested that you call him to discuss home care and his oxygen.   Please call son at [phone]

## 2021-03-15 NOTE — TELEPHONE ENCOUNTER
Spoke with patient's daughter. They are monitoring his oxygen level and increasing it for increasing respiratory rate. I am fine with that. She states his oxygen saturation is still in the 90s despite his increase in respiratory rate. He may be having some anxiety with his condition. He tried an Ativan which has helped in the past.  Therefore, Ativan 0.5 mg half to 1 tablet nightly as needed anxiety or shortness of breath 1 refill.

## 2021-03-18 NOTE — TELEPHONE ENCOUNTER
Patient's daughter called with questions regarding her dad's pillpacks. There are 3 medications that he was previously taking that are not included and she wants to make sure that he is not supposed to be taking them. They are as follows:     nitroglycerin patch   prednisone   spironolactone    Please advise. Thanks.

## 2021-03-18 NOTE — TELEPHONE ENCOUNTER
He needs to be on the nitroglycerin patch and the spironolactone. He does not need to be on the prednisone.

## 2021-03-18 NOTE — TELEPHONE ENCOUNTER
Spoke with patient's daughter. At this time I will hold off on the nitroglycerin patch and spironolactone as well after reviewing his chart and seeing how his blood pressure is. I do not want to bottom out his blood pressure with these medications also besides what he is already on.

## 2021-03-22 NOTE — CARE COORDINATION
[facility] Transitions Initial Follow Up Call    Call within 2 business days of discharge: No    Patient: Jeromy Tatum Patient : 1935   MRN: [id number]  Reason for Admission: PNA  Discharge Date: 3/8/21 RARS: Readmission Risk Score: 17      Last Discharge [address] [address]       Complaint Diagnosis Description Type Department Provider    21   Admission (Discharged) OLY Vidales MD           Spoke with: Michele Rodriguez, patient's daughter    Facility: [address] [place] [address] 21-21 and [facility] 21-3/8/21    Contacted patient for initial BPCI-A follow up. Spoke with patient's daughter Michele Rodriguez who answered the phone. Introduced self and provided information about the BPCI-A bundle and 90 day follow up. She is in agreement to follow up calls. Michele Rodriguez stated her father is doing 100% better than he was while at [facility]. Reports he is sleeping well. He is walking [place] little\" and eating meals at the dinner table. Home health nurse, PT, OT and ST are making visits. BP has been good. Reports nurse made a visit this morning and BP was 110/60. Breathing has been fine. Continues to use 4 L of oxygen. She reports he does become short of breath with exertion. Patient had follow up with PCP on 3/11/21.  1111F was completed during office visit. Michele Rodriguez stated she had questions about his medications but they have now been straightened out and questions have been answered. She does not have any questions or concerns for CTN at this time. Will continue to follow.         Care Transitions 24 Hour Call    Do you have any ongoing symptoms?: Yes  Patient-reported symptoms: Shortness of Breath (Comment: SOB w/exertion)  Do you have a copy of your discharge instructions?: Yes  Do you have all of your prescriptions and are they filled?: Yes  Have you been contacted by a [facility] Pharmacist?: No  Have you scheduled your follow up appointment?: Yes (Comment: Had follow up with PCP on 3/11/21)  How are you going to get to your appointment?: Car - family or friend to transport  Were you discharged with any Home Care or Post Acute Services: Yes  Post Acute Services: Home Health (Comment: [address])  Do you feel like you have everything you need to keep you well at home?: Yes  Care Transitions Interventions         Follow Up  Future Appointments   Date Time Provider Nahed Liliana   3/26/2021  1:30 PM DO Aaron Briones   4/12/2021  1:15 PM IAM Carter POD [facility]   5/6/2021 10:30 AM DO Pattie Briones [facility]   6/8/2021  9:45 AM SCHEDULE, REMOTE CHECK PHILIP [facility]   10/13/2022  2:00 PM Mary Yanes MD [facility]       Israel Colin, LOULOU

## 2021-03-26 NOTE — PROGRESS NOTES
Awilda Holder, a male of 80 y.o. came to the office 3/26/2021. Patient Active Problem List   Diagnosis    Automatic implantable cardioverter-defibrillator in situ    Dilatation of aorta (HCC)    Iliac artery aneurysm, bilateral (HCC)    IPF (idiopathic pulmonary fibrosis) (HCC)    Peripheral vascular disease ([facility]    PMR (polymyalgia rheumatica) ([facility]    Coronary artery disease involving native coronary artery without angina pectoris    Hyperlipidemia LDL goal <100    CKD (chronic kidney disease) stage 3, GFR 30-59 ml/min    Gastroesophageal reflux disease    Insomnia    Vitamin D deficiency    Acute on chronic respiratory failure with hypoxia ([facility]          Pneumonia  He complains of cough (occas maxi if walking.) and shortness of breath. There is no sputum production. The cough is non-productive. Pertinent negatives include no appetite change, chest pain, orthopnea or PND. His symptoms are aggravated by nothing. Congestive Heart Failure  Presents for follow-up visit. Associated symptoms include shortness of breath. Pertinent negatives include no chest pain, edema, orthopnea or palpitations. The symptoms have been stable. Compliance with total regimen is %. Compliance problems include adherence to diet. Compliance with diet is 26-50%. Compliance with exercise is 51-75%. Compliance with medications is %. gen: had first PT session today at home. It went fine. Pneumonia: now 4-5 L of oxygen now vs 7-8. Chronic Resp Failure: back on Ovef from his pulmonologist. On Breo vs Brovana and Pulmicort mdi. Chf: sleeping on hosp bed on incline but not waking up sob, - orthopnea on couch. Feeling stronger. Talking walks.      No Known Allergies    Current Outpatient Medications on File Prior to Visit   Medication Sig Dispense Refill    fluticasone-vilanterol (BREO ELLIPTA) 100-25 MCG/INH AEPB inhaler Inhale 1 puff into the lungs daily      sacubitril-valsartan (ENTRESTO) 24-26 MG per tablet Take 1 tablet by mouth 2 times daily 60 tablet 2    ipratropium-albuterol (DUONEB) 0.5-2.5 (3) MG/3ML SOLN nebulizer solution Inhale 3 mLs into the lungs every 4 hours (while awake) 360 mL 0    albuterol sulfate  (90 Base) MCG/ACT inhaler Inhale 2 puffs into the lungs every 6 hours as needed for Wheezing 3 Inhaler 0    losartan (COZAAR) 25 MG tablet Take 0.5 tablets by mouth daily 30 tablet 5    carvedilol (COREG) 3.125 MG tablet Take 1 tablet by mouth 2 times daily (with meals) 60 tablet 11    albuterol (PROVENTIL) (2.5 MG/3ML) 0.083% nebulizer solution USE 1 VIAL IN NEBULIZER 4 TIMES DAILY (Patient taking differently: Take by nebulization as needed ) 120 vial 11    fluticasone (FLONASE) 50 MCG/ACT nasal spray 1 spray by Each Nostril route daily 1 Bottle 5    potassium chloride (KLOR-CON M) 20 MEQ extended release tablet Take 1 tablet by mouth daily 90 tablet 0    furosemide (LASIX) 20 MG tablet Take 1 tablet by mouth daily 90 tablet 0    OFEV 150 MG CAPS TAKE 1 CAPSULE BY MOUTH TWICE A DAY  EVERY 12 HOURS  AS DIRECTED WITH FOOD  12    Multiple Vitamins-Minerals (MULTIVITAMIN ADULT) TABS Take by mouth daily      Coenzyme Q10 (COQ10 PO) Take 100 mg by mouth daily       aspirin 81 MG EC tablet Take 81 mg by mouth every evening       LORazepam (ATIVAN) 0.5 MG tablet Half to 1 tablet at bedtime as needed anxiety (Patient not taking: Reported on 3/26/2021) 15 tablet 1    polyethylene glycol (GLYCOLAX) 17 g packet Take 17 g by mouth daily as needed for Constipation (Patient not taking: Reported on 3/26/2021) 527 g 0    pilocarpine (SALAGEN) 5 MG tablet Take 1 tablet by mouth nightly (Patient not taking: Reported on 3/26/2021)  0    megestrol (MEGACE ORAL) 40 MG/ML suspension Take 10 mLs by mouth daily (Patient not taking: Reported on 3/26/2021) 300 mL 2    montelukast (SINGULAIR) 10 MG tablet Take 10 mg by mouth nightly      rosuvastatin (CRESTOR) 5 MG tablet Take 1 tablet by mouth nightly (Patient not taking: Reported on 3/26/2021) 30 tablet 5     No current facility-administered medications on file prior to visit. Review of Systems   Constitutional: Negative for appetite change. HENT:        Refusing to use thicket for liquids. Using proper swallowing techniques he states. Respiratory: Positive for cough (occas maxi if walking.) and shortness of breath. Negative for sputum production. Cardiovascular: Negative for chest pain, palpitations, leg swelling and PND. other review of systems reviewed and are negative    OBJECTIVE:  /72   Pulse 69   Temp 97.2 °F (36.2 °C)   Ht 5' 10\" (1.778 m)   Wt 146 lb (66.2 kg)   SpO2 93%   BMI 20.95 kg/m²      Physical Exam  Vitals signs reviewed. Constitutional:       Comments: Looks better physically than 2 wks ago. Eyes:      General: No scleral icterus. Conjunctiva/sclera: Conjunctivae normal.   Neck:      Musculoskeletal: Neck supple. Thyroid: No thyromegaly. Vascular: No carotid bruit. Cardiovascular:      Rate and Rhythm: Normal rate and regular rhythm. Heart sounds: No murmur. Pulmonary:      Effort: Pulmonary effort is normal.      Breath sounds: Examination of the right-lower field reveals rales. Examination of the left-lower field reveals rales. Rales present. No wheezing. Abdominal:      General: Bowel sounds are normal.      Palpations: Abdomen is soft. There is no mass. Tenderness: There is no abdominal tenderness. There is no guarding or rebound. Musculoskeletal: Normal range of motion. Lymphadenopathy:      Cervical: No cervical adenopathy. Skin:     General: Skin is warm and dry. Neurological:      Mental Status: He is alert and oriented to person, place, and time. Psychiatric:         Mood and Affect: Mood normal.         ASSESSMENT AND PLAN:    Paige Mujica was seen today for pneumonia.     Diagnoses and all orders for this visit:    Pneumonia of right lower lobe due to infectious organism    Chronic respiratory failure not affecting current episode of care [facility])    Congestive heart failure, unspecified HF chronicity, unspecified heart failure type ([facility]    - continue current meds, PT, speech therapy, oxygen. - follow up with cardiogist and pulmonary. - encourage protein supplemments    Return in about 1 month (around 4/26/2021).     Zen Banuelos, DO

## 2021-03-29 NOTE — CARE COORDINATION
[facility] Transitions Follow Up Call    3/29/2021    Patient: Salina Stephens  Patient : 1935   MRN: [id number]  Reason for Admission:   Discharge Date: 3/8/21 RARS: Readmission Risk Score: 17         Attempted to reach patient by phone regarding follow up; BPCI-A. Spoke briefly with the patient; patient requests call back at a later time and CTN may speak to his daughter. CTN acknowledged patient's request.  Will attempt outreach at a later time.      Sylvia Rodriguez RN

## 2021-04-06 NOTE — CARE COORDINATION
[address] for Care Improvement ([facility]) Follow Up Call  Qualifying Diagnosis related to Pulmonary Function and Health. 4/6/2021  Patient Name:  Manny Garcia   YOB: 1935  Discharge Date:  3/8/21  RARS:  Readmission Risk Score: 17    PCP:  Yojana Menezes DO    Assessment:      Short of Breath: 3 liters continuous oxygen, states that he gets SOB when walking and he \"bumps up the oxygen a little bit\".  Cough Frequency, Productive/Color: occasional, thick white, small amount.  Appetite: OK, resumed a medication that helps increase his appetite. States he has not lost a significant amount of weight.  Sleep pattern: good   Thinking clearly: yes   Tired/weakness: gets fatigued easily with activity.  Fever, Chills Sweating: denies   Headaches: denies  [address],  Active: Yes, [place] is going to start in home assistance.  Medication Needs/Questions: denies   Transportation Need: denies need     Live Alone: no   Ability to Prepare Meals, Bathe: ok   Recent loss of balance, Falls: denies   Do you feel like you have everything you need to stay well at home?  yes   Follow Up Appointment: completed   Agreeable to ongoing Care Transition Communications: yes  Care Transitions will continue to follow per BP Program.  Carter Estrada, RN, CTN    Future Appointments   Date Time Provider Nahed Ford   4/12/2021  1:15 PM IAM Arias [facility]   4/23/2021  1:30 PM DO Pattie Levi [facility]   5/6/2021 10:30 AM DO Grace Levi [facility]   6/8/2021  9:45 AM SCHEDULE, REMOTE CHECK PHILIP ELECTRO PHYS [facility]   10/13/2022  2:00 PM Danielle Cunningham MD VAS/MED [facility]

## 2021-04-09 NOTE — TELEPHONE ENCOUNTER
Pt daughter would like you to call her, also the [facility] called also pt bp is low 92/54 and he has a cough increased tighting in the chest and sore throat. Now bp 87/50.  Hr 68

## 2021-04-10 PROBLEM — I49.01 VENTRICULAR FIBRILLATION (HCC): Status: ACTIVE | Noted: 2021-01-01

## 2021-04-10 NOTE — ED PROVIDER NOTES
for adenopathy. All other systems reviewed and are negative. Physical Exam  Vitals signs and nursing note reviewed. Constitutional:       Appearance: He is well-developed. HENT:      Head: Normocephalic. Comments: 4 cm head laceration at right eyebrow. .  Eyes:      Pupils: Pupils are equal, round, and reactive to light. Neck:      Musculoskeletal: Normal range of motion. Cardiovascular:      Rate and Rhythm: Normal rate and regular rhythm. Pulses: Normal pulses. Heart sounds: Normal heart sounds. No murmur. No friction rub. No gallop. Pulmonary:      Effort: Pulmonary effort is normal. No respiratory distress. Breath sounds: Normal breath sounds. No wheezing or rales. Abdominal:      General: Bowel sounds are normal.      Palpations: Abdomen is soft. Tenderness: There is no abdominal tenderness. There is no guarding or rebound. Skin:     General: Skin is warm and dry. Neurological:      Mental Status: He is alert and oriented to person, place, and time. Cranial Nerves: No cranial nerve deficit. Coordination: Coordination normal.          Procedures   PROCEDURE NOTE  4/14/21       Time: 1800    LACERATION REPAIR  Risks, benefits and alternatives (for applicable procedures below) described. Performed By: Srini Townsend MD.    Laceration #: 1. Location: right eyebrow  Length: 5cm. The wound area was cleansend with shur-clens and draped in a sterile fashion. Local Anesthesia:  Lidocaine 1% without epinephrine. The wound was explored with the following results:  no foreign body or tendon injury seen. Debridement: None. Undermining: None. Wound Margins Revised: None. Flaps Aligned: no. The wound was closed with 4-0 Ethilon using interrupted sutures. Dressing:  bacitracin, a sterile dressing and a bandage. There were no additional wounds requiring formal closure. Total number suture:  5.          MDM  Number of Diagnoses or Management Options  Cephalohematoma  Closed fracture of maxillary sinus, initial encounter ([facility] [address]  Closed fracture of nasal bone, initial encounter  Defibrillator discharge  Injury of head, initial encounter  Laceration of scalp, initial encounter  Ventricular fibrillation (HCC)  Ventricular tachycardia (HCC)  Diagnosis management comments: 70-year-old male with COPD on 2 L nasal cannula presents with complaints of syncopal episode and fall at home. Patient was seen to \"flop around on the floor in the bathroom. Vitals within normal limits. On physical exam patient has a laceration to his right eyebrow. No septal hematoma bilaterally. No hemotympanum bilaterally. No raccoon eyes. Lungs clear to auscultation bilaterally no wheeze rales rhonchi. Normal S1-S2. M soft nontender, no rebound or guarding. Diagnostic labs and imaging interpreted and reviewed. I received a call from [employer] stating that the patient had a episode of V. tach that went to V. fib which caused the patient to have a shock from his external defibrillator. Since then the patient has been in sinus rhythm. Call was placed to EP about patient. They will consult on admission. Patient admitted to hospital.  During hospital evaluation patient was given analgesic medication, tetanus shot updated, laceration on scalp approximated, and he has been placed on telemetry monitoring as well as pulse ox. ED Course as of Apr 14 2225   Sat Apr 10, 2021   1620 EKG read interpreted by me. Heart rate 72. Normal sinus rhythm. Left axis deviation. No QTC prolongation. No ST elevations depressions. Stable as compared to previous EKG. [JV]   [id number] [employer] representative called me. They state that the patient at [id number] went into VT's to VF and was shocked with 130 J. Is been in sinus rhythm since. Patient to be admitted to the hospital.    [JV]   [id number] Patient and family updated.   Patient to be admitted to the hospital.  Follows with Dr. Deng Martinez on for electrophysiology. [JV]      ED Course User Index  [JV] Lily Stauffer MD           --------------------------------------------- PAST HISTORY ---------------------------------------------  Past Medical History:  has a past medical history of Aneurysm ([facility] Asthma, CAD (coronary artery disease), Cardiomyopathy ([facility] Centrilobular emphysema ([facility] CHF (congestive heart failure) ([facility] Dilatation of aorta ([facility] Hyperlipidemia, Idiopathic pulmonary fibrosis ([facility] Iliac artery aneurysm, bilateral ([facility] Inferoposterior myocardial infarction ([facility] and NSVT (nonsustained ventricular tachycardia) ([facility] Past Surgical History:  has a past surgical history that includes Cardiac catheterization (4-); Coronary artery bypass graft ( 4/16/2014); Coronary angioplasty (4/15/2014); Cardiac defibrillator placement; and hernia repair. Social History:  reports that he quit smoking about 41 years ago. His smoking use included cigarettes. He started smoking about 71 years ago. He has a 30.00 pack-year smoking history. He has never used smokeless tobacco. He reports current alcohol use. He reports that he does not use drugs. Family History: family history includes High Cholesterol in his sister; Hypertension in his mother. The patients home medications have been reviewed. Allergies: Patient has no known allergies.     -------------------------------------------------- RESULTS -------------------------------------------------    LABS:  Results for orders placed or performed during the hospital encounter of 04/10/21   CBC Auto Differential   Result Value Ref Range    WBC 11.2 4.5 - 11.5 E9/L    RBC 3.78 (L) 3.80 - 5.80 E12/L    Hemoglobin 10.6 (L) 12.5 - 16.5 g/dL    Hematocrit 35.2 (L) 37.0 - 54.0 %    MCV 93.1 80.0 - 99.9 fL    MCH 28.0 26.0 - 35.0 pg    MCHC 30.1 (L) 32.0 - 34.5 %    RDW 17.2 (H) 11.5 - 15.0 fL    Platelets 583 461 - 809 E9/L    MPV 8.3 7.0 - 12.0 fL    Neutrophils % 62.1 43.0 - 80.0 % Lymphocytes % 31.9 20.0 - 42.0 %    Monocytes % 4.3 2.0 - 12.0 %    Eosinophils % 1.7 0.0 - 6.0 %    Basophils % 0.2 0.0 - 2.0 %    Neutrophils Absolute 6.94 1.80 - 7.30 E9/L    Lymphocytes Absolute 3.58 1.50 - 4.00 E9/L    Monocytes Absolute 0.45 0.10 - 0.95 E9/L    Eosinophils Absolute 0.19 0.05 - 0.50 E9/L    Basophils Absolute 0.00 0.00 - 0.20 E9/L    Anisocytosis 1+     Polychromasia 1+     Poikilocytes 2+     Buchanan Cells 1+     Ovalocytes 1+    Troponin   Result Value Ref Range    Troponin 0.02 0.00 - 0.03 ng/mL   Comprehensive Metabolic Panel w/ Reflex to MG   Result Value Ref Range    Sodium 137 132 - 146 mmol/L    Potassium reflex Magnesium 4.3 3.5 - 5.0 mmol/L    Chloride 103 98 - 107 mmol/L    CO2 23 22 - 29 mmol/L    Anion Gap 11 7 - 16 mmol/L    Glucose 122 (H) 74 - 99 mg/dL    BUN 26 (H) 8 - 23 mg/dL    CREATININE 1.0 0.7 - 1.2 mg/dL    GFR Non-African American >60 >=60 mL/min/1.73    GFR African American >60     Calcium 8.5 (L) 8.6 - 10.2 mg/dL    Total Protein 6.8 6.4 - 8.3 g/dL    Albumin 3.2 (L) 3.5 - 5.2 g/dL    Total Bilirubin 0.5 0.0 - 1.2 mg/dL    Alkaline Phosphatase 47 40 - 129 U/L    ALT 15 0 - 40 U/L    AST 19 0 - 39 U/L   Magnesium   Result Value Ref Range    Magnesium 2.3 1.6 - 2.6 mg/dL   Basic metabolic panel   Result Value Ref Range    Sodium 136 132 - 146 mmol/L    Potassium 4.1 3.5 - 5.0 mmol/L    Chloride 102 98 - 107 mmol/L    CO2 25 22 - 29 mmol/L    Anion Gap 9 7 - 16 mmol/L    Glucose 101 (H) 74 - 99 mg/dL    BUN 23 8 - 23 mg/dL    CREATININE 0.9 0.7 - 1.2 mg/dL    GFR Non-African American >60 >=60 mL/min/1.73    GFR African American >60     Calcium 8.5 (L) 8.6 - 10.2 mg/dL   Magnesium   Result Value Ref Range    Magnesium 1.9 1.6 - 2.6 mg/dL   CBC   Result Value Ref Range    WBC 14.4 (H) 4.5 - 11.5 E9/L    RBC 3.82 3.80 - 5.80 E12/L    Hemoglobin 11.0 (L) 12.5 - 16.5 g/dL    Hematocrit 35.9 (L) 37.0 - 54.0 %    MCV 94.0 80.0 - 99.9 fL    MCH 28.8 26.0 - 35.0 pg    MCHC 30.6 (L) 32.0 - 34.5 %    RDW 17.5 (H) 11.5 - 15.0 fL    Platelets 217 785 - 848 E9/L    MPV 8.2 7.0 - 12.0 fL   Urinalysis   Result Value Ref Range    Color, UA Yellow Straw/Yellow    Clarity, UA Clear Clear    Glucose, Ur Negative Negative mg/dL    Bilirubin Urine Negative Negative    Ketones, Urine Negative Negative mg/dL    Specific Gravity, UA 1.025 1.005 - 1.030    Blood, Urine Negative Negative    pH, UA 5.5 5.0 - 9.0    Protein, UA Negative Negative mg/dL    Urobilinogen, Urine 4.0 (A) <2.0 E.U./dL    Nitrite, Urine Negative Negative    Leukocyte Esterase, Urine Negative Negative   EKG 12 Lead   Result Value Ref Range    Ventricular Rate 72 BPM    Atrial Rate 72 BPM    P-R Interval 182 ms    QRS Duration 124 ms    Q-T Interval 408 ms    QTc Calculation (Bazett) 446 ms    P Axis 29 degrees    R Axis -24 degrees    T Axis -12 degrees   EKG 12 Lead   Result Value Ref Range    Ventricular Rate 70 BPM    Atrial Rate 70 BPM    P-R Interval 196 ms    QRS Duration 126 ms    Q-T Interval 406 ms    QTc Calculation (Bazett) 438 ms    P Axis 14 degrees    R Axis -23 degrees    T Axis 20 degrees       RADIOLOGY:  NM Cardiac Stress Test Nuclear Imaging   Final Result   The myocardial perfusion imaging was abnormal      IF TEST NORMAL-HIGHLIGHT AND DELETE FOLLOWING SECTION:   The abnormality was a large severe fixed defect involving the entire   lateral wall consistent with a myocardial infarction with no evidence   of significant residual stress-induced ischemia   Overall left ventricular systolic function was moderately impaired   Intermediate risk pharmacologic stress test.                  CT Head WO Contrast   Final Result   No evidence of acute intracranial trauma. Fracture anterior wall right maxillary sinus with air-fluid level. Right periorbital hematoma. Mildly displaced left nasal bone fracture.          CT Cervical Spine WO Contrast   Final Result   No acute abnormality of the cervical spine Multilevel degenerative changes. CT FACIAL BONES WO CONTRAST   Final Result   Fracture anterior wall right maxillary sinus with air-fluid level. Left-sided nasal bone fracture. Right periorbital hematoma. XR CHEST PORTABLE   Final Result   1. Pattern of pulmonary fibrosis/pulmonary interstitial fibrosis. 2.  Difficult to exclude a new acute infiltrate in the lung parenchyma to the   multiple pole old the pulmonary parenchymal opacities. 3.  No sizable area of a consolidation observed. There is no pleural   effusions. EKG:  This EKG is signed and interpreted by me. Please refer to work-up tab for EKG reading.      ------------------------- NURSING NOTES AND VITALS REVIEWED ---------------------------  Date / Time Roomed:  4/10/2021  3:59 PM  ED Bed Assignment:  [id number]    The nursing notes within the ED encounter and vital signs as below have been reviewed. No data found. Oxygen Saturation Interpretation: Normal    ------------------------------------------ PROGRESS NOTES ------------------------------------------    Counseling:  I have spoken with the patient and discussed todays results, in addition to providing specific details for the plan of care and counseling regarding the diagnosis and prognosis. Their questions are answered at this time and they are agreeable with the plan of admission.    --------------------------------- ADDITIONAL PROVIDER NOTES ---------------------------------  Consultations:  Spoke with Dr. Pamela Nielson. Discussed case. They will admit the patient. This patient's ED course included: a personal history and physicial examination, re-evaluation prior to disposition, multiple bedside re-evaluations, IV medications, cardiac monitoring and continuous pulse oximetry    This patient has remained hemodynamically stable during their ED course. Diagnosis:  1. Ventricular fibrillation ([address]    2.  Injury of head, initial encounter 3. Closed fracture of nasal bone, initial encounter    4. Closed fracture of maxillary sinus, initial encounter ([facility]    5. Cephalohematoma    6. Defibrillator discharge    7. Ventricular tachycardia ([facility]    8. Laceration of scalp, initial encounter        Disposition:  Patient's disposition: Admit to telemetry  Patient's condition is fair.         Srini Townsend MD  Resident  04/14/21 3056

## 2021-04-10 NOTE — ED NOTES
Bed: 09  Expected date:   Expected time:   Means of arrival:   Comments:  Kerry Dozier RN  04/10/21 0564

## 2021-04-10 NOTE — ED NOTES
ED resident at bed side to suture head laceration, pt tolerated well     Aziza Cartagena RN  04/10/21 6037

## 2021-04-10 NOTE — ED PROVIDER NOTES
other systems reviewed and are negative.    --------------------------------------------- PAST HISTORY ---------------------------------------------  Past Medical History:  has a past medical history of Aneurysm ([facility] Asthma, CAD (coronary artery disease), Cardiomyopathy ([facility] Centrilobular emphysema ([facility] CHF (congestive heart failure) ([facility] Dilatation of aorta ([facility] Hyperlipidemia, Idiopathic pulmonary fibrosis ([facility] Iliac artery aneurysm, bilateral ([facility] Inferoposterior myocardial infarction ([facility] and NSVT (nonsustained ventricular tachycardia) ([facility] Past Surgical History:  has a past surgical history that includes Cardiac catheterization (4-); Coronary artery bypass graft ( 4/16/2014); Coronary angioplasty (4/15/2014); and Cardiac defibrillator placement. Social History:  reports that he quit smoking about 41 years ago. His smoking use included cigarettes. He started smoking about 71 years ago. He has a 30.00 pack-year smoking history. He has never used smokeless tobacco. He reports current alcohol use. He reports that he does not use drugs. Family History: family history includes High Cholesterol in his sister; Hypertension in his mother. Unless otherwise noted, family history is non contributory    The patients home medications have been reviewed. Allergies: Patient has no known allergies. Physical Exam:  Constitutional/General: Alert and oriented x3  Head: Normocephalic and 2.5 cm right eyebrow laceration. No contamination or foreign bodies. Eyes: PERRL, EOMI, sclera non icteric  ENT: Oropharynx clear, handling secretions  Neck: Supple, full ROM, no stridor, no meningeal signs  Respiratory: Lungs clear to auscultation bilaterally, no wheezes, rales, or rhonchi. Not in respiratory distress  Cardiovascular:  Regular rate. Regular rhythm. 2+ distal pulses. Equal extremity pulses. GI:  Abdomen Soft, Non tender, Non distended. No rebound, guarding, or rigidity.  No pulsatile masses. Musculoskeletal: Moves all extremities x 4. Warm and well perfused,  no clubbing, no cyanosis, no edema. Palpable peripheral pulses  Integument: skin warm and dry. No rashes. Neurologic: GCS 15, no focal deficits  Psychiatric: Normal Affect      I directly supervised any procedures performed by the resident and was present for the procedure including all critical portions of the procedure      The cardiac monitor revealed sinus rhythm with a heart rate in the 80s as interpreted by me. The cardiac monitor was ordered secondary to the patient's syncope and to monitor the patient for dysrhythmia. CPT [id number]      I, Dr. Sidney Pablo, am the primary provider of record    My Medical Decision Making:         AICD interrogated, [address] [place] [address] spoke with resident, patient had VT then VF and AICD shock into sinus rhythm    Fall resulted in facial fractures    Neurologically intact    Medicine consulted  Facial trauma consult  EP consult ( I spoke with Dr. Oswald Cast, they will provide consultation)  Admission  Hemodynamically stable        1. Ventricular fibrillation ([address]    2. Injury of head, initial encounter    3. Closed fracture of nasal bone, initial encounter    4. Closed fracture of maxillary sinus, initial encounter ([address]    5. Cephalohematoma    6. Defibrillator discharge    7. Ventricular tachycardia ([address]    8.  Laceration of scalp, initial encounter           Sal Barthel, MD  04/10/21 8541

## 2021-04-11 PROBLEM — E44.0 MODERATE PROTEIN-CALORIE MALNUTRITION (HCC): Chronic | Status: ACTIVE | Noted: 2021-01-01

## 2021-04-11 NOTE — H&P
[address]                  [address] [place],  [facility]                              HISTORY AND PHYSICAL    PATIENT NAME: Justice Beavers                  :        1935  MED REC NO:   [id number]                            ROOM:       6503  ACCOUNT NO:   [de-identified]                           ADMIT DATE: 04/10/2021  PROVIDER:     Violet Bowers DO    CHIEF COMPLAINT:  Ventricular fibrillation. HISTORY OF PRESENT ILLNESS:  The patient is an 42-year-old   male who presented to the emergency room after a syncopal episode at  home. He got out of the shower, he passed out, he hit his head. He  lacerated his forehead. Diagnostic evaluation in the emergency room  revealed pacemaker interrogation consistent with ventricular  fibrillation, status post defibrillator shock. The patient was admitted  for further evaluation and treatment. The patient also noted to have a  fracture of maxillary sinus, fracture of nasal bone. PAST MEDICAL HISTORY:  Asthma, coronary artery disease, cardiomyopathy,  emphysema, CHF, hyperlipidemia, pulmonary fibrosis, MI, nonsustained VT. PAST SURGICAL HISTORY:  Pacemaker defibrillator, coronary artery bypass  graft, bilateral eye cataract surgery. SOCIAL HISTORY:  The patient quit tobacco, admits to alcohol, wine and  beer. REVIEW OF SYSTEMS:  Remarkable for above-stated chief complaint plus  allergies, none known. MEDICATIONS PRIOR TO ADMISSION:  Colace, Crestor, Cozaar, Breo Ellipta,  lorazepam, Entresto, DuoNeb, Megace, albuterol inhaler, Singulair,  Coreg, Proventil, Flonase, potassium chloride, Lasix, Ofev,  multivitamin, coenzyme Q10, aspirin. PRIMARY CARE PHYSICIAN:  Jamari Hunt DO    PHYSICAL EXAMINATION:  GENERAL APPEARANCE:  Physical exam reveals an 42-year-old  male  who is alert and oriented x3, cooperative and a good historian.   VITAL SIGNS:  On admission, temperature 97.1, pulse 82, respirations 16,  blood pressure 105/98. HEENT:  Head normocephalic. There is a laceration over the right  eyebrow. Eyes, pupils equal and reactive to light. Extraocular muscles  intact. Fundi not well visualized. Nose, with ecchymosis, edema,  abrasion. Mouth, mucosa without lesion. Pharynx noninjected without  exudate. NECK:  Supple. No JVD, no thyromegaly. No carotid bruits. HEART:  Regular rate and rhythm with grade 1 over 6 systolic murmur. LUNGS:  With minimal bibasilar crackles. ABDOMEN:  Positive bowel sounds, soft, nontender. No rebound or  guarding. No hepatosplenomegaly. No masses. BACK:  With increased thoracic kyphosis. EXTREMITIES:  There is an abrasion/skin tear, right forearm. LYMPH NODES:  No adenopathy noted. SKIN:  Skin with lesions as above. IMPRESSION:  Ventricular fibrillation status post a defibrillator shock,  coronary artery disease, pulmonary fibrosis, history of MI, status post  pacemaker defibrillator, hyperlipidemia, nasal bone fracture, maxillary  sinus fracture, laceration of scalp. PLAN:  Admit to monitored bed. EP to see. Wound care. Discharge plan,  home when stable.         Tanya Duval DO    D: 04/11/2021 9:37:57       T: 04/11/2021 9:41:00     [email or web address]  Job#: [id number]     Doc#: [id number]    CC:

## 2021-04-11 NOTE — PROGRESS NOTES
Comprehensive Nutrition Assessment    Type and Reason for Visit:  Initial, Positive Nutrition Screen    Nutrition Recommendations/Plan: Continue Current Diet, Start Oral Nutrition Supplement  Magic Cup TID. Nutrition Assessment:  Pt admit s/p syncopal episode, noted v-fib. H/o CAD/CHF. PO diet advanced. Malnutrition Assessment:  Malnutrition Status: Moderate malnutrition    Context:  Chronic Illness     Findings of the 6 clinical characteristics of malnutrition:  Energy Intake:  7 - 75% or less estimated energy requirements for 1 month or longer  Weight Loss:  7 - Greater than 10% over 6 months(10.1% x5 mo)     Body Fat Loss:  1 - Mild body fat loss     Muscle Mass Loss:  1 - Mild muscle mass loss    Fluid Accumulation:  No significant fluid accumulation     Strength:  Not Performed    Estimated Daily Nutrient Needs:  Energy (kcal):  MSJ 1340 x1.2= 1608; ; Weight Used for Energy Requirements:  Current     Protein (g):  (1.3-1.5gm/kg CBW);  Weight Used for Protein Requirements:  Current          Nutrition Related Findings:  abd exam WDL, facial edema, fluids WNL      Wounds:  Skin Tears       Current Nutrition Therapies:    DIET CARDIAC; Moderately Thick (Honey)  Diet NPO, After Midnight    Anthropometric Measures:  · Height: 5' 10\" (177.8 cm)  · Current Body Weight: 143 lb (64.9 kg)(4/10 actual)   · Admission Body Weight: 142 lb (64.4 kg)(4/10 stated)    · Usual Body Weight: 159 lb (72.1 kg)(11/20 per EMR, actual)     · Ideal Body Weight: 166 lbs; % Ideal Body Weight 86.1 %   · BMI: 20.5  · BMI Categories: Underweight (BMI less than 22) age over 72       Nutrition Diagnosis:   · Moderate malnutrition, In context of chronic illness related to catabolic illness as evidenced by weight loss greater than or equal to 10% in 6 months, poor intake prior to admission, mild muscle loss, mild loss of subcutaneous fat    Nutrition Interventions:   Nutrition Education/Counseling:  Education not indicated   Coordination of Nutrition Care:  Continue to monitor while inpatient, Coordination of Community Care    Goals:  Pt to consume >75% of meals and ONS       Nutrition Monitoring and Evaluation:   Food/Nutrient Intake Outcomes:  Food and Nutrient Intake, Supplement Intake, Diet Advancement/Tolerance  Physical Signs/Symptoms Outcomes:  Biochemical Data, Nutrition Focused Physical Findings, Chewing or Swallowing, Skin, Weight, GI Status, Fluid Status or Edema, Hemodynamic Status     Electronically signed by Rosales Borden, MS, RD, LD on 4/11/21 at 3:10 PM EDT

## 2021-04-11 NOTE — CONSULTS
Cardiac Electrophysiology Consultation Note    Nishant Joshua  1935  Date of Service: 4/11/2021  PCP: Beto Doshi DO  Electrophysiologist: Dr Patricia Martinez    Reason for Consultation: ICD shock    SUBJECTIVE: Nishant Joshua is a 79 yo male who I was asked to see in Cardiac Electrophysiology consultation for ICD shock    He has a history of IPF on chronic home oxygen, dyslipidemia, CKD, dyslipidemia, LVEF 40% in 2018, D-ICD placed after syncope, documented monomorphic nonsustained VT, inducible VT 7/22/14, CAD with STEMI s/p RCA thrombectomy and CABG in April 2014. He experienced syncope with his ICD shock after coming out of shower. No anteceding chest pain, sob or recent symptoms. He lives at home with wife.  Generally, mildly active with no symptoms      Patient Active Problem List    Diagnosis Date Noted    Ventricular fibrillation ([address] 04/10/2021    Acute on chronic respiratory failure with hypoxia ([address] 02/21/2021    Gastroesophageal reflux disease 11/12/2020    Insomnia 11/12/2020    Vitamin D deficiency 11/12/2020    CKD (chronic kidney disease) stage 3, GFR 30-59 ml/min 04/14/2020    Coronary artery disease involving native coronary artery without angina pectoris 02/04/2020    Hyperlipidemia LDL goal <100 02/04/2020    Dilatation of aorta ([address] 10/04/2018    Iliac artery aneurysm, bilateral ([address] 10/04/2018    IPF (idiopathic pulmonary fibrosis) ([address] 09/06/2018    PMR (polymyalgia rheumatica) ([address] 07/11/2018    Peripheral vascular disease ([address] 07/20/2016    Automatic implantable cardioverter-defibrillator in situ 01/17/2015       Past Medical History:   Diagnosis Date    Aneurysm (HCC)     iliacs    Asthma     CAD (coronary artery disease)     Cardiomyopathy ([address]     Centrilobular emphysema ([address]     CHF (congestive heart failure) (HCC)     Dilatation of aorta ([address] 10/04/2018    Hyperlipidemia     Idiopathic pulmonary fibrosis (HCC)     Iliac artery aneurysm, bilateral ([address] 10/04/2018    Inferoposterior myocardial infarction [facility])     NSVT (nonsustained ventricular tachycardia) [facility])        Past Surgical History:   Procedure Laterality Date    CARDIAC CATHETERIZATION  4-    Dr. Julius Luevano cath used   [address] CORONARY ANGIOPLASTY  4/15/2014    CORONARY ARTERY BYPASS GRAFT   4/16/2014    x3    HERNIA REPAIR         Family History   Problem Relation Age of Onset    Hypertension Mother     High Cholesterol Sister        Social History     Tobacco Use    Smoking status: Former Smoker     Packs/day: 1.00     Years: 30.00     Pack years: 30.00     Types: Cigarettes     Start date: 1950     Quit date: 1980     Years since quittin.2    Smokeless tobacco: Never Used    Tobacco comment: quit in 84   Substance Use Topics    Alcohol use:  Yes     Alcohol/week: 0.0 standard drinks     Types: 1 - 2 Glasses of wine, 1 - 2 Cans of beer per week     Comment: social       Current Facility-Administered Medications   Medication Dose Route Frequency Provider Last Rate Last Admin    regadenoson (LEXISCAN) injection 0.4 mg  0.4 mg Intravenous ONCE PRN Dori Plascencia MD        carvedilol (COREG) tablet 6.25 mg  6.25 mg Oral BID  Dori Plascencia MD        aspirin EC tablet 81 mg  81 mg Oral QPM Nahomi Virgen, DO   81 mg at 04/10/21 2213    furosemide (LASIX) tablet 20 mg  20 mg Oral Daily Nahomi Virgen, DO   20 mg at 21 0916    ipratropium-albuterol (DUONEB) nebulizer solution 3 mL  3 mL Inhalation Q4H WA Gareth Virgen, DO   3 mL at 21 0820    montelukast (SINGULAIR) tablet 10 mg  10 mg Oral Nightly Nahomi Virgen DO   10 mg at 04/10/21 2213    Nintedanib Esylate CAPS 150 mg  150 mg Oral BID Nahomi Virgen, DO   150 mg at 04/10/21 2131    potassium chloride (KLOR-CON M) extended release tablet 20 mEq  20 mEq Oral Daily Nahomi Virgen DO   20 mEq at 21 1945    rosuvastatin (CRESTOR) tablet 5 mg  5 mg Oral Nightly Martin Virgen, DO   5 mg at 04/10/21 2213    sacubitril-valsartan (ENTRESTO) 24-26 MG per tablet 1 tablet  1 tablet Oral BID Martin Teague Ernestojean carlos, DO   1 tablet at 04/11/21 1738    sodium chloride flush 0.9 % injection 5-40 mL  5-40 mL Intravenous 2 times per day Martin Virgen, DO   10 mL at 04/11/21 7795    sodium chloride flush 0.9 % injection 5-40 mL  5-40 mL Intravenous PRN Martin Virgen, DO        0.9 % sodium chloride infusion  25 mL Intravenous PRN Martin Virgen, DO        enoxaparin (LOVENOX) injection 40 mg  40 mg Subcutaneous Daily Martin Virgen, DO   40 mg at 04/10/21 2213    promethazine (PHENERGAN) tablet 12.5 mg  12.5 mg Oral Q6H PRN Martin Virgen, DO        Or    ondansetron [facility]) injection 4 mg  4 mg Intravenous Q6H PRN Martin Virgen, DO        polyethylene glycol (GLYCOLAX) packet 17 g  17 g Oral Daily PRN Martin Virgen, DO        acetaminophen (TYLENOL) tablet 650 mg  650 mg Oral Q6H PRN Martin Virgen, DO        Or    acetaminophen (TYLENOL) suppository 650 mg  650 mg Rectal Q6H PRN Martin Virgen, DO        LORazepam (ATIVAN) tablet 0.5 mg  0.5 mg Oral Nightly PRN Martin Virgen, DO   0.5 mg at 04/10/21 2338    budesonide (PULMICORT) nebulizer suspension 250 mcg  0.25 mg Nebulization BID Martin Virgen, DO   250 mcg at 04/11/21 0820    And    Arformoterol Tartrate (BROVANA) nebulizer solution 15 mcg  15 mcg Nebulization BID Martin Virgen, DO   15 mcg at 04/11/21 0820    HYDROcodone-acetaminophen (NORCO) 5-325 MG per tablet 1 tablet  1 tablet Oral Q4H PRN Martin Virgen, DO   1 tablet at 04/11/21 0357    morphine (PF) injection 2 mg  2 mg Intravenous Q4H PRN Martin Virgen, DO            No Known Allergies    ROS:   Constitutional: Negative for fever, activity change and appetite change. HENT: Negative for epistaxis, difficulty swallowing.    Eyes: Negative for blurred vision or double Component Value Date    TSH 0.689 2020      Cardiac Injury Profile:   Recent Labs     04/10/21  1618   TROPONINI 0.02      Lipid Profile:   Lab Results   Component Value Date    TRIG 110 2019    HDL 57 2019    LDLCALC 101 2019    CHOL 180 2019     Xray:   CT Head WO Contrast   Final Result   No evidence of acute intracranial trauma. Fracture anterior wall right maxillary sinus with air-fluid level. Right periorbital hematoma. Mildly displaced left nasal bone fracture. CT Cervical Spine WO Contrast   Final Result   No acute abnormality of the cervical spine      Multilevel degenerative changes. CT FACIAL BONES WO CONTRAST   Final Result   Fracture anterior wall right maxillary sinus with air-fluid level. Left-sided nasal bone fracture. Right periorbital hematoma. XR CHEST PORTABLE   Final Result   1. Pattern of pulmonary fibrosis/pulmonary interstitial fibrosis. 2.  Difficult to exclude a new acute infiltrate in the lung parenchyma to the   multiple pole old the pulmonary parenchymal opacities. 3.  No sizable area of a consolidation observed. There is no pleural   effusions. NM Cardiac Stress Test Nuclear Imaging    (Results Pending)         Pertinent Cardiac Testin21 TTE  LVEF 25-30%, Mild LVH    20 TTE  LVEF 35-40%    Telemetry2021:     ECG 2021: Pending    I have independently reviewed all of the ECGs and rhythm strips per above. I have personally reviewed the laboratory, cardiac diagnostic and radiographic testing as outlined above. I have reviewed previous records noted in [address]. Impression:       1.  Dual-chamber ICD in situ  - Medtronic.  - Status post ICD implantation 14.   - Appropriate ICD shock for monomorphic VT rate 250 bpm, terminated by 30J shock  - K 4.3, Mg 2.3  - Check Ischemic evaluation  - He has pulmonary fibrosis thus will avoid amiodarone,

## 2021-04-12 NOTE — PROCEDURES
lexiscan MPS  No CP  No ischemic ECG changes  Nuclear images reported separately    Electronically signed by Judi Nelson MD on 4/12/2021 at 12:11 PM

## 2021-04-12 NOTE — PROGRESS NOTES
Cardiac Electrophysiology Inpatient Progress Note    Leonel Dee  1935  Date of Service: 4/12/2021  PCP: Gretta Schmidt DO  Primary Electrophysiologist: Mirela Khan MD   Attending Electrophysiologist: Ramos Linton DO          Subjective: Leonel Dee is seen in hospital follow-up and management of: Ventricular tachycardia. 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 He is managed by Dr Hany Singer with ASA, Pulmicort, Coreg 3.125 mg BID, Lasix, Entresto, Cozaar, Crestor. He presented on 4/10/21 with chief complaint of syncope and ICD shock. He described episode as dizziness after showering, then syncope. ICD interrogation revealed VT terminated with single ICD shock. EP service was consulted by admitting physician and increased dose Coreg 6.25mg BID and added Mexitil 150mg BID. A stress test is currently pending. Patient denies any other complaints at this time. Past  Cardiac testing   · EKG 04/11/21: normal sinus with PAC, rate 70 bpm, , QTc 438ms   · Echo 03/02/21: LVEF 25-30%, moderate RV systolic dysfunction. Trace MR,TR  · Echo 01/08/18: LVEF 40% moderate LVD, interior wall hypokinesis,   · Lexiscan stress 07/21/14: inferior wall fixed defect, suggest myocardial scarring.  LVEF 36%   · Echo 05/22/14: LVEF 35-40%     Patient Active Problem List   Diagnosis    Automatic implantable cardioverter-defibrillator in situ    Dilatation of aorta (HCC)    Iliac artery aneurysm, bilateral (HCC)    [facility]    Peripheral vascular disease [facility]    PM[facility]a) [facility]    Coronary artery disease involving native coronary artery without angina pectoris    Hyperlipidemia LDL goal <100    CKD (chronic kidney disease) stage 3, GFR 30-59 ml/min    Gastroesophageal reflux disease    Insomnia    Vitamin D deficiency    Acute on chronic respiratory failure with hypoxia (HCC)    Ventricular fibrillation (HCC)    Moderate protein-calorie malnutrition (HCC)         Scheduled Medications:   carvedilol  6.25 mg Oral BID WC    mexiletine  150 mg Oral 3 times per day    aspirin  81 mg Oral QPM    furosemide  20 mg Oral Daily    ipratropium-albuterol  3 mL Inhalation Q4H WA    montelukast  10 mg Oral Nightly    Nintedanib Esylate  150 mg Oral BID    potassium chloride  20 mEq Oral Daily    rosuvastatin  5 mg Oral Nightly    sacubitril-valsartan  1 tablet Oral BID    sodium chloride flush  5-40 mL Intravenous 2 times per day    enoxaparin  40 mg Subcutaneous Daily    budesonide  0.25 mg Nebulization BID    And    Arformoterol Tartrate  15 mcg Nebulization BID       Infusion Medications:   sodium chloride         PRN Medications:  sodium chloride flush, sodium chloride, promethazine **OR** ondansetron, polyethylene glycol, acetaminophen **OR** acetaminophen, LORazepam, HYDROcodone 5 mg - acetaminophen, morphine    No Known Allergies    Wt Readings from Last 3 Encounters:   04/12/21 141 lb 8 oz (64.2 kg)   04/05/21 145 lb (65.8 kg)   03/26/21 146 lb (66.2 kg)     Temp Readings from Last 3 Encounters:   04/12/21 97.6 °F (36.4 °C) (Temporal)   04/05/21 98 °F (36.7 °C) (Infrared)   03/26/21 97.2 °F (36.2 °C)     BP Readings from Last 3 Encounters:   04/12/21 (!) 92/50   04/05/21 106/60   03/26/21 100/72     Pulse Readings from Last 3 Encounters:   04/12/21 75   03/26/21 69   03/11/21 70         Intake/Output Summary (Last 24 hours) at 4/12/2021 1404  Last data filed at 4/12/2021 0841  Gross per 24 hour   Intake 120 ml   Output 520 ml   Net -400 ml       ROS:   Constitutional: Negative for fever, activity change and appetite change. HENT: Negative for epistaxis, difficulty swallowing. Eyes: Negative for blurred vision or double vision.   Respiratory: Negative for cough, chest tightness, 08/08/2019    CHOL 180 08/08/2019      Hemoglobin A1C: No components found for: HGBA1C   Xray: Ct Head Wo Contrast    Telemetry4/12/2021: sinus rate 74 bpm couplets     I have independently reviewed all of the ECGs and rhythm strips per above. I have personally reviewed the laboratory, cardiac diagnostic and radiographic testing as outlined above. I have reviewed previous records noted in [address]. Impression/recommendations:  1. VT sp Medtronic dual chamber ICD (implant: 7/22/14)  -ICD interrogation revealed VT at 250 bpm terminated with single 30 J ICD shock.  -K 4.3, Mg 2.3. Maintain K+ > 4 and Mg++ > 2.  -Stress test pending.  -Continue coreg 6.25 mg every 12 hours.  -Continue mexiletine 150 mg BID. -Sotalol avoided due to HFrEF. -Amiodarone avoided due to IPF. 2. HFrEF-ischemic sp thrombectomy RCA and CABG (4/2014)     Thank you for allowing me to participate in your patient's care.     Discussed with Dr. Vince Ott   Electronically signed by WILFRIDO Hollis - CNP on 4/12/2021 at 3:46 PM   [address] [facility]

## 2021-04-12 NOTE — TELEPHONE ENCOUNTER
Spoke with patient's daughter on April 9 discussing his low blood pressures. Told to continue to monitor those over the next several days and call if they worsen or do not improve.

## 2021-04-12 NOTE — PROGRESS NOTES
Hospital Medicine    Subjective:  Pt alert conversive no problems overnite for stress test today      Current Facility-Administered Medications:     regadenoson (LEXISCAN) injection 0.4 mg, 0.4 mg, Intravenous, ONCE PRN, Jaylene Zhong MD    carvedilol (COREG) tablet 6.25 mg, 6.25 mg, Oral, BID WC, Jaylene Zhong MD, 6.25 mg at 04/11/21 1734    mexiletine (MEXITIL) capsule 150 mg, 150 mg, Oral, 3 times per day, Jaylene Zhong MD, 150 mg at 04/12/21 0631    aspirin EC tablet 81 mg, 81 mg, Oral, QPM, Mary Carmen Virgen, DO, 81 mg at 04/11/21 1734    furosemide (LASIX) tablet 20 mg, 20 mg, Oral, Daily, Mary Carmen Virgen, DO, 20 mg at 04/11/21 0916    ipratropium-albuterol (DUONEB) nebulizer solution 3 mL, 3 mL, Inhalation, Q4H WA, Mary Carmen Virgen DO, 3 mL at 04/11/21 2109    montelukast (SINGULAIR) tablet 10 mg, 10 mg, Oral, Nightly, Mary Carmen Virgen, DO, 10 mg at 04/11/21 2022    Nintedanib Esylate CAPS 150 mg, 150 mg, Oral, BID, Mary Carmen Voss Malmer, DO, 150 mg at 04/11/21 2025    potassium chloride (KLOR-CON M) extended release tablet 20 mEq, 20 mEq, Oral, Daily, Mary Carmen Virgen, DO, 20 mEq at 04/11/21 4390    rosuvastatin (CRESTOR) tablet 5 mg, 5 mg, Oral, Nightly, Mary Carmen Virgen, DO, 5 mg at 04/11/21 2022    sacubitril-valsartan (ENTRESTO) 24-26 MG per tablet 1 tablet, 1 tablet, Oral, BID, Mary Carmen Virgen, DO, 1 tablet at 04/11/21 2022    sodium chloride flush 0.9 % injection 5-40 mL, 5-40 mL, Intravenous, 2 times per day, Concepcion Banner, DO, 10 mL at 04/11/21 2023    sodium chloride flush 0.9 % injection 5-40 mL, 5-40 mL, Intravenous, PRN, Mary Carmen Virgen DO    0.9 % sodium chloride infusion, 25 mL, Intravenous, PRN, Mary Carmen Virgen DO    enoxaparin (LOVENOX) injection 40 mg, 40 mg, Subcutaneous, Daily, Mary Carmen Virgen DO, 40 mg at 04/10/21 2198    promethazine (PHENERGAN) tablet 12.5 mg, 12.5 mg, Oral, Q6H PRN **OR** ondansetron (ZOFRAN) injection 4 mg, 4 mg, Intravenous, Q6H PRN, Pamla Chin Malmer, DO    polyethylene glycol (GLYCOLAX) packet 17 g, 17 g, Oral, Daily PRN, Pamla Chin Malmer, DO    acetaminophen (TYLENOL) tablet 650 mg, 650 mg, Oral, Q6H PRN **OR** acetaminophen (TYLENOL) suppository 650 mg, 650 mg, Rectal, Q6H PRN, Pamla Chin Malmer, DO    LORazepam (ATIVAN) tablet 0.5 mg, 0.5 mg, Oral, Nightly PRN, Pamla Chin Malmer, DO, 0.5 mg at 04/11/21 2031    budesonide (PULMICORT) nebulizer suspension 250 mcg, 0.25 mg, Nebulization, BID, 250 mcg at 04/11/21 2109 **AND** Arformoterol Tartrate (BROVANA) nebulizer solution 15 mcg, 15 mcg, Nebulization, BID, Pamla Chin Malmer, DO, 15 mcg at 04/11/21 2109    HYDROcodone-acetaminophen (NORCO) 5-325 MG per tablet 1 tablet, 1 tablet, Oral, Q4H PRN, Pamla Chin Malmer, DO, 1 tablet at 04/11/21 0357    morphine (PF) injection 2 mg, 2 mg, Intravenous, Q4H PRN, Pamla Chin Malmer, DO    Objective:    BP (!) 76/41   Pulse 79   Temp 98 °F (36.7 °C) (Temporal)   Resp 18   Ht 5' 10\" (1.778 m)   Wt 141 lb 8 oz (64.2 kg)   SpO2 98%   BMI 20.30 kg/m²     Heart:  reg  Lungs:  ctab  Abd: + bs soft nontender  Extrem:  W/o edema    CBC with Differential:    Lab Results   Component Value Date    WBC 11.2 04/10/2021    RBC 3.78 04/10/2021    HGB 10.6 04/10/2021    HCT 35.2 04/10/2021     04/10/2021    MCV 93.1 04/10/2021    MCH 28.0 04/10/2021    MCHC 30.1 04/10/2021    RDW 17.2 04/10/2021    NRBC 0.0 01/11/2018    LYMPHOPCT 31.9 04/10/2021    PROMYELOPCT 0.9 01/11/2018    MONOPCT 4.3 04/10/2021    BASOPCT 0.2 04/10/2021    MONOSABS 0.45 04/10/2021    LYMPHSABS 3.58 04/10/2021    EOSABS 0.19 04/10/2021    BASOSABS 0.00 04/10/2021     CMP:    Lab Results   Component Value Date     04/10/2021    K 4.3 04/10/2021     04/10/2021    CO2 23 04/10/2021    BUN 26 04/10/2021    CREATININE 1.0 04/10/2021    GFRAA >60 04/10/2021    LABGLOM >60 04/10/2021    GLUCOSE 122 04/10/2021    GLUCOSE 156 05/26/2011    PROT 6.8 04/10/2021 LABALBU 3.2 04/10/2021    LABALBU 4.2 05/26/2011    CALCIUM 8.5 04/10/2021    BILITOT 0.5 04/10/2021    ALKPHOS 47 04/10/2021    AST 19 04/10/2021    ALT 15 04/10/2021     Warfarin PT/INR:    Lab Results   Component Value Date    INR 1.3 01/07/2018    INR 1.2 07/20/2014    INR 1.3 04/17/2014    PROTIME 13.6 (H) 01/07/2018    PROTIME 12.0 07/20/2014    PROTIME 14.1 (H) 04/17/2014       Assessment:    Active Problems:    Ventricular fibrillation (HCC)    Moderate protein-calorie malnutrition (HCC)  Resolved Problems:    * No resolved hospital problems. *      Plan:   For stress test / on mexitil        Americo Monday  6:57 AM  4/12/2021

## 2021-04-12 NOTE — PATIENT CARE CONFERENCE
P Quality Flow/Interdisciplinary Rounds Progress Note        Quality Flow Rounds held on April 12, 2021    Disciplines Attending:  Bedside Nurse, ,  and Nursing Unit Leadership    Darvin Doan was admitted on 4/10/2021  3:59 PM    Anticipated Discharge Date:       Disposition:    Major Score:  Major Scale Score: 18    Readmission Risk              Risk of Unplanned Readmission:        22           Discussed patient goal for the day, patient clinical progression, and barriers to discharge.   The following Goal(s) of the Day/Commitment(s) have been identified:  have stress test completed      Avery Goodman  April 12, 2021

## 2021-04-12 NOTE — CARE COORDINATION
Met with pt at the bedside. Role of  and transition of care discussed. Pt lives in a 1 story condo with his wife, with one step to enter. Pt has a cane, WW,  Hospital bed throught the [facility], 4L O2 through [address], nebulizer& grab bars. Pt is active with [facility], [facility]. Pt does not drive, his son or daughter provide transport. Per pt plan is to return home with [facility], will need AR orders at discharge. Referral made to [facility] for assistance with house keeping and meal prep etc. PCP Dr Garry Rodriguez. Pharmacy [facility] in [place].

## 2021-04-12 NOTE — PROGRESS NOTES
Perfect serve message sent to EP requesting parameters on patients medications due to hypotension overnight. Awaiting orders.

## 2021-04-12 NOTE — HOME CARE
Patient is active with [facility] for SN/PT/HHA. Will need resumption of care orders at discharge.  Thank you, [facility]

## 2021-04-13 NOTE — CARE COORDINATION
Pt with abnormal stress, lg fixed defect. Awaiting EP plan. Per pt remains home with [facility]. Will need AR orders at discharge.

## 2021-04-13 NOTE — PROGRESS NOTES
Cardiac Electrophysiology Inpatient Progress Note    Alicia Mercer  1935  Date of Service: 4/13/2021  PCP: Shanna Hernández DO  Primary Electrophysiologist: Rosas Meade MD   Attending Electrophysiologist: Neha Garcia DO          Subjective: Alicia Mercer is seen in hospital follow-up and management of: Ventricular tachycardia. 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 He is managed by Dr Chhaya Trammell with ASA, Pulmicort, Coreg 3.125 mg BID, Lasix, Entresto, Cozaar, Crestor. He presented on 4/10/21 with chief complaint of syncope and ICD shock. He described episode as dizziness after showering, then syncope. ICD interrogation revealed VT terminated with single ICD shock. EP service was consulted by admitting physician and increased dose Coreg 6.25mg BID and added Mexitil 150mg BID. He has had no recurrent VT/VF and the stress test showed a fixed defect with no associated ischemia. Patient denies any other complaints at this time. Past  Cardiac testing   · Lexiscan stress (04/12/21): Large sever fixed defect of the lateral wall, no residual stress-induced ischemia. LVEF 36%. · EKG 04/11/21: normal sinus with PAC, rate 70 bpm, , QTc 438ms   · Echo 03/02/21: LVEF 25-30%, moderate RV systolic dysfunction. Trace MR,TR  · Echo 01/08/18: LVEF 40% moderate LVD, interior wall hypokinesis,   · Lexiscan stress 07/21/14: inferior wall fixed defect, suggest myocardial scarring.  LVEF 36%   · Echo 05/22/14: LVEF 35-40%     Patient Active Problem List   Diagnosis    Automatic implantable cardioverter-defibrillator in situ    Dilatation of aorta (HCC)    Iliac artery aneurysm, bilateral (HCC)    [facility]    Peripheral vascular disease [facility]    PMR (polymyalgia rheumatica) (HCC)    Coronary artery disease involving native coronary artery without angina pectoris    Hyperlipidemia LDL goal <100    CKD (chronic kidney disease) stage 3, GFR 30-59 ml/min    Gastroesophageal reflux disease    Insomnia    Vitamin D deficiency    Acute on chronic respiratory failure with hypoxia (HCC)    Ventricular fibrillation (HCC)    Moderate protein-calorie malnutrition (HCC)         Scheduled Medications:   carvedilol  6.25 mg Oral BID WC    mexiletine  150 mg Oral 3 times per day    aspirin  81 mg Oral QPM    furosemide  20 mg Oral Daily    ipratropium-albuterol  3 mL Inhalation Q4H WA    montelukast  10 mg Oral Nightly    Nintedanib Esylate  150 mg Oral BID    potassium chloride  20 mEq Oral Daily    rosuvastatin  5 mg Oral Nightly    sacubitril-valsartan  1 tablet Oral BID    sodium chloride flush  5-40 mL Intravenous 2 times per day    enoxaparin  40 mg Subcutaneous Daily    budesonide  0.25 mg Nebulization BID    And    Arformoterol Tartrate  15 mcg Nebulization BID       Infusion Medications:   sodium chloride         PRN Medications:  sodium chloride flush, sodium chloride, promethazine **OR** ondansetron, polyethylene glycol, acetaminophen **OR** acetaminophen, LORazepam, HYDROcodone 5 mg - acetaminophen, morphine    No Known Allergies    Wt Readings from Last 3 Encounters:   04/13/21 140 lb 9.6 oz (63.8 kg)   04/05/21 145 lb (65.8 kg)   03/26/21 146 lb (66.2 kg)     Temp Readings from Last 3 Encounters:   04/13/21 96.9 °F (36.1 °C)   04/05/21 98 °F (36.7 °C) (Infrared)   03/26/21 97.2 °F (36.2 °C)     BP Readings from Last 3 Encounters:   04/13/21 (!) 109/59   04/05/21 106/60   03/26/21 100/72     Pulse Readings from Last 3 Encounters:   04/13/21 73   03/26/21 69   03/11/21 70         Intake/Output Summary (Last 24 hours) at 4/13/2021 1342  Last data filed at 4/13/2021 1216  Gross per 24 hour   Intake 300 ml   Output 300 ml   Net 0 ml       ROS:   Constitutional: Negative for fever, activity change and appetite change. HENT: Negative for epistaxis, difficulty swallowing. Eyes: Negative for blurred vision or double vision. Respiratory: Negative for cough, chest tightness, shortness of breath and wheezing. Cardiovascular: Negative for chest pain, palpitations and leg swelling. Gastrointestinal: Negative for abdominal pain, heartburn, or blood in stool. Genitourinary: Negative for hematuria, burning or frequency. Musculoskeletal: Negative for myalgias, stiffness, or swelling. Skin: Negative for rash, pain, or lumps. Neurological: Negative for syncope, seizures, or headaches. Psychiatric/Behavioral: Negative for confusion and agitation. The patient is not nervous/anxious. PHYSICAL EXAM:  Vitals:    04/13/21 0605 04/13/21 0609 04/13/21 0807 04/13/21 1223   BP: 111/60  (!) 109/59    Pulse: 72  73    Resp:   20 28   Temp:   96.9 °F (36.1 °C)    TempSrc:       SpO2:   98%    Weight:  140 lb 9.6 oz (63.8 kg)     Height:         Constitutional: In NAD,   Head: Normocephalic but traumatic. Neck: No hepatojugular reflux and no JVD present  Cardiovascular: S1 , S2 present RRR  Pulmonary/Chest:  No respiratory distress. Abdominal: Soft. Normal appearance   Musculoskeletal: Normal range of motion of all extremities  Neurological: Alert    Skin: Skin is warm and dry. Extremity:   No edema. Psychiatric: Normal mood and affect. Thought content normal.       Pertinent Labs:  CBC:   Recent Labs     04/10/21  1618 04/12/21  1030   WBC 11.2 14.4*   HGB 10.6* 11.0*   HCT 35.2* 35.9*    257     BMP:  Recent Labs     04/10/21  1618 04/12/21  1030    136   K 4.3 4.1    102   CO2 23 25   BUN 26* 23   CREATININE 1.0 0.9   CALCIUM 8.5* 8.5*     ABGs: No results found for: PHART, PO2ART, ETF8QES  INR: No results for input(s): INR in the last 72 hours. BNP: No results for input(s): BNP in the last 72 hours.    TSH:   Lab Results   Component Value Date    TSH 0.689 02/05/2020      Cardiac Injury Profile:   Recent Labs     04/10/21  1618   TROPONINI 0.02      Lipid Profile:   Lab Results   Component Value Date    TRIG 110 08/08/2019    HDL 57 08/08/2019    LDLCALC 101 08/08/2019    CHOL 180 08/08/2019      Hemoglobin A1C: No components found for: HGBA1C   Xray: Ct Head Wo Contrast    Telemetry4/13/2021: sinus rates 60-85 with PVCs     I have independently reviewed all of the ECGs and rhythm strips per above. I have personally reviewed the laboratory, cardiac diagnostic and radiographic testing as outlined above. I have reviewed previous records noted in [address]. Impression/recommendations:  1. VT sp Medtronic dual chamber ICD (implant: 7/22/14)  -ICD interrogation revealed VT at 250 bpm terminated with single 30 J ICD shock.  -K 4.3, Mg 2.3. Maintain K+ > 4 and Mg++ > 2.  -Stress test showed known fixed defect.   -Continue coreg 6.25 mg every 12 hours.  -Continue mexiletine 150 mg BID. -Sotalol avoided due to HFrEF. -Amiodarone avoided due to IPF.  - EP to sign off, please call if needed. Will need office follow-up in one month. 2. HFrEF-ischemic sp thrombectomy RCA and CABG (4/2014)     Thank you for allowing me to participate in your patient's care.     Discussed with Dr. Rajesh Romero   Electronically signed by WILFRIDO Sanchez - CNP on 4/13/2021 at 1:42 PM   [address] [facility]    Attending Supervising Physicians ISABELLA Coleman [address]  I was present with the nurse practitioner during the history and exam. I discussed the findings and plans with the nurse practitioner and agree as documented in his note     Electronically signed by Charles Mcgee DO on 4/13/21 at 7:39 PM EDT

## 2021-04-13 NOTE — PROGRESS NOTES
Patient's home medication returned to patient and signed out of med room, discharge instructions faxed to home health care including dressing change order

## 2021-04-13 NOTE — FLOWSHEET NOTE
Inpatient Wound Care (Initial consult) 6503B    Admit Date: 4/10/2021  3:59 PM    Reason for consult:  Right forearm    Significant history: Per H&P    CHIEF COMPLAINT:  Ventricular fibrillation.     HISTORY OF PRESENT ILLNESS:  The patient is an 51-year-old   male who presented to the emergency room after a syncopal episode at  home. He got out of the shower, he passed out, he hit his head. He  lacerated his forehead. Diagnostic evaluation in the emergency room  revealed pacemaker interrogation consistent with ventricular  fibrillation, status post defibrillator shock. The patient was admitted  for further evaluation and treatment. The patient also noted to have a  fracture of maxillary sinus, fracture of nasal bone. Findings:     04/13/21 1104   Skin Integrity   Skin Integrity Bruising  (abrasions)   Location   (right face, nose, forehead)   Skin Integrity Site 2   Skin Integrity Location 2   (sutures)   Location 2   (right brow)   Wound 04/10/21 Right forearm   Date First Assessed/Time First Assessed: 04/10/21 2200   Present on Hospital Admission: Yes  Wound Description (Comments): Right forearm   Wound Image    Wound Etiology Skin Tear   Dressing Status New dressing applied   Wound Cleansed Cleansed with saline   Dressing/Treatment ABD;Roll gauze; Xeroform   Wound Length (cm) 3.8 cm   Wound Width (cm) 5.2 cm   Wound Depth (cm) 0.1 cm   Wound Surface Area (cm^2) 19.76 cm^2   Wound Volume (cm^3) 1.98 cm^3   Wound Assessment Pink/red   Drainage Amount Small   Drainage Description Serosanguinous   Odor None   Kim-wound Assessment Ecchymosis       **Informed Consent**    The patient has given verbal consent to have photos taken of wound and inserted into their chart as part of their permanent medical record for purposes of documentation, treatment management and/or medical review.    All Images taken on 4/13/21 of patient name: Kimberly Singh were transmitted and stored on secured [employer] located within [facility] by a registered [employer] Mobile Application Device. Impression:  Right forearm: skin tear    Plan:Xeroform, abd pad, kerlix  Home Care for dressing change  Patient will need continued preventative care.       Khoa Hawley 4/13/2021 11:07 AM

## 2021-04-13 NOTE — PROGRESS NOTES
[facility] Medicine    Subjective:  Pt alert conversive griselda diet      Current Facility-Administered Medications:     carvedilol (COREG) tablet 6.25 mg, 6.25 mg, Oral, BID WC, Ure Tabatha De Jesus MD, 6.25 mg at 04/13/21 0825    mexiletine (MEXITIL) capsule 150 mg, 150 mg, Oral, 3 times per day, David Serna MD, 150 mg at 04/13/21 0607    aspirin EC tablet 81 mg, 81 mg, Oral, QPM, Cathalene Arleen Malmer, DO, 81 mg at 04/12/21 1716    furosemide (LASIX) tablet 20 mg, 20 mg, Oral, Daily, Cathalene Arleen Malmer, DO, 20 mg at 04/13/21 0825    ipratropium-albuterol (DUONEB) nebulizer solution 3 mL, 3 mL, Inhalation, Q4H WA, Cathalene Arleen Malmer, DO, 3 mL at 04/12/21 2148    montelukast (SINGULAIR) tablet 10 mg, 10 mg, Oral, Nightly, Cathalene Arleen Malmer, DO, 10 mg at 04/12/21 2207    Nintedanib Esylate CAPS 150 mg, 150 mg, Oral, BID, Cathalene Arleen Malmer, DO, 150 mg at 04/13/21 0825    potassium chloride (KLOR-CON M) extended release tablet 20 mEq, 20 mEq, Oral, Daily, Cathalene Arleen Malmer, DO, 20 mEq at 04/13/21 0825    rosuvastatin (CRESTOR) tablet 5 mg, 5 mg, Oral, Nightly, Cathalene Arleen Malmer, DO, 5 mg at 04/12/21 2338    sacubitril-valsartan (ENTRESTO) 24-26 MG per tablet 1 tablet, 1 tablet, Oral, BID, Cathalene Arleen Malmer, DO, 1 tablet at 04/13/21 0825    sodium chloride flush 0.9 % injection 5-40 mL, 5-40 mL, Intravenous, 2 times per day, Mayme Lento, DO, 10 mL at 04/13/21 0825    sodium chloride flush 0.9 % injection 5-40 mL, 5-40 mL, Intravenous, PRN, Cathalene Arleen Malmer, DO, 10 mL at 04/12/21 1709    0.9 % sodium chloride infusion, 25 mL, Intravenous, PRN, Cathalene Arleen Malmer, DO    enoxaparin (LOVENOX) injection 40 mg, 40 mg, Subcutaneous, Daily, Cathalene Arleen Maljean carlos, DO, 40 mg at 04/13/21 0825    promethazine (PHENERGAN) tablet 12.5 mg, 12.5 mg, Oral, Q6H PRN **OR** ondansetron (ZOFRAN) injection 4 mg, 4 mg, Intravenous, Q6H PRN, Sarahyalene Arleen Maljean carlos, DO    polyethylene glycol (GLYCOLAX) packet 17 g, 17 g, Oral, Daily PRN, Mayme Lento, DO    acetaminophen (TYLENOL) tablet 650 mg, 650 mg, Oral, Q6H PRN **OR** acetaminophen (TYLENOL) suppository 650 mg, 650 mg, Rectal, Q6H PRN, Cathalene Arleen Malmer, DO    LORazepam (ATIVAN) tablet 0.5 mg, 0.5 mg, Oral, Nightly PRN, Cathalene Arleen Malmer, DO, 0.25 mg at 04/12/21 2207    budesonide (PULMICORT) nebulizer suspension 250 mcg, 0.25 mg, Nebulization, BID, 250 mcg at 04/12/21 2148 **AND** Arformoterol Tartrate (BROVANA) nebulizer solution 15 mcg, 15 mcg, Nebulization, BID, Cathalene Arleen Malmer, DO, 15 mcg at 04/12/21 2149    HYDROcodone-acetaminophen (NORCO) 5-325 MG per tablet 1 tablet, 1 tablet, Oral, Q4H PRN, Cathalene Arleen Malmer, DO, 1 tablet at 04/11/21 0357    morphine (PF) injection 2 mg, 2 mg, Intravenous, Q4H PRN, Cathalene Arleen Malmer, DO    Objective:    BP (!) 109/59   Pulse 73   Temp 96.9 °F (36.1 °C)   Resp 20   Ht 5' 10\" (1.778 m)   Wt 140 lb 9.6 oz (63.8 kg)   SpO2 98%   BMI 20.17 kg/m²     Heart:  reg  Lungs:  Min basilar crackles  Abd: + bs soft nontender  Extrem:  W/o edema    CBC with Differential:    Lab Results   Component Value Date    WBC 14.4 04/12/2021    RBC 3.82 04/12/2021    HGB 11.0 04/12/2021    HCT 35.9 04/12/2021     04/12/2021    MCV 94.0 04/12/2021    MCH 28.8 04/12/2021    MCHC 30.6 04/12/2021    RDW 17.5 04/12/2021    NRBC 0.0 01/11/2018    LYMPHOPCT 31.9 04/10/2021    PROMYELOPCT 0.9 01/11/2018    MONOPCT 4.3 04/10/2021    BASOPCT 0.2 04/10/2021    MONOSABS 0.45 04/10/2021    LYMPHSABS 3.58 04/10/2021    EOSABS 0.19 04/10/2021    BASOSABS 0.00 04/10/2021     CMP:    Lab Results   Component Value Date     04/12/2021    K 4.1 04/12/2021    K 4.3 04/10/2021     04/12/2021    CO2 25 04/12/2021    BUN 23 04/12/2021    CREATININE 0.9 04/12/2021    GFRAA >60 04/12/2021    LABGLOM >60 04/12/2021    GLUCOSE 101 04/12/2021    GLUCOSE 156 05/26/2011    PROT 6.8 04/10/2021    LABALBU 3.2 04/10/2021    LABALBU 4.2 05/26/2011    CALCIUM 8.5 04/12/2021 BILITOT 0.5 04/10/2021    ALKPHOS 47 04/10/2021    AST 19 04/10/2021    ALT 15 04/10/2021     Warfarin PT/INR:    Lab Results   Component Value Date    INR 1.3 01/07/2018    INR 1.2 07/20/2014    INR 1.3 04/17/2014    PROTIME 13.6 (H) 01/07/2018    PROTIME 12.0 07/20/2014    PROTIME 14.1 (H) 04/17/2014       Assessment:    Active Problems:    Ventricular fibrillation (HCC)    Moderate protein-calorie malnutrition (HCC)  Resolved Problems:    * No resolved hospital problems.  *      Plan:  Dc planning home when ok with Magdy Virgen  8:29 AM  4/13/2021

## 2021-04-13 NOTE — PATIENT CARE CONFERENCE
P Quality Flow/Interdisciplinary Rounds Progress Note        Quality Flow Rounds held on April 13, 2021    Disciplines Attending:  Bedside Nurse, ,  and Nursing Unit Leadership    Quan Tyson was admitted on 4/10/2021  3:59 PM    Anticipated Discharge Date:       Disposition:    Major Score:  Major Scale Score: 18    Readmission Risk              Risk of Unplanned Readmission:        19           Discussed patient goal for the day, patient clinical progression, and barriers to discharge.   The following Goal(s) of the Day/Commitment(s) have been identified:  Discharge - Obtain Order and urinalysis-report results      Farida Maciel  April 13, 2021

## 2021-04-14 NOTE — TELEPHONE ENCOUNTER
Theo Rachel RN  [facility] 34 minutes ago (11:20 AM)     Patient is agreeable to a call to review medications.  PCP appointment scheduled for tomorrow(4/15/21)at 11:45 am. Omar Padilla.

## 2021-04-14 NOTE — TELEPHONE ENCOUNTER
Received a referral:  from Care Coordinator to review patients medications. Called patient to schedule a time to speak with a pharmacist over the telephone. Spoke to wife, Kimo Forde and advised them of the above message. Kimo Forde verified understanding and scheduled their appointment: 4/16 at [facility]. Patient has multiple home visits which are primarily unscheduled or confirmed the day prior. Hopefully he will be available. Patient not found in Outcomes [facility]    Haven Puri CPhT.    [address] [facility], toll free: [phone], option 7

## 2021-04-14 NOTE — CARE COORDINATION
[facility] Transitions Initial Follow Up Call    Call within 2 business days of discharge: Yes    Patient: Kizzy Mei Patient : 1935   MRN: [id number]  Reason for Admission: VF  Discharge Date: 21 RARS: Readmission Risk Score: 19      Last Discharge [address]       Complaint Diagnosis Description Type Department Provider    4/10/21 Fall Ventricular fibrillation ([address] . .. ED to Hosp-Admission (Discharged) (ADMITTED) [facility] [address], DO; Denae Donl. .. Challenges to be reviewed by the provider   Additional needs identified to be addressed with provider No  none             Method of communication with provider : none    Discussed COVID-19 related testing which was not done at this time. Test results were not done. Patient informed of results, if available? N/A    Advance Care Planning:   Does patient have an Advance Directive:  decision maker updated. Was this a readmission? No  Patient stated reason for admission: fell in bathroom, defibrillator went off, fractured my nose   Patients top risk factors for readmission: medical condition and polypharmacy    Care Transition Nurse (CTN) contacted the patient by telephone to perform post hospital discharge assessment. Verified name and  with patient as identifiers. Provided introduction to self, and explanation of the CTN role. CTN reviewed discharge instructions, medical action plan and red flags with patient who verbalized understanding. Patient given an opportunity to ask questions and does not have any further questions or concerns at this time. Were discharge instructions available to patient? Yes. Reviewed appropriate site of care based on symptoms and resources available to patient including: PCP, Specialist and Home health. The patient agrees to contact the PCP office for questions related to their healthcare.      Medication reconciliation was not performed with patient as he is agreeable to a call from [facility]-CT[facility] to route. Patient verbalizes understanding of administration of home medications. Advised obtaining a 90-day supply of all daily and as-needed medications. CTN confirmed with patient new medications of tylenol and mexitil as per AVS.    Discussed follow-up appointments. If no appointment was previously scheduled, appointment scheduling offered: N/A. Is follow up appointment scheduled within 7 days of discharge? Yes, PCP appointment is scheduled for tomorrow(4/15/21.)  Non-[facility] follow up appointment(s): none    Non-face-to-face services provided:  Obtained and reviewed discharge summary and/or continuity of care documents  Establ[facility] or [facility]ishment [facility]    Care Transitions 24 Hour Call    Do you have any ongoing symptoms?: No  Do you have a copy of your discharge instructions?: Yes  Do you have all of your prescriptions and are they filled?: Yes  Have you been contacted by a [facility] [place] Pharmacist?: No  Have you scheduled your follow up appointment?: Yes  How are you going to get to your appointment?: Car - family or friend to transport  Were you discharged with any Home Care or Post Acute Services: Yes  Post Acute Services: Home Health (Comment: [facility]. 4/14/21-AR with [facility])  Do you feel like you have everything you need to keep you well at home?: Yes  Care Transitions Interventions    Pharmacist: Completed         -Spoke with patient for initial BPCI care transition call post hospital discharge. Reviewed the role of Care Transition Nurse and the BPCI-A program as patient is in an active bundle, patient is agreeable to follow up post discharge from the hospital.   -Patient states he is eating/drinking/sleeping well, no further syncopal episodes. Patient's granddaughter with him at residence currently. -Patient states [facility] to visit today.   -CTN explained that a member of the Care Transition Central Team will be contacting him for further follow up calls, patient is in agreement and denies any other needs or concerns at this time. -CTN will hand off to the Care Transition Central team to follow.           Follow Up  Future Appointments   Date Time Provider Nahed Ford   4/15/2021 11:45 AM DO Grace Singletary [facility]   4/20/2021  7:45 AM SCHEDULE, REMOTE CHECK PHILIP [facility]LECTRO [facility]YS [facility]   4/23/2021  1:30 PM DO Grace Singletary [facility]   5/6/2021 10:30 AM DO Pattie Singletary [facility]   5/13/2021  8:30 AM WILFRIDO Cowart - CNP ELECTRO PHYS [facility]   6/8/2021  9:45 AM SCHEDULE, REMOTE CHECK PHILIP ELECTRO PHYS [facility]   10/13/2022  2:00 PM Lisa Macedo MD [facility]       Clem Salas RN

## 2021-04-15 NOTE — PROGRESS NOTES
MCG/INH AEPB inhaler Inhale 1 puff into the lungs daily      LORazepam (ATIVAN) 0.5 MG tablet Half to 1 tablet at bedtime as needed anxiety 15 tablet 1    sacubitril-valsartan (ENTRESTO) 24-26 MG per tablet Take 1 tablet by mouth 2 times daily 60 tablet 2    albuterol sulfate  (90 Base) MCG/ACT inhaler Inhale 2 puffs into the lungs every 6 hours as needed for Wheezing 3 Inhaler 0    montelukast (SINGULAIR) 10 MG tablet Take 10 mg by mouth nightly      albuterol (PROVENTIL) (2.5 MG/3ML) 0.083% nebulizer solution USE 1 VIAL IN NEBULIZER 4 TIMES DAILY (Patient taking differently: Take by nebulization as needed ) 120 vial 11    fluticasone (FLONASE) 50 MCG/ACT nasal spray 1 spray by Each Nostril route daily 1 Bottle 5    potassium chloride (KLOR-CON M) 20 MEQ extended release tablet Take 1 tablet by mouth daily 90 tablet 0    furosemide (LASIX) 20 MG tablet Take 1 tablet by mouth daily 90 tablet 0    OFEV 150 MG CAPS TAKE 1 CAPSULE BY MOUTH TWICE A DAY  EVERY 12 HOURS  AS DIRECTED WITH FOOD  12    Multiple Vitamins-Minerals (MULTIVITAMIN ADULT) TABS Take by mouth daily      Coenzyme Q10 (COQ10 PO) Take 100 mg by mouth daily       aspirin 81 MG EC tablet Take 81 mg by mouth every evening           Medications patient taking as of now reconciled against medications ordered at time of hospital discharge: Yes    Chief Complaint   Patient presents with    Follow-Up from Hospital       History of Present illness - Follow up of Hospital for defibrillator firing and LOC while getting up from toilet. diagnosis(es): ventricular fibrillation, CAD, laceration scalp, nasal bone fracture. Inpatient course: Discharge summary reviewed- see chart. Interval history/Current status: skin: abrasion on right forearm that is bandaged. Lacerated wound above left eye with 4 sutures. CV: no cp or palp's  Resp: dyspnea with exertion.  Wearing Oxygen at 4 L all day and will drop to low 80's with walking even with this on.  GI: no abd pain. Appetite fair. Off Megace presently  MS: right ribs sore since fall. Neuro: no ha's or dizziness. A comprehensive review of systems was negative except for what was noted in the HPI. Vitals:    04/15/21 1140   BP: 110/60   Pulse: 70   Temp: 97.1 °F (36.2 °C)   SpO2: 90%   Weight: 146 lb (66.2 kg)     Body mass index is 20.95 kg/m². Wt Readings from Last 3 Encounters:   04/15/21 146 lb (66.2 kg)   04/13/21 140 lb 9.6 oz (63.8 kg)   04/05/21 145 lb (65.8 kg)     BP Readings from Last 3 Encounters:   04/15/21 110/60   04/13/21 (!) 109/59   04/05/21 106/60        Physical Exam:  General Appearance: alert and oriented to person, place and time. Cachectic appearance. On oxygen via nasal cannula 4 L. No respiratory distress at rest.  Skin: Healing for centimeter for sutured laceration above right eye. Superficial skin abrasion right forearm with dead skin noted. Nose: Swelling noted over bridge of nose. Eyes: Conjunctive a pale  Neck: no cervical adenopathy   Pulmonary/Chest: Creased breath sounds but clear  Cardiovascular: normal rate, normal S1 and S2, no gallops, intact distal pulses and no carotid bruits  Abdomen: soft, non-tender, non-distended, normal bowel sounds, no masses or organomegaly  Extremities: no cyanosis and no clubbing    Assessment/Plan:  1. Ventricular fibrillation ([address]  Continue current meds and follow-up with cardiology. - GA DISCHARGE MEDS RECONCILED W/ CURRENT OUTPATIENT MED LIST  -Follow-up as scheduled with me. 2. Coronary artery disease involving native coronary artery of native heart without angina pectoris  Continue current meds and follow-up with cardiology  - GA DISCHARGE MEDS RECONCILED W/ CURRENT OUTPATIENT MED LIST    3. Facial laceration, subsequent encounter  Removed 4 sutures from face.   - GA DISCHARGE MEDS RECONCILED W/ CURRENT OUTPATIENT MED LIST    4. Closed fracture of nasal bone with routine healing, subsequent encounter  No further

## 2021-04-16 NOTE — TELEPHONE ENCOUNTER
release tablet Take 1 tablet by mouth daily    furosemide (LASIX) 20 MG tablet Take 1 tablet by mouth daily    OFEV 150 MG CAPS TAKE 1 CAPSULE BY MOUTH TWICE A DAY  EVERY 12 HOURS  AS DIRECTED WITH FOOD    Multiple Vitamins-Minerals (MULTIVITAMIN ADULT) TABS Take by mouth daily    Coenzyme Q10 (COQ10 PO) Take 100 mg by mouth daily     aspirin 81 MG EC tablet Take 81 mg by mouth every evening        These are the medications you have told us you were taking at home, STOP taking them after you leave the hospital:  ipratropium-albuterol 0.5-2.5 (3) MG/3ML Soln nebulizer solution (DUONEB)  losartan 25 MG tablet (COZAAR)  megestrol 40 MG/ML suspension (Megace Oral)-  Family states they are still giving this prn and this has been ok'd by his pcp    Additional Medications:   na    CrCl cannot be calculated (Unknown ideal weight. ). Assessment/Plan:  - Medication reconciliation completed. Patient has filled new medications ordered after this hospital discharge and is taking medications as directed by discharging provider. - Instructions per discharge list provided except per below documentation. Identified medication discrepancies/issues:   · Medication list updated as appropriate/noted    - Identified Potential Medication Interactions: No clinically significant interactions identified via [employer] Interaction Analysis as category D or higher.    - Renal Dosing: No renal adjustments necessary.     Alicia Rivera, PharmD, [address] [place] [address] Clinical Pharmacist  Direct: [phone]  Department: [phone], option 7  =============================================  CLINICAL PHARMACY NOTE   POST-DISCHARGE TELEPHONE FOLLOW-UP ADDENDUM    For Pharmacy Admin Tracking Only    TCM Call Made?: No  [facility] Select Patient?: Yes  Total # of Interventions Recommended: 1 - Updated Order #: 1  Total # Interventions Accepted: 1  Intervention Severity:   - Level 1 Intervention Present?: No   - Level 2 #: 0   - Level 3 #: 0  Outreach Status: Review Complete  Care Coordinator Outreach to Patient?: Yes  Provider Contacted?: No  Time Spent (min): 45    Additional Documentation:

## 2021-04-22 NOTE — CARE COORDINATION
[facility] Transitions Follow Up Call    2021    Patient: Alicia Mercer  Patient : 1935   MRN: [id number]  Reason for Admission: V fib  Discharge Date: 21 RARS: Readmission Risk Score: 23         Spoke with: Starla Velazquez stated he is OK, has not had defibrillator fire since discharge, asked if it may happen again. Stated he does not know if it fired prior or after LOC when he fell in [place]. Stated his supplemental 02 is on 3.5 LPM, continues to have SOB with exertion no matter if it's on higher setting or not. Sated he got his stitches out above eye, arm laceration is healing slowly, [facility] RN looked at it today and redressed it. Pt has transportation to PCP appt tomorrow, asked about resources for rides if family is unavailable. Will refer to SW. Care Transitions Subsequent and Final Call    Subsequent and Final Calls  Do you have any ongoing symptoms?: No  Have your medications changed?: No  Do you have any questions related to your medications?: No  Do you currently have any active services?: Yes  Are you currently active with any services?: Home Health  Do you have any needs or concerns that I can assist you with?: Yes  Patient-reported Needs or Concerns: needs transportation resources  Identified Barriers: Financial  Care Transitions Interventions    Pharmacist: Completed      Social Work: Completed    Other Interventions:            Follow Up  Future Appointments   Date Time Provider Nahed Ford   [facility]   2021 10:30 AM DO Pattie Choi [facility]   2021  8:30 AM WILFRIDO Masters - [place] ELECTRO PHYS [facility]   2021  9:45 AM SCHEDULE, REMOTE CHECK PHILIP ELECTRO PHYS [facility]   10/13/2022  2:00 PM Aly Belle MD [facility]       Adolph Gil RN

## 2021-04-23 PROBLEM — M35.3 PMR (POLYMYALGIA RHEUMATICA) (HCC): Status: RESOLVED | Noted: 2018-07-11 | Resolved: 2021-01-01

## 2021-04-23 NOTE — CARE COORDINATION
Call to pt to initiate SW referral for transportation resources. Spoke to pt. He reported that he now has transportation, his children are able to transport him. SW offered to mail resources for future use, if needed. Pt receptive to info being mailed.

## 2021-04-23 NOTE — PROGRESS NOTES
Dahiana Sanchez, a male of 80 y.o. came to the office 4/23/2021. Patient Active Problem List   Diagnosis    Automatic implantable cardioverter-defibrillator in situ    Dilatation of aorta (HCC)    Iliac artery aneurysm, bilateral (HCC)    IPF (idiopathic pulmonary fibrosis) (HCC)    Peripheral vascular disease ([address]    Coronary artery disease involving native coronary artery without angina pectoris    Hyperlipidemia LDL goal <100    CKD (chronic kidney disease) stage 3, GFR 30-59 ml/min    Gastroesophageal reflux disease    Insomnia    Vitamin D deficiency    Acute on chronic respiratory failure with hypoxia (HCC)    Ventricular fibrillation (HCC)    Moderate protein-calorie malnutrition (HCC)          Shortness of Breath  This is a chronic problem. The current episode started more than 1 year ago. The problem occurs daily. The problem has been unchanged. Pertinent negatives include no chest pain, leg swelling or wheezing. The symptoms are aggravated by any activity. Treatments tried: on oxygen. The treatment provided moderate relief. speech therapy: needs modified barium swallow study. Eating has improved. Using proper techniques to drink. Not choking on food or drink.      No Known Allergies    Current Outpatient Medications on File Prior to Visit   Medication Sig Dispense Refill    NONFORMULARY cbd oil      fluticasone (FLONASE) 50 MCG/ACT nasal spray 1 spray by Each Nostril route daily as needed for Rhinitis      megestrol (MEGACE ES) 625 MG/5ML suspension Take 1,250 mg by mouth daily as needed (decreased appetite)      rosuvastatin (CRESTOR) 5 MG tablet Take 1 tablet by mouth nightly 30 tablet 5    acetaminophen (TYLENOL) 325 MG tablet Take 2 tablets by mouth every 6 hours as needed for Pain 120 tablet 3    carvedilol (COREG) 6.25 MG tablet Take 1 tablet by mouth 2 times daily (with meals) 60 tablet 0    mexiletine (MEXITIL) 150 MG capsule Take 1 capsule by mouth every 8 hours 90 Rate and Rhythm: Normal rate and regular rhythm. Heart sounds: No murmur. Pulmonary:      Effort: Pulmonary effort is normal.      Breath sounds: Normal breath sounds. No wheezing or rales. Abdominal:      General: Bowel sounds are normal.      Palpations: Abdomen is soft. There is no mass. Tenderness: There is no abdominal tenderness. There is no guarding or rebound. Musculoskeletal: Normal range of motion. Lymphadenopathy:      Cervical: No cervical adenopathy. Skin:     General: Skin is warm and dry. Neurological:      Mental Status: He is alert and oriented to person, place, and time. Psychiatric:         Mood and Affect: Mood normal.         ASSESSMENT AND PLAN:    Iliana Balbuena was seen today for 1 month follow-up. Diagnoses and all orders for this visit:    Ventricular fibrillation ([address]    SOB (shortness of breath)    Dysphagia, unspecified type  -     FL MODIFIED BARIUM SWALLOW W VIDEO; Future    - continue current oxygen level  - encourage proper tech for eating and drinking    Return in about 6 weeks (around 6/4/2021) for medicare pe or for acute problem.     Daiana Banuelos,

## 2021-04-28 NOTE — CARE COORDINATION
Transportation resources mailed to patient    Paul Imelda, [facility] Coordination Transition

## 2021-04-29 NOTE — CARE COORDINATION
[facility] Transitions Bundled Payments for Care Improvement (BPCI) Follow Up Call  Qualifying Diagnosis related to Pulmonary Function and Health.    4/29/2021  Patient Name:  Asiya Curran   YOB: 1935  Discharge Date:  4/13/21  RARS:  Readmission Risk Score: 19    PCP:  Mini Barry DO    Message left in compliance with HIPPA. Stated who I was, representing the [facility], Care Transitions Team. Requested to place a call to their Physician with any immediate health needs/questions/concerns.        Care Transitions will continue to follow per BPCI Program.  Zaid Iyer, RN, CTN    Future Appointments   Date Time Provider Nahed Maheri   5/3/2021 10:30 AM SEB XRAY RM 2 [facility]   5/13/2021  8:30 AM WILFRIDO Carranza - CNP ELECTRO PHYS [facility]   6/8/2021  9:45 AM SCHEDULE, REMOTE CHECK PHILIP ELECTRO PHYS [facility]   6/11/2021  9:30 AM DO Grace Do [facility]   10/13/2022  2:00 PM Debbie Gallegos MD [facility]

## 2021-05-03 NOTE — PROGRESS NOTES
SPEECH/LANGUAGE PATHOLOGY  VIDEOFLUOROSCOPIC STUDY OF SWALLOWING (MBS)    ADMISSION INFORMATION      PATIENT NAME:  Jimena Jarvis  (male)     MRN:  [id number]    :  1935  (80 y.o.)  STATUS:  Outpatient    TODAY'S DATE:  5/3/2021       REFERRING PROVIDER:    Jaquan Feng DO  ADMITTING DIAGNOSIS: Dysphagia, unspecified type [R13.10]    REASON FOR REFERRAL: Assess for improvement in swallow function      PATIENT REPORT/COMPLAINT: Most recent video swallow study from 2021 recommended soft & bite sized solids with PTL due to silent aspiration and significant pharyngeal delay. Pt is currently receiving SLP services at home. Pt/daughter report current diet is regular solids with NTL and report good toleration. Pt denies recent pneumonia and reports recent decrease in oxygen requirements. SUMMARY OF EVALUATION      DYSPHAGIA DIAGNOSIS:  severe  pharyngeal phase dysphagia; silent aspiration and significant pharyngeal residuals observed during this study       DIET RECOMMENDATIONS:   NPO: Unable to safely recommend PO diet at this time due to Pt's high risk of aspiration on all textures assessed. Recommend that Pt, family, and medical team discuss goals of care and wishes to determine best option for nutrition/ hydration. Pt/daughter educated on recommendation of NPO status and informed that SLP is able to contact physician with pt/daughter present. Pt/daughter stated that they will contact physician independently to further discuss a plan of care. Pt/daughter educated on possible non-oral feedings to be discussed with pt/physician and further determine plan of care.     FEEDING RECOMMENDATIONS:    Assistance level:  Not applicable     Compensatory strategies recommended: Not applicable     Discussed recommendations with nursing or faxed results to referring physician/facility?: Yes    PLAN OF CARE:  Outpatient OR Home Care Skilled SLP intervention for dysphagia management is recommended 1-2 nectar consistency (mildly thick) liquids    PROCEDURE:  Consistencies Administered During the Evaluation   Liquids: thin liquid, nectar thick liquid and honey thick liquid   Solids:  pureed foods      Method of Intake:   cup, spoon  Self fed, Fed by clinician      Position:   Seated, upright, Lateral plane    CLINICAL ASSESSMENT:    ORAL PREPARATION PHASE:    WFL    ORAL PHASE:   WFL     PHARYNGEAL PHASE:     ONSET TIME       Delayed initiation of the pharyngeal swallow was noted with swallow reflex triggered at the level of the pyriform sinus for thin liquid and NTL. PHARYNGEAL RESIDUALS        Vallecula/Pharyngeal Wall           Reduced tongue base retraction resulting in significant residuals in vallecula and/or posterior pharyngeal wall for all consistencies administered which minimally cleared with cued multiple swallows and partially cleared with thin liquid chaser. Pyriform Sinuses      Significant residuals in the pyriform sinuses were noted for all consistencies administered which minimally cleared with cued multiple swallows and partially cleared with thin liquid chaser. LARYNGEAL PENETRATION   Laryngeal penetration occurred prior to aspiration for thin liquid, NTL, and HTL. Further details under aspiration section. Laryngeal penetration occurred in the absence of aspiration AFTER the swallow for pureed foods due to pharyngeal residuals which penetrated deep into the laryngeal vestibule (to the level of the true vocal folds). Laryngeal penetration was moderate and occurred consistently. Significant, consistent penetration of all consistencies noted as pt cued to double swallow in an attempt to clear pharyngeal residuals. ASPIRATION  Aspiration occurred BEFORE the swallow for thin liquid and nectar consistency liquid due to delayed pharyngeal swallow. Aspiration was marked and occurred consistently and an absent cough/throat clear was noted.     Aspiration occurred AFTER the swallow for honey consistency liquid due to pharyngeal residuals. Aspiration was marked and occurred consistently. An absent cough/ throat clear was noted. PENETRATION-ASPIRATION SCALE (PAS):  THIN 8 = Material enters the airway, passes below the vocal folds, and no effort is made to eject   MILDLY THICK 8 = Material enters the airway, passes below the vocal folds, and no effort is made to eject   MODERATELY THICK 8 = Material enters the airway, passes below the vocal folds, and no effort is made to eject   PUREE 5 = Material enters the airway, contacts the vocal folds, and is not ejected from the airway  HARD SOLID item not administered       COMPENSATORY STRATEGIES    Compensatory strategies that were not beneficial included Chin tuck, which resulted in aspiration of pharyngeal residuals after the swallow. STRUCTURAL/FUNCTIONAL ANOMALIES   No structural/functional anomalies were noted    CERVICAL ESOPHAGEAL STAGE :     The cervical esophagus appeared adequate          ___________    Cognition:   Within functional limits for this exam    Oral Peripheral Examination   Adequate lingual/labial strength     Current Respiratory Status   3 liters nasal cannula     Parameters of Speech Production  Respiration:  Adequate for speech production  Quality:   Within functional limits  Intensity: Within functional limits    Pain: No pain reported. EDUCATION:  Learner: Patient and Family  Education:  Reviewed results and recommendations of this evaluation, Reviewed diet and strategies, Reviewed ST goals and Plan of Care and Reviewed recommendations for follow-up  Evaluation of Education: Tauna Masker understanding     INTERVENTION    Pt/family educated on above results and plan of care. Pt/family trained on compensatory strategies for safe swallow and signs and symptoms of aspiration (throat clearing, coughing/choking, wet vocal quality. , etc.). Pt/family encouraged to engage in question/answer session.  All questions answered and pt/family verbalized understanding of above. CPT Code: 73060  dysphagia study, 98546 dysphagia therapy    [x]The admitting diagnosis and active problem list, as listed below have been reviewed prior to initiation of this evaluation. ACTIVE PROBLEM LIST:   Patient Active Problem List   Diagnosis    Automatic implantable cardioverter-defibrillator in situ    Dilatation of aorta (HCC)    Iliac artery aneurysm, bilateral (HCC)    IPF (idiopathic pulmonary fibrosis) (HCC)    Peripheral vascular disease ([address]    Coronary artery disease involving native coronary artery without angina pectoris    Hyperlipidemia LDL goal <100    CKD (chronic kidney disease) stage 3, GFR 30-59 ml/min    Gastroesophageal reflux disease    Insomnia    Vitamin D deficiency    Acute on chronic respiratory failure with hypoxia ([address]    Ventricular fibrillation (HCC)    Moderate protein-calorie malnutrition ([address]       MARQUEZ Duckworth. Speech-Language Pathology Student    Shyla FISCHER CCC/SLP [id number]  Speech-Language Pathologist

## 2021-05-04 NOTE — TELEPHONE ENCOUNTER
Patient had swallow test done yesterday and DIL would like you to call her at your earliest convenience to discuss the findings.

## 2021-05-04 NOTE — TELEPHONE ENCOUNTER
Spoke with patient and his daughter and reviewed the failed barium swallowing study with them. Recommendation is for feeding tube. However he refuses this despite the risk for aspiration. Therefore he will use his maneuvers when he eats to try to avoid aspirating.

## 2021-05-06 NOTE — CARE COORDINATION
[address] for Care Improvement ([facility]) Follow Up Call  Qualifying Diagnosis related to Pulmonary Function and Health. 5/6/2021  Patient Name:  Kizzy Mei   YOB: 1935  Discharge Date:  4/13/21  RARS:  Readmission Risk Score: 19    PCP:  Brenda Pitts DO    Assessment:      Short of Breath: yes with minimal exertion. States his oxygen saturation drops with a few steps, recovers quickly when he sits down. Suggested the possibility of Pulmonary-Cardiac Rehab. Instructed what the benefits are taking this route. Instructed to ask his cardiologist for a recommendation. V/U   Cough Frequency, Productive/Color: denies   Appetite: ok   Sleep pattern: broken sleep, takes Ativan to sleep which is effective.  Thinking clearly: yes   Tired/weakness: yes, quickly fatigues.  Fever, Chills Sweating: denies   Headaches: denies  [address],  Active: is completing the services this week.  Medication Needs/Questions: denies   Transportation Need: children drive    Live Alone: spouse   Ability to [employer], Bathe: requires assistance to shower, is fearful of taking a shower on his own. Suggested that he ask his cardiologist about his fear and if he should always have assistance in the shower. V/U   Recent loss of balance, Falls: none since the defibrillator fired and he fell in the shower.  Do you feel like you have everything you need to stay well at home? yes   Follow Up Appointment: completed   Agreeable to ongoing Care Transition Communications: yes  Care Transitions will continue to follow per [facility] Program.  Radha Gamble RN, CTN  Follow Up Concerns: Fatigue, SOB, OP Therapy. Showering on his own.     Future Appointments   Date Time Provider Nahed Ford   5/13/2021  8:30 AM WILFRIDO Cordero - CNP ELECTRO PHYS [facility]   6/8/2021  9:45 AM SCHEDULE, REMOTE CHECK PHILIP ELECTRO PHYS [facility] 6/11/2021  9:30 AM DO Grace Francisco [facility]   10/13/2022  2:00 PM Syed Keating MD [facility]

## 2021-05-06 NOTE — TELEPHONE ENCOUNTER
Home care called, they will be discharging patient from home care. He does not want to go to hospice but is willing to do palliative care. Please place referral and we will fax to [phone].

## 2021-05-11 NOTE — TELEPHONE ENCOUNTER
Home care called to let you know his bp is still running low he stated to them he is drinking and feeling fine. He also will be getting D/C from home care.

## 2021-05-12 NOTE — TELEPHONE ENCOUNTER
Spoke with Beth Calderon about referral for Palliative Care. Beth Calderon asked that I speak to his daughter Mago Dyson. Explained Palliative Care service to Mago Dyson. [facility] just finished but family has started with a private care giver to assist patient with personal care/shower. Mago Dyson would like to get established with Palliative Care, not ready for Hospice. Explained we would call back once we know provider's June schedule. Mago Dyson was given our contact number if needed. Mago Dyson is available on Monday or Friday to be there for Home visit.

## 2021-05-12 NOTE — PROGRESS NOTES
Electrophysiology Outpatient Follow-Up Note    Jaida Branch  1935  Date of Service: 5/13/2021  Referring Provider/PCP: Mark Gar DO   Primary Electrophysiologist: Dr. Aki Eddy  Chief Complaint: ICD in situ and sustained VT        Subjective: Jaida Branch is seen today for outpatient follow up. He was last seen inpatient from 04/10 to 04/14/2021 at that time he was admitted for VT/ICD shock, a stress test was performed that showed a fixed defect, his Coreg was increased from 3.125 BID to 6.25 BID, and he was started on Mexitil 150 mg TID as his IPF prevented the initiation of amiodarone. Since the time of his discharge he has failed a swallow eval on 05/04 refused a feeding tube and deferred hospice care. He presents today in sinus with a rate of 71 bpm, he is accompanied by his granddaughter. Since his return home he notes continued dyspnea with mild exertion (3-4 steps), significant orthopnea and PND increasing fatigue and weakness. He has had 5 nonsustained VT episodes since his hospitalization 6-23 beats in duration, his OptiVol is back to baseline after being elevated for most of March and April. He continues to be compliant with his remotes.        Patient Active Problem List   Diagnosis    Automatic implantable cardioverter-defibrillator in situ    Dilatation of aorta (HCC)    Iliac artery aneurysm, bilateral (HCC)    IPF (idiopathic pulmonary fibrosis) (HCC)    Peripheral vascular disease [facility]    Coronary artery disease involving native coronary artery without angina pectoris    Hyperlipidemia LDL goal <100    CKD (chronic kidney disease) stage 3, GFR 30-59 ml/min    Gastroesophageal reflux disease    Insomnia    Vitamin D deficiency    Acute on chronic respiratory failure with hypoxia (HCC)    Ventricular fibrillation (HCC)    Moderate protein-calorie malnutrition ([address]     Past Medical History:   Diagnosis Date    Aneurysm ([address]     iliacs    Asthma     CAD (coronary artery disease)     Cardiomyopathy ([facility]     Centrilobular emphysema ([facility]     CHF (congestive heart failure) ([facility])     Dilatation of aorta ([facility] 10/04/2018    Hyperlipidemia     Idiopathic pulmonary fibrosis ([facility])     Iliac artery aneurysm, bilateral ([facility]) 10/04/2018    Inferoposterior myocardial infarction ([facility])     NSVT (nonsustained ventricular tachycardia) ([facility])        Medications:  Current Outpatient Medications on File Prior to Visit   Medication Sig Dispense Refill    fluticasone (FLONASE) 50 MCG/ACT nasal spray 1 spray by Each Nostril route daily as needed for Rhinitis      rosuvastatin (CRESTOR) 5 MG tablet Take 1 tablet by mouth nightly 30 tablet 5    carvedilol (COREG) 6.25 MG tablet Take 1 tablet by mouth 2 times daily (with meals) 60 tablet 0    Fluticasone furoate-vilanterol (BREO ELLIPTA) 200-25 MCG/INH AEPB inhaler Inhale 1 puff into the lungs daily       sacubitril-valsartan (ENTRESTO) 24-26 MG per tablet Take 1 tablet by mouth 2 times daily 60 tablet 2    albuterol sulfate  (90 Base) MCG/ACT inhaler Inhale 2 puffs into the lungs every 6 hours as needed for Wheezing 3 Inhaler 0    montelukast (SINGULAIR) 10 MG tablet Take 10 mg by mouth nightly      albuterol (PROVENTIL) (2.5 MG/3ML) 0.083% nebulizer solution USE 1 VIAL IN NEBULIZER 4 TIMES DAILY 120 vial 11    potassium chloride (KLOR-CON M) 20 MEQ extended release tablet Take 1 tablet by mouth daily 90 tablet 0    furosemide (LASIX) 20 MG tablet Take 1 tablet by mouth daily 90 tablet 0    OFEV 150 MG CAPS TAKE 1 CAPSULE BY MOUTH TWICE A DAY  EVERY 12 HOURS  AS DIRECTED WITH FOOD  12    Multiple Vitamins-Minerals (MULTIVITAMIN ADULT) TABS Take by mouth daily      Coenzyme Q10 (COQ10 PO) Take 100 mg by mouth daily       aspirin 81 MG EC tablet Take 81 mg by mouth daily        No current facility-administered medications on file prior to visit.         No Known Allergies    ROS:   Constitutional: Negative for fever, + for  activity change and appetite change. HENT: Negative for epistaxis. Eyes: Negative for diploplia, blurred vision. Respiratory: Negative for  chest tightness,and wheezing.  + for cough and shortness of breath   Cardiovascular: pertinent positives in HPI  Gastrointestinal: Negative for abdominal pain and blood in stool. All other review of systems are negative      Physical exam:   BP (!) 92/50   Resp 18   Ht 5' 10\" (1.778 m)   Wt 140 lb (63.5 kg)   BMI 20.09 kg/m²    Constitutional: well developed, in no acute distress   Eyes: conjunctivae normal, no xanthelasma   Ears, Nose, Throat: oral mucosa moist, no cyanosis   Neck: supple, + JVD, no bruits, no thyromegaly   CV: regular rate & rhythm, normal S1 & S2, No S3 or S4. 2/6 sytolic murmurs, rubs, or gallops. Peripheral pulses normal   Lungs: Basilar crackles bilaterally, increased respiratory effort   Abdomen: soft, non-tender, bowel sounds present, no masses or hepatomegaly   Extremities: no digital clubbing, no edema   Skin: warm, no rashes  Neuro/Psych: A&O x 3, normal mood and affect  ICD site: well healed. No erosion, infection or migration    Data:   Lab Results   Component Value Date    WBC 14.4 (H) 04/12/2021    HGB 11.0 (L) 04/12/2021    HCT 35.9 (L) 04/12/2021    MCV 94.0 04/12/2021     04/12/2021     Lab Results   Component Value Date    CREATININE 0.9 04/12/2021     Lab Results   Component Value Date    INR 1.3 01/07/2018    INR 1.2 07/20/2014    INR 1.3 04/17/2014    PROTIME 13.6 (H) 01/07/2018    PROTIME 12.0 07/20/2014    PROTIME 14.1 (H) 04/17/2014     Lab Results   Component Value Date    TSH 0.689 02/05/2020     Lab Results   Component Value Date    ALT 15 04/10/2021    AST 19 04/10/2021    ALKPHOS 47 04/10/2021    BILITOT 0.5 04/10/2021     Pertinent Cardiac Testing:    TTE 01/08/18:   Findings      Left Ventricle   Left ventricle is mildly enlarged .   Moderate left ventricular concentric hypertrophy noted.   Inferior wall hypokinesis.   Ejection fraction is visually estimated at 40%.   There is doppler evidence of stage I diastolic dysfunction.      Right Ventricle   Normal right ventricular size and function.      Left Atrium   The left atrium is moderately dilated.      Right Atrium   Normal right atrium size.      Mitral Valve   No evidence of mitral valve stenosis.      Tricuspid Valve   Mild tricuspid regurgitation. RVSP is 32 mmHg.      Aortic Valve   Aortic valve opens well.   The aortic valve appears mildly sclerotic.      Pulmonic Valve   Not well visualized. Normal Doppler evaluation.      Pericardial Effusion   No evidence of pericardial effusion.      Aorta   Aortic root dimension within normal limits.   Miscellaneous   Normal Inferior Vena Cava diameter and respiratory variation.      Conclusions      Summary   Left ventricle is mildly enlarged .   Moderate left ventricular concentric hypertrophy noted.   Inferior wall hypokinesis.   Ejection fraction is visually estimated at 40%.   .      Signature      ----------------------------------------------------------------   Electronically signed by Yg Cifuentes MD(Interpreting physician)  Ulysses Alatorre 01/09/2018 08:18 AM   ----------------------------------------------------------------         Echocardiogram 03/02/21    Left Ventricle   Left ventricle grossly normal in size. Mild left ventricular concentric hypertrophy noted. Global hypokinesis is noted to be moderate to severe. Estimated left ventricular ejection fraction is 25-30%. There is doppler evidence of stage I diastolic dysfunction. Right Ventricle   Normal right ventricular size. Right ventricle global systolic function is moderately reduced . Left Atrium   The left atrium is moderately dilated. The LAESV Index is >34 ml/m2. Interatrial septum appears intact. Right Atrium   Right atrium is normal in size. Mitral Valve   Mild mitral annular calcification.    Increased E stress test 04/12/21:   IMAGING: Myocardial perfusion imaging was performed at rest 30-35   minutes following the intravenous injection of 11.5 mCi of   (Tc-Sestamibi) followed by 10 ml of Normal Saline.  At peak exercise,   the patient was injected intravenously with 32mCi of (Tc-Sestamibi)   followed by 10 ml of Normal Saline.  Gated post-stress tomographic   imaging was performed 20-25 minutes after stress.        FINDINGS: The overall quality of the study was poor.       Left ventricular cavity size was noted to be dilated on both the   stress and resting images       Rotational analog analysis demonstrated no significant motion artifact       A severedefect was present in the lateral wall  wall(s) that was   largesized by quantification.              The resting images no change        Gated SPECT left ventricular ejection fraction was calculated to be   36% with apical akinesis and septal dyskinesis.         Impression   The myocardial perfusion imaging was abnormal       IF TEST NORMAL-HIGHLIGHT AND DELETE FOLLOWING SECTION:   The abnormality was a large severe fixed defect involving the entire   lateral wall consistent with a myocardial infarction with no evidence   of significant residual stress-induced ischemia   Overall left ventricular systolic function was moderately impaired   Intermediate risk pharmacologic stress test.           Device Interrogation ( 05/13/21 )  Make/Model Medtronic Evera XT  Mode AAIR <--> DDDR 60/120  P wave: 1.9 mV  Impedance: 456 ohms   Threshold: 1 V @ 0.4 ms  RV R wave: 4.9 mV  Impedance: 323 ohms   Threshold: 1.75 V @ 0.4 ms  Pacing: A: 30.8%  RV: <0.1%   Battery Voltage/Longevity:  1.9-1.4 years 2.96  Charge time 7.9 sec. Arrhythmias: 5 NSVT 1-3 sec 6-23 beats since the time of his hospitalization, elevated OptiVol in February and March now back to baseline  Reprogramming included counters cleared.    Overall device function is normal  All device programmable settings were evaluated per above and in the scanned document, along with iterative adjustments (capture thresholds) to assess and select the most appropriate final programming to provide for consistent delivery of the appropriate therapy and to verify function of the device. Impression:    1. Dual-chamber ICD in situ  - Medtronic.  - Secondary prevention.   - In July 2014, presented with syncopal episode. Due to ischemic cardiomyopathy, prior myocardial infarction, and documented monomorphic nonsustained VT, he underwent an EP study on 7/22/14which showed inducible ventricular tachycardia  - Status post ICD implantation 7/22/14.  - Normal device function. 2. 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   - Non-sustained VT noted on follow up. - VT that terminated after one shock on 04/10/21  - Mexetil 150 mg TID started 04/10/21  - Continued NSVT 6-23 beats. - Continue to monitor. 3. Coronary artery disease  - STEMI s/p RCA thrombectomy and CABG in April 2014  - Large fixed defect seen in stress test 04/12/21  - On ASA, Crestor and Coreg. 4. Ischemic cardiomyopathy  - Status post ICD after positive EP study in July 2014. - Echocardiogram: EF 35-40% in 5/2015. - Echocardiogram: EF 40% in 1/2018  - Echocardiogram: EF 35-40% on 1/28/20  - Echocardiogram: EF 25-30% on 03/02/21  - Currently Compensated and euvolemic.   - NYHA class IV, ACC stage C vs.D  - On GDMT including Coreg, Entresto and Lasix. 5. Pulmonary fibrosis  - On continuous O2 cannula. - Follows with Dr. Lorenzo Samson     6. Polymyalgia rheumatica  - On chronic Prednisone use      7. Aspiration Pneumonia,   Failed swallow evaluation 05/04/21    Recommendations:     1. Continue Carvedilol 6.25 mg bid. 2. Increase Mexitil to  200 mg TID, If worsening fatigue can reduce back to 150 MG TID.    3. Referral to advanced heart failure for possible inotropic support at the [facility] per the patients request. (NYHA class IV on maximal tolerated medical therapy)      4. Continue follow up with Cardiology for GDMT adjustment. 5. Remote ICD monitoring via [facility] transmission every 91 days. 6. Follow-up with in 3 months with Dr. Sarah Rodriguez. Encouraged the patient to call the office for any questions or concerns. I have spent a total of 35 minutes with the patient and the family reviewing the above stated recommendations. And a total of >50% of that time involved face-to-face time providing counseling and or coordination of care with the other providers    Thank you for allowing me the opportunity to participate in the care of your patient.      Judi Byrne, APRN - CNP  Cardiac Electrophysiology  [facility]) Physicians  [facility] [place]: [facility]  11:01 AM  5/13/2021

## 2021-05-13 NOTE — DISCHARGE SUMMARY
[address]                  [address] [facility],  [facility]                               DISCHARGE SUMMARY    PATIENT NAME: Prabhjot Yoon                  :        1935  MED REC NO:   [id number]                            ROOM:       [facility]  ACCOUNT NO:   [de-identified]                           ADMIT DATE: 04/10/2021  PROVIDER:     Pooja Clarke DO                  DISCHARGE DATE:  2021    DISCHARGE DIAGNOSES:  1. Ventricular fibrillation status post ICD shock. 2.  Syncope. 3.  Laceration, scalp. 4.  Nasal fracture. 5.  Coronary artery disease. 6.  Pulmonary fibrosis. 7.  History of MI.  8.  Hyperlipidemia. 9.  Status post pacemaker ICD. 9.  Maxillary sinus fracture. HOSPITAL COURSE:  The patient is an 42-year-old  male,  presented to the emergency room after a syncopal episode at home. Diagnostic evaluation in the emergency room revealed pacemaker  interrogation consistent with ventricular fibrillation status post  defibrillator shock. The patient was admitted to the hospital.  The  patient was seen by Electrophysiology. His medications were adjusted. He underwent nuclear stress test which revealed large severe fixed  defect involving the entire lateral wall consistent with a myocardial  infarction with no evidence of significant residual stress-induced  ischemia. Overall, left ventricular systolic function was moderately  impaired. The patient was discharged to home in stable condition on  2021. DISCHARGE MEDS:  As per discharge med rec which include:  1. Tylenol p.r.n.  2.  Mexitil 150 mg every eight hours. 3.  Carvedilol 6.25 mg b.i.d.  4.  Albuterol nebulizer four times daily. 5.  Albuterol inhaler p.r.n.  6.  Aspirin 81 mg daily. 7.  Breo Ellipta 200/25 one puff daily. 8.  CoQ10 daily. 9.  Colace 100 mg daily p.r.n.  10.  Entresto 24/26 one tab b.i.d.  11.  Furosemide 20 mg daily.   12. Singulair 10 mg daily. 13.  Multivitamin daily. 14.  Ofev 150 mg b.i.d.  15.  Potassium chloride 20 mEq one daily. 16.  Lorazepam p.r.n. FOLLOWUP:  The patient instructed to follow up with Dr. Allison Park,  Electrophysiology, in one week. Follow up with Dr. Yuliana Duncan, call office for appointment.         Carlos Sandoval DO    D: 05/12/2021 19:16:42       T: 05/12/2021 19:23:13     [email or web address]  Job#: [id number]     Doc#: [id number]    CC:  Yuliana Duncan DO

## 2021-05-20 NOTE — CARE COORDINATION
Pharmacist: Completed      Social Work: Completed    Other Interventions:            Follow Up  Future Appointments   Date Time Provider Nahed Maheri   6/8/2021  9:45 AM SCHEDULE, REMOTE CHECK PHILIP ELECTRO [facility]YS [facility]   6/11/2021  9:30 AM DO Pattie Rivera [facility]   6/11/2021  1:00 PM WILFRIDO Agarwal - CNP [facility]   10/13/2022  2:00 PM Guillermo Carmichael MD [facility]       Sharif Bunn RN

## 2021-06-07 NOTE — TELEPHONE ENCOUNTER
SW made call to pt daughter to change scheduled appt for home visit with Juany Short due to change in NP schedule. Pt rescheduled from 6-11 to 6-17 at 2pm. Spoke with Giovanni Bhardwaj, agreed to appt changed time and date due to scheduling issue and Giovanni Bhardwaj only available on Thursday and Friday.

## 2021-06-11 NOTE — PROGRESS NOTES
Medicare Annual Wellness Visit  Name: Rhonda Tierney Date: 2021   MRN: [id number] Sex: Male   Age: 80 y.o. Ethnicity: Non-/Non    : 1935 Race: Ashlie Myers is here for Medicare AWV    Screenings for behavioral, psychosocial and functional/safety risks, and cognitive dysfunction are all negative except as indicated below. These results, as well as other patient data from the [address] form, are documented in Flowsheets linked to this Encounter. No Known Allergies      Prior to Visit Medications    Medication Sig Taking? Authorizing Provider   Coenzyme Q10 (COQ10) 100 MG CAPS Take 100 mg by mouth daily Yes Luis Banuelos DO   furosemide (LASIX) 20 MG tablet Take 1 tablet by mouth daily Yes Luis Banuelos DO   montelukast (SINGULAIR) 10 MG tablet Take 1 tablet by mouth nightly Yes Luis Banuelos DO   aspirin 81 MG EC tablet Take 1 tablet by mouth daily Yes Luis Banuelos DO   OFEV 150 MG CAPS TAKE 1 CAPSULE BY MOUTH TWICE A DAY (EVERY 12 HOURS) AS DIRECTED WITH FOOD.  Yes Cecil Rucker,    mexiletine (MEXITIL) 200 MG capsule Take 1 capsule by mouth 3 times daily Yes WILFRIDO Novoa CNP   carvedilol (COREG) 6.25 MG tablet Take 1 tablet by mouth 2 times daily (with meals) Yes WILFRIDO Novoa CNP   ENTRESTO 24-26 MG per tablet TAKE ONE TABLET BY MOUTH 2 TIMES A DAY Yes Luis Banuelos DO   fluticasone (FLONASE) 50 MCG/ACT nasal spray 1 spray by Each Nostril route daily as needed for Rhinitis Yes Historical Provider, MD   rosuvastatin (CRESTOR) 5 MG tablet Take 1 tablet by mouth nightly Yes Luis Banuelos DO   Fluticasone furoate-vilanterol (BREO ELLIPTA) 200-25 MCG/INH AEPB inhaler Inhale 1 puff into the lungs daily  Yes Historical Provider, MD   albuterol sulfate  (90 Base) MCG/ACT inhaler Inhale 2 puffs into the lungs every 6 hours as needed for Wheezing Yes Jojo CHAMPAGNE kg/m². Based upon direct observation of the patient, evaluation of cognition reveals recent and remote memory intact. General Appearance: alert and oriented to person, place and time, but slight cachexia, in no acute distress but wearing oxygen via NC. Skin: warm and dry, no rash or erythema, but numerous ecchymotic lesions on forearms. Head: normocephalic and atraumatic  Eyes: pupils equal, round, and reactive to light, extraocular eye movements intact, conjunctivae normal  ENT: tympanic membrane, external ear and ear canal normal bilaterally, nose without deformity, nasal mucosa and turbinates normal without polyps  Neck: supple and non-tender without mass, no thyromegaly or thyroid nodules, no cervical lymphadenopathy  Pulmonary/Chest: clear to auscultation bilaterally- no wheezes, rales or rhonchi, normal air movement, no respiratory distress  Cardiovascular: normal rate, regular rhythm, normal S1 and S2, no murmurs, rubs, clicks, or gallops, distal pulses intact, no carotid bruits  Abdomen: soft, non-tender, non-distended, normal bowel sounds, no masses or organomegaly  Extremities: no cyanosis, clubbing or edema  Musculoskeletal: normal range of motion, no joint swelling, deformity or tenderness  Neurologic: reflexes normal and symmetric, no cranial nerve deficit, gait, coordination and speech normal    Patient's complete Health Risk Assessment and screening values have been reviewed and are found in Flowsheets. The following problems were reviewed today and where indicated follow up appointments were made and/or referrals ordered.     Positive Risk Factor Screenings with Interventions:           Health Habits/Nutrition:  Health Habits/Nutrition  Do you exercise for at least 20 minutes 2-3 times per week?: (!) No  Have you lost any weight without trying in the past 3 months?: (!) Yes  Do you eat only one meal per day?: No  Have you seen the dentist within the past year?: Yes  Body mass index: 19.8  Health Habits/Nutrition Interventions:  · Inadequate physical activity:  patient is not ready to increase his/her physical activity level at this time  · Nutritional issues:  appetite is improving       Personalized Preventive Plan   Current Health Maintenance Status  Immunization History   Administered Date(s) Administered    Influenza A (K4Y7-72) Vaccine PF IM 12/10/2009    Influenza A (B2k9-36),all Formulations 12/01/2009    Influenza Vaccine, unspecified formulation 11/09/2016    Influenza Virus Vaccine 11/01/2015    Influenza, High Dose (Fluzone 65 yrs and older) 11/05/2015, 11/13/2015, 11/09/2016, 09/15/2017, 09/07/2018, 09/05/2019    Influenza, Quadv, adjuvanted, 65 yrs +, IM, PF (Fluad) 09/24/2020    Pneumococcal Conjugate 13-valent ([id number]) 12/01/2015, 10/01/2016    Pneumococcal Conjugate Vaccine 09/18/2018    Pneumococcal Polysaccharide ([id number]) 07/08/2020    Tdap (Boostrix, Adacel) 04/10/2021    Zoster Live (Zostavax) 02/06/2013, 11/01/2015    Zoster Recombinant (Shingrix) 11/14/2018        Health Maintenance   Topic Date Due    Shingles Vaccine (3 of 3) 01/09/2019    Annual Wellness Visit (AWV)  Never done    Lipid screen  09/04/2021    Potassium monitoring  04/12/2022    Creatinine monitoring  04/12/2022    DTaP/Tdap/Td vaccine (2 - Td or Tdap) 04/10/2031    Flu vaccine  Completed    Pneumococcal 65+ years Vaccine  Completed    COVID-19 Vaccine  Completed    Hepatitis A vaccine  Aged Out    Hepatitis B vaccine  Aged Out    Hib vaccine  Aged Out    Meningococcal (ACWY) vaccine  Aged Out     Recommendations for Omni-ID Due: see orders and patient instructions/AVS.  . Recommended screening schedule for the next 5-10 years is provided to the patient in written form: see Patient Instructions/AVS.    Ana Bajwa was seen today for medicare awv.     Diagnoses and all orders for this visit:    Routine general medical examination at a health care facility

## 2021-06-11 NOTE — PATIENT INSTRUCTIONS
Personalized Preventive Plan for Jaida Branch - 6/11/2021  Medicare offers a range of preventive health benefits. Some of the tests and screenings are paid in full while other may be subject to a deductible, co-insurance, and/or copay. Some of these benefits include a comprehensive review of your medical history including lifestyle, illnesses that may run in your family, and various assessments and screenings as appropriate. After reviewing your medical record and screening and assessments performed today your provider may have ordered immunizations, labs, imaging, and/or referrals for you. A list of these orders (if applicable) as well as your Preventive Care list are included within your After Visit Summary for your review. Other Preventive Recommendations:    · A preventive eye exam performed by an eye specialist is recommended every 1-2 years to screen for glaucoma; cataracts, macular degeneration, and other eye disorders. · A preventive dental visit is recommended every 6 months. · Try to get at least 150 minutes of exercise per week or 10,000 steps per day on a pedometer . · Order or download the FREE \"Exercise & Physical Activity: Your Everyday Guide\" from [employer]. Call 1-738.517.9418 or search [employer] online. · You need 7478-7386 mg of calcium and 4890-1427 IU of vitamin D per day. It is possible to meet your calcium requirement with diet alone, but a vitamin D supplement is usually necessary to meet this goal.  · When exposed to the sun, use a sunscreen that protects against both UVA and UVB radiation with an SPF of 30 or greater. Reapply every 2 to 3 hours or after sweating, drying off with a towel, or swimming. · Always wear a seat belt when traveling in a car. Always wear a helmet when riding a bicycle or motorcycle.

## 2021-06-17 NOTE — TELEPHONE ENCOUNTER
Community Based Palliative Care Initial Psychosocial Assessment      DEMOGRAPHICS  Name: Alina Dutton  : 1935  Age: 80 y.o. Gender: male  Race:   Marital Status:    Confucianism: unknown  : Yes   Branch: unknown   Overall Experience: unknown   VA Benefits Exploration: VA affiliated      GENERAL INFORMATION  Primary Palliative Diagnosis: COPD  Medical Insurance: Medicare   Prescription Coverage: Medicare  Preferred Pharmacy:  The [address] [facility], [address] [address]  Phone: [phone] Fax: [phone]    PCP: Bishop Lester DO  Employment: retired  Advanced Directives: [address] [facility] and Financial Power of   Language: English  Communication Barriers: none     PSYCHOSOCIAL  Highest Level of Education: unknown  Learning barriers: none  Ethnic/Cultural Considerations: none  Interests/Hobbies: unknown  Community Affiliations: unknown  Physical Needs Met: yes  Emotional Needs Met: yes  Spiritual Needs Met: yes  Indicators of Abuse/Neglect/Exploitation: no  Substance Abuse: none   Tobacco: none   Alcohol: none   Illicit Drugs: none  Mental Status Exam:   General Observation:     Appearance: _X__Well groomed ___Unkempt ___Disheveled    Build: ___Average _X__Thin ___Overweight    Demeanor: __X_Average ___Hostile ___Mistrustful  ___Withdrawn                         ___Preoccupied___Demanding    Eye contact: _X__Average ___Avoidant ___Intense    Activity: __X_Average ___Agitated ___Slow    Speech: _X__Clear ___Slurred ___Rapid ___Pressured     Thought content:     _X__No disturbance reported or observed    ___Obsessional ___Phobic ___Guilty ___Autistic     ___ Ideas of Reference ___Preoccupied ____Guarded ___other:     Delusions:_X__No disturbance reported or observed      ___ Grandiose ___Persecutory ___Somatic ___Bizarre                 ___Nihilistic ___Religious     Perception: Hallucinations other    _X__No disturbance reported or observed    ___Auditory ___Visual ___Olfactory ___Gustatory ___Tactile     ___Illusions___Depersonalization ___ Derealization     Thought Process:    _X__No disturbance reported or observed    ___Logical ___Circumstantial ___Tangential ___Loose ___Racing   ___Incoherent ___Concrete ___Blocked ___Flight of Ideas     Mood:    _X__Euthymic ___Depressed ___Anxious ___Angry ___Euphoric    ___Irritable     Affect:    _X__ Full ___Constricted ___Flat ___Inapproriate ___Labile     Behavior:     _X__Cooperative ___Resistant ___Agitated ___Impulsive    ___Over sedated___ Anhedonia ___Akasthisia ___Dystonia    ___Assaultive ___Aggressive___Restless ___Loss of interests    ___Withdrawn ___Tardive dyshinesia     Cognitive:     __X_No disturbance reported or observed    Impairment of: ___Orientation ___Memory     ___Attention/Concentration ___Ability to Abstract    Estimate of Intelligence: ___Signficantly below Average            _X__Average or Above   Insight/Judgement:     _X__Good ___Fair ___Poor             Mental Health History: No known history  Suicidal/Homicidal Ideation: none     ENVIRONMENT  Living Situation:  at home  Safety: home with a reliable caregiver  Evacuation: _X__Independent___Needs assistance  Safety Awareness: _X__Good___Fair___Poor  ADL needs: bathing, housekeeping, meal preparation, shopping and transportation  DME needs: walker     FINANCES  Income Source: retired  Additional Resources: none     FAMILY/CAREGIVER INFORMATION  Primary support: Wife, adult children, grandchildren  Coping/Strengths/Barriers: none  Children/Dependents: Adult children      ASSESSMENT  Patient is an 80 yr old male who resides with his wife at home. His wife and adult son/daughter are his primary caregivers along with his grandchildren.  Pt is affiliated with the [address] [place] [address], he was alert and oriented x3 during appt, answered all questions and provided all needed information. PLAN  SW will continue to follow as needed for any needs. Link pt to [facility] to see what other assistance can be available for pt.      Electronically signed by JORDYN Krause, LSW on 6/17/21 at 7:56 PM EDT

## 2021-06-30 PROBLEM — J96.20 ACUTE-ON-CHRONIC RESPIRATORY FAILURE (HCC): Status: ACTIVE | Noted: 2021-01-01

## 2021-07-02 NOTE — TELEPHONE ENCOUNTER
SW contacted pt daughter Kenan Reddy to schedule next [address] home visit with Brandon Estrella. Pt scheduled for 7-23 at [address] agreed to appt time and date.

## 2021-08-03 NOTE — ED PROVIDER NOTES
light-headedness and headaches. Physical Exam  Constitutional:       Appearance: Normal appearance. He is normal weight. HENT:      Head: Normocephalic and atraumatic. Right Ear: External ear normal.      Left Ear: External ear normal.      Mouth/Throat:      Mouth: Mucous membranes are moist.      Pharynx: Oropharynx is clear. Eyes:      Extraocular Movements: Extraocular movements intact. Conjunctiva/sclera: Conjunctivae normal.   Cardiovascular:      Rate and Rhythm: Normal rate and regular rhythm. Pulses: Normal pulses. Heart sounds: Normal heart sounds. Pulmonary:      Effort: Pulmonary effort is normal. No respiratory distress. Breath sounds: Normal breath sounds. No stridor. Comments: Moderately decreased breath sounds diffusely    Abdominal:      General: Abdomen is flat. Bowel sounds are normal.      Palpations: Abdomen is soft. Musculoskeletal:         General: Normal range of motion. Cervical back: Normal range of motion and neck supple. Skin:     General: Skin is warm and dry. Neurological:      General: No focal deficit present. Mental Status: He is alert and oriented to person, place, and time. Mental status is at baseline. Psychiatric:         Mood and Affect: Mood normal.         Behavior: Behavior normal.          Procedures       MDM  Number of Diagnoses or Management Options  Chest tightness  Shortness of breath  Diagnosis management comments: This is a 81 yo male with a PMHx of cardioverter/defibrillator and idiopathic pulmonary fibrosis presents to the ED due to chest pain that began around 11 PM tonight. EKG revealed normal sinus rhythm with evidence of old infarcts that were present on previous EKGs. There are no acute interval or ST segment changes. Patient was scheduled to receive nitroglycerin and aspirin for his symptoms, but he stated his symptoms had already resolved before then.   His lab work and chest x-ray are unchanged from his most recent visit. Initial troponin was 35 and repeat is pending. He is resting comfortably in the bed on re-evaluation. He is Covid vaccinated. Repeat troponin is 39 and thus negative. Patient states his blood pressure is typically low and this is within normal range for him. Case was discussed with patient and he is aware that while we can rule out a heart attack, we are unable to concretely determine if this chest tightness was coming from his heart or not. He denied admission and observation and would prefer to be discharged. He has been told to follow up with his PCP or cardiologist as soon as possible, preferably within two days of this visit for further workup. He will be discharged home and has been told to return to the ED if his symptoms return or worsen. ED Course as of Aug 03 0546   Tue Aug 03, 2021   0230 Patient's chest pain resolved on re-evaluation. [JS]      ED Course User Index  [JS] Ioana Hernandez DO        ED Course as of Aug 03 0546   Tue Aug 03, 2021   0230 Patient's chest pain resolved on re-evaluation. [JS]      ED Course User Index  [JS] Ioana Hernandez DO       --------------------------------------------- PAST HISTORY ---------------------------------------------  Past Medical History:  has a past medical history of Aneurysm ([facility] Asthma, CAD (coronary artery disease), Cardiomyopathy ([address] Centrilobular emphysema ([address] CHF (congestive heart failure) ([address] Dilatation of aorta ([address] Hyperlipidemia, Idiopathic pulmonary fibrosis ([address] Iliac artery aneurysm, bilateral ([address] Inferoposterior myocardial infarction ([address] and NSVT (nonsustained ventricular tachycardia) ([facility] [address] [facility] Past Surgical History:  has a past surgical history that includes Cardiac catheterization (4-); Coronary artery bypass graft ( 4/16/2014); Coronary angioplasty (4/15/2014); Cardiac defibrillator placement; and hernia repair.     Social History:  reports that he quit smoking about 41 years ago. His smoking use included cigarettes. He started smoking about 71 years ago. He has a 30.00 pack-year smoking history. He has never used smokeless tobacco. He reports current alcohol use. He reports that he does not use drugs. Family History: family history includes High Cholesterol in his sister; Hypertension in his mother. The patients home medications have been reviewed. Allergies: Patient has no known allergies.     -------------------------------------------------- RESULTS -------------------------------------------------  Labs:  Results for orders placed or performed during the hospital encounter of 08/03/21   CBC Auto Differential   Result Value Ref Range    WBC 11.8 (H) 4.5 - 11.5 E9/L    RBC 3.49 (L) 3.80 - 5.80 E12/L    Hemoglobin 10.6 (L) 12.5 - 16.5 g/dL    Hematocrit 33.9 (L) 37.0 - 54.0 %    MCV 97.1 80.0 - 99.9 fL    MCH 30.4 26.0 - 35.0 pg    MCHC 31.3 (L) 32.0 - 34.5 %    RDW 16.1 (H) 11.5 - 15.0 fL    Platelets 384 952 - 587 E9/L    MPV 8.7 7.0 - 12.0 fL    Neutrophils % 49.9 43.0 - 80.0 %    Immature Granulocytes % 0.5 0.0 - 5.0 %    Lymphocytes % 42.0 20.0 - 42.0 %    Monocytes % 6.2 2.0 - 12.0 %    Eosinophils % 1.3 0.0 - 6.0 %    Basophils % 0.1 0.0 - 2.0 %    Neutrophils Absolute 5.89 1.80 - 7.30 E9/L    Immature Granulocytes # 0.06 E9/L    Lymphocytes Absolute 4.95 (H) 1.50 - 4.00 E9/L    Monocytes Absolute 0.73 0.10 - 0.95 E9/L    Eosinophils Absolute 0.15 0.05 - 0.50 E9/L    Basophils Absolute 0.01 0.00 - 0.20 I8/S   Basic Metabolic Panel w/ Reflex to MG   Result Value Ref Range    Sodium 138 132 - 146 mmol/L    Potassium reflex Magnesium 4.4 3.5 - 5.0 mmol/L    Chloride 104 98 - 107 mmol/L    CO2 29 22 - 29 mmol/L    Anion Gap 5 (L) 7 - 16 mmol/L    Glucose 112 (H) 74 - 99 mg/dL    BUN 29 (H) 6 - 23 mg/dL    CREATININE 1.1 0.7 - 1.2 mg/dL    GFR Non-African American >60 >=60 mL/min/1.73    GFR African American >60     Calcium 8.6 8.6 - 10.2 mg/dL   Troponin   Result Value Ref Range    Troponin, High Sensitivity 35 (H) 0 - 11 ng/L   Troponin   Result Value Ref Range    Troponin, High Sensitivity 37 (H) 0 - 11 ng/L   Troponin   Result Value Ref Range    Troponin, High Sensitivity 39 (H) 0 - 11 ng/L   EKG 12 Lead   Result Value Ref Range    Ventricular Rate 61 BPM    Atrial Rate 61 BPM    P-R Interval 182 ms    QRS Duration 134 ms    Q-T Interval 426 ms    QTc Calculation (Bazett) 428 ms    P Axis 14 degrees    R Axis -22 degrees    T Axis 13 degrees       Radiology:  XR CHEST PORTABLE   Final Result   Unchanged chronic interstitial lung disease.             ------------------------- NURSING NOTES AND VITALS REVIEWED ---------------------------  Date / Time Roomed:  8/3/2021  1:38 AM  ED Bed Assignment:  25/25    The nursing notes within the ED encounter and vital signs as below have been reviewed. /61   Pulse 64   Temp 98.1 °F (36.7 °C) (Oral)   Resp 14   Ht 5' 10\" (1.778 m)   Wt 148 lb 9.6 oz (67.4 kg)   SpO2 99%   BMI 21.32 kg/m²   Oxygen Saturation Interpretation: Normal      ------------------------------------------ PROGRESS NOTES ------------------------------------------  4:57 AM EDT  I have spoken with the patient and discussed todays results, in addition to providing specific details for the plan of care and counseling regarding the diagnosis and prognosis. Their questions are answered at this time and they are agreeable with the plan. I discussed at length with them reasons for immediate return here for re evaluation. They will followup with their primary care physician by calling their office tomorrow. --------------------------------- ADDITIONAL PROVIDER NOTES ---------------------------------  At this time the patient is without objective evidence of an acute process requiring hospitalization or inpatient management. They have remained hemodynamically stable throughout their entire ED visit and are stable for discharge with outpatient follow-up.      The plan has been discussed in detail and they are aware of the specific conditions for emergent return, as well as the importance of follow-up. New Prescriptions    No medications on file       Diagnosis:  1. Chest tightness Controlled   2. Shortness of breath Controlled       Disposition:  Patient's disposition: Discharge to home  Patient's condition is stable. Patient was seen and evaluated by myself and my attending Amari Zavala DO. Assessment and Plan discussed with attending provider, please see attestation for final plan of care.      DO Suhail Woo DO  Resident  08/03/21 [id number] Haja Phelps DO  Resident  08/03/21 [id number]

## 2021-08-05 NOTE — TELEPHONE ENCOUNTER
WILLOW spoke with daughter Vianey Sweeney to schedule pt home visit appt with Diomedes Adair. Pt scheduled for 8-31 at [facility] agreed to appt time and date.

## 2021-08-10 NOTE — PROGRESS NOTES
Sandy Gunter, a male of 80 y.o. came to the office 8/10/2021. Patient Active Problem List   Diagnosis    Automatic implantable cardioverter-defibrillator in situ    Dilatation of aorta (HCC)    Iliac artery aneurysm, bilateral (HCC)    IPF (idiopathic pulmonary fibrosis) ([facility])    Peripheral vascular disease ([facility]    Coronary artery disease involving native coronary artery without angina pectoris    Hyperlipidemia LDL goal <100    CKD (chronic kidney disease) stage 3, GFR 30-59 ml/min (formerly [facility])    Gastroesophageal reflux disease    Insomnia    Vitamin D deficiency    Acute-on-chronic respiratory failure ([facility]    Ventricular fibrillation ([facility]    Moderate protein-calorie malnutrition ([facility]          Chest Pain   This is a new problem. Episode onset: began 11.30 pm 8/2. went to ER 8/3. The onset quality is sudden. The problem has been resolved. The pain is present in the substernal region. The quality of the pain is described as pressure. The pain does not radiate. Associated symptoms include shortness of breath. Pertinent negatives include no diaphoresis, headaches, lower extremity edema, nausea, palpitations or vomiting. Associated symptoms comments: Chest pressure. . The pain is aggravated by nothing. He has tried nitroglycerin (one Nitro helped. went to ER.) for the symptoms. The treatment provided significant relief. His past medical history is significant for CAD.    k dur getting stuck in throat. No Known Allergies    Current Outpatient Medications on File Prior to Visit   Medication Sig Dispense Refill    ENTRESTO 24-26 MG per tablet TAKE 1 TABLET BY MOUTH 2 TIMES A DAY 60 tablet 0    mexiletine (MEXITIL) 200 MG capsule TAKE ONE CAPSULE BY MOUTH 3 TIMES A DAY 90 capsule 5    nitroGLYCERIN (NITROSTAT) 0.3 MG SL tablet Place 1 tablet under the tongue every 5 minutes as needed for Chest pain up to max of 3 total doses.  If no relief after 1 dose, call 911. 30 tablet 0    megestrol (MEGACE) 40 MG/ML suspension Take 10 mLs by mouth daily 240 mL 3    potassium chloride (KLOR-CON M) 20 MEQ extended release tablet Take 1 tablet by mouth daily 90 tablet 0    Coenzyme Q10 (COQ10) 100 MG CAPS Take 100 mg by mouth daily 30 capsule 5    furosemide (LASIX) 20 MG tablet Take 1 tablet by mouth daily 90 tablet 0    montelukast (SINGULAIR) 10 MG tablet Take 1 tablet by mouth nightly 30 tablet 5    aspirin 81 MG EC tablet Take 1 tablet by mouth daily 30 tablet 5    OFEV 150 MG CAPS TAKE 1 CAPSULE BY MOUTH TWICE A DAY (EVERY 12 HOURS) AS DIRECTED WITH FOOD. 60 capsule 11    carvedilol (COREG) 6.25 MG tablet Take 1 tablet by mouth 2 times daily (with meals) 60 tablet 11    fluticasone (FLONASE) 50 MCG/ACT nasal spray 1 spray by Each Nostril route daily as needed for Rhinitis      rosuvastatin (CRESTOR) 5 MG tablet Take 1 tablet by mouth nightly 30 tablet 5    Multiple Vitamins-Minerals (MULTIVITAMIN ADULT) TABS Take by mouth daily      Fluticasone furoate-vilanterol (BREO ELLIPTA) 200-25 MCG/INH AEPB inhaler Inhale 1 puff into the lungs daily (Patient not taking: Reported on 8/10/2021) 60 each 6    albuterol (PROVENTIL) (2.5 MG/3ML) 0.083% nebulizer solution USE 1 VIAL IN NEBULIZER 4 TIMES DAILY (Patient not taking: Reported on 8/10/2021) 120 vial 11    albuterol sulfate  (90 Base) MCG/ACT inhaler Inhale 2 puffs into the lungs every 6 hours as needed for Wheezing (Patient not taking: Reported on 8/10/2021) 3 Inhaler 0     No current facility-administered medications on file prior to visit. Review of Systems   Constitutional: Negative for diaphoresis. Respiratory: Positive for shortness of breath. Cardiovascular: Positive for chest pain. Negative for palpitations. Gastrointestinal: Negative for nausea and vomiting. Neurological: Negative for headaches.    other review of systems reviewed and are negative    OBJECTIVE:  /68   Pulse 77   Temp 97 °F (36.1 °C)   Resp 16 Ht 5' 10\" (1.778 m)   Wt 143 lb (64.9 kg)   SpO2 95%   BMI 20.52 kg/m²      Physical Exam  Constitutional:       General: He is not in acute distress. Eyes:      General: No scleral icterus. Conjunctiva/sclera: Conjunctivae normal.   Neck:      Thyroid: No thyromegaly. Cardiovascular:      Rate and Rhythm: Normal rate and regular rhythm. Heart sounds: No murmur heard. Pulmonary:      Effort: Pulmonary effort is normal.      Breath sounds: Normal breath sounds. Musculoskeletal:      Cervical back: Neck supple. Lymphadenopathy:      Cervical: No cervical adenopathy. Skin:     General: Skin is warm and dry. Neurological:      Mental Status: He is alert and oriented to person, place, and time. ASSESSMENT AND PLAN:    Adrian Canela was seen today for follow-up from hospital.    Diagnoses and all orders for this visit:    Coronary artery disease of native artery of native heart with stable angina pectoris [facility])    - reviewed results from ER visit  - discussed using Nitro and what to do if it doesn't help, ie go to ER  - hold K dur since getting stuck on him in throat. - follow up with cardiologist as scheduled. Return if symptoms worsen or fail to improve, for as scheduled.     Mila Banuelos, DO

## 2021-08-11 PROBLEM — R07.9 CHEST PAIN: Status: ACTIVE | Noted: 2021-01-01

## 2021-08-11 PROBLEM — E78.5 HYPERLIPIDEMIA: Status: ACTIVE | Noted: 2020-02-04

## 2021-08-11 NOTE — CONSULTS
Inpatient Cardiology Consultation      Reason for Consult:  Chest Pain     Consulting Physician: Dr. Fairchild Degree    Requesting Physician:  Dr. Deepa Harper    Date of Consultation: 8/11/2021    HISTORY OF PRESENT ILLNESS:   Mr. Mele Tony is an 80year old  male who follows with Dr. Alexandria Roy and Dr. Murtaza Garcia. He was most recently seen in the office with Dr. Alexandria Roy on 06/14/2021. His medical history includes ICMP, NSVT s/p dual chamber [employer] ICD, HTN, HLD (intolerance to statins) started on low dose Crestor 6/2019, PMR on chronic prednisone, CABG in 2014, post CABG had PAF, R iliac artery aneurysm, pulmonary fibrosis on chronic O2, GERD, asthma, and ex smoker. Mr. Mele Tony presented to [facility] ED on 08/11/2021 with complaints of chest pain. He states that prior to presentation, he was having midsternal chest \"pressure\" with rest that lasted 15 minutes for which he took one SL NTG tablet with no relief, he then took another 2, each 5 minutes apart. After he took the third tablet he states that his chest pain improved but remained present. He states that he summoned EMS and upon their arrival he received ASA 324mg with complete resolution of his chest pain without recurrence. He states that prior to this event, he presented to [facility] ED on 08/03/2021 with similar chest pain, occurring at rest, \"but I didn't have NTG then, they gave it to me in the ER and I didn't have more chest pain again until today\". He denies orthopnea and PND. States that he has chronic CHEN attributed to pulmonary fibrosis, and is unchanged. Upon arrival to the ED his VS are 97 point 4-64-/82-98% on RA. EKG SR. Troponin of 55, 53.  proBNP 1396. BUN/SCR 23/1.2. WBC 11.7. H&H 9.5/30. 6. CXR was unchanged from prior with no acute findings. Cardiology was consulted by Dr. Deepa Harper for management of chest pain.         Please note: past medical records were reviewed per electronic medical record (EMR) - see detailed reports under Past Medical/ Surgical History. Past Medical and Surgical History:    1. DNR-CCA  2. Ex smoker Quit 1980. Smoked 1 PPD x 30 years  3. COPD/Pulmonary fibrosis of chronic O2  4. PMR on chronic prednisone  5. Asthma  6. ?T2DM  7. HTN  8. HLD  9. CKD  10. Hx R iliac artery aneurysm  11. Inferior STEMI 4/14/2014  12. S/p CABG(04/16/2017) and right thrombectomy (LIMA to LAD, SVG to OM, SVG to RCA with vein angioplasty of posterolateral branch of RCA)  13. Post CABG paroxysmal AF  14. TTE 05/2014:  EF 35% to 40%, mildly dilated LV and right atrium, trace MR, trace TR, left-sided pleural effusion. 13. Lexiscan nuclear stress test July 21, 2014:  EF 36%, no ischemia.  A large, fixed inferolateral defect. 16. NSVT and syncope with EP study noting inducible VT - Follows with Dr. Marcos Crook S/p ICD 07/22/2014 - [employer]onic  17. 1/10/2015 p-BNP 2040, Bun/Cr 24/1.7  18. 5/1/2015 p-BNP 1050, Bun/Cr 26/1.3  19. 9/4/2015 p-, Bun/Cr 23/1.3  20. 6/14/2016 p-BNP 1359, Bun/Cr 24/1.2  21. 2/20/2017 p-,  Bun/Cr 25/1.2  22. 5/23/2017 p-  23. 1/7/2018 p-BNP 4164, troponin <0.01, Bun/Cr 29/1.3  24. 1/7/2018 TTE Dr [employer]: Left ventricle is mildly enlarged. Moderate left ventricular concentric hypertrophy noted. Inferior wall hypokinesis. Mild TR. EF visually estimated at 40%. 25. 6/8/2018 p-BNP 1298, Bun/Cr 22/1.2  26. 6/14/2019 Office visit Dr [employer].  Trail of low dose Crestor 5 mg QD (previous reported myalgias on statin therapy)  27. 6/19/2019 p-BNP 1543, Bun/Cr 29/1.4, troponin <0.01, d-dimer 541  28. 8/8/2019 T Chol 180, Triglycerides 110, HDL 57,   29. 1/28/2020 TTE Dr Santos Lehman: Moderately reduced left ventricular systolic function. Ejection fraction is visually estimated at 35-40%. Normal right ventricular size with mildly reduced right ventricular function. There is doppler evidence of stage I diastolic dysfunction. Moderately dilated left atrium by volume index. Mild MR/TR. PASP is estimated at 31 mmHg  30. 2/3/2020 p-BNP 1913, Bun/Cr 22/1.1  31. TTE 03/01/2021 (Dr. Azalia Payne): LV grossly normal in size. Mild LVH. Global hypokinesis is noted to be moderate to severe. EF 25-30%. Doppler evidence of stage I diastolic dysfunction. LA ESV index is greater than 34 mL/M2. RV global systolic function is moderately reduced. Increased E-point septal separation noted, suggesting decreased LV cardiac output. Physiologic and/or trace MR is present. Physiologic and/or trace TR.  RVSP is 40 mmHg. Physiologic and/or trace CO present. Technically good quality study. Compared to prior echo, changes noted. Suggest clinical correlation. 32. Lexiscan MPS 04/12/2021: EF 36% with apical akinesis and septal dyskinesis. The myocardial perfusion imaging was abnormal.  The abnormality was a large severe fixed defect involving the entire lateral wall consistent with a myocardial infarction with no evidence of significant residual stress-induced ischemia. Overall LV systolic function was moderately impaired. Intermediate risk pharmacologic stress test.      Medications Prior to admit:  Prior to Admission medications    Medication Sig Start Date End Date Taking? Authorizing Provider   ENTRESTO 24-26 MG per tablet TAKE 1 TABLET BY MOUTH 2 TIMES A DAY 8/9/21   Luis Banuelos,    mexiletine (MEXITIL) 200 MG capsule TAKE ONE CAPSULE BY MOUTH 3 TIMES A DAY 8/9/21   Christos Corona MD   nitroGLYCERIN (NITROSTAT) 0.3 MG SL tablet Place 1 tablet under the tongue every 5 minutes as needed for Chest pain up to max of 3 total doses.  If no relief after 1 dose, call 911. 8/5/21   Vern Banuelos,    megestrol (MEGACE) 40 MG/ML suspension Take 10 mLs by mouth daily 7/1/21   Vern Banuelos DO   potassium chloride (KLOR-CON M) 20 MEQ extended release tablet Take 1 tablet by mouth daily 6/28/21   Vern Banuelos DO   Fluticasone furoate-vilanterol (BREO ELLIPTA) 200-25 MCG/INH AEPB inhaler Inhale 1 puff into the lungs daily  Patient not taking: Reported on 8/10/2021 6/21/21   Renato Stewart MD   albuterol (PROVENTIL) (2.5 MG/3ML) 0.083% nebulizer solution USE 1 VIAL IN NEBULIZER 4 TIMES DAILY  Patient not taking: Reported on 8/10/2021 6/14/21   Josué Rodriguez MD   Coenzyme Q10 (COQ10) 100 MG CAPS Take 100 mg by mouth daily 6/9/21   Sebas Banuelos DO   furosemide (LASIX) 20 MG tablet Take 1 tablet by mouth daily 5/28/21   Sebas Banuelos DO   montelukast (SINGULAIR) 10 MG tablet Take 1 tablet by mouth nightly 5/28/21   Sebas Banuelos DO   aspirin 81 MG EC tablet Take 1 tablet by mouth daily 5/28/21   Luis Banuelos,    OFEV 150 MG CAPS TAKE 1 CAPSULE BY MOUTH TWICE A DAY (EVERY 12 HOURS) AS DIRECTED WITH FOOD. 5/27/21   Dominga Rucker,    carvedilol (COREG) 6.25 MG tablet Take 1 tablet by mouth 2 times daily (with meals) 5/13/21   Sky Stain, APRN - CNP   fluticasone (FLONASE) 50 MCG/ACT nasal spray 1 spray by Each Nostril route daily as needed for Rhinitis    Historical Provider, MD   rosuvastatin (CRESTOR) 5 MG tablet Take 1 tablet by mouth nightly 4/15/21   Sebas Banuelos DO   albuterol sulfate  (90 Base) MCG/ACT inhaler Inhale 2 puffs into the lungs every 6 hours as needed for Wheezing  Patient not taking: Reported on 8/10/2021 1/29/21   Josué Rodriguez MD   Multiple Vitamins-Minerals (MULTIVITAMIN ADULT) TABS Take by mouth daily    Historical Provider, MD       Current Medications:    No current facility-administered medications for this encounter. Allergies:  Patient has no known allergies. Social History:    Quit smoking cigarettes in 1984. Prior to that smoked 1 pack/day for 50 years. States that in the remote past he drank \" moderate alcohol\". Denies ingestion of alcohol at present. Denies illicit drug use. Caffeine intake includes 2 cups of coffee daily.     Family History:   Please note this information was not obtained at this time, as it is limited in nature due to the patient's advanced age. REVIEW OF SYSTEMS:     · Constitutional: Denies fevers, chills, night sweats, and fatigue  · HEENT: Denies headaches, nose bleeds, and blurred vision,oral pain, abscess or lesion. · Musculoskeletal: Denies falls, pain to BLE with ambulation and edema to BLE. · Neurological: Denies dizziness and lightheadedness, numbness and tingling  · Cardiovascular: Complains of chest pain. denies palpitations, and feelings of heart racing. · Respiratory: Denies orthopnea and PND. Complains of chronic, unchanged CHEN-see HPI  · Gastrointestinal: Denies heartburn, nausea/vomiting, diarrhea and constipation, black/bloody, and tarry stools. · Genitourinary: Denies dysuria and hematuria  · Hematologic: Denies excessive bruising or bleeding  · Lymphatic: Denies lumps and bumps to neck, axilla, breast, and groin  · Endocrine: Denies excessive thirst. Denies intolerance to hot and cold  · Psychiatric: Denies anxiety and depression. PHYSICAL EXAM:   BP (!) 112/53   Pulse 62   Temp 97.4 °F (36.3 °C) (Oral)   Resp 18   Ht 5' 10\" (1.778 m)   Wt 143 lb (64.9 kg)   SpO2 100%   BMI 20.52 kg/m²   CONST:  Well developed, well nourished elderly  male who appears stated age. Awake, alert, cooperative, no apparent distress  HEENT:   Head- Normocephalic, atraumatic   Eyes- Conjunctivae pink, anicteric  Throat- Oral mucosa pink and moist  Neck-  No stridor, trachea midline, no jugular venous distention. No adenopathy   CHEST: Chest symmetrical and non-tender to palpation. No accessory muscle use or intercostal retractions  RESPIRATORY: Lung sounds - clear throughout fields   CARDIOVASCULAR:     No carotid bruit  Heart Inspection- shows no noted pulsations  Heart Palpation- no heaves or thrills; PMI is non-displaced   Heart Ausculation- Regular rate and rhythm, systolic murmur. No s3,  or rub   PV: No lower extremity edema. No varicosities.  Pedal pulses palpable, no clubbing or cyanosis   ABDOMEN: Soft, non-tender to light palpation. Bowel sounds present. No palpable masses no organomegaly; no abdominal bruit  MS: Good muscle strength and tone. No atrophy or abnormal movements. : Deferred  SKIN: Warm and dry no statis dermatitis or ulcers   NEURO / PSYCH: Oriented to person, place and time. Speech clear and appropriate. Follows all commands. Pleasant affect     DATA:    ECG: As above  Tele strips: SR  Diagnostic:    Labs:   CBC:   Recent Labs     08/11/21  0957   WBC 11.7*   HGB 9.5*   HCT 30.6*        BMP:   Recent Labs     08/11/21  0957      K 3.7   CO2 26   BUN 23   CREATININE 1.2   LABGLOM 57   CALCIUM 8.4*     Mag:   Recent Labs     08/11/21  0957   MG 2.0   HgA1c:   Lab Results   Component Value Date    LABA1C 5.7 (H) 02/04/2020   FASTING LIPID PANEL:  Lab Results   Component Value Date    CHOL 180 08/08/2019    HDL 57 08/08/2019    LDLCALC 101 08/08/2019    TRIG 110 08/08/2019     LIVER PROFILE:  Recent Labs     08/11/21  0957   AST 17   ALT 13   LABALBU 3.2*     08/11/2021 CXR:  No significant change. Comparison 08/03/2021. Continue follow-up recommended. IMPRESSION:  1. Sternal chest pain with similar reoccurrence 1 week ago. Troponin 55, 53 with no ischemic EKG changes. 2. Known Hx CAD/ISCMP s/p CABG 04/16/2017 (LIMA-LAD, SVG-OM, SVG-RCA, vein angioplasty of the posterior lateral branch of RCA). Most recent Lexiscan MPS 04/12/2021: No reversible defect. Large severe fixed defect involving the entire lateral wall. EF noted to be 25-30% per TTE in 03/2021. Clinically not in decompensated HF  3. NSVT and syncope with EP study noting inducible VT s/p ICD 07/22/2014 ([employer])  4. HTN: Controlled  5. HLD  6. COPD/pulmonary fibrosis/chronic O2/remote tobacco abuse  7. T2DM, questionable  8. Hx PMR on chronic prednisone therapy  9. CKD  10.  Postoperative Atrial Fibrillation in 2014  11. Leukocytosis/afebrile  12. Anemia  13. Hypoalbuminemia      PLAN:  1. Check TSH  2. Continue telemetry monitoring  3. We will change Coreg to Toprol XL   4. Add Imdur 30 mg daily  5. We will maximize medical therapy and evaluate for recurrence of chest pain.   6. Above as discussed with Dr. Denzel Holter    Electronically signed by WILFRIDO Blair CNP on 8/11/2021 at 12:33 PM

## 2021-08-11 NOTE — H&P
[facility] History and Physical      CHIEF COMPLAINT:  Chest pressure    History of Present Illness:      Mr Tim Dorantes is an 80year old gentleman with hx of CAD, PVD, idiopathic pulmonary fibrosis, chronic respiratory failure on 2-3 l/m NC continuously, CKD III, GERD, HLD. He has history of ICM with prior inducible V-tach, is s/p pacemaker/ICD placement. He had ICD discharge in April of this year due to vtach    He had an episode of chest pressure on 8/2 that persisted for several hours and he was seen in the ED early morning hours 8/3. Work-up at that time unremarkable and he was discharged home, advised to use NTG as needed for recurrence of chest discomfort. This morning states he awoke feeling fine. He got out of bed and did morning routine without chest discomfort. After eating breakfast, approximately 08:30 developed feeling of chest heaviness \"felt like someone pressing on my chest\". He took a NTG tablet, without relief. Subsequently took 2 additional NTG tabs in 5 minute intervals without improvement in discomfort. He states he started to perspire and at that time notified EMS. He was brought to the ED for further eval. Had an ASA en route and states his pain has slowly resolved. He is currently chest pain free. Work-up in ED, included troponin 55 -> 53. BNP 1396, lower than past several assessments. Chemistry and hemogram otherwise unremarkable. Chest x-ray without effusion or infiltrate. EKG without ischemic changes, is A-paced with long HI interval.    Admission requested for overnight observation, cardiology consultation. He is currently comfortable and hemodynamically stable without chest pressure. Denies other recent illnesses, URI symptoms, N/V/D. He has had some difficulty swallowing his KCL tablet, gets stuck. Denies any other difficulty swallowing food or fluids. Informant(s) for H&P: patient and review of records.      REVIEW OF SYSTEMS:  A comprehensive review of systems was negative except for: what is in the HPI      PMH:  Past Medical History:   Diagnosis Date    Aneurysm ([facility]     iliacs    Asthma     CAD (coronary artery disease)     Cardiomyopathy ([facility]     Centrilobular emphysema ([facility]     CHF [facility]congestive heart failure) [facility])     Dilatation of aorta ([facility] 10/04/2018    Hyperlipidemia     Idiopathic pulmonary fibrosis (HCC)     Iliac artery aneurysm, bilateral ([facility] 10/04/2018    Inferoposterior myocardial infarction (HCC)     NSVT (nonsustained ventricular tachycardia) ([facility]        Surgical History:  Past Surgical History:   Procedure Laterality Date    CARDIAC CATHETERIZATION  4-    Dr. Temo Lai cath used    CARDIAC DEFIBRILLATOR PLACEMENT      CORONARY ANGIOPLASTY  4/15/2014    CORONARY ARTERY BYPASS GRAFT   4/16/2014    x3    HERNIA REPAIR         Medications Prior to Admission:    Prior to Admission medications    Medication Sig Start Date End Date Taking? Authorizing Provider   ENTRESTO 24-26 MG per tablet TAKE 1 TABLET BY MOUTH 2 TIMES A DAY 8/9/21  Yes Luis Banuelos DO   nitroGLYCERIN (NITROSTAT) 0.3 MG SL tablet Place 1 tablet under the tongue every 5 minutes as needed for Chest pain up to max of 3 total doses.  If no relief after 1 dose, call 911. 8/5/21  Yes Tatiana Banuelos DO   megestrol (MEGACE) 40 MG/ML suspension Take 10 mLs by mouth daily 7/1/21  Yes Luis Banuelos DO   potassium chloride (KLOR-CON M) 20 MEQ extended release tablet Take 1 tablet by mouth daily 6/28/21  Yes Luis Banuelos DO   Fluticasone furoate-vilanterol (BREO ELLIPTA) 200-25 MCG/INH AEPB inhaler Inhale 1 puff into the lungs daily 6/21/21  Yes Rosa Maria Gutierrez MD   albuterol (PROVENTIL) (2.5 MG/3ML) 0.083% nebulizer solution USE 1 VIAL IN NEBULIZER 4 TIMES DAILY 6/14/21  Yes Rand White MD   Coenzyme Q10 (COQ10) 100 MG CAPS Take 100 mg by mouth daily 6/9/21  Yes Luis Banuelos DO   furosemide (LASIX) 20 MG tablet Take 1 tablet by mouth daily 5/28/21  Yes Luis Banuelos DO   montelukast (SINGULAIR) 10 MG tablet Take 1 tablet by mouth nightly 5/28/21  Yes Rocky Banuelos DO   aspirin 81 MG EC tablet Take 1 tablet by mouth daily 5/28/21  Yes Luis Banuelos DO   OFEV 150 MG CAPS TAKE 1 CAPSULE BY MOUTH TWICE A DAY (EVERY 12 HOURS) AS DIRECTED WITH FOOD. 5/27/21  Yes Carla Rucker DO   carvedilol (COREG) 6.25 MG tablet Take 1 tablet by mouth 2 times daily (with meals) 5/13/21  Yes Easter Counter, APRN - CNP   rosuvastatin (CRESTOR) 5 MG tablet Take 1 tablet by mouth nightly 4/15/21  Yes Luis Banuelos DO   albuterol sulfate  (90 Base) MCG/ACT inhaler Inhale 2 puffs into the lungs every 6 hours as needed for Wheezing 1/29/21  Yes Radha Castro MD   Multiple Vitamins-Minerals (MULTIVITAMIN ADULT) TABS Take by mouth daily   Yes Historical Provider, MD   mexiletine (MEXITIL) 200 MG capsule TAKE ONE CAPSULE BY MOUTH 3 TIMES A DAY 8/9/21   Nataly Yepez MD   fluticasone (FLONASE) 50 MCG/ACT nasal spray 1 spray by Each Nostril route daily as needed for Rhinitis    Historical Provider, MD       Allergies:    Patient has no known allergies. Social History:    reports that he quit smoking about 41 years ago. His smoking use included cigarettes. He started smoking about 71 years ago. He has a 30.00 pack-year smoking history. He has never used smokeless tobacco. He reports current alcohol use. He reports that he does not use drugs. Family History:   family history includes High Cholesterol in his sister; Hypertension in his mother.        PHYSICAL EXAM:  Vitals:  /61   Pulse 59   Temp 97.4 °F (36.3 °C) (Oral)   Resp 18   Ht 5' 10\" (1.778 m)   Wt 143 lb (64.9 kg)   SpO2 100%   BMI 20.52 kg/m²     General Appearance: alert and oriented to person, place and time and in no acute distress  Skin: warm and dry  Head: normocephalic and atraumatic  Eyes: pupils equal, round, and reactive to light, extraocular eye movements intact, conjunctivae normal  Neck: neck supple and non tender without mass   Pulmonary/Chest: basilar crackles, more on left than right. No wheezing or rhonchi  Cardiovascular: normal rate, normal S1 and S2 and no carotid bruits  Abdomen: soft, non-tender, non-distended, normal bowel sounds, no masses or organomegaly  Extremities: no cyanosis, no clubbing and no edema  Neurologic: no cranial nerve deficit and speech normal        LABS:  Recent Labs     08/11/21  0957      K 3.7      CO2 26   BUN 23   CREATININE 1.2   GLUCOSE 142*   CALCIUM 8.4*       Recent Labs     08/11/21  0957   WBC 11.7*   RBC 3.12*   HGB 9.5*   HCT 30.6*   MCV 98.1   MCH 30.4   MCHC 31.0*   RDW 15.9*      MPV 8.6       No results for input(s): POCGLU in the last 72 hours. Radiology:   XR CHEST PORTABLE   Final Result   No significant change. Continued follow-up recommended. EKG: A-paced, rate 60s, no acute ischemic changes    ASSESSMENT:      Active Problems:    Chest pain  Resolved Problems:    * No resolved hospital problems. *      PLAN:    1. Chest pain/pressure  - known hx of CAD/PVD, ICM with LVEF 36% per last stress test in April 2021  - Recurrent chest pressure over past 1 week. 2 isolated episodes, first was relieved with NTG, this occasion did not resolve. - Continue ASA, Crestor  - Cardiology consulted, change Carvedilol to Metoprolol.  - nitrates added, starting Isosorbide.  - monitor on telemetry overnight    2. Idiopathic pulmonary fibrosis  - with chronic hypoxemic respiratory failure  - continue supplemental O2  - continue Ofev, singulair, albuterol, symbicort    3. HLD  - continue crestor    4.  Cardiomyopathy  - s/p pacer/ICD  - no ICD discharges since April  - continue Lasix, BB K+ supllement, Entresto  - Mexilitine listed as prescribed 8/9/21, patient is not yet aware of this medication and does not appear he has picked up yet. Defer to cardiology if wanting to start this admission    Code Status: [facility]  DVT prophylaxis: lovenox      NOTE: This report was transcribed using voice recognition software. Every effort was made to ensure accuracy; however, inadvertent computerized transcription errors may be present.   Electronically signed by WILFRIDO Wu CNP on 8/11/2021 at 4:03 PM

## 2021-08-11 NOTE — ED NOTES
Diet tray ordered for pt     Rufino Fuentes RN  08/11/21 6328 Fever greater than (need to indicate Fahrenheit or Celsius)

## 2021-08-11 NOTE — ED PROVIDER NOTES
HPI:  8/11/21, Time: 9:45 AM EDT         Lety Lucero is a 80 y.o. male presenting to the ED for patient is a 42-year-old male who presents with an episode of chest pain around 8:30 AM this morning. States he was up reading the newspaper when he got a tightness in his chest.  He took 3 nitroglycerin at home. By the time the ambulance arrived at his home he was given a aspirin and he said the pain subsided after that. The pain did not radiate to his back, shoulders or jaw. He was a little bit diaphoretic with the discomfort. He denies any nausea. He was seen last week in the emergency department for a similar episode. He has history of dyslipidemia, cardiac cath, open heart surgery and cardiac defibrillator. He did see his PCP yesterday and was told everything did look good and if the chest pain occurred again to take his nitroglycerin. , beginning prior to arrival around 0830 am ago. The complaint has been resolved, mild in severity, and worsened by nothing. Patient has a history of pulmonary fibrosis and is on continuous O2 at 3 L. He currently denies any new SOB, CP, cough, fevers, chills, abdominal pain, vomiting, diarrhea, back pain, headache, paresthesias. ROS:   Pertinent positives and negatives are stated within HPI, all other systems reviewed and are negative.  --------------------------------------------- PAST HISTORY ---------------------------------------------  Past Medical History:  has a past medical history of Aneurysm ([facility] Asthma, CAD (coronary artery disease), Cardiomyopathy ([address] Centrilobular emphysema ([address] CHF (congestive heart failure) ([address] Dilatation of aorta ([address] Hyperlipidemia, Idiopathic pulmonary fibrosis ([address] Iliac artery aneurysm, bilateral ([address] Inferoposterior myocardial infarction ([address] and NSVT (nonsustained ventricular tachycardia) ([facility]     Past Surgical History:  has a past surgical history that includes Cardiac catheterization (4-); Coronary artery bypass graft ( 4/16/2014); Coronary angioplasty (4/15/2014); Cardiac defibrillator placement; and hernia repair. Social History:  reports that he quit smoking about 41 years ago. His smoking use included cigarettes. He started smoking about 71 years ago. He has a 30.00 pack-year smoking history. He has never used smokeless tobacco. He reports current alcohol use. He reports that he does not use drugs. Family History: family history includes High Cholesterol in his sister; Hypertension in his mother. The patients home medications have been reviewed. Allergies: Patient has no known allergies. ---------------------------------------------------PHYSICAL EXAM--------------------------------------    Constitutional/General: Alert and oriented x3, well appearing, non toxic in NAD  Head: Normocephalic and atraumatic  Eyes: PERRL, EOMI  Mouth: Oropharynx clear, handling secretions, no trismus  Neck: Supple, full ROM, non tender to palpation in the midline, no stridor, no crepitus, no meningeal signs  Pulmonary: diminished to auscultation bilaterally, no wheezes, rales, or rhonchi. Not in respiratory distress  Cardiovascular:  Regular rate. Regular rhythm. No murmurs, gallops, or rubs. 2+ distal pulses  Chest: no chest wall tenderness  Abdomen: Soft. Non tender. Non distended. +BS. No rebound, guarding, or rigidity. No pulsatile masses appreciated. Musculoskeletal: Moves all extremities x 4. Warm and well perfused, no clubbing, cyanosis, or edema. Capillary refill <3 seconds  Skin: warm and dry. No rashes. Neurologic: GCS 15, CN 2-12 grossly intact, no focal deficits, symmetric strength 5/5 in the upper and lower extremities bilaterally  Psych: Normal Affect    -------------------------------------------------- RESULTS -------------------------------------------------  I have personally reviewed all laboratory and imaging results for this patient. Results are listed below. LABS:  Results for orders placed or performed during the hospital encounter of 08/11/21   CBC Auto Differential   Result Value Ref Range    WBC 11.7 (H) 4.5 - 11.5 E9/L    RBC 3.12 (L) 3.80 - 5.80 E12/L    Hemoglobin 9.5 (L) 12.5 - 16.5 g/dL    Hematocrit 30.6 (L) 37.0 - 54.0 %    MCV 98.1 80.0 - 99.9 fL    MCH 30.4 26.0 - 35.0 pg    MCHC 31.0 (L) 32.0 - 34.5 %    RDW 15.9 (H) 11.5 - 15.0 fL    Platelets 121 702 - 626 E9/L    MPV 8.6 7.0 - 12.0 fL    Neutrophils % 50.5 43.0 - 80.0 %    Immature Granulocytes % 0.8 0.0 - 5.0 %    Lymphocytes % 41.3 20.0 - 42.0 %    Monocytes % 5.8 2.0 - 12.0 %    Eosinophils % 1.4 0.0 - 6.0 %    Basophils % 0.2 0.0 - 2.0 %    Neutrophils Absolute 5.94 1.80 - 7.30 E9/L    Immature Granulocytes # 0.09 E9/L    Lymphocytes Absolute 4.84 (H) 1.50 - 4.00 E9/L    Monocytes Absolute 0.68 0.10 - 0.95 E9/L    Eosinophils Absolute 0.16 0.05 - 0.50 E9/L    Basophils Absolute 0.02 0.00 - 0.20 E9/L   Comprehensive Metabolic Panel   Result Value Ref Range    Sodium 138 132 - 146 mmol/L    Potassium 3.7 3.5 - 5.0 mmol/L    Chloride 104 98 - 107 mmol/L    CO2 26 22 - 29 mmol/L    Anion Gap 8 7 - 16 mmol/L    Glucose 142 (H) 74 - 99 mg/dL    BUN 23 6 - 23 mg/dL    CREATININE 1.2 0.7 - 1.2 mg/dL    GFR Non-African American 57 >=60 mL/min/1.73    GFR African American >60     Calcium 8.4 (L) 8.6 - 10.2 mg/dL    Total Protein 6.3 (L) 6.4 - 8.3 g/dL    Albumin 3.2 (L) 3.5 - 5.2 g/dL    Total Bilirubin 0.4 0.0 - 1.2 mg/dL    Alkaline Phosphatase 45 40 - 129 U/L    ALT 13 0 - 40 U/L    AST 17 0 - 39 U/L   Troponin   Result Value Ref Range    Troponin, High Sensitivity 55 (H) 0 - 11 ng/L   Troponin   Result Value Ref Range    Troponin, High Sensitivity 53 (H) 0 - 11 ng/L   Magnesium   Result Value Ref Range    Magnesium 2.0 1.6 - 2.6 mg/dL   Brain Natriuretic Peptide   Result Value Ref Range    Pro-BNP 1,396 (H) 0 - 450 pg/mL   TSH without Reflex   Result Value Ref Range    TSH 5.060 (H) 0.270 - 4.200 uIU/mL   T4, Free   Result Value Ref Range    T4 Free 1.11 0.93 - 1.70 ng/dL   Troponin   Result Value Ref Range    Troponin, High Sensitivity 52 (H) 0 - 11 ng/L   Troponin   Result Value Ref Range    Troponin, High Sensitivity 51 (H) 0 - 11 ng/L   CBC   Result Value Ref Range    WBC 11.7 (H) 4.5 - 11.5 E9/L    RBC 3.16 (L) 3.80 - 5.80 E12/L    Hemoglobin 9.7 (L) 12.5 - 16.5 g/dL    Hematocrit 30.4 (L) 37.0 - 54.0 %    MCV 96.2 80.0 - 99.9 fL    MCH 30.7 26.0 - 35.0 pg    MCHC 31.9 (L) 32.0 - 34.5 %    RDW 15.5 (H) 11.5 - 15.0 fL    Platelets 926 575 - 702 E9/L    MPV 8.5 7.0 - 12.0 fL   Comprehensive Metabolic Panel w/ Reflex to MG   Result Value Ref Range    Sodium 139 132 - 146 mmol/L    Potassium reflex Magnesium 4.5 3.5 - 5.0 mmol/L    Chloride 105 98 - 107 mmol/L    CO2 24 22 - 29 mmol/L    Anion Gap 10 7 - 16 mmol/L    Glucose 98 74 - 99 mg/dL    BUN 21 6 - 23 mg/dL    CREATININE 1.1 0.7 - 1.2 mg/dL    GFR Non-African American >60 >=60 mL/min/1.73    GFR African American >60     Calcium 8.6 8.6 - 10.2 mg/dL    Total Protein 6.3 (L) 6.4 - 8.3 g/dL    Albumin 3.1 (L) 3.5 - 5.2 g/dL    Total Bilirubin 0.4 0.0 - 1.2 mg/dL    Alkaline Phosphatase 45 40 - 129 U/L    ALT 13 0 - 40 U/L    AST 19 0 - 39 U/L   Magnesium   Result Value Ref Range    Magnesium 2.1 1.6 - 2.6 mg/dL   Brain natriuretic peptide   Result Value Ref Range    Pro-BNP 1,531 (H) 0 - 450 pg/mL   EKG 12 Lead   Result Value Ref Range    Ventricular Rate 60 BPM    Atrial Rate 53 BPM    P-R Interval 258 ms    QRS Duration 122 ms    Q-T Interval 406 ms    QTc Calculation (Bazett) 406 ms    P Axis -37 degrees    R Axis -26 degrees    T Axis 20 degrees   EKG 12 lead   Result Value Ref Range    Ventricular Rate 69 BPM    Atrial Rate 69 BPM    P-R Interval 180 ms    QRS Duration 124 ms    Q-T Interval 398 ms    QTc Calculation (Bazett) 426 ms    P Axis 16 degrees    R Axis -23 degrees    T Axis 66 degrees       RADIOLOGY:  Interpreted by Radiologist.  XR CHEST PORTABLE   Final Result   No significant change. Continued follow-up recommended. EKG Interpretation  Interpreted by emergency department physician    Rhythm: paced  Rate: 50-60  Axis: normal  Conduction: normal  ST Segments: nonspecific changes  T Waves: non specific changes    Clinical Impression: paced rhythm  Comparison to prior EKG: stable as compared to patient's most recent EKG      ------------------------- NURSING NOTES AND VITALS REVIEWED ---------------------------   The nursing notes within the ED encounter and vital signs as below have been reviewed by myself. /61   Pulse 88   Temp 97.8 °F (36.6 °C) (Infrared)   Resp 18   Ht 5' 10\" (1.778 m)   Wt 144 lb 1.6 oz (65.4 kg)   SpO2 92%   BMI 20.68 kg/m²   Oxygen Saturation Interpretation: Normal    The patients available past medical records and past encounters were reviewed.         ------------------------------ ED COURSE/MEDICAL DECISION MAKING----------------------  Medications   metoprolol succinate (TOPROL XL) extended release tablet 12.5 mg (12.5 mg Oral Given 8/12/21 0910)   isosorbide mononitrate (IMDUR) extended release tablet 30 mg (30 mg Oral Given 8/12/21 0909)   aspirin EC tablet 81 mg (81 mg Oral Given 8/12/21 0909)   sacubitril-valsartan (ENTRESTO) 24-26 MG per tablet 1 tablet (1 tablet Oral Given 8/12/21 0909)   furosemide (LASIX) tablet 20 mg (20 mg Oral Given 8/12/21 0909)   megestrol acetate (MEGACE) 400 MG/10ML suspension 400 mg (400 mg Oral Given 8/12/21 0910)   montelukast (SINGULAIR) tablet 10 mg (10 mg Oral Given 8/11/21 2123)   multivitamin 1 tablet (1 tablet Oral Given 8/12/21 0909)   nitroGLYCERIN (NITROSTAT) SL tablet 0.4 mg (has no administration in time range)   Nintedanib Esylate (OFEV) CAPS 150 mg (150 mg Oral Given 8/12/21 0910)   potassium bicarb-citric acid (EFFER-K) effervescent tablet 20 mEq (20 mEq Oral Given 8/12/21 0917)   rosuvastatin (CRESTOR) tablet 5 mg (5 mg Oral Given 8/11/21 2122)   sodium chloride flush 0.9 % injection 5-40 mL (10 mLs Intravenous Given 8/12/21 0917)   sodium chloride flush 0.9 % injection 5-40 mL (has no administration in time range)   0.9 % sodium chloride infusion (has no administration in time range)   ondansetron (ZOFRAN-ODT) disintegrating tablet 4 mg (has no administration in time range)     Or   ondansetron (ZOFRAN) injection 4 mg (has no administration in time range)   acetaminophen (TYLENOL) tablet 650 mg (has no administration in time range)     Or   acetaminophen (TYLENOL) suppository 650 mg (has no administration in time range)   polyethylene glycol (GLYCOLAX) packet 17 g (has no administration in time range)   enoxaparin (LOVENOX) injection 40 mg (40 mg Subcutaneous Given 8/12/21 0910)   albuterol (PROVENTIL) nebulizer solution 2.5 mg (has no administration in time range)   budesonide (PULMICORT) nebulizer suspension 500 mcg (500 mcg Nebulization Not Given 8/12/21 0859)   Arformoterol Tartrate (BROVANA) nebulizer solution 15 mcg (15 mcg Nebulization Not Given 8/12/21 0858)             Medical Decision Making:    Cardiac work up. EKG. Re-Evaluations:             Re-evaluation. Patients symptoms show no change      Consultations:             Dr Stephens Lanes: NONE        This patient's ED course included: a personal history and physicial examination, re-evaluation prior to disposition, multiple bedside re-evaluations, cardiac monitoring, continuous pulse oximetry, complex medical decision making and emergency management and a personal history and physicial eaxmination    This patient has remained hemodynamically stable, remained unchanged and been closely monitored during their ED course. Counseling: The emergency provider has spoken with the patient and spouse/SO and discussed todays results, in addition to providing specific details for the plan of care and counseling regarding the diagnosis and prognosis.   Questions are answered at this time and they are agreeable with the plan.       --------------------------------- IMPRESSION AND DISPOSITION ---------------------------------    IMPRESSION  1. Chest pain, unspecified type        DISPOSITION  Disposition: Admit to telemetry  Patient condition is stable        NOTE: This report was transcribed using voice recognition software.  Every effort was made to ensure accuracy; however, inadvertent computerized transcription errors may be present         Jenn Rowan, DO  08/12/21 0969

## 2021-08-11 NOTE — ED NOTES
Bed: 12  Expected date:   Expected time:   Means of arrival:   Comments:  EMS     Albert Avalos RN  08/11/21 7899

## 2021-08-11 NOTE — ED NOTES
RN faxed SBAR to floor. Confirmation received and spoke with Lor.  Pt ready for transport to room when room is ready       Yuliana Hahn RN  08/11/21 4894

## 2021-08-12 NOTE — PROGRESS NOTES
Patient says he will have someone bring in his BREO from home as he already took it this morning and he feels nebulizers do not help him    Will need sent down to pharmacy in AM

## 2021-08-12 NOTE — DISCHARGE SUMMARY
[facility] Physician Discharge Summary       [facility] Bert Hinojosa , [address]  [phone]          Activity level: As tolerated     Dispo: Home    Condition on discharge: Stable     Patient ID:  Leo Nash  [id number]  31 y.o.  1935    Admit date: 8/11/2021    Discharge date and time:  8/12/2021  1:17 PM    Admission Diagnoses: Active Problems:    Hyperlipidemia    Chest pain    Cardiomyopathy ([address]    Chronic respiratory failure with hypoxia (HCC)  Resolved Problems:    * No resolved hospital problems. *      Discharge Diagnoses: Active Problems:    Hyperlipidemia    Chest pain    Cardiomyopathy ([address]    Chronic respiratory failure with hypoxia (HCC)  Resolved Problems:    * No resolved hospital problems. *      Consults:  IP CONSULT TO CARDIOLOGY    Procedures:  n/a    Hospital Course:   Patient Leo Nash is a 80 y.o. presented with Chest pain [R07.9]  Chest pain, unspecified type [R07.9]. He was admitted for further evaluation. He was seen by Cardiology. He had no further pain. He was evaluated in March 2021 with 2 D ECHO and treadmill test.  Cardiology did not feel any additional tests needed. Medication adjustment was done. Imdur 30 mg daily and Toprol added. Coreg discontinued. F/U with PCP and Cardiology per their recommendations.       Discharge Exam:    General Appearance: alert and oriented to person, place and time and in no acute distress  Skin: warm and dry  Head: normocephalic and atraumatic  Eyes: pupils equal, round, and reactive to light, extraocular eye movements intact, conjunctivae normal  Neck: neck supple and non tender without mass   Pulmonary/Chest: clear to auscultation bilaterally- no wheezes, rales or rhonchi, normal air movement, no respiratory distress  Cardiovascular: normal rate, normal S1 and S2 and no carotid bruits  Abdomen: soft, non-tender, non-distended, normal bowel sounds, no masses or organomegaly  Extremities: no cyanosis, no clubbing and no edema  Neurologic: no cranial nerve deficit and speech normal    I/O last 3 completed shifts:  In: -   Out: 450 [Urine:450]  I/O this shift:  In: 240 [P.O.:240]  Out: 350 [Urine:350]      LABS:  Recent Labs     08/11/21  0957 08/12/21  0600    139   K 3.7 4.5    105   CO2 26 24   BUN 23 21   CREATININE 1.2 1.1   GLUCOSE 142* 98   CALCIUM 8.4* 8.6       Recent Labs     08/11/21  0957 08/12/21  0600   WBC 11.7* 11.7*   RBC 3.12* 3.16*   HGB 9.5* 9.7*   HCT 30.6* 30.4*   MCV 98.1 96.2   MCH 30.4 30.7   MCHC 31.0* 31.9*   RDW 15.9* 15.5*    265   MPV 8.6 8.5     Imaging:  XR CHEST PORTABLE    Result Date: 8/11/2021  EXAMINATION: ONE XRAY VIEW OF THE CHEST 8/11/2021 10:01 am COMPARISON: 08/03/2021 HISTORY: ORDERING SYSTEM PROVIDED HISTORY: chest pain TECHNOLOGIST PROVIDED HISTORY: Reason for exam:->chest pain FINDINGS: EKG leads overlie the chest. 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 Left chest is partially obscured by the cardiac pacemaker. Heart size is normal.  Scattered vascular calcifications. No pneumothorax. Coarsened interstitial opacities are likely chronic. These findings are similar to previous. No definite new areas of consolidation or effusion. Chronic rotator cuff arthropathy on the right. No significant change. Continued follow-up recommended.        Patient Instructions:      Medication List      ASK your doctor about these medications    * albuterol sulfate  (90 Base) MCG/ACT inhaler  Inhale 2 puffs into the lungs every 6 hours as needed for Wheezing     * albuterol (2.5 MG/3ML) 0.083% nebulizer solution  Commonly known as: PROVENTIL  USE 1 VIAL IN NEBULIZER 4 TIMES DAILY     aspirin 81 MG EC tablet  Take 1 tablet by mouth daily     Breo Ellipta 200-25 MCG/INH Aepb inhaler  Generic drug: Fluticasone furoate-vilanterol  Inhale 1 puff into the lungs daily     carvedilol 6.25 MG tablet  Commonly known as: COREG  Take 1 tablet by mouth 2 times daily (with meals)     CoQ10 100 MG Caps  Take 100 mg by mouth daily     Entresto 24-26 MG per tablet  Generic drug: sacubitril-valsartan  TAKE 1 TABLET BY MOUTH 2 TIMES A DAY     fluticasone 50 MCG/ACT nasal spray  Commonly known as: FLONASE     furosemide 20 MG tablet  Commonly known as: Lasix  Take 1 tablet by mouth daily     megestrol 40 MG/ML suspension  Commonly known as: MEGACE  Take 10 mLs by mouth daily     mexiletine 200 MG capsule  Commonly known as: MEXITIL  TAKE ONE CAPSULE BY MOUTH 3 TIMES A DAY     montelukast 10 MG tablet  Commonly known as: SINGULAIR  Take 1 tablet by mouth nightly     Multivitamin Adult Tabs     nitroGLYCERIN 0.3 MG SL tablet  Commonly known as: Nitrostat  Place 1 tablet under the tongue every 5 minutes as needed for Chest pain up to max of 3 total doses. If no relief after 1 dose, call 911. Ofev 150 MG Caps  Generic drug: Nintedanib Esylate  TAKE 1 CAPSULE BY MOUTH TWICE A DAY (EVERY 12 HOURS) AS DIRECTED WITH FOOD.     potassium chloride 20 MEQ extended release tablet  Commonly known as: KLOR-CON M  Take 1 tablet by mouth daily     rosuvastatin 5 MG tablet  Commonly known as: Crestor  Take 1 tablet by mouth nightly         * This list has 2 medication(s) that are the same as other medications prescribed for you. Read the directions carefully, and ask your doctor or other care provider to review them with you.                   Note that more than 35 minutes was spent in preparing discharge papers, discussing discharge with patient, medication review, etc.    Signed:  Electronically signed by Brandon Brown MD on 8/12/2021 at 1:17 PM

## 2021-08-12 NOTE — CARE COORDINATION
Met w/ patient. Explained role of , OBS LOC, and plan of care. Lives w/ wife in an apartment- 1 step to entrance. Has walker, cane, shower bench, raised toilet, nebulizer, home O2 3lNC through [facility]. Requiring O2 3lNC at his baseline. Active w/ [facility]- verified w/ Inocencia @ [facility]- will need resume order on discharge. DNR-CC. On Entresto PTA. PCP is Dr. Chasity Zurita and pharmacy is [facility]. Per pt, plan is to return home on discharge- tates his daughter will provide transportation home.  Will follow Lisseth Bueno RN case manager

## 2021-08-13 NOTE — CARE COORDINATION
[facility] Transitions Initial Follow Up Call    Call within 2 business days of discharge: Yes    Patient: Martín Higgins Patient : 1935   MRN: [id number]  Reason for Admission: chest pain, cardiomyopathy, chronic respiratory failure with hypoxia  Discharge Date: 21 RARS: Readmission Risk Score: 19      Last Discharge [facility]       Complaint Diagnosis Description Type Department Provider    21 Chest Pain Chest pain, unspecified type ED to Hosp-Admission (Discharged) (ADMITTED) OLY WeaverW Brandon Brown MD; Angi Glez,... Spoke with: Patient states he woke up with left leg pain that started at hip and went all the way down leg. States he took a tbsp of yellow mustard and after 15 minutes, pain resolved. Denies trouble with ambulation and denies any swelling or redness of lower extremity. Denies any recurring chest pain, pressure or sob. States he wears his oxygen and is compliant with all meds, including new medications. (1111F completed)    Pt reports he is calling PCP office today to schedule f/u. He is active with [facility] and they have called to resume care. Denies any needs or concerns. Transitions of Care Initial Call    Was this an external facility discharge? No Discharge Facility:     Challenges to be reviewed by the provider   Additional needs identified to be addressed with provider: No  none             Method of communication with provider : none      Advance Care Planning:   Does patient have an Advance Directive: reviewed and current, reviewed and needs to be updated, not on file; education provided, not on file, patient declined education, decision maker updated and referral to internal ACP facilitator. Advance Care Planning   Healthcare Decision Maker:    Primary Decision Maker: Chidi Garnica [facility] [phone]      Was this a readmission?  No  Patient stated reason for admission: chest pressure  Patients top risk factors for factors. Plan for next call: symptom management-chest pressure and follow up appointment-PCP        Non-face-to-face services provided:  Obtained and reviewed discharge summary and/or continuity of care documents    Care Transitions 24 Hour Call    Do you have any ongoing symptoms?: No  Do you have a copy of your discharge instructions?: Yes  Do you have all of your prescriptions and are they filled?: Yes  Have you been contacted by a [id number] Pharmacist?: No  Have you scheduled your follow up appointment?: No  Were you discharged with any Home Care or Post Acute Services: Yes  Post Acute Services: Home Health (Comment: [phone]   4/14/21-[facility] with [facility])  Patient DME: Kathysa Barth cane, Nestramón Guillermo, Shower chair  Patient Home Equipment: Nebulizer, Oxygen  Do you have support at home?: Partner/Spouse/SO  Do you feel like you have everything you need to keep you well at home?: Yes  Are you an active caregiver in your home?: No  Care Transitions Interventions         Follow Up  Future Appointments   Date Time Provider Nahed Ford   8/30/2021 11:00 AM SCHEDULE, DEVICE CLINIC 1 [facility]   8/30/2021 11:00 AM Cameron Velasquez MD [facility]   8/31/2021  9:00 AM [facility]O [facility]   9/7/2021 10:45 AM SCHEDULE, REMOTE CHECK [facility]ILIP [facility]   10/11/2021  9:15 AM DO Grace Mason [facility]   10/13/2022  2:00 PM Apolonia Lauren MD [facility]       Reena Degroot RN

## 2021-08-19 NOTE — TELEPHONE ENCOUNTER
Have him try over-the-counter Mucinex to see if this helps bring up more mucus to clear it out. Be careful with eating and drinking as he could be having some phlegm from his poor swallowing issues.

## 2021-08-19 NOTE — TELEPHONE ENCOUNTER
----- Message from Delilah Ayala MA sent at 8/18/2021 12:45 PM EDT -----  Subject: Message to Provider    QUESTIONS  Information for Provider? Ellis Álvarez @ [facility]. Pneumonia Dx. Having a   lot of phlegm and coughing up clear mucous. No fever. Please call Ellis Álvarez Ph   ? [phone]  ---------------------------------------------------------------------------  --------------  Wil Hinders INFO  What is the best way for the office to contact you? OK to leave message on   voicemail  Preferred Call Back Phone Number? [phone]  ---------------------------------------------------------------------------  --------------  SCRIPT ANSWERS  Relationship to Patient? Third Party  Representative Name?  [facility]

## 2021-08-20 NOTE — CARE COORDINATION
[facility] Transitions Follow Up Call    2021    Patient: Yasemin Singh  Patient : 1935   MRN: [id number]  Reason for Admission: chest pain  Discharge Date: 21 RARS: Readmission Risk Score: 23         Spoke with pt for a sub care transition call. Pt states that he is doing \"good. \" Pt denies any CP/chest discomfort. Pt reports that the [facility] nurse and OT were out to see him yesterday. Pt is on chronic O2 at 3L aat. Pt has a home pulse oximeter and states at rest with O2 his SaO2 is between %, but when ambulating will dip to the high 80%. Pt states that with rest his O2 levels will recover quickly. Pt denies any increase sob today. Noted that pt's [facility] nurse called pt's pcp yesterday regarding his cough and increased phlegm. Pt was advised by pcp to start OTC Mucinex, which pt states that he has started. Pt reports that he is doing better today in reference to his cough/phlegm and feels that the medication is helping. Pt declined needing assistance with scheduling a HFU with his pcp stating that he just had one prior to the hospital admission. Pt reports that he has a f/u scheduled with EP on 21 and then with palliative care in-home visit on 21. Pt denies any needs/concerns today. Pt agreeable to continued f/u calls from CTN. Pt stated that he will be on \"vacation\" next week where he will be staying with his son locally. Pt states that if CTN will be contacting him next week to call him on his mobile phone number @ [phone]. Care Transitions Follow Up Call    Needs to be reviewed by the provider   Additional needs identified to be addressed with provider: No  none             Method of communication with provider : none      Care Transition Nurse (CTN) contacted the patient by telephone to follow up after admission on 21. Verified name and  with patient as identifiers.     Addressed changes since last contact: home health care-Pt confirmed that [facility] services have been coming out to his home. Pt had a nurse and OT visit yesterday. Discussed cough/phlegm issue today, as [id number] [facility] pt's pcp yesterday regarding concern about pt's cough and increased phlegm. Pt has started Mucinex per pcp recommendations and reports feeling better today. Discussed follow-up appointments. If no appointment was previously scheduled, appointment scheduling offered: Pt declined wanting to schedule a f/u with his pcp. CTN reviewed discharge instructions, medical action plan and red flags with patient and discussed any barriers to care and/or understanding of plan of care after discharge. Discussed appropriate site of care based on symptoms and resources available to patient including: PCP, Specialist and Home health. The patient agrees to contact the PCP office for questions related to their healthcare. Patients top risk factors for readmission: medical condition-COPD, pulmonary fibrosis, chronic O2, CAD, CKD, multiple health system providers and polypharmacy  Interventions to address risk factors: Scheduled appointment with PCP-Pt declined wanting to schedule a f/u with his pcp at this time and Scheduled appointment with Specialist-Pt has scheduled f/u appts with his specialists. Pt has a f/u with EP on 8/30/21 and CBP on 8/31/21      CTN provided contact information for future needs. Plan for follow-up call in 5-7 days based on severity of symptoms and risk factors. Plan for next call: symptom management-ensure no return of CP and check on status of cough/mucus, is Mucinex helping?        Care Transitions Subsequent and Final Call    Subsequent and Final Calls  Do you have any ongoing symptoms?: No  Have your medications changed?: No  Do you have any questions related to your medications?: No  Do you currently have any active services?: Yes  Are you currently active with any services?: Home Health  Do you have any needs or concerns that I can assist you with?: No  Identified Barriers: None  Care Transitions Interventions    Pharmacist: Completed      Social Work: Completed    Other Interventions:            Follow Up  Future Appointments   Date Time Provider Nahed Ford   8/30/2021 11:00 AM SCHEDULE, DEVICE CLINIC 1 [facility]   8/30/2021 11:00 AM Chotikorn Marylee Lob, MD [facility]   8/31/2021  9:00 AM [facility]FRIDO [facility]   9/7/2021 10:45 AM SCHEDULE, REMOTE CHECK PHILIP [facility]   10/11/2021  9:15 AM DO Grace Aguilar [facility]   10/13/2022  2:00 PM Faby Costa MD [facility]       Bernice Canela RN

## 2021-08-26 NOTE — PROGRESS NOTES
Electrophysiology Outpatient Follow-Up Note    Rojelio Leung  1935  Date of Service: 8/30/2021  Referring Provider/PCP: Tulio Valentine DO   Primary Electrophysiologist: Lexy Davis MD  Chief Complaint: ICD in situ and sustained VT        Subjective: Rojelio Leung is seen today for outpatient follow up. An echocardiogram in 3/2020 showed an LVEF 25-30% and moderately dilated LA (see below). A stress test was also performed in April showed a larger severe fixed defect involving the entire lateral wall c/w MI (see below). At his previous visit he had requested referral to [facility] for possible inotropic support which was cancelled due to his daughter was not available to take him up there. He most recently presented to the ED on 8/11/21 with complaint of chest pain/pressure improved with the 3rd tablet of SL NTG. He was evaluated by Cardiology and declined aggressive cardiac testing. His Coreg was switched to Toprol XL to aid in BP management, which was recently stopped due to hypotension. He followed with Palliative Care and in review of their notes his code status is DNR-CC: ICD therapies are currently programmed on. He currently wearing oxygen continuously via nasal cannula. Discussed with him regarding ICD therapies due to [facility] code status. Currently he prefers to have the therapy on for now and may consider turn off the ICD therapies in future after he discusses with his families.     Patient Active Problem List   Diagnosis    Automatic implantable cardioverter-defibrillator in situ    Dilatation of aorta (HCC)    Iliac artery aneurysm, bilateral (HCC)    [facility]opathic [facility]) [facility]    Peripheral vascular disease ([facility]    Coronary artery disease involving native coronary artery without angina pectoris    Hyperlipidemia    CKD (chronic kidney disease) stage 3, GFR 30-59 ml/min (HCC)    Gastroesophageal reflux disease    Insomnia    Vitamin D deficiency    Acute-on-chronic respiratory failure (HCC)    Ventricular fibrillation (HCC)    Moderate protein-calorie malnutrition ([facility]    Chest pain    Cardiomyopathy ([facility]    Chronic respiratory failure with hypoxia ([facility])     Past Medical History:   Diagnosis Date    Aneurysm ([facility]     iliacs    Asthma     CAD (coronary artery disease)     Cardiomyopathy ([facility]     Centrilobular emphysema ([facility]     CHF (congestive heart failure) ([facility])     Dilatation of aorta ([facility] 10/04/2018    Hyperlipidemia     Idiopathic pulmonary fibrosis ([facility])     Iliac artery aneurysm, bilateral ([facility]) 10/04/2018    Inferoposterior myocardial infarction ([facility])     NSVT (nonsustained ventricular tachycardia) ([facility])        Medications:  Current Outpatient Medications on File Prior to Visit   Medication Sig Dispense Refill    furosemide (LASIX) 20 MG tablet Take 1 tablet by mouth daily 90 tablet 0    ENTRESTO 24-26 MG per tablet TAKE 1 TABLET BY MOUTH 2 TIMES A DAY 60 tablet 0    mexiletine (MEXITIL) 200 MG capsule TAKE ONE CAPSULE BY MOUTH 3 TIMES A DAY 90 capsule 5    nitroGLYCERIN (NITROSTAT) 0.3 MG SL tablet Place 1 tablet under the tongue every 5 minutes as needed for Chest pain up to max of 3 total doses. If no relief after 1 dose, call 911. 30 tablet 0    albuterol (PROVENTIL) (2.5 MG/3ML) 0.083% nebulizer solution USE 1 VIAL IN NEBULIZER 4 TIMES DAILY 120 vial 11    Coenzyme Q10 (COQ10) 100 MG CAPS Take 100 mg by mouth daily 30 capsule 5    montelukast (SINGULAIR) 10 MG tablet Take 1 tablet by mouth nightly 30 tablet 5    aspirin 81 MG EC tablet Take 1 tablet by mouth daily 30 tablet 5    OFEV 150 MG CAPS TAKE 1 CAPSULE BY MOUTH TWICE A DAY (EVERY 12 HOURS) AS DIRECTED WITH FOOD.  60 capsule 11    rosuvastatin (CRESTOR) 5 MG tablet Take 1 tablet by mouth nightly 30 tablet 5    albuterol sulfate  (90 Base) MCG/ACT inhaler Inhale 2 puffs into the lungs every 6 hours as needed for Wheezing 3 Inhaler 0    Multiple Vitamins-Minerals (MULTIVITAMIN ADULT) TABS Take by mouth daily       No current facility-administered medications on file prior to visit. No Known Allergies    ROS:   Constitutional: Negative for fever, activity change and appetite change. HENT: Negative for epistaxis. Eyes: Negative for diploplia, blurred vision. Respiratory: Negative for cough, chest tightness, shortness of breath and wheezing. Cardiovascular: pertinent positives in HPI  Gastrointestinal: Negative for abdominal pain and blood in stool. All other review of systems are negative      PHYSICAL EXAM:   Vitals:    08/30/21 1114   BP: 120/70   Site: Left Upper Arm   Position: Sitting   Cuff Size: Medium Adult   Pulse: 72   Resp: 16   SpO2: 94%   Weight: 145 lb (65.8 kg)   Height: 5' 10\" (1.778 m)      Constitutional: Well-developed, no acute distress  Eyes: conjunctivae normal, no xanthelasma   Ears, Nose, Throat: oral mucosa moist, no cyanosis   CV: no JVD. Regular rate and rhythm. Normal S1S2 and no S3. No murmurs, rubs, or gallops. PMI is nondisplaced  Lungs: poor air entry bilaterally, normal respiratory effort without used of accessory muscles  Abdomen: soft, non-tender, bowel sounds present, no masses or hepatomegaly   Musculoskeletal: no digital clubbing, no edema   Skin: warm, no rashes   ICD site: well healed.  No erosion, infection or migration    Data:   Lab Results   Component Value Date    WBC 11.7 (H) 08/12/2021    HGB 9.7 (L) 08/12/2021    HCT 30.4 (L) 08/12/2021    MCV 96.2 08/12/2021     08/12/2021     Lab Results   Component Value Date    CREATININE 1.1 08/12/2021     Lab Results   Component Value Date    INR 1.3 01/07/2018    INR 1.2 07/20/2014    INR 1.3 04/17/2014    PROTIME 13.6 (H) 01/07/2018    PROTIME 12.0 07/20/2014    PROTIME 14.1 (H) 04/17/2014     Lab Results   Component Value Date    TSH 5.060 (H) 08/11/2021     Lab Results   Component Value Date    ALT 13 08/12/2021    AST 19 08/12/2021    ALKPHOS 45 08/12/2021    BILITOT 0.4 08/12/2021     Pertinent Cardiac Testing:    TTE 01/08/18: Findings      Left Ventricle   Left ventricle is mildly enlarged .   Moderate left ventricular concentric hypertrophy noted.   Inferior wall hypokinesis.   Ejection fraction is visually estimated at 40%.   There is doppler evidence of stage I diastolic dysfunction.      Right Ventricle   Normal right ventricular size and function.      Left Atrium   The left atrium is moderately dilated.      Right Atrium   Normal right atrium size.      Mitral Valve   No evidence of mitral valve stenosis.      Tricuspid Valve   Mild tricuspid regurgitation. RVSP is 32 mmHg.      Aortic Valve   Aortic valve opens well.   The aortic valve appears mildly sclerotic.      Pulmonic Valve   Not well visualized. Normal Doppler evaluation.      Pericardial Effusion   No evidence of pericardial effusion.      Aorta   Aortic root dimension within normal limits.   Miscellaneous   Normal Inferior Vena Cava diameter and respiratory variation.      Conclusions      Summary   Left ventricle is mildly enlarged .   Moderate left ventricular concentric hypertrophy noted.   Inferior wall hypokinesis.   Ejection fraction is visually estimated at 40%.   .      Signature      ----------------------------------------------------------------   Electronically signed by Irma Stahl MD(Interpreting physician)  Pascale Camp 01/09/2018 08:18 AM   ----------------------------------------------------------------       Echocardiogram 03/02/21    Left Ventricle   Left ventricle grossly normal in size. Mild left ventricular concentric hypertrophy noted. Global hypokinesis is noted to be moderate to severe. Estimated left ventricular ejection fraction is 25-30%. There is doppler evidence of stage I diastolic dysfunction. Right Ventricle   Normal right ventricular size. Right ventricle global systolic function is moderately reduced .       Left Atrium The left atrium is moderately dilated. The LAESV Index is >34 ml/m2. Interatrial septum appears intact. Right Atrium   Right atrium is normal in size. Mitral Valve   Mild mitral annular calcification. Increased E point septal separation noted,suggesting decreased LV cardiac   output. Physiologic and/or trace mitral regurgitation is present. Tricuspid Valve   The tricuspid valve appears structurally normal.   Physiologic and/or trace tricuspid regurgitation. RVSP is 40 mmHg. Aortic Valve   The aortic valve is trileaflet. Aortic valve opens well. The aortic valve appears mildly sclerotic. No doppler evidence of aortic stenosis or regurgitation. Pulmonic Valve   Pulmonic valve is structurally normal.   Physiologic and/or trace pulmonic regurgitation present. Pericardial Effusion   No evidence of pericardial thickening/calcification present. No evidence of pericardial effusion. Aorta   Aortic root dimension within normal limits. Ascending aorta appears mildly sclerotic and/or calcified. Miscellaneous   Normal Inferior Vena Cava diameter and respiratory variation. Conclusions      Summary   Left ventricle grossly normal in size. Mild left ventricular concentric hypertrophy noted. Global hypokinesis is noted to be moderate to severe. Estimated left ventricular ejection fraction is 25-30%. There is doppler evidence of stage I diastolic dysfunction. The LAESV Index is >34 ml/m2. Right ventricle global systolic function is moderately reduced . Increased E point septal separation noted,suggesting decreased LV cardiac   output. Physiologic and/or trace mitral regurgitation is present. Physiologic and/or trace tricuspid regurgitation. RVSP is 40 mmHg. Physiologic and/or trace pulmonic regurgitation present. Technically good quality study. Compared to prior echo, changes noted. Suggest clinical correlation.       Signature ----------------------------------------------------------------   Electronically signed by Carroll Ramírez DO(Interpreting   physician) on 03/02/2021 01:51 PM   ----------------------------------------------------------------      Daryle Block stress test 04/12/21:   IMAGING: Myocardial perfusion imaging was performed at rest 30-35   minutes following the intravenous injection of 11.5 mCi of   (Tc-Sestamibi) followed by 10 ml of Normal Saline.  At peak exercise,   the patient was injected intravenously with 32mCi of (Tc-Sestamibi)   followed by 10 ml of Normal Saline.  Gated post-stress tomographic   imaging was performed 20-25 minutes after stress.        FINDINGS: The overall quality of the study was poor.       Left ventricular cavity size was noted to be dilated on both the   stress and resting images       Rotational analog analysis demonstrated no significant motion artifact       A severedefect was present in the lateral wall  wall(s) that was   largesized by quantification.              The resting images no change        Gated SPECT left ventricular ejection fraction was calculated to be   36% with apical akinesis and septal dyskinesis.         Impression   The myocardial perfusion imaging was abnormal       IF TEST NORMAL-HIGHLIGHT AND DELETE FOLLOWING SECTION:   The abnormality was a large severe fixed defect involving the entire   lateral wall consistent with a myocardial infarction with no evidence   of significant residual stress-induced ischemia   Overall left ventricular systolic function was moderately impaired   Intermediate risk pharmacologic stress test.       Device Interrogation 8/30/21:  Make/Model Medtronic Evera XT  Mode AAIR <--> DDDR 60/120  P wave: 1.9 mV  Impedance: 456 ohms   Threshold: 0.75 V @ 0.4 ms  RV R wave: 4.8 mV  Impedance: 323 ohms   Threshold: 1.5 V @ 0.4 ms  Pacing: A: 24.4%  RV: 0%   Battery Voltage/Longevity:  2.8 years   Charge time 4.2 sec.      Arrhythmias: 5 NSVT, lasting 1-4 seconds  OptiVol: ongoing since 8/23/2021  Reprogramming included: see below  Overall device function is normal  All device programmable settings were evaluated per above and in the scanned document, along with iterative adjustments (capture thresholds) to assess and select the most appropriate final programming to provide for consistent delivery of the appropriate therapy and to verify function of the device. Impression:    1. Dual-chamber ICD in situ  - Medtronic.  - Secondary prevention.   - In July 2014, presented with syncopal episode. Due to ischemic cardiomyopathy, prior myocardial infarction, and documented monomorphic nonsustained VT, he underwent an EP study on 7/22/14which showed inducible ventricular tachycardia  - Status post ICD implantation 7/22/14.  - Normal device function.  - Discussed with him regarding ICD therapies due to [facility] code status. Currently he prefers to have the therapy on for now and may consider turn off the ICD therapies in future after he discusses with his families. 2. 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   - Non-sustained VT noted on follow up. - VT that terminated after one shock on 04/10/21  - Mexetil 150 mg TID started 04/10/21  - No sustained VT.  - Continue to monitor. 3. Coronary artery disease  - STEMI s/p RCA thrombectomy and CABG in April 2014  - Large fixed defect seen in stress test 04/12/21  - On ASA, Crestor and Entresto. - Hospitalized 8/11/21: declined aggressive testing; meds adjusted per Cardiology    4. Ischemic cardiomyopathy  - Status post ICD after positive EP study in July 2014. - Echocardiogram: EF 35-40% in 5/2015. - Echocardiogram: EF 40% in 1/2018  - Echocardiogram: EF 35-40% on 1/28/20  - Echocardiogram: EF 25-30% on 03/02/21  - Currently Compensated and euvolemic.   - NYHA class IV, ACC stage C vs.D  - On GDMT including ASA, Crestor and Entresto. - Toprol XL stopped due to hypotension.      5. Pulmonary fibrosis  - On continuous O2 cannula. - Follows with Dr. Misael Candelario     6. Polymyalgia rheumatica    7. History of aspiration Pneumonia,   - Failed swallow evaluation 05/04/21    8. DNR-CC  - ICD therapies currently programmed ON    Recommendations:     1. Normal ICD function. 2. He wishes to have his ICD therapies to remain on despite DNR-CC status. He will consider turning off the ICD therapies after discussion with families. 3. Continue Mexilitine 200 mg every 8 hours. 4. Continue follow up with Cardiology for GDMT adjustment. 5. Remote ICD monitoring via [facility] transmission every 91 days. 6. Follow-up with in 6 months or sooner PRN. Encouraged the patient to call the office for any questions or concerns. Thank you for allowing me the opportunity to participate in the care of your patient. I have spent a total of 40 minutes with the patient and the family reviewing the above stated recommendations. And a total of >50% of that time involved face-to-face time providing counseling and/or coordination of care with the other providers, preparation for the clinic visit, reviewing records/tests, counseling/education of the patient, ordering medications/tests/procedures, coordinating care, and documenting clinical information in the EHR.      Lexy Davis MD  Cardiac Electrophysiology  [address]  [facility]  11:51 AM  8/30/2021

## 2021-08-26 NOTE — CARE COORDINATION
[facility] Transitions Follow Up Call    2021    Patient: Teresa Mejia  Patient : 1935   MRN: [id number]  Reason for Admission: chest pain, cardiomyopathy, chronic respiratory failure with hypoxia  Discharge Date: 21 RARS: Readmission Risk Score: 23         Spoke with: Patient reports his cough is improving. States he continues to wear his oxygen at 3L continuous and pulse ox reads % resting but will drop to high 80's-low 90's when moving around. Denies being any more sob than usual and denies any chest pain. Reports pall care will be back out on 21 and plans on discussing if how long he will need to be on oxygen and if he could get a portable tank so he can drive again. Care Transitions Follow Up Call    Needs to be reviewed by the provider   Additional needs identified to be addressed with provider: No  none             Method of communication with provider : none      Care Transition Nurse (CTN) contacted the patient by telephone to follow up after admission on 21-21. Verified name and  with patient as identifiers. Addressed changes since last contact: none  Discussed follow-up appointments. If no appointment was previously scheduled, appointment scheduling offered: Yes. Is follow up appointment scheduled within 7 days of discharge? No.    Advance Care Planning:   Does patient have an Advance Directive: reviewed and current, reviewed and needs to be updated, not on file; education provided, not on file, patient declined education, decision maker updated and referral to internal ACP facilitator. Advance Care Planning   Healthcare Decision Maker:    Primary Decision Maker: Pam Aponte Child - [phone]      CTN reviewed discharge instructions, medical action plan and red flags with patient and discussed any barriers to care and/or understanding of plan of care after discharge.  Discussed appropriate site of care based on symptoms and resources available to patient including: PCP, Specialist, When to call 911 and [facility] care. The patient agrees to contact the PCP office for questions related to their healthcare. Patients top risk factors for readmission: functional physical ability, medical condition-COPD and polypharmacy  Interventions to address risk factors: Obtained and reviewed discharge summary and/or continuity of care documents      Non-[facility] follow up appointment(s):     CTN provided contact information for future needs. Plan for follow-up call in 5-7 days based on severity of symptoms and risk factors. Plan for next call: symptom management-cough, oxygen, self management-portable tank? and follow up appointment-pall care            Care Transitions Subsequent and Final Call    Subsequent and Final Calls  Do you have any ongoing symptoms?: Yes  Onset of Patient-reported symptoms: Today  Patient-reported symptoms: Shortness of Breath, Cough  Interventions for patient-reported symptoms: Other  Have your medications changed?: No  Do you have any questions related to your medications?: No  Do you currently have any active services?: Yes  Are you currently active with any services?: Home Health  Do you have any needs or concerns that I can assist you with?: No  Care Transitions Interventions    Pharmacist: Completed      Social Work: Completed    Other Interventions:            Follow Up  Future Appointments   Date Time Provider Nahed Ford   8/30/2021 11:00 AM SCHEDULE, DEVICE [facility]   8/30/2021 11:00 AM Christos Flowers MD ELECTRO PHYS [facility]   8/31/2021  9:00 AM [facility]   9/7/2021 10:45 AM SCHEDULE, REMOTE CHECK [facility]LECTRO PHYS [facility]   10/11/2021  9:15 AM DO Grace Rapp [facility]   10/13/2022  2:00 PM Pushpa Chavez MD [facility]       Teo Alonso RN

## 2021-08-31 NOTE — PROGRESS NOTES
Palliative Care Department  Palliative Care Progress Note  Provider: WILFRIDO Pozo - CNP    Location of Service: The patient's home    Reason for Consult:  []  Code status Discussion  []  Assist with goals of care  [x]  Psychosocial support  [x]  Symptom Management  []  Advanced Care Planning    Chief Complaint: Donna Akers is a 80 y.o. male with chief complaint of SOB    Assessment/Plan      Pulmonary Fibrosis/CHF:   -  Established with Pulm and Cardiology    SOB associated with angina:   -  Wears supplemental O2   -  Has Nebs and inhalers   -  Recovers quickly with rest   -  We discussed non-pharmacologic management   -  Add Morphine Conc Sol. 2.5 mg Q 1 PRN for SOB/Chest pain    Fatigue and Physical Debility:   -  Encouraged activity as tolerated   -  Working with PT/OT    Dysphagia:   -  Using thickened liquids   -  Encouraged compliance with recommendations made by speech therapy    Palliative Care Encounter:      - Goals of care: live longer, improve or maintain function/quality of life, remain at home and continue current management    -He wishes to have his defibrillator turned off     - Code Status: DNR-CC    Prognosis: unknown    Referrals to: none today    Follow Up:  8 weeks. They were encouraged to call with any questions, concerns, needs, or changes in symptoms. Subjective:     HPI:  Donna Akers is a 80 y.o. male with significant medical history of CAD s/p CABG with cardiomyopathy, s/p pacer/defibrillator placement, with hospitalization in April of 2021 following V-fib at home s/p shock. he also has his of pulmonary fibrosis and require supplemental O2. He 2was referred to [address] [place] [address] for support. Subjective/Events/Discussions:  6/17/2021:  Mr. Arroyo Govern seen in his home today with his wife. He is alert and oriented and overall reports he is doing well. He was in the ED 2 times in the past month due to chest pain/tightness associated with SOB.   He required nitro. He has not had this chest pain since his last hospital visit. We discussed options for symptom control and he agrees to low dose morphine to be utilized in the event of severe persistent SOB or SOB associated with chest pain. He also reports that he discussed having his defibrillator turned off, as he states he does not feel he would survive another fatal arrhythmia and shock, or if he did he does not feel he would return to quality of life of which he would wish to live. He understands that with his defibrillator turned off if he were to develop fatal arrhythmia no shock would be provided, and he would die. He agrees to this, and agrees to continue with a CODE STATUS of DNR CC. He does wish for his pacemaker to continue to function properly, and he plans to coordinate this with his electrophysiology team.  He otherwise reports he is doing fairly well, working with physical therapy, and overall is doing fairly well. We otherwise would not make any further changes to his plan of care, and we'll try to coordinate with his home health team to have a nurse come by some education on administration of his low-dose oral morphine concentrate both himself and his family.     Past Medical History:   Diagnosis Date    Aneurysm ([facility]     iliacs    Asthma     CAD (coronary artery disease)     Cardiomyopathy ([facility]     [facility]rilobular [facility]sema ([facility])     CHF (congestive heart failure) ([facility])     Dilatation of aorta ([facility]) 10/04/2018    Hyperlipidemia     Idiopathic pulmonary fibrosis (HCC)     Iliac artery aneurysm, bilateral ([facility] 10/04/2018    Inferoposterior myocardial infarction [facility])     NSVT (nonsustained ventricular tachycardia) ([facility]        Past Surgical History:   Procedure Laterality Date    CARDIAC CATHETERIZATION  4-    Dr. Ryan Cason cath used    CARDIAC DEFIBRILLATOR PLACEMENT      CORONARY ANGIOPLASTY  4/15/2014    CORONARY ARTERY BYPASS GRAFT   4/16/2014    x3    HERNIA REPAIR         Family History   Problem Relation Age of Onset    Hypertension Mother     High Cholesterol Sister        ROS: UNLESS STATED ABOVE PATIENT DENIES:  CONSTITUTIONAL:  fever, chill, rigors, nausea, vomiting, fatigue. HEENT: blurry vision, double vision, hearing problem, tinnitus, hoarseness, dysphagia, odynophagia  RESPIRATORY: cough, shortness of breath, sputum expectoration. CARDIOVASCULAR:  Chest pain/pressure, palpitation, syncope, irregular beats  GASTROINTESTINAL:  abdominal or rectal pain, diarrhea, constipation, . GENITOURINARY:  Burning, frequency, urgency, incontinence, discharge  INTEGUMENTARY: rash, wound, pruritis  HEMATOLOGIC/LYMPHATIC:  Swelling, sores, gum bleeding, easy bruising, pica. MUSCULOSKELETAL:  pain, edema, joint swelling or redness  NEUROLOGICAL:  light headed, dizziness, loss of consciousness, weakness, change in memory, seizures, tremors    Objective:     Physical Exam  /64   Pulse 50   Resp 14   SpO2 98%     Gen:  Alert, appears stated age, well nourished, in no acute distress  HEENT:  Normocephalic, conjunctiva pink, no drainage, mucosa moist  Neck:  Supple  Lungs:  CTA bilaterally, no audible rhonchi or wheezes noted  Heart: RRR, no murmur, rub, or gallop noted during exam  Abd:  Soft, non tender, non distended, BS+  M/S/Ext:  Moving all extremities, no edema, pulses present  Skin:  Warm and dry  Neuro:  PERRL, Alert, oriented x 3; following commands    Current Medications:  Medications reviewed: yes    Controlled Substance Monitoring:  [place] reviewed 8/31/21  RX Monitoring 8/31/2021   Attestation -   Periodic Controlled Substance Monitoring Possible medication side effects, risk of tolerance/dependence & alternative treatments discussed. ;No signs of potential drug abuse or diversion identified. ;Assessed functional status.            WILFRIDO Arredondo - CNP  Palliative Medicine    Time/Communication  Greater than 50% of time spent, total 25 minutes in face-to-face counseling and coordination of care regarding goals of care, symptom management, diagnosis and prognosis and see above. Discussed the plan of care with the other IDT members of the Palliative Care Team, and with patient and family. Thank you for allowing Palliative Medicine to participate in the care of Lety Lucero. Note: This report was completed using computerLeanKit voiced recognition software. Every effort has been made to ensure accuracy; however, inadvertent computerized transcription errors may be present.

## 2021-08-31 NOTE — TELEPHONE ENCOUNTER
SW placed call to pt daughter Vianey Sweeney to discuss home visit appt with pt earlier in the day and to provide and update on any new needs or issues. Vianey Sweeney did not identify any needs at this time, discussed with Salazarrobert Patel that Santi Borden prescribed a new medication and that WILLOW will be contacting [address] [place] [address] to see about sending a nurse to show pt how to utilize medication appropriately. Discussed pt current status, recent visits to hospital ED for shortness of breath and pt quality of life. Salazarrobert Patel was understanding and was informed to reach out to PM in the mean time for any needs. Will scheduled next [facility] appt for a follow up in 2 months per NP orders.

## 2021-09-01 NOTE — TELEPHONE ENCOUNTER
Call from [address] [place] asking if a Morphine bottle of 30ml be distributed instead of 15 ml. Discussed with Rayray Garner and ok to give 30 ml bottle of Morphine concentrate.

## 2021-09-02 NOTE — CARE COORDINATION
Care Transitions Outreach Attempt      Attempted to reach patient for transitions of care follow up. Unable to reach patient. Patient: Junior Jacob Patient : 1935 MRN: [id number]    Last Discharge [facility]       Complaint Diagnosis Description Type Department Provider    21 Chest Pain Chest pain, unspecified type ED to Hosp-Admission (Discharged) (ADMITTED) OLY WeaverW Jennie Benitez MD; Renita Celeste,. ..               Noted following upcoming appointments from discharge chart review:   [facility] follow up appointment(s):   Future Appointments   Date Time Provider Nahed Ford   2021 10:45 AM SCHEDULE, REMOTE CHECK PHILIP ELECTRO [facility]YS [facility]   10/11/2021  9:15 AM DO Grace Hernandez [facility]   10/13/2022  2:00 PM Leonarda Beltran MD [facility]/MED [facility]

## 2021-09-07 NOTE — TELEPHONE ENCOUNTER
Nurse Maile contacted Dr. Anahy Ferrell office to schedule ED follow up appt. Spoke with Arleen Childress she stated she will send message to provider to get pt scheduled for follow up appt.

## 2021-09-07 NOTE — TELEPHONE ENCOUNTER
----- Message from Mila Grant sent at 9/7/2021  1:30 PM EDT -----  Subject: Appointment Request    Reason for Call: Urgent (Patient Request) ED Follow Up Visit    QUESTIONS  Type of Appointment? Established Patient  Reason for appointment request? No appointments available during search  Additional Information for Provider? Pt was seen and released from the Ed   on 9/6 for abdominal pain; need a follow-up appt for today 9/7 or tomorrow   9/8  ---------------------------------------------------------------------------  --------------  CALL BACK INFO  What is the best way for the office to contact you? OK to leave message on   voicemail  Preferred Call Back Phone Number? [phone]  ---------------------------------------------------------------------------  --------------  SCRIPT ANSWERS  Relationship to Patient? Third Party  Representative Name? Savanah  (Patient requests to see provider urgently. )? Yes  Do you have any questions for your primary care provider that need to be   answered prior to your appointment? No  Have you been diagnosed with, awaiting test results for, or told that you   are suspected of having COVID-19 (Coronavirus)? (If patient has tested   negative or was tested as a requirement for work, school, or travel and   not based on symptoms, answer no)? No  Do you currently have flu-like symptoms including fever or chills, cough,   shortness of breath, difficulty breathing, or new loss of taste or smell? No  Have you had close contact with someone with COVID-19 in the last 14 days? No  (Service Expert  click yes below to proceed with [employer] As Usual   Scheduling)?  Yes

## 2021-09-07 NOTE — ED PROVIDER NOTES
This is an 80year old male with a PMH of CAD, COPD who presents to the ED for evaluation of abdominal pain. Patient states that today shortly after eating breakfast he devloped some pain to his lower abdomen. Patient states that this pain has been constant in nature. Patient states that he rates his pain as a 5/10. He states that he has a lack of an appetite and notes that his pain does seem to radiate around to his back. Patient has no testicular pain or dysuria. Patient has no known history of kidney stones. She has no fevers or chills. Patient has no chest pain or shortness of breath. Patient states that since being here in the ER his pain has improved. He has no numbness or tingling or changes in his gait. Patient notes he does use 3 L of oxygen at his baseline. The history is provided by the patient. No  was used. Review of Systems   Constitutional: Negative for fever. HENT: Negative for congestion. Eyes: Negative for visual disturbance. Respiratory: Negative for shortness of breath. Cardiovascular: Negative for chest pain. Gastrointestinal: Positive for abdominal pain. Endocrine: Negative for polyuria. Genitourinary: Negative for dysuria. Musculoskeletal: Negative for back pain. Skin: Negative for rash. Neurological: Negative for headaches. Hematological: Does not bruise/bleed easily. Psychiatric/Behavioral: Negative for confusion. Physical Exam  Vitals and nursing note reviewed. Constitutional:       General: He is not in acute distress. Appearance: He is well-developed. HENT:      Head: Normocephalic and atraumatic. Mouth/Throat:      Mouth: Mucous membranes are moist.   Eyes:      Extraocular Movements: Extraocular movements intact. Pupils: Pupils are equal, round, and reactive to light. Neck:      Vascular: No JVD. Cardiovascular:      Rate and Rhythm: Normal rate and regular rhythm.    Pulmonary:      Effort: help pass the stone. Both given return precautions were both agreeable this plan.                       --------------------------------------------- PAST HISTORY ---------------------------------------------  Past Medical History:  has a past medical history of [facility] Asthma, CAD (coronary artery disease), Cardiomyopathy ([facility] Centrilobular emphysema ([facility] CHF (congestive heart failure) ([facility] Dilatation of aorta ([facility] Hyperlipidemia, Idiopathic pulmonary fibrosis ([facility] Iliac artery aneurysm, bilateral ([facility] Inferoposterior myocardial infarction ([facility] and NSVT (nonsustained ventricular tachycardia) ([facility] Past Surgical History:  has a past surgical history that includes Cardiac catheterization (4-); Coronary artery bypass graft ( 4/16/2014); Coronary angioplasty (4/15/2014); Cardiac defibrillator placement; and hernia repair. Social History:  reports that he quit smoking about 41 years ago. His smoking use included cigarettes. He started smoking about 71 years ago. He has a 30.00 pack-year smoking history. He has never used smokeless tobacco. He reports current alcohol use. He reports that he does not use drugs. Family History: family history includes High Cholesterol in his sister; Hypertension in his mother. The patients home medications have been reviewed. Allergies: Patient has no known allergies.     -------------------------------------------------- RESULTS -------------------------------------------------  Labs:  Results for orders placed or performed during the hospital encounter of 09/06/21   Comprehensive Metabolic Panel w/ Reflex to MG   Result Value Ref Range    Sodium 138 132 - 146 mmol/L    Potassium reflex Magnesium 3.7 3.5 - 5.0 mmol/L    Chloride 103 98 - 107 mmol/L    CO2 23 22 - 29 mmol/L    Anion Gap 12 7 - 16 mmol/L    Glucose 166 (H) 74 - 99 mg/dL    BUN 21 6 - 23 mg/dL    CREATININE 1.2 0.7 - 1.2 mg/dL    GFR Non-African American 57 >=60 mL/min/1.73 GFR African American >60     Calcium 9.0 8.6 - 10.2 mg/dL    Total Protein 6.0 (L) 6.4 - 8.3 g/dL    Albumin 3.0 (L) 3.5 - 5.2 g/dL    Total Bilirubin 0.4 0.0 - 1.2 mg/dL    Alkaline Phosphatase 50 40 - 129 U/L    ALT 7 0 - 40 U/L    AST 21 0 - 39 U/L   CBC Auto Differential   Result Value Ref Range    WBC 10.3 4.5 - 11.5 E9/L    RBC 3.44 (L) 3.80 - 5.80 E12/L    Hemoglobin 10.6 (L) 12.5 - 16.5 g/dL    Hematocrit 33.7 (L) 37.0 - 54.0 %    MCV 98.0 80.0 - 99.9 fL    MCH 30.8 26.0 - 35.0 pg    MCHC 31.5 (L) 32.0 - 34.5 %    RDW 15.9 (H) 11.5 - 15.0 fL    Platelets 821 869 - 821 E9/L    MPV 8.5 7.0 - 12.0 fL    Neutrophils % 62.6 43.0 - 80.0 %    Immature Granulocytes % 0.4 0.0 - 5.0 %    Lymphocytes % 30.1 20.0 - 42.0 %    Monocytes % 6.2 2.0 - 12.0 %    Eosinophils % 0.5 0.0 - 6.0 %    Basophils % 0.2 0.0 - 2.0 %    Neutrophils Absolute 6.46 1.80 - 7.30 E9/L    Immature Granulocytes # 0.04 E9/L    Lymphocytes Absolute 3.10 1.50 - 4.00 E9/L    Monocytes Absolute 0.64 0.10 - 0.95 E9/L    Eosinophils Absolute 0.05 0.05 - 0.50 E9/L    Basophils Absolute 0.02 0.00 - 0.20 E9/L   Lactic Acid, Plasma   Result Value Ref Range    Lactic Acid 1.8 0.5 - 2.2 mmol/L   Lipase   Result Value Ref Range    Lipase 22 13 - 60 U/L   Urinalysis with Microscopic   Result Value Ref Range    Color, UA Yellow Straw/Yellow    Clarity, UA Clear Clear    Glucose, Ur Negative Negative mg/dL    Bilirubin Urine Negative Negative    Ketones, Urine Negative Negative mg/dL    Specific Gravity, UA 1.020 1.005 - 1.030    Blood, Urine LARGE (A) Negative    pH, UA 5.0 5.0 - 9.0    Protein, UA TRACE Negative mg/dL    Urobilinogen, Urine 1.0 <2.0 E.U./dL    Nitrite, Urine Negative Negative    Leukocyte Esterase, Urine Negative Negative    WBC, UA 2-5 0 - 5 /HPF    RBC, UA >20 0 - 2 /HPF    Bacteria, UA RARE (A) None Seen /HPF       Radiology:  CT ABDOMEN PELVIS W IV CONTRAST Additional Contrast? None   Final Result   5 mm stone in the proximal to mid right ureter with moderate hydronephrosis. Low-attenuation lesion in the lateral segment of the left hepatic lobe not   definitely present on a prior chest CT. This is nonspecific but metastatic   disease is a consideration. When clinically feasible, a nonemergent hepatic   mass protocol CT is recommended to further characterize. Cholelithiasis with some nonspecific gallbladder wall thickening. Cholecystitis not excluded although the gallbladder is not significantly   distended. Correlate with clinical presentation. A 2.1 x 1.7 cm cavitary focus in the periphery of the right lung base is   nonspecific. This could be related to worsening areas of fibrosis and   honeycombing but an infectious/inflammatory or neoplastic lesion is not   excluded. Thickening of the cecum and proximal ascending colon may be related to   underdistention. Colitis or neoplasia thought less likely. Aneurysmal dilatation of the aortic bifurcation and left greater than right   common iliac arteries. Vascular surgical consultation recommended for   appropriate management. At minimum, this should be followed annually. Diverticulosis. Areas of osseous sclerosis in the pelvis may represent bone islands. Other   osteoblastic process difficult to exclude. Correlate with PSA values. ------------------------- NURSING NOTES AND VITALS REVIEWED ---------------------------  Date / Time Roomed:  9/6/2021  9:17 PM  ED Bed Assignment:  15/15    The nursing notes within the ED encounter and vital signs as below have been reviewed.    /70   Pulse 60   Temp 97.3 °F (36.3 °C)   Resp 12   Ht 5' 10\" (1.778 m)   Wt 150 lb (68 kg)   SpO2 98%   BMI 21.52 kg/m²   Oxygen Saturation Interpretation: Normal      ------------------------------------------ PROGRESS NOTES ------------------------------------------  2:54 AM EDT  I have spoken with the patient and discussed todays results, in addition to providing specific details for the plan of care and counseling regarding the diagnosis and prognosis. Their questions are answered at this time and they are agreeable with the plan. I discussed at length with them reasons for immediate return here for re evaluation. They will followup with their PCP.      --------------------------------- ADDITIONAL PROVIDER NOTES ---------------------------------  At this time the patient is without objective evidence of an acute process requiring hospitalization or inpatient management. They have remained hemodynamically stable throughout their entire ED visit and are stable for discharge with outpatient follow-up. The plan has been discussed in detail and they are aware of the specific conditions for emergent return, as well as the importance of follow-up. Discharge Medication List as of 9/7/2021 12:44 AM      START taking these medications    Details   tamsulosin (FLOMAX) 0.4 MG capsule Take 1 capsule by mouth daily for 15 days, Disp-15 capsule, R-0Print      ondansetron (ZOFRAN ODT) 8 MG TBDP disintegrating tablet Take 1 tablet by mouth every 8 hours as needed for Nausea, Disp-12 tablet, R-0Print             Diagnosis:  1. Abdominal pain, unspecified abdominal location    2. Kidney stone    3. Abnormal CT scan        Disposition:  Patient's disposition: Discharge to home  Patient's condition is stable.      Catie Palencia DO  09/07/21 0400

## 2021-09-09 NOTE — PROGRESS NOTES
Coenzyme Q10 (COQ10) 100 MG CAPS  Take 100 mg by mouth daily             ENTRESTO 24-26 MG per tablet  TAKE 1 TABLET BY MOUTH 2 TIMES A DAY             furosemide (LASIX) 20 MG tablet  Take 1 tablet by mouth daily             isosorbide mononitrate (IMDUR) 30 MG extended release tablet  TAKE ONE TABLET BY MOUTH EVERY DAY             LORazepam (ATIVAN) 0.5 MG tablet  TAKE 1/2 TO 1 TABLET BY MOUTH AT BEDTIME AS NEEDED             METOPROLOL SUCCINATE ER PO  TAKE  BY MOUTH EVERY DAY             mexiletine (MEXITIL) 200 MG capsule  TAKE ONE CAPSULE BY MOUTH 3 TIMES A DAY             montelukast (SINGULAIR) 10 MG tablet  Take 1 tablet by mouth nightly             Morphine Sulfate (MORPHINE 20MG/ML) SOLN concentrated solution  Take 0.125 mLs by mouth every hour as needed for Pain (chest pain, or severe SOB) for up to 10 days. Multiple Vitamins-Minerals (MULTIVITAMIN ADULT) TABS  Take by mouth daily             nitroGLYCERIN (NITROSTAT) 0.3 MG SL tablet  Place 1 tablet under the tongue every 5 minutes as needed for Chest pain up to max of 3 total doses. If no relief after 1 dose, call 911.              OFEV 150 MG CAPS  TAKE 1 CAPSULE BY MOUTH TWICE A DAY (EVERY 12 HOURS) AS DIRECTED WITH FOOD.             ondansetron (ZOFRAN ODT) 8 MG TBDP disintegrating tablet  Take 1 tablet by mouth every 8 hours as needed for Nausea             rosuvastatin (CRESTOR) 5 MG tablet  Take 1 tablet by mouth nightly             tamsulosin (FLOMAX) 0.4 MG capsule  Take 1 capsule by mouth daily for 15 days                   Medications marked \"taking\" at this time  Outpatient Medications Marked as Taking for the 9/9/21 encounter (Office Visit) with Dorothy Hillman DO   Medication Sig Dispense Refill    isosorbide mononitrate (IMDUR) 30 MG extended release tablet TAKE ONE TABLET BY MOUTH EVERY DAY      METOPROLOL SUCCINATE ER PO TAKE  BY MOUTH EVERY DAY      LORazepam (ATIVAN) 0.5 MG tablet TAKE 1/2 TO 1 TABLET BY MOUTH AT BEDTIME AS NEEDED 30 tablet 1    tamsulosin (FLOMAX) 0.4 MG capsule Take 1 capsule by mouth daily for 15 days 15 capsule 0    ondansetron (ZOFRAN ODT) 8 MG TBDP disintegrating tablet Take 1 tablet by mouth every 8 hours as needed for Nausea 12 tablet 0    furosemide (LASIX) 20 MG tablet Take 1 tablet by mouth daily 90 tablet 0    ENTRESTO 24-26 MG per tablet TAKE 1 TABLET BY MOUTH 2 TIMES A DAY 60 tablet 0    mexiletine (MEXITIL) 200 MG capsule TAKE ONE CAPSULE BY MOUTH 3 TIMES A DAY 90 capsule 5    nitroGLYCERIN (NITROSTAT) 0.3 MG SL tablet Place 1 tablet under the tongue every 5 minutes as needed for Chest pain up to max of 3 total doses. If no relief after 1 dose, call 911. 30 tablet 0    albuterol (PROVENTIL) (2.5 MG/3ML) 0.083% nebulizer solution USE 1 VIAL IN NEBULIZER 4 TIMES DAILY 120 vial 11    Coenzyme Q10 (COQ10) 100 MG CAPS Take 100 mg by mouth daily 30 capsule 5    montelukast (SINGULAIR) 10 MG tablet Take 1 tablet by mouth nightly 30 tablet 5    aspirin 81 MG EC tablet Take 1 tablet by mouth daily 30 tablet 5    OFEV 150 MG CAPS TAKE 1 CAPSULE BY MOUTH TWICE A DAY (EVERY 12 HOURS) AS DIRECTED WITH FOOD. 60 capsule 11    rosuvastatin (CRESTOR) 5 MG tablet Take 1 tablet by mouth nightly 30 tablet 5    albuterol sulfate  (90 Base) MCG/ACT inhaler Inhale 2 puffs into the lungs every 6 hours as needed for Wheezing 3 Inhaler 0    Multiple Vitamins-Minerals (MULTIVITAMIN ADULT) TABS Take by mouth daily          Medications patient taking as of now reconciled against medications ordered at time of hospital discharge: Yes    Chief Complaint   Patient presents with   [address] Management     TCM visit       History of Present illness - Follow up of Hospital diagnosis(es): kidney stone, abd pain, liver mass. Inpatient course: Discharge summary reviewed- see chart. Interval history/Current status: gi: abd pain comes and goes. On Flomax. Pain better when urinates.  Saw Dr. Naomie Lopez who wants him to push the fluids. He thinks it will pass. He has follow up 9/13 with urology. Gen: no chills or fever. Taking ativan hs to help him sleep taking a whole pill. Cv: - cp or palp's  Resp: + sob with walking. Wearing oxygen 24/7 which helps. A comprehensive review of systems was negative except for what was noted in the HPI. Vitals:    09/09/21 0937   BP: 108/70   Pulse: 74   Resp: 14   Temp: 97.2 °F (36.2 °C)   SpO2: 96%   Weight: 148 lb (67.1 kg)     Body mass index is 21.24 kg/m². Wt Readings from Last 3 Encounters:   09/09/21 148 lb (67.1 kg)   09/06/21 150 lb (68 kg)   08/30/21 145 lb (65.8 kg)     BP Readings from Last 3 Encounters:   09/09/21 108/70   09/07/21 115/70   08/31/21 128/64        Physical Exam:  General Appearance: alert and oriented to person, place and time, well-developed and well-nourished, in no acute distress  Eyes: sclera anicteric and conj pale  Pulmonary/Chest: clear to auscultation bilaterally- no wheezes, rales or rhonchi, normal air movement, no respiratory distress  Cardiovascular: normal rate, normal S1 and S2, no gallops, intact distal pulses and no carotid bruits  Abdomen: soft, non-tender, non-distended, normal bowel sounds, no masses or organomegaly  Extremities: no cyanosis and no clubbing    Assessment/Plan:  1. Adjustment disorder with anxiety  - LORazepam (ATIVAN) 0.5 MG tablet; TAKE 1/2 TO 1 TABLET BY MOUTH AT BEDTIME AS NEEDED  Dispense: 30 tablet; Refill: 0    2.  Kidney stone on right side  - push fluids with thicket   - follow up with urology        Medical Decision Making: moderate complexity

## 2021-09-09 NOTE — TELEPHONE ENCOUNTER
----- Message from Danita Smith sent at 9/9/2021  9:06 AM EDT -----  Subject: Message to Provider    QUESTIONS  Information for Provider? pt calling in asking if the thickener he uses is   causing his kidney stones. please advise.   ---------------------------------------------------------------------------  --------------  CALL BACK INFO  What is the best way for the office to contact you? Do not leave any   message, patient will call back for answer  Preferred Call Back Phone Number? [phone]  ---------------------------------------------------------------------------  --------------  SCRIPT ANSWERS  Relationship to Patient?  Self

## 2021-09-14 PROBLEM — I50.23 ACUTE ON CHRONIC SYSTOLIC HF (HEART FAILURE) (HCC): Status: ACTIVE | Noted: 2021-01-01

## 2021-09-14 NOTE — ED PROVIDER NOTES
25-year-old male with a past medical history of cardiomyopathy CHF pulmonary fibrosis and coronary artery disease who presents to the ED for evaluation of shortness of breath. Patient states that for the past 3 weeks she has been having increasing shortness of breath. States that over the past 1 week it has been significantly worsened in severity. States that whenever he exerts himself this does seem to worsen his shortness of breath. Patient remarks that today he was shaving it was becoming far to cumbersome and he felt short of breath doing just that simple activity to the point where he had to stop doing this and ultimately called EMS later in the day. He states that he uses 3 L of oxygen had to be placed on 4 L today. He states that at rest he feels fine. He has no chest pain leg pain or leg swelling. No reported cough fevers or black or bloody stools. The history is provided by the patient. No  was used. Review of Systems   Constitutional: Negative for fever. HENT: Negative for congestion. Eyes: Negative for visual disturbance. Respiratory: Negative for cough and shortness of breath. Cardiovascular: Negative for chest pain. Gastrointestinal: Negative for abdominal pain. Endocrine: Negative for polyuria. Genitourinary: Negative for dysuria. Musculoskeletal: Negative for back pain. Skin: Negative for rash. Neurological: Negative for headaches. Hematological: Does not bruise/bleed easily. Psychiatric/Behavioral: Negative for confusion. Physical Exam  Vitals and nursing note reviewed. Constitutional:       General: He is not in acute distress. Appearance: He is well-developed. HENT:      Head: Normocephalic and atraumatic. Eyes:      Extraocular Movements: Extraocular movements intact. Pupils: Pupils are equal, round, and reactive to light. Neck:      Vascular: No JVD. Cardiovascular:      Rate and Rhythm: Normal rate. Heart sounds: No murmur heard. Pulmonary:      Effort: Pulmonary effort is normal.      Breath sounds: No wheezing, rhonchi or rales. Comments: Diminished at baseses  Chest:      Chest wall: No tenderness. Abdominal:      General: There is no distension. Palpations: Abdomen is soft. Tenderness: There is no abdominal tenderness. Hernia: No hernia is present. Musculoskeletal:      Cervical back: Normal range of motion and neck supple. Right lower leg: No edema. Left lower leg: No edema. Skin:     General: Skin is warm and dry. Capillary Refill: Capillary refill takes less than 2 seconds. Neurological:      General: No focal deficit present. Mental Status: He is alert and oriented to person, place, and time. Cranial Nerves: No cranial nerve deficit. Psychiatric:         Mood and Affect: Mood normal.         Behavior: Behavior normal.          Procedures     MDM  Number of Diagnoses or Management Options  Acute respiratory failure with hypoxia (HCC)  Congestive heart failure, unspecified HF chronicity, unspecified heart failure type [facility])  Diagnosis management comments: Patient presented to the ED for evaluation of increasing work of breathing and CHEN. Patient was stable on his 3L and when I stood the patient up his oxygen dropped almost immediately into the 80's CXR showed vascular congestion and his BNP was elevated from his previous.  Patient was given dose of Lasix here in the ED and given his worsening shortness of breath and hypoxia he was admitted to the medicine team.                   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    Hemoglobin 9.1 (L) 12.5 - 16.5 g/dL    Hematocrit 28.6 (L) 37.0 - 54.0 %    MCV 97.3 80.0 - 99.9 fL    MCH 31.0 26.0 - 35.0 pg    MCHC 31.8 (L) 32.0 - 34.5 %    RDW 15.3 (H) 11.5 - 15.0 fL    Platelets 539 950 - 658 E9/L    MPV 8.2 7.0 - 12.0 fL    Neutrophils % 52.0 43.0 - 80.0 %    Immature Granulocytes % 0.7 0.0 - 5.0 %    Lymphocytes % 40.0 20.0 - 42.0 %    Monocytes % 6.0 2.0 - 12.0 %    Eosinophils % 1.1 0.0 - 6.0 %    Basophils % 0.2 0.0 - 2.0 %    Neutrophils Absolute 5.68 1.80 - 7.30 E9/L    Immature Granulocytes # 0.08 E9/L    Lymphocytes Absolute 4.36 (H) 1.50 - 4.00 E9/L    Monocytes Absolute 0.65 0.10 - 0.95 E9/L    Eosinophils Absolute 0.12 0.05 - 0.50 E9/L    Basophils Absolute 0.02 0.00 - 0.20 E9/L   Troponin   Result Value Ref Range    Troponin, High Sensitivity 46 (H) 0 - 11 ng/L   Brain Natriuretic Peptide   Result Value Ref Range    Pro-BNP 2,913 (H) 0 - 450 pg/mL   Protime-INR   Result Value Ref Range    Protime 12.8 (H) 9.3 - 12.4 sec    INR 1.2    Comprehensive Metabolic Panel w/ Reflex to MG   Result Value Ref Range    Sodium 140 132 - 146 mmol/L    Potassium reflex Magnesium 3.4 (L) 3.5 - 5.0 mmol/L    Chloride 105 98 - 107 mmol/L    CO2 26 22 - 29 mmol/L    Anion Gap 9 7 - 16 mmol/L    Glucose 92 74 - 99 mg/dL    BUN 16 6 - 23 mg/dL    CREATININE 1.0 0.7 - 1.2 mg/dL    GFR Non-African American >60 >=60 mL/min/1.73    GFR African American >60     Calcium 8.4 (L) 8.6 - 10.2 mg/dL    Total Protein 6.3 (L) 6.4 - 8.3 g/dL    Albumin 3.3 (L) 3.5 - 5.2 g/dL    Total Bilirubin 0.4 0.0 - 1.2 mg/dL    Alkaline Phosphatase 45 40 - 129 U/L    ALT 12 0 - 40 U/L    AST 17 0 - 39 U/L   CBC auto differential   Result Value Ref Range    WBC 11.8 (H) 4.5 - 11.5 E9/L    RBC 3.22 (L) 3.80 - 5.80 E12/L    Hemoglobin 9.9 (L) 12.5 - 16.5 g/dL    Hematocrit 31.2 (L) 37.0 - 54.0 %    MCV 96.9 80.0 - 99.9 fL    MCH 30.7 26.0 - 35.0 pg    MCHC 31.7 (L) 32.0 - 34.5 %    RDW 15.3 (H) 11.5 - 15.0 fL Platelets 976 250 - 584 E9/L    MPV 8.3 7.0 - 12.0 fL    Neutrophils % 46.2 43.0 - 80.0 %    Immature Granulocytes % 0.7 0.0 - 5.0 %    Lymphocytes % 45.8 (H) 20.0 - 42.0 %    Monocytes % 5.6 2.0 - 12.0 %    Eosinophils % 1.4 0.0 - 6.0 %    Basophils % 0.3 0.0 - 2.0 %    Neutrophils Absolute 5.48 1.80 - 7.30 E9/L    Immature Granulocytes # 0.08 E9/L    Lymphocytes Absolute 5.41 (H) 1.50 - 4.00 E9/L    Monocytes Absolute 0.66 0.10 - 0.95 E9/L    Eosinophils Absolute 0.16 0.05 - 0.50 E9/L    Basophils Absolute 0.03 0.00 - 0.20 E9/L   Troponin   Result Value Ref Range    Troponin, High Sensitivity 47 (H) 0 - 11 ng/L   Troponin   Result Value Ref Range    Troponin, High Sensitivity 51 (H) 0 - 11 ng/L   Troponin   Result Value Ref Range    Troponin, High Sensitivity 51 (H) 0 - 11 ng/L   Magnesium   Result Value Ref Range    Magnesium 1.9 1.6 - 2.6 mg/dL   EKG 12 Lead   Result Value Ref Range    Ventricular Rate 70 BPM    Atrial Rate 70 BPM    P-R Interval 200 ms    QRS Duration 138 ms    Q-T Interval 424 ms    QTc Calculation (Bazett) 457 ms    P Axis 16 degrees    R Axis -30 degrees    T Axis 64 degrees       RADIOLOGY:  US DUP LOWER EXTREMITY RIGHT ANNA   Final Result   No lower extremity DVT is identified. XR CHEST PORTABLE   Final Result   1. Slightly limited exam, with grossly unchanged background chronic   interstitial lung disease, and at least borderline cardiomegaly, and   equivalent central pulmonary vascular congestion, partially obscured by the   above processes, but not significantly changed. Eulalio Poep RECOMMENDATION:   1. If further characterization is clinically required, consider chest CT. Eulalio Pope ------------------------- NURSING NOTES AND VITALS REVIEWED ---------------------------  Date / Time Roomed:  9/14/2021  4:19 PM  ED Bed Assignment:  [id number]    The nursing notes within the ED encounter and vital signs as below have been reviewed.      Patient Vitals for the past 24 hrs:   BP Temp Temp src Pulse Resp SpO2   09/15/21 2010 (!) 91/53 98.3 °F (36.8 °C) Oral 85 16 100 %   09/15/21 1156 104/62 97.4 °F (36.3 °C) Oral 74 16 96 %   09/15/21 0801 (!) 111/58 97.9 °F (36.6 °C) Oral 85 16 92 %       Oxygen Saturation Interpretation: Abnormal    ------------------------------------------ PROGRESS NOTES ------------------------------------------  Re-evaluation(s):  Time: 916  Patients symptoms show no change  Repeat physical examination is not changed    Counseling:  I have spoken with the patient and discussed todays results, in addition to providing specific details for the plan of care and counseling regarding the diagnosis and prognosis. Their questions are answered at this time and they are agreeable with the plan of admission.    --------------------------------- ADDITIONAL PROVIDER NOTES ---------------------------------  Consultations:  Spoke with Dr. Natalia don. They will admit the patient. This patient's ED course included: a personal history and physicial examination, re-evaluation prior to disposition, multiple bedside re-evaluations, IV medications, cardiac monitoring, continuous pulse oximetry and complex medical decision making and emergency management    This patient has remained hemodynamically stable during their ED course. Diagnosis:  1. Congestive heart failure, unspecified HF chronicity, unspecified heart failure type ([facility] [address]    2. Acute respiratory failure with hypoxia (HCC)        Disposition:  Patient's disposition: Admit to telemetry  Patient's condition is stable.         Raquel Dean DO  09/15/21 0745

## 2021-09-15 NOTE — PROGRESS NOTES
[facility] Progress Note    Admitting Date and Time: 9/14/2021  4:19 PM  Admit Dx: Acute on chronic systolic HF (heart failure) (HCC) [I50.23]  Acute respiratory failure with hypoxia ([facility]) [J96.01]  Congestive heart failure, unspecified HF chronicity, unspecified heart failure type ([address] [I50.9]    Subjective:  Patient is being followed for Acute on chronic systolic HF (heart failure) (HCC) [I50.23]  Acute respiratory failure with hypoxia (HCC) [J96.01]  Congestive heart failure, unspecified HF chronicity, unspecified heart failure type ([facility] [address] [I50.9]     Patient has no complaints at this time. He states that his breathing has improved. He denies any chest pain or palpitations. ROS: denies fever, chills, cp, sob, n/v, HA unless stated above.       potassium chloride  40 mEq Oral Daily    metoprolol succinate  12.5 mg Oral BID    aspirin  81 mg Oral Daily    sacubitril-valsartan  1 tablet Oral BID    isosorbide mononitrate  30 mg Oral Daily    montelukast  10 mg Oral Nightly    rosuvastatin  5 mg Oral Nightly    tamsulosin  0.4 mg Oral Daily    sodium chloride flush  5-40 mL IntraVENous 2 times per day    furosemide  40 mg IntraVENous Daily    enoxaparin  40 mg SubCUTAneous Daily     sodium chloride flush, 5-40 mL, PRN  ondansetron, 4 mg, Q8H PRN   Or  ondansetron, 4 mg, Q6H PRN  polyethylene glycol, 17 g, Daily PRN  acetaminophen, 650 mg, Q6H PRN   Or  acetaminophen, 650 mg, Q6H PRN  potassium chloride, 10 mEq, PRN  magnesium sulfate, 2,000 mg, PRN         Objective:    /62   Pulse 74   Temp 97.4 °F (36.3 °C) (Oral)   Resp 16   Ht 5' 10\" (1.778 m)   Wt 146 lb 8 oz (66.5 kg)   SpO2 96%   BMI 21.02 kg/m²     General Appearance: alert and oriented to person, place and time and in no acute distress  Skin: warm and dry  Head: normocephalic and atraumatic  Eyes: pupils equal, round, and reactive to light, extraocular eye movements intact, conjunctivae normal  Neck: neck supple and non tender without mass   Pulmonary/Chest: clear to auscultation bilaterally- no wheezes, rales or rhonchi, normal air movement, no respiratory distress  Cardiovascular: normal rate, normal S1 and S2 and no carotid bruits  Abdomen: soft, non-tender, non-distended, normal bowel sounds, no masses or organomegaly  Extremities: no cyanosis, no clubbing and no edema  Neurologic: no cranial nerve deficit and speech normal        Recent Labs     09/14/21  1704 09/15/21  0542    140   K 3.9 3.4*    105   CO2 25 26   BUN 19 16   CREATININE 1.1 1.0   GLUCOSE 123* 92   CALCIUM 8.6 8.4*       Recent Labs     09/14/21  1704 09/15/21  0542   WBC 10.9 11.8*   RBC 2.94* 3.22*   HGB 9.1* 9.9*   HCT 28.6* 31.2*   MCV 97.3 96.9   MCH 31.0 30.7   MCHC 31.8* 31.7*   RDW 15.3* 15.3*    290   MPV 8.2 8.3       Radiology: No new imaging studies    Assessment:    Active Problems:    Acute on chronic systolic HF (heart failure) ([facility])  Resolved Problems:    * No resolved hospital problems. *      Plan:    1. Acute on chronic hypoxic respiratory failure -patient is currently on 5 L of oxygen. At home he uses 3 L of oxygen. 2.  Acute on chronic systolic and stage I diastolic CHF exacerbation, EF 25 to 30% -patient is on Lasix 20 mg oral daily at home. Currently has been started on Lasix 40 mg IV daily. He is to continue Toprol as well as Entresto. Patient has been placed on a fluid restriction. We will follow up with accurate ins and outs as well as daily weights. Thus far patient is net -1.14 L. Cardiology has been consulted  3. CAD -stable on Imdur, Toprol, aspirin, Crestor  4. DVT prophylaxis -Lovenox      NOTE: This report was transcribed using voice recognition software. Every effort was made to ensure accuracy; however, inadvertent computerized transcription errors may be present.   Electronically signed by Samia White DO on 9/15/2021 at 1:16 PM

## 2021-09-15 NOTE — H&P
Hospital Medicine  History and Physical    Patient:  Pamela Damon  MRN: [id number]    CHIEF COMPLAINT:    Chief Complaint   Patient presents with    Shortness of Breath     Increased with movement worse x 3 weeks worse today       Primary Care Physician: Brianna Cronin DO    HISTORY OF PRESENT ILLNESS:   The patient is a 80 y.o. male with past medical history significant for combined systolic and diastolic congestive heart failure with ejection fraction of 25 to 30% status post AICD as well as CAD status post CABG who presented to the hospital complaining of worsening shortness of breath for the last week, the patient is chronically on 3 L however has been noticing himself getting more tired and short of breath on minimal exertion that usually does without any issues, here in the hospital the patient's oxygen requirements went up to 4 L, chest x-ray was consistent with chronic essential changes with pulmonary congestion, the patient denied having any fever/chills/cough or sputum production, the patient stated that he had to increase his water intake over the last week due to kidney stone diagnosis where his doctor told him to increase his water intake and probably drank some beer for easy extraction of the stone      Past Medical History:      Diagnosis Date    Aneurysm ([address]     iliacs    Asthma     CAD (coronary artery disease)     Cardiomyopathy ([address]     Centrilobular emphysema ([address]     CHF (congestive heart failure) ([address]     Dilatation of aorta ([address] 10/04/2018    Hyperlipidemia     Idiopathic pulmonary fibrosis ([address]     Iliac artery aneurysm, bilateral ([address] 10/04/2018    Inferoposterior myocardial infarction [facility])     NSVT (nonsustained ventricular tachycardia) ([address]        Past Surgical History:      Procedure Laterality Date    CARDIAC CATHETERIZATION  4-    Dr. Ricardo Davidson cath used    CARDIAC DEFIBRILLATOR PLACEMENT     [address]  4/15/2014    CORONARY ARTERY BYPASS GRAFT   4/16/2014    x3    HERNIA REPAIR         Medications Prior to Admission:    Prior to Admission medications    Medication Sig Start Date End Date Taking? Authorizing Provider   rosuvastatin (CRESTOR) 5 MG tablet Take 1 tablet by mouth nightly 9/10/21   Luis Banuelos DO   isosorbide mononitrate (IMDUR) 30 MG extended release tablet TAKE ONE TABLET BY MOUTH EVERY DAY 9/2/21   Historical Provider, MD   METOPROLOL SUCCINATE ER PO TAKE  BY MOUTH EVERY DAY 9/2/21   Historical Provider, MD   LORazepam (ATIVAN) 0.5 MG tablet TAKE 1/2 TO 1 TABLET BY MOUTH AT BEDTIME AS NEEDED 9/9/21 11/9/21  Nanci Banuelos DO   tamsulosin (FLOMAX) 0.4 MG capsule Take 1 capsule by mouth daily for 15 days 9/7/21 9/22/21  Yumi Traore, DO   ondansetron (ZOFRAN ODT) 8 MG TBDP disintegrating tablet Take 1 tablet by mouth every 8 hours as needed for Nausea 9/7/21   Yumi Traore,    furosemide (LASIX) 20 MG tablet Take 1 tablet by mouth daily 8/23/21   Luis Banuelos,    ENTRESTO 24-26 MG per tablet TAKE 1 TABLET BY MOUTH 2 TIMES A DAY 8/9/21   Luis Banuelos DO   mexiletine (MEXITIL) 200 MG capsule TAKE ONE CAPSULE BY MOUTH 3 TIMES A DAY 8/9/21   Marcus Smith MD   nitroGLYCERIN (NITROSTAT) 0.3 MG SL tablet Place 1 tablet under the tongue every 5 minutes as needed for Chest pain up to max of 3 total doses.  If no relief after 1 dose, call 911. 8/5/21   Nanci Banuelos,    albuterol (PROVENTIL) (2.5 MG/3ML) 0.083% nebulizer solution USE 1 VIAL IN NEBULIZER 4 TIMES DAILY 6/14/21   Rocky Heredia MD   Coenzyme Q10 (COQ10) 100 MG CAPS Take 100 mg by mouth daily 6/9/21   Nanci Banuelos DO   montelukast (SINGULAIR) 10 MG tablet Take 1 tablet by mouth nightly 5/28/21   Nanci Banuelos DO   aspirin 81 MG EC tablet Take 1 tablet by mouth daily 5/28/21   Luis Banuelos,    OFEV 150 MG CAPS TAKE 1 CAPSULE BY MOUTH TWICE A DAY (EVERY 12 HOURS) AS Bridget Query in the last 72 hours. Invalid input(s): Sheryle Myrtle  -----------------------------------------------------------------   XR CHEST PORTABLE    Result Date: 9/14/2021  EXAMINATION: ONE XRAY VIEW OF THE CHEST  9/14/2021 17:01 COMPARISON: AP portable chest radiograph 08/11/2021 HISTORY: Dyspnea FINDINGS: This exam is slightly limited by patient body habitus, as well as AP portable, semi-upright technique. Given these factors: Multiple cardiac monitoring leads  overlie the patient's chest.  Multiple sternotomy wires/sternoplasty devices, as well as coronary ostial markers/surgical clips, and a left-subclavian-approach multi-lead AICD, all appear grossly unchanged. There is moderate bibasilar reticulonodular coarsening of interstitial markings, lessening peripherally to both lung apices, and with relative partial sparing of the left upper lobe, not significantly changed. The above processes partially obscure the cardiomediastinal silhouette, although there is at least borderline cardiomegaly, and equivalent central pulmonary vascular congestion, not significantly changed. No other clinically-significant changes are noted. .     1. Slightly limited exam, with grossly unchanged background chronic interstitial lung disease, and at least borderline cardiomegaly, and equivalent central pulmonary vascular congestion, partially obscured by the above processes, but not significantly changed. Heidi Pate RECOMMENDATION: 1. If further characterization is clinically required, consider chest CT. Heidi Pate            Assessment and Plan     *Acute on chronic hypoxemic respiratory failure  *Pulmonary congestion/fluid over load secondary to acute on chronic  Combined systolic and diastolic congestive heart failure  *History of BPH    -Admit to [facility] with telemetry  -Start patient on Lasix 40 mg IV daily as the patient takes 20 mg p.o. daily  -Strict I's and O's  -Restrict diet to 2 g of sodium and 2 L of IV fluids  -Continue to cycle troponin  -There is no need to repeat the echo as he had one done 6 months ago  -Patient is DNR CCA and he would like to discuss shortened down the AICD as an outpatient with his cardiologist  -Continue with oxygen supplement through the nasal cannula to keep his oxygen saturation above 88%  -Continue his home medications for his chronic medical issues    Start DVT prophylaxis with Lovenox        Lucio Michelle MD, MD  Admitting Hospitalist

## 2021-09-15 NOTE — PROGRESS NOTES
Called [employer] crm per order and spoke to representative reguarding orders to make the defibrillator inactive they stated they will send message to tech for our request and will call once completed.

## 2021-09-15 NOTE — CONSULTS
Inpatient Cardiology Consultation      Reason for Consult:  CHF     Consulting Physician: Dr Sabino Oliver     Requesting Physician:  Dr Shaheen Corbett     Date of Consultation: 9/15/2021    HISTORY OF PRESENT ILLNESS:   Mr Lori Putnam is an 49-year-old male who also follows with Dr Michi Capps [facility], most recently seen by Dr Jeromy Hoyt 8/2021 and electrophysiology, most recently seen in the office 8/30/2021 with Dr. Sonam Dunn. Past medical history includes CABG in 2014, post CABG had PAF, ICMP, NSVT s/p dual chamber Medtronic ICD 2014, HTN, HLD (intolerance to statins) started on low dose Crestor 6/2019, polymyalgia rheumatica on chronic prednisone,  R iliac artery aneurysm, pulmonary fibrosis on chronic O2, GERD, asthma, and devious tobacco abuse, DNR CCA. Hospitalized 8/11/2021 with chest pain --troponin flat 55, 53 with no ischemic EKG changes. He was started on Imdur 30 mg daily and changed from Coreg to Toprol-XL. Office visit 8/30/2021 : Wishes to continue his ICD therapies spite his DNR CC CODE STATUS. ED encounter 9/6/2021 with abdominal pain --CT abdomen pelvis with 5 mm right ureter stone with moderate hydronephrosis. Treated with Flomax and Zofran and discharged home. --Referral to urology seen as outpatient 9/9/2021 to continue Flomax and pain medicine as needed for ureter stone. Presented to [facility] 9/14/2021 with shortness of breath  VS: 97.8-68-24-99% 4 L nasal cannula-118/64  Labs: BBC 10.9, H&H 9.1/28.6, platelets 044. K+ 3.9, BUN/SCR 19/1.1, glucose 123. Troponin 46  proBNP 2913  Covid negative    CXR unchanged with chronic interstitial lung disease and borderline cardiomegaly with central pulmonary vascular congestion. Was admitted to telemetry monitoring floor and started on IV Lasix 40 mg daily. Net -1.1 L. Cardiology was asked to see the patient for acute congestive heart failure.     He states that he has been having progressive CHEN over the last few months but recently noticed that his pulse ox has been dropping to 70-80 when he was working with PT at home. He admits to associated fatigue and palpitations with the shortness of breath. He notes some peripheral edema. He has not been taking any extra Lasix. He denies any chest pain. Please note: past medical records were reviewed per electronic medical record (EMR) - see detailed reports under Past Medical/ Surgical History. Past Medical and Surgical History:    1. DNR-CCA  2. Ex smoker Quit 1980. Smoked 1 PPD x 30 years  3. COPD/Pulmonary fibrosis of chronic O2  4. PMR on chronic prednisone  5. Asthma  6. ?T2DM  7. HTN  8. HLD  9. CKD  10. Hx R iliac artery aneurysm  11. Inferior STEMI 4/14/2014  12. S/p CABG(04/16/2017) and right thrombectomy (LIMA to LAD, SVG to OM, SVG to RCA with vein angioplasty of posterolateral branch of RCA)  13. Post CABG paroxysmal AF  14. TTE 05/2014:  EF 35% to 40%, mildly dilated LV and right atrium, trace MR, trace TR, left-sided pleural effusion. 13. Lexiscan nuclear stress test July 21, 2014:  EF 36%, no ischemia.  A large, fixed inferolateral defect. 16. NSVT and syncope with EP study noting inducible VT - Follows with Dr. Shirley Vuong S/p ICD 07/22/2014 - [employer]  17. 1/10/2015 p-BNP 2040, Bun/Cr 24/1.7  18. 5/1/2015 p-BNP 1050, Bun/Cr 26/1.3  19. 9/4/2015 p-, Bun/Cr 23/1.3  20. 6/14/2016 p-BNP 1359, Bun/Cr 24/1.2  21. 2/20/2017 p-,  Bun/Cr 25/1.2  22. 5/23/2017 p-  23. 1/7/2018 p-BNP 4164, troponin <0.01, Bun/Cr 29/1.3  24. 1/7/2018 TTE Dr Anabelle Bai ventricle is mildly enlarged. Moderate left ventricular concentric hypertrophy noted. Inferior wall hypokinesis. Mild TR. EF visually estimated at 40%. 25. 6/8/2018 p-BNP 1298, Bun/Cr 22/1.2  26. 6/14/2019 Office visit Dr Frandy Mcdonald.  Trail of low dose Crestor 5 mg QD (previous reported myalgias on statin therapy)  27. 6/19/2019 p-BNP 1543, Bun/Cr 29/1.4, troponin <0.01, d-dimer 541  28. 8/8/2019 T Chol 180, Triglycerides 110, HDL 57, LDL 101  29. 1/28/2020 TTE Dr Glenny Presley reduced left ventricular systolic function. Ejection fraction is visually estimated at 35-40%. Normal right ventricular size with mildly reduced right ventricular function. There is doppler evidence of stage I diastolic dysfunction. Moderately dilated left atrium by volume index. Mild MR/TR. PASP is estimated at 31 mmHg  30. 2/3/2020 p-BNP 1913, Bun/Cr 22/1.1  31. TTE 03/01/2021 (Dr. Yohannes Esparza): LV grossly normal in size. Mild LVH. Global hypokinesis is noted to be moderate to severe. EF 25-30%. Doppler evidence of stage I diastolic dysfunction. LA ESV index is greater than 34 mL/M2. RV global systolic function is moderately reduced. Increased E-point septal separation noted, suggesting decreased LV cardiac output. Physiologic and/or trace MR is present. Physiologic and/or trace TR.  RVSP is 40 mmHg. Physiologic and/or trace AR present. Technically good quality study. Compared to prior echo, changes noted. Suggest clinical correlation. 32. Lexiscan MPS 04/12/2021: EF 36% with apical akinesis and septal dyskinesis. The myocardial perfusion imaging was abnormal.  The abnormality was a large severe fixed defect involving the entire lateral wall consistent with a myocardial infarction with no evidence of significant residual stress-induced ischemia. Overall LV systolic function was moderately impaired. Intermediate risk pharmacologic stress test.       Medications Prior to admit:  Prior to Admission medications    Medication Sig Start Date End Date Taking?  Authorizing Provider   rosuvastatin (CRESTOR) 5 MG tablet Take 1 tablet by mouth nightly 9/10/21  Yes Luis Banuelos,    isosorbide mononitrate (IMDUR) 30 MG extended release tablet TAKE ONE TABLET BY MOUTH EVERY DAY 9/2/21  Yes Historical Provider, MD   METOPROLOL SUCCINATE ER PO Take 12.5 mg by mouth 2 times daily  9/2/21  Yes Historical Provider, MD   LORazepam (ATIVAN) 0.5 MG tablet TAKE 1/2 TO potassium chloride (KLOR-CON M) extended release tablet 40 mEq, 40 mEq, Oral, Daily  metoprolol succinate (TOPROL XL) extended release tablet 12.5 mg, 12.5 mg, Oral, BID  aspirin EC tablet 81 mg, 81 mg, Oral, Daily  sacubitril-valsartan (ENTRESTO) 24-26 MG per tablet 1 tablet, 1 tablet, Oral, BID  isosorbide mononitrate (IMDUR) extended release tablet 30 mg, 30 mg, Oral, Daily  montelukast (SINGULAIR) tablet 10 mg, 10 mg, Oral, Nightly  rosuvastatin (CRESTOR) tablet 5 mg, 5 mg, Oral, Nightly  tamsulosin (FLOMAX) capsule 0.4 mg, 0.4 mg, Oral, Daily  sodium chloride flush 0.9 % injection 5-40 mL, 5-40 mL, IntraVENous, 2 times per day  sodium chloride flush 0.9 % injection 5-40 mL, 5-40 mL, IntraVENous, PRN  ondansetron (ZOFRAN-ODT) disintegrating tablet 4 mg, 4 mg, Oral, Q8H PRN **OR** ondansetron (ZOFRAN) injection 4 mg, 4 mg, IntraVENous, Q6H PRN  polyethylene glycol (GLYCOLAX) packet 17 g, 17 g, Oral, Daily PRN  acetaminophen (TYLENOL) tablet 650 mg, 650 mg, Oral, Q6H PRN **OR** acetaminophen (TYLENOL) suppository 650 mg, 650 mg, Rectal, Q6H PRN  potassium chloride 10 mEq/100 mL IVPB (Peripheral Line), 10 mEq, IntraVENous, PRN  magnesium sulfate 2000 mg in 50 mL IVPB premix, 2,000 mg, IntraVENous, PRN  furosemide (LASIX) injection 40 mg, 40 mg, IntraVENous, Daily  enoxaparin (LOVENOX) injection 40 mg, 40 mg, SubCUTAneous, Daily    Allergies:  Patient has no known allergies. Social History:    Complete ADL independently -- wears 3.5L NC chronically. Previous tobacco abuse: quit 1984, 1ppd for 50yr   Remote history of alcohol - denies cuirrent consumption. Denies illicit drug use  DNR-CCA         Family History: This information was not obtained at this time as it is found noncontributory secondary to the patients advanced age.          REVIEW OF SYSTEMS:     · Constitutional: Denies fevers, chills, night sweats, and fatigue  · HEENT: Denies headaches, nose bleeds, and blurred vision,oral pain, abscess or lesion. · Musculoskeletal: Denies falls, pain to BLE with ambulation and edema to BLE. · Neurological: Denies dizziness and lightheadedness, numbness and tingling  · Cardiovascular: Denies chest pain, palpitations, and feelings of heart racing. · Respiratory: Denies orthopnea and PND, cough, CHEN  · Gastrointestinal: Denies heartburn, nausea/vomiting, diarrhea and constipation, black/bloody, and tarry stools. · Genitourinary: Denies dysuria and hematuria  · Hematologic: Denies excessive bruising or bleeding  · Lymphatic: Denies lumps and bumps to neck, axilla, breast, and groin      PHYSICAL EXAM:   /62   Pulse 74   Temp 97.4 °F (36.3 °C) (Oral)   Resp 16   Ht 5' 10\" (1.778 m)   Wt 146 lb 8 oz (66.5 kg)   SpO2 96%   BMI 21.02 kg/m²   CONST:  Well developed, elderly thin  male who appears his stated age. Awake, alert, cooperative, no apparent distress  HEENT:   Head- Normocephalic, atraumatic   Eyes- Conjunctivae pink, anicteric  Throat- Oral mucosa pink and moist  Neck-  No stridor, trachea midline, no jugular venous distention. CHEST: Chest symmetrical and non-tender to palpation. RESPIRATORY: bilateral rhonchi noted, no wheezing    CARDIOVASCULAR:     No carotid bruit noted bilaterally   Heart Ausculation- Regular rate and rhythm, no murmur. PV: No lower extremity edema. No varicosities. ABDOMEN: Soft, non-tender to light palpation. Bowel sounds present. MS: Good muscle strength and tone. No atrophy or abnormal movements. : Deferred   SKIN: Warm and dry no statis dermatitis or ulcers   NEURO / PSYCH: Oriented to person, place and time. Speech clear and appropriate. Follows all commands.  Pleasant affect     DATA:    ECG: SR HR 70s   Tele strips:  SR HR70s     Diagnostic:      Intake/Output Summary (Last 24 hours) at 9/15/2021 1316  Last data filed at 9/15/2021 1248  Gross per 24 hour   Intake --   Output 1140 ml   Net -1140 ml       Labs:   CBC:   Recent Labs 09/14/21  1704 09/15/21  0542   WBC 10.9 11.8*   HGB 9.1* 9.9*   HCT 28.6* 31.2*    290     BMP:   Recent Labs     09/14/21  1704 09/15/21  0542    140   K 3.9 3.4*   CO2 25 26   BUN 19 16   CREATININE 1.1 1.0   LABGLOM >60 >60   CALCIUM 8.6 8.4*     Mag:   Recent Labs     09/15/21  0542   MG 1.9     HgA1c:   Lab Results   Component Value Date    LABA1C 5.7 (H) 02/04/2020     FASTING LIPID PANEL:  Lab Results   Component Value Date    CHOL 107 08/12/2021    HDL 28 08/12/2021    LDLCALC 63 08/12/2021    TRIG 79 08/12/2021     LIVER PROFILE:  Recent Labs     09/14/21  1704 09/15/21  0542   AST 19 17   ALT 11 12   LABALBU 3.2* 3.3*       Assessment and Plan per Dr Jessica Haynes. Electronically signed by Herbert Benavidez on 9/15/2021 at 1:16 PM     The above documentation has been prepared under my direction and personally reviewed by me in its entirety. I confirm that the note above accurately reflects all work, treatment, procedures, and medical decision making performed by me. The patient's history was independently obtained. The patient was independently examined. Electrocardiogram, prior and present cardiovascular assessment, and laboratory studies were reviewed. The patient is an 80-year-old white male known to [facility] cardiology with primary cardiovascular care provided by Carolina Jeff electrophysiology care by Bri Brown. A known history of coronary atherosclerosis with an ischemic cardiomyopathy associated chronic systolic heart failure with surgical revascularization in April, 2017 inclusive of a left internal mammary graft to the left anterior descending and saphenous vein grafts to the circumflex and right coronary artery with angioplasty of the posterior lateral branch of the right coronary artery face of moderate left ventricular systolic dysfunction.   He subsequently developed nonsustained ventricular tachyarrhythmias and syncope with electrophysiology studies inducing sustained ventricular tachycardia with subsequent placement of an implantable cardioverter defibrillator. He has most recently undergone objective assessment with an echocardiogram in March, 2021 demonstrating evidence of a normal-sized left ventricular chamber with mild concentric left ventricular hypertrophy and severe left ventricular systolic dysfunction with estimated ejection fraction of 25-30% with stage I diastolic dysfunction and no significant valvular pathology with mild pulmonary hypertension right ventricular systolic pressures of approximately 40 mmHg in April, 2021 perfusion imaging demonstrating an extensive fixed lateral perfusion abnormality with no reversible component moderate left ventricular systolic dysfunction with a post-rest ejection fraction of approximately 35%. He additionally has a history of chronic obstructive lung disease with associated pulmonary fibrosis and chronic hypoxic respiratory failure requiring home oxygen in addition to that of hypertension, diabetes with associated stage III chronic kidney disease and hyperlipidemia in addition to that of peripheral arterial disease with a right iliac artery aneurysm and polymyalgia rheumatica on chronic steroid therapy. He was most recently evaluated within the past month with a recurrence of chest discomfort and attempted optimization of medical management. At the time of most recent evaluation by the electrophysiology service based on his wishes of conservative advanced directives, discussion was held regarding deactivation of his implantable cardioverter defibrillator but it which time he wished to further consider this at the time of interrogation of his device within the past month evidence of increased thoracic impedance suggestive of volume overload.   Subsequent to his most recent evaluations, he has developed progressive symptomatology of exertional dyspnea with reports of desaturation on home pulse oximetry and a reduction of his functional capabilities (functional class III-IV). He denies any significant symptoms of anginal-like chest discomfort or other ischemic equivalents and presently denies recent arrhythmia related symptoms. At the time of his emergency room evaluation, a resting electrocardiogram reviewed time of his evaluation demonstrated evidence of sinus rhythm with left axis deviation and intraventricular conduction delay with a chest x-ray again reviewed demonstrating evidence of cardiomegaly with diffuse interstitial infiltrates worse and from his previous assessments with laboratory studies demonstrating elevation of high-sensitivity troponin levels in a pattern not suggestive of an acute coronary syndrome as well as elevation of a proBNP level to 2915 pg/mL increased from his previous assessment. He has subsequently received diuretics with an appropriate response. At time of evaluation, his medications and allergies were reviewed as well as that of his past medical history and review of systems as documented. On examination, he appears frail and chronically ill and presently in no discomfort nor distress. He is hemodynamically stable vital signs as documented. Jugular venous pressure is approximately 6 to 7 cm with no carotid bruits present. Lung fields demonstrate somewhat fibrotic bibasilar rales. Cardiac examination is notable for a regular rate and rhythm with the presence of a soft third heart sound and no cardiac murmur. A benign abdominal examination is present no peripheral edema noted. Diagnostic Assessment and Plan: On a clinical basis, the patient presents with evidence of acute superimposed upon chronic systolic heart failure in the face of his ischemic cardiomyopathy and severe left ventricular systolic dysfunction and in spite of his existing medical regimen.   Presently diuresis appears appropriate with additional plans of attempts of optimization of medical management with an attempt to adjust his beta-blocker and discontinue his oral nitrates and attempt to provide continued ischemic protection and attempts to optimize cardiac performance. Careful monitoring of blood pressure as well as that of renal function and electrolytes will be necessary during diuresis, especially in the face of his sacubitril/valsartan and to further optimize cardiac performance, spironolactone will be added to his existing medical regimen. At the time of evaluation, his clinical condition was discussed and if his wishes comfort care measures alone will be provided including the deactivation of the defibrillator component of his device. The involvement of palliative care would be appropriate to assist both the patient and family in regards to potential end-of-life decisions. Ongoing attempts of appropriate risk factor modification blood pressure and serum lipids will remain essential to reducing risk of future atherosclerotic development. Thank you for allowing me to participate in your patient's care. Please feel free to contact me if you have any questions or concerns. Amari Lauren.  Theo España, [address] [facility]

## 2021-09-16 NOTE — CONSULTS
I provided Particoren Higgins with the Heart Failure book, the HF Zones, and the Sodium Content pamphlet. We reviewed the HF zones, signs and symptoms to report on day 1 of onset, medication compliance, daily weights, low sodium diet (label reading)   I advised patient you can reduce the risk for heart failure exacerbations by modifying/controlling the risk factors they have. We discussed self-managed care which includes the following: to take medications as prescribed- to call the doctor with any side effects do not just stop taking the medication, follow a cardiac heart healthy / low sodium diet, do daily weights, exercise regularly- per doctor recommendation I stressed the importance of informing the cardiologist the first day of onset of signs and symptoms in the \"Yellow Zone\" of the HF Zones.  Verbalizes understanding     Echocardiogram / EF: 25-30    Lab Results   Component Value Date    BNP 35 05/26/2011      Lab Results   Component Value Date    PROBNP 2,913 (H) 09/14/2021        History of:   Past Medical History:   Diagnosis Date    Aneurysm [facility] [address]     iliacs    Asthma     CAD (coronary artery disease)     Cardiomyopathy [facility] [address]     Centrilobular emph[facility]sema ([facility])     CHF (congestive heart failure) ([facility])     Dilatation of aorta ([facility]) 10/04/2018    Hyperlipidemia     Idiopathic pulmonary fibrosis ([facility])     Iliac artery aneurysm, bilateral ([facility]) 10/04/2018    Inferoposterior myocardial infarction ([facility])     NSVT (nonsustained ventricular tachycardia) ([facility])          Medications:    furosemide  40 mg Oral Daily    [Held by provider] potassium chloride  40 mEq Oral Daily    mexiletine  200 mg Oral 3 times per day    metoprolol succinate  25 mg Oral BID    spironolactone  12.5 mg Oral Daily    aspirin  81 mg Oral Daily    sacubitril-valsartan  1 tablet Oral BID    montelukast  10 mg Oral Nightly    rosuvastatin  5 mg Oral Nightly    tamsulosin  0.4 mg Oral Daily    sodium chloride flush  5-40 mL IntraVENous 2 times per day    enoxaparin  40 mg SubCUTAneous Daily       sodium chloride flush, ondansetron **OR** ondansetron, polyethylene glycol, acetaminophen **OR** acetaminophen, potassium chloride, magnesium sulfate          CONGESTIVE HEART FAILURE (CHF) GUIDELINES  (Must be completed for Primary Diagnosis CHF or History of CHF)    Type of CHF:    [] Acute   [] Chronic     [x] Acute on Chronic     Ventricular Function Assessment (check):             [] HFpEF  [] HFpEF, borderline  [] HFpEF, improved  [x] HFrEF  Ejection Fraction (%):  25-30                                                               Angiotensin-Converting-Enzyme (ACE) inhibitor ordered:  [] Yes  [x] No (specify contraindication):  [] Renal Insufficiency  [] Cough  [] Hypotension  [] Allergy/angioedema  [] No left ventricular systolic dysfunction (LVSD)  [] Hyperkalemia  [] Moderate to severe aortic stenosis  [] Other (Specify):     Angiotensin II receptor blockers (ARB) ordered:  [] Yes  [x] No (specify contraindication):  [] Renal Insufficiency  [] Hypotension  [] Allergy/angioedema  [] No LVSD  [] Hyperkalemia  [] Moderate to severe aortic stenosis  [] Other (Specify):      ARNI - Angiotensin Receptor Neprilysin Inhibitor ordered:  [x] Yes  [] No      ACC/AHA Guidelines Beta Blocker (Carvedilol, Metoprolol Succinate, or Bisoprolol) ordered:    [x] Yes  [] No (specify contraindication):  [] Bradycardia  [] Hypotension  [] LVD  [] 2nd or 3rd degree heart block  [] Bronchospastic airway disease  [] Decompensated CHF  [] Other (Specify):      Code Status: DNR-CC     The patient is ordered:  Diet (sodium restriction):   Sodium/fluid restriction daily ordered (fluid restriction recommended if patient is hyponatremic and/or diuretic is initiated or increased): Yes  FR: 1200  Daily Weights:   Patient Vitals for the past 96 hrs (Last 3 readings):   Weight   09/14/21 2115 146 lb 8 oz (66.5 kg)     I/O:     Intake/Output Summary (Last 24 hours) at 9/16/2021 0930  Last data filed at 9/15/2021 2013  Gross per 24 hour   Intake --   Output 1315 ml   Net -1315 ml        I provided the patient with the following:   The [address] [place] [address] Dr, \"Caring for Your Heart: Living Well with Heart Failure\"    The Heart Failure Zones    Sodium Content of Foods    Sodium-Free Flavoring Tips    Low-Sodium Nutrition Therapy    Low salt cookbook    scale   ? The Heart Failure Booklet was given to the patient, which addresses:   Signs and symptoms of HF to report    Home Self Management- activity, weight tracking, taking medications as prescribed, meals/diet planning (sodium and fluid restriction), how to read food labels, keeping physician follow ups, smoking cessation, follow the Heart Failure Zones      Instructed to call 911 if you have any of the following symptoms: ?   Struggling to breathe unrelieved with rest,    Having chest pain, confusion or can't think clearly    Have confusion or cant think clearly  Discharge Plan: I placed HF Home Instructions in the discharge instructions. Readmission Risk Score: 20       Reminder: Discharge Instructions: activity, daily weights, diet, S/S, discharge medications & when to call the doctor. Follow up appt made at [facility] CHF clinic 9/30/21 12 pm    Time spent with pt on education 30 mins.       Electronically signed by Blanca Mckoen RN on 9/16/2021 at 12:33 PM

## 2021-09-16 NOTE — CARE COORDINATION
Social Work / Discharge Planning : SW met with patient and explained role as discharge planner/ transition of care. Admitted with  CHF. Patient resides with spouse in an apartment. Patient has a walker, cane, shower bench and  raised toilet. Patient has a nebulizer and home O2 at 3l through [employer]. Patient is active with [facility]. SW verified with [facility] at [facility] and will need resume order on discharge. PCP is Dr. Lady Pina and pharmacy is [facility] Patient plans to return home and family can transport. AWait plan. SW to follow.  Electronically signed by RINA Sales on 9/16/21 at 11:35 AM EDT

## 2021-09-16 NOTE — PROGRESS NOTES
INPATIENT CARDIOLOGY FOLLOW-UP    Name: Ana Maria Ch    Age: 80 y.o. Date of Admission: 9/14/2021  4:19 PM    Date of Service: 9/16/2021    Chief Complaint: Follow-up for coronary atherosclerosis, ischemic cardiomyopathy, acute superimposed upon chronic systolic heart failure, ventricular tachycardia, chronic obstructive lung disease, chronic kidney disease    Interim History: Upon entering the room, the patient was sleeping comfortably and relates a slight improvement of his respiratory status. Appropriate fluid mobilization has occurred without adverse effects on renal function nor electrolytes. Deactivation of the defibrillation component of his device is pending. Review of Systems: The remainder of a complete multisystem review including consitutional, central nervous, respiratory, circulatory, gastrointestinal, genitourinary, endocrinologic, hematologic, musculoskeletal and psychiatric are negative.     Problem List:  Patient Active Problem List   Diagnosis    Automatic implantable cardioverter-defibrillator in situ    [facility] of [facility] ([facility])    Iliac artery aneurysm, bilateral ([facility])    IPF (idiopathic pulmonary fibrosis) ([facility])    Peripheral vascular disease ([address]    Coronary artery disease involving native coronary artery without angina pectoris    Hyperlipidemia    CKD (chronic kidney disease) stage 3, GFR 30-59 ml/min ([facility])    Gastroesophageal reflux disease    Insomnia    Vitamin D deficiency    Acute-on-chronic respiratory failure ([address]    Ventricular fibrillation ([address]    Moderate protein-calorie malnutrition ([address]    Chest pain    Cardiomyopathy ([address]    Chronic respiratory failure with hypoxia ([facility])    Acute on chronic systolic HF (heart failure) ([facility])       Allergies:  No Known Allergies    Current Medications:  Current Facility-Administered Medications   Medication Dose Route Frequency Provider Last Rate Last Admin    [Held by provider] potassium chloride Lamarhannah Rajendra SANFORD) extended release tablet 40 mEq  40 mEq Oral Daily Tabitha Smith DO   40 mEq at 09/15/21 1255    mexiletine (MEXITIL) capsule 200 mg  200 mg Oral 3 times per day Bennie Avila MD   200 mg at 09/16/21 4963    metoprolol succinate (TOPROL XL) extended release tablet 25 mg  25 mg Oral BID Bennie Avila MD        spironolactone (ALDACTONE) tablet 12.5 mg  12.5 mg Oral Daily Bennie Avila MD   12.5 mg at 09/15/21 1632    aspirin EC tablet 81 mg  81 mg Oral Daily Alejandro Matias MD   81 mg at 09/15/21 0945    sacubitril-valsartan (ENTRESTO) 24-26 MG per tablet 1 tablet  1 tablet Oral BID Bennie Avila MD   1 tablet at 09/15/21 0948    montelukast (SINGULAIR) tablet 10 mg  10 mg Oral Nightly Alejandro Matias MD   10 mg at 09/15/21 2113    rosuvastatin (CRESTOR) tablet 5 mg  5 mg Oral Nightly Alejandro Matias MD   5 mg at 09/15/21 2113    tamsulosin (FLOMAX) capsule 0.4 mg  0.4 mg Oral Daily [facility] Adalgisa Cabrera MD   0.4 mg at 09/15/21 1632    sodium chloride flush 0.9 % injection 5-40 mL  5-40 mL IntraVENous 2 times per day Eileen Yang MD   10 mL at 09/15/21 2113    sodium chloride flush 0.9 % injection 5-40 mL  5-40 mL IntraVENous PRN Alejandro Matias MD        ondansetron (ZOFRAN-ODT) disintegrating tablet 4 mg  4 mg Oral Q8H PRN Alejandro Matias MD        Or    ondansetron [facility]) injection 4 mg  4 mg IntraVENous Q6H PRN Alejandro James MD        polyethylene glycol (GLYCOLAX) packet 17 g  17 g Oral Daily PRN Alejandro Matias MD        acetaminophen (TYLENOL) tablet 650 mg  650 mg Oral Q6H PRN Alejandro Matias MD        Or    acetaminophen (TYLENOL) suppository 650 mg  650 mg Rectal Q6H PRN Alejandro Matias MD        potassium chloride 10 mEq/100 mL IVPB (Peripheral Line)  10 mEq IntraVENous PRN Alejandro Matias MD        magnesium sulfate 2000 mg in 50 mL IVPB premix  2,000 mg IntraVENous PRN Alejandro Swani John Ca MD        furosemide (LASIX) injection 40 mg  40 mg IntraVENous Daily Alejandro Lei MD   40 mg at 09/15/21 1254    enoxaparin (LOVENOX) injection 40 mg  40 mg SubCUTAneous Daily Alejandro Lei MD   40 mg at 09/15/21 0950         Physical Exam:  BP (!) 92/56   Pulse 89   Temp 97.4 °F (36.3 °C) (Oral)   Resp 18   Ht 5' 10\" (1.778 m)   Wt 146 lb 8 oz (66.5 kg)   SpO2 97%   BMI 21.02 kg/m²   Weight change: Wt Readings from Last 3 Encounters:   09/14/21 146 lb 8 oz (66.5 kg)   09/09/21 148 lb (67.1 kg)   09/06/21 150 lb (68 kg)     The patient is awake, alert and in no discomfort or distress. He appears chronically ill and debilitated. No gross musculoskeletal deformity is present. No significant skin or nail changes are present. Gross examination of head, eyes, nose and throat are negative. Jugular venous pressure is 6 to 7 cm and no carotid bruits are present. Normal respiratory effort is noted with no accessory muscle usage present. Lung fields demonstrate bilateral fibrotic rales to ascultation. Cardiac examination is notable for a regular rate and rhythm with no palpable thrill. No gallop rhythm and a soft apical holosystolic murmur are identified. A benign abdominal examination is present with no masses or organomegaly. Intact pulses are present throughout all extremities and no peripheral edema is present. No focal neurologic deficits are present. Intake/Output:    Intake/Output Summary (Last 24 hours) at 9/16/2021 0851  Last data filed at 9/15/2021 2013  Gross per 24 hour   Intake --   Output 1315 ml   Net -1315 ml     No intake/output data recorded. Laboratory Tests:  Lab Results   Component Value Date    CREATININE 1.1 09/16/2021    BUN 17 09/16/2021     09/16/2021    K 3.7 09/16/2021     09/16/2021    CO2 29 09/16/2021     No results for input(s): CKTOTAL, CKMB in the last 72 hours.     Invalid input(s): TROPONONI  Lab Results   Component Value Date BNP 35 05/26/2011     Lab Results   Component Value Date    WBC 11.8 09/15/2021    RBC 3.22 09/15/2021    HGB 9.9 09/15/2021    HCT 31.2 09/15/2021    MCV 96.9 09/15/2021    MCH 30.7 09/15/2021    MCHC 31.7 09/15/2021    RDW 15.3 09/15/2021     09/15/2021    MPV 8.3 09/15/2021     Recent Labs     09/14/21  1704 09/15/21  0542   ALKPHOS 42 45   ALT 11 12   AST 19 17   PROT 6.2* 6.3*   BILITOT 0.3 0.4   LABALBU 3.2* 3.3*     Lab Results   Component Value Date    MG 1.9 09/15/2021     Lab Results   Component Value Date    PROTIME 12.8 09/14/2021    INR 1.2 09/14/2021     Lab Results   Component Value Date    TSH 5.060 08/11/2021     No components found for: CHLPL  Lab Results   Component Value Date    TRIG 79 08/12/2021    TRIG 110 08/08/2019     Lab Results   Component Value Date    HDL 28 08/12/2021    HDL 57 08/08/2019     Lab Results   Component Value Date    LDLCALC 63 08/12/2021    LDLCALC 101 (H) 08/08/2019       Cardiac Tests:  Telemetry findings reviewed: sinus rhythm, no new tachy/bradyarrhythmias overnight      ASSESSMENT / PLAN: On a clinical basis, the patient reports mild subjective improvement in the face of documented appropriate fluid mobilization and tolerance of his existing medical regimen with relative hypotension likely limiting further dose modification. The deactivation of the defibrillation component of his implantable cardioverter defibrillator is pending later today with subsequent plans of the discontinuation of telemetry. Ongoing attempts of optimization of his medical regimen to provide comfort will be made with plans of conversion of diuretics from intravenous to oral administration with dose modification from previous dosing. Continued attempts of nutritional support will remain essential to fluid mobilization reducing his risk of progressive debilitation.   Continued appropriate risk factor modification of blood pressure and serum lipids will be necessary to reduce risk of future atherosclerotic progression. As previously outlined, his prognosis appears extremely guarded and is well understood by the patient with palliative care involvement of beneficial to assisting he and family in end-of-life decisions. Note: This report was completed utilizing computer voice recognition software. Every effort has been made to ensure accuracy, however; inadvertent computerized transcription errors may be present. Moi Kwok.  Ulises Pretty, [facility]

## 2021-09-16 NOTE — PROGRESS NOTES
Physician Progress Note      PATIENTJoseph Sena  CSN #:                  [id number]  :                       1935  ADMIT DATE:       2021 4:19 PM  DISCH DATE:  RESPONDING  PROVIDER #:        Stuart Roman MD          QUERY TEXT:    Patient admitted with CHF. Noted documentation of CKD in consult and PN. In   order to support the diagnosis of CKD, please include additional clinical   indicators in your documentation. Or please document if the diagnosis of CKD   has been ruled out after further study. The medical record reflects the following:  Risk Factors: HTN, diuretic use  Clinical Indicators: Creatinine at baseline 1.0-1.1 with GFR >60. Per consult   and PN  \". Estela Gonzalez He additionally has a history of chronic obstructive lung disease   with associated pulmonary fibrosis and chronic hypoxic respiratory failure   requiring home oxygen in addition to that of hypertension, diabetes with   associated stage III chronic kidney disease. Estela Palmer Dye \"  Treatment: monitor labs    Thank you,  Rand Lipscomb RN, CCDS  Clinical Documentation Improvement Specialist  [email or web address]  [phone]  Options provided:  -- CKD present as evidenced by, Please document evidence.   -- CKD was ruled out  -- Other - I will add my own diagnosis  -- Disagree - Not applicable / Not valid  -- Disagree - Clinically unable to determine / Unknown  -- Refer to Clinical Documentation Reviewer    PROVIDER RESPONSE TEXT:    CKD is present as evidenced by creatine clearance 45ml/min/1.73m2    Query created bySampson Singh on 2021 9:50 AM      Electronically signed by:  Stuart Roman MD 2021 12:45 PM

## 2021-09-16 NOTE — PROGRESS NOTES
Patient Pulse ox was 92% RA at rest, Ambulated patient on room air. Oxygen saturation was 84% on room air while ambulating, oxygen was applied 3 liters nasal cannula. Recovery pulse ox was 88% when walking back to bed. Once back at the bedside he was at rest on 3L @ 97% NC.

## 2021-09-16 NOTE — PROGRESS NOTES
[facility] Progress Note    Admitting Date and Time: 9/14/2021  4:19 PM  Admit Dx: Acute on chronic systolic HF (heart failure) (HCC) [I50.23]  Acute respiratory failure with hypoxia (HCC) [J96.01]  Congestive heart failure, unspecified HF chronicity, unspecified heart failure type ([address] [I50.9]    Subjective:  Patient is being followed for Acute on chronic systolic HF (heart failure) (HCC) [I50.23]  Acute respiratory failure with hypoxia (HCC) [J96.01]  Congestive heart failure, unspecified HF chronicity, unspecified heart failure type ([address] [I50.9]   Pt feels better today  Per RN: Ambulated the patient and O2 sat dropped to 84%    ROS: denies fever, chills, cp, n/v, HA unless stated above.       furosemide  40 mg Oral Daily    [Held by provider] potassium chloride  40 mEq Oral Daily    mexiletine  200 mg Oral 3 times per day    metoprolol succinate  25 mg Oral BID    spironolactone  12.5 mg Oral Daily    aspirin  81 mg Oral Daily    sacubitril-valsartan  1 tablet Oral BID    montelukast  10 mg Oral Nightly    rosuvastatin  5 mg Oral Nightly    tamsulosin  0.4 mg Oral Daily    sodium chloride flush  5-40 mL IntraVENous 2 times per day    enoxaparin  40 mg SubCUTAneous Daily     sodium chloride flush, 5-40 mL, PRN  ondansetron, 4 mg, Q8H PRN   Or  ondansetron, 4 mg, Q6H PRN  polyethylene glycol, 17 g, Daily PRN  acetaminophen, 650 mg, Q6H PRN   Or  acetaminophen, 650 mg, Q6H PRN  potassium chloride, 10 mEq, PRN  magnesium sulfate, 2,000 mg, PRN         Objective:    BP (!) 92/56   Pulse 89   Temp 97.4 °F (36.3 °C) (Oral)   Resp 18   Ht 5' 10\" (1.778 m)   Wt 146 lb 8 oz (66.5 kg)   SpO2 97%   BMI 21.02 kg/m²     General Appearance: alert and oriented to person, place and time and in no acute distress  Skin: warm and dry  Head: normocephalic and atraumatic  Eyes: pupils equal, round, and reactive to light, extraocular eye movements intact, conjunctivae normal  Neck: neck supple and non tender without mass   Pulmonary/Chest: decreased sounds at the bases bilaterally- no wheezes, rales or rhonchi, normal air movement, no respiratory distress  Cardiovascular: normal rate, normal S1 and S2 and no carotid bruits  Abdomen: soft, non-tender, non-distended, normal bowel sounds, no masses or organomegaly  Extremities: no cyanosis, no clubbing and trace edema  Neurologic: no cranial nerve deficit and speech normal        Recent Labs     09/14/21  1704 09/15/21  0542 09/16/21  0349    140 140   K 3.9 3.4* 3.7    105 103   CO2 25 26 29   BUN 19 16 17   CREATININE 1.1 1.0 1.1   GLUCOSE 123* 92 98   CALCIUM 8.6 8.4* 8.9       Recent Labs     09/14/21  1704 09/15/21  0542   WBC 10.9 11.8*   RBC 2.94* 3.22*   HGB 9.1* 9.9*   HCT 28.6* 31.2*   MCV 97.3 96.9   MCH 31.0 30.7   MCHC 31.8* 31.7*   RDW 15.3* 15.3*    290   MPV 8.2 8.3         Assessment:    Active Problems:    Acute on chronic systolic HF (heart failure) ([facility])  Resolved Problems:    * No resolved hospital problems. *      Plan:  1. Acute on chronic hypoxic respiratory failure, baseline 3 L at home, O2 sat was 86% on his 3 L, currently requiring 5 L will wean down on oxygen. Due to acute decompensation of chronic systolic heart failure. Treating underlying process. 2.  Acute decompensation of chronic systolic heart failure ejection fraction 25 to 30%, started the patient on IV Lasix, switch to oral, fluid balance -2.2 L, I do not think it is accurate though as there was no documentation for intake. Will discuss with cardiology probably need more aggressive diuresis. Continue on Toprol-XL  3. Hx of CAD, no chest pain, continue toprol, asa, statin and imdur      NOTE: This report was transcribed using voice recognition software. Every effort was made to ensure accuracy; however, inadvertent computerized transcription errors may be present.   Electronically signed by Carter Jensen MD on 9/16/2021 at 10:22 AM

## 2021-09-17 NOTE — PROGRESS NOTES
Spoke with patients daughter and she is wanted his pulmonologist to see him prior to discharge. Spoke with Dr. Elizabeth Sal and he said okay for the consult, plan on discharge today. Sent consult to Dr. Kamila Bentley for consult.

## 2021-09-17 NOTE — PROGRESS NOTES
[facility] Progress Note    Admitting Date and Time: 9/14/2021  4:19 PM  Admit Dx: Acute on chronic systolic HF (heart failure) (HCC) [I50.23]  Acute respiratory failure with hypoxia (HCC) [J96.01]  Congestive heart failure, unspecified HF chronicity, unspecified heart failure type ([address] [I50.9]    Subjective:  Patient is being followed for Acute on chronic systolic HF (heart failure) (HCC) [I50.23]  Acute respiratory failure with hypoxia (HCC) [J96.01]  Congestive heart failure, unspecified HF chronicity, unspecified heart failure type ([address] [I50.9]   Pt feels o, he desaturating ambulating and needed 6L NC of O2    ROS: denies fever, chills, cp, n/v, HA unless stated above.       furosemide  40 mg Oral Daily    mexiletine  200 mg Oral 3 times per day    metoprolol succinate  25 mg Oral BID    spironolactone  12.5 mg Oral Daily    aspirin  81 mg Oral Daily    sacubitril-valsartan  1 tablet Oral BID    montelukast  10 mg Oral Nightly    rosuvastatin  5 mg Oral Nightly    tamsulosin  0.4 mg Oral Daily    sodium chloride flush  5-40 mL IntraVENous 2 times per day    enoxaparin  40 mg SubCUTAneous Daily     sodium chloride flush, 5-40 mL, PRN  ondansetron, 4 mg, Q8H PRN   Or  ondansetron, 4 mg, Q6H PRN  polyethylene glycol, 17 g, Daily PRN  acetaminophen, 650 mg, Q6H PRN   Or  acetaminophen, 650 mg, Q6H PRN  potassium chloride, 10 mEq, PRN  magnesium sulfate, 2,000 mg, PRN         Objective:    BP (!) 109/55   Pulse 64   Temp 98.6 °F (37 °C) (Oral)   Resp 16   Ht 5' 10\" (1.778 m)   Wt 141 lb (64 kg)   SpO2 93%   BMI 20.23 kg/m²     General Appearance: alert and oriented to person, place and time and in no acute distress  Skin: warm and dry  Head: normocephalic and atraumatic  Eyes: pupils equal, round, and reactive to light, extraocular eye movements intact, conjunctivae normal  Neck: neck supple and non tender without mass   Pulmonary/Chest: decreased sounds at the bases bilaterally- no wheezes, rales or rhonchi, normal air movement, no respiratory distress  Cardiovascular: normal rate, normal S1 and S2 and no carotid bruits  Abdomen: soft, non-tender, non-distended, normal bowel sounds, no masses or organomegaly  Extremities: no cyanosis, no clubbing and trace edema  Neurologic: no cranial nerve deficit and speech normal        Recent Labs     09/15/21  0542 09/16/21  0349 09/17/21  0737    140 137   K 3.4* 3.7 4.0    103 101   CO2 26 29 28   BUN 16 17 18   CREATININE 1.0 1.1 1.1   GLUCOSE 92 98 94   CALCIUM 8.4* 8.9 9.0       Recent Labs     09/15/21  0542   WBC 11.8*   RBC 3.22*   HGB 9.9*   HCT 31.2*   MCV 96.9   MCH 30.7   MCHC 31.7*   RDW 15.3*      MPV 8.3         Assessment:    Active Problems:    Acute on chronic systolic HF (heart failure) ([facility])  Resolved Problems:    * No resolved hospital problems. *      Plan:  1. Acute on chronic hypoxic respiratory failure, baseline 3 L at home, O2 sat was 86% on his 3 L, currently requiring 6 L will wean down on oxygen. Due to acute decompensation of chronic systolic heart failure. Treating underlying process. Will consult pulm, not sure this is entirely due to acute decompensation of CHF  2. Acute decompensation of chronic systolic heart failure ejection fraction 25 to 30%, started the patient on IV Lasix, switch to oral, fluid balance -2.2 L, I do not think it is accurate though as there was no documentation for intake. Will discuss with cardiology probably need more aggressive diuresis. Continue on Toprol-XL  3. Hx of CAD, no chest pain, continue toprol, asa, statin and imdur        NOTE: This report was transcribed using voice recognition software. Every effort was made to ensure accuracy; however, inadvertent computerized transcription errors may be present.   Electronically signed by Chevy Robin MD on 9/17/2021 at 5:31 PM

## 2021-09-17 NOTE — CARE COORDINATION
Social work / Discharge Planning : SW called [employer] and spoke to [place] and ordered 6 liters of 02 and faxed orders. Baseline was 3 at home. Caitlyn Whitfield  [phone]. WILLOW to follow.  Electronically signed by RINA Dugan on 9/17/21 at 3:31 PM EDT

## 2021-09-17 NOTE — CONSULTS
Kathy Dumont M.D.,[facility]  Tyrel Chaudhry D.O., F.JJ., Carol Rojas M.D. Erica Lucas M.D. Quan Corley D.O. Patient:  Molina Sparrow 80 y.o. male MRN: [id number]     Date of Service: 9/17/2021      PULMONARY CONSULTATION    Reason for Consultation: Patient known to us, family request  Referring Physician: Dr. Whitney Enid with the referring physician will be sent via the electronic medical record. Chief Complaint: Shortness of breath    CODE STATUS: DNR CC    SUBJECTIVE:  HPI:  Molina Sparrow is a 80 y.o. gentleman well-known to our practice. From a pulmonary standpoint he has advanced pulmonary fibrosis, he has chronic hypoxic respiratory failure on 3 to 3-4 L of oxygen at home. He had a prolonged stay at [facility] 20 months of February and March due to aspiration pneumonia acute on chronic hypoxic respiratory failure. His pulmonary regimen includes Ofev at 150 mg twice daily, and Breo. He presented to the hospital on the 14th with chief complaint of progressive shortness of breath with oxygen desaturations at home he was telling me his physical therapist that came home checked him when he was in the low 80s. He states while he is sitting down he is doing well. He denies any fevers or chills. No productive cough. He does have a history of chronic aspiration and dysphagia and currently is on a thickened diet.       Past Medical History:   Diagnosis Date    Aneurysm ([facility]     iliacs    Asthma     CAD (coronary artery disease)     Cardiomyopathy ([facility]     Centrilobular emphysema (HCC)     CHF (congestive heart failure) ([facility])     Dilatation of aorta ([facility]) 10/04/2018    Hyperlipidemia     Idiopathic pulmonary fibrosis (HCC)     Iliac artery aneurysm, bilateral ([facility] 10/04/2018    Inferoposterior myocardial infarction ([facility])     NSVT (nonsustained ventricular tachycardia) ([facility])        Past Surgical History:   Procedure Laterality Date    CARDIAC CATHETERIZATION  4-    Dr. Amanda Finger cath used   [address] CORONARY ANGIOPLASTY  4/15/2014    CORONARY ARTERY BYPASS GRAFT   4/16/2014    x3    HERNIA REPAIR         Family History   Problem Relation Age of Onset    Hypertension Mother     High Cholesterol Sister        Social History:   Social History     Socioeconomic History    Marital status:      Spouse name: Not on file    Number of children: 3    Years of education: Not on file    Highest education level: Not on file   Occupational History    Occupation: retired-sales   Tobacco Use    Smoking status: Former Smoker     Packs/day: 1.00     Years: 30.00     Pack years: 30.00     Types: Cigarettes     Start date: 1950     Quit date: 1980     Years since quittin.7    Smokeless tobacco: Never Used    Tobacco comment: quit in    Vaping Use    Vaping Use: Never used    Passive vaping exposure Yes   Substance and Sexual Activity    Alcohol use: Yes     Types: 1 - 2 Glasses of wine, 1 - 2 Cans of beer per week     Comment: 1 beer a day    Drug use: No    Sexual activity: Not Currently     Partners: Female   Other Topics Concern    Not on file   Social History Narrative    Not on file     Social Determinants of Health     Financial Resource Strain: Low Risk     Difficulty of Paying Living Expenses: Not hard at all   Food Insecurity: No Food Insecurity    Worried About [facility] in the Last Year: Never true    [place] of Food in the Last Year: Never true   Transportation Needs:     Lack of Transportation (Medical):      Lack of Transportation (Non-Medical):    Physical Activity:     Days of Exercise per Week:     Minutes of Exercise per Session:    Stress:     Feeling of Stress :    Social Connections:     Frequency of Communication with Friends and Family:     Frequency of Social Gatherings with Friends and Family:     Attends [employer] Services:     Active Member of Clubs or Organizations:     Attends Club or Organization Meetings:     Marital Status:    Intimate Partner Violence:     Fear of Current or Ex-Partner:     Emotionally Abused:     Physically Abused:     Sexually Abused:      Smoking history: The patient is a former smoker. Smoked from nineteen fifties to 1980 a pack a day. ETOH:   reports current alcohol use. Exposures:  There is a remote history of asbestosis exposure       Vaccines:      Immunization History   Administered Date(s) Administered    COVID-19, Moderna, PF, 100mcg/0.5mL 01/26/2021, 03/15/2021    Influenza A ([id number]) Vaccine PF IM 12/10/2009    Influenza A (T9o6-72),all Formulations 12/01/2009    Influenza Vaccine, unspecified formulation 11/09/2016    Influenza Virus Vaccine 10/19/2011, 11/01/2015    Influenza, High Dose (Fluzone 65 yrs and older) 11/05/2015, 11/13/2015, 11/09/2016, 11/09/2016, 09/15/2017, 09/07/2018, 09/05/2019    Influenza, Quadv, adjuvanted, 65 yrs +, IM, PF (Fluad) 09/24/2020    Pneumococcal Conjugate 13-valent ([id number]) 12/01/2015, 10/01/2016    Pneumococcal Conjugate Vaccine 09/18/2018    Pneumococcal Polysaccharide ([id number]) 07/08/2020    Tdap (Boostrix, Adacel) 04/10/2021    Zoster Live (Zostavax) 02/06/2013, 11/01/2015    Zoster Recombinant (Shingrix) 11/14/2018        Home Meds: Medications Prior to Admission: rosuvastatin (CRESTOR) 5 MG tablet, Take 1 tablet by mouth nightly  isosorbide mononitrate (IMDUR) 30 MG extended release tablet, TAKE ONE TABLET BY MOUTH EVERY DAY  LORazepam (ATIVAN) 0.5 MG tablet, TAKE 1/2 TO 1 TABLET BY MOUTH AT BEDTIME AS NEEDED  tamsulosin (FLOMAX) 0.4 MG capsule, Take 1 capsule by mouth daily for 15 days  ENTRESTO 24-26 MG per tablet, TAKE 1 TABLET BY MOUTH 2 TIMES A DAY  mexiletine (MEXITIL) 200 MG capsule, TAKE ONE CAPSULE BY MOUTH 3 TIMES A DAY  albuterol (PROVENTIL) (2.5 MG/3ML) 0.083% nebulizer solution, USE 1 VIAL IN NEBULIZER 4 TIMES DAILY (Patient taking differently: Take 2.5 mg by nebulization every 4 hours as needed for Wheezing or Shortness of Breath USE 1 VIAL IN NEBULIZER 4 TIMES DAILY)  Coenzyme Q10 (COQ10) 100 MG CAPS, Take 100 mg by mouth daily  montelukast (SINGULAIR) 10 MG tablet, Take 1 tablet by mouth nightly  aspirin 81 MG EC tablet, Take 1 tablet by mouth daily  OFEV 150 MG CAPS, TAKE 1 CAPSULE BY MOUTH TWICE A DAY (EVERY 12 HOURS) AS DIRECTED WITH FOOD.  albuterol sulfate  (90 Base) MCG/ACT inhaler, Inhale 2 puffs into the lungs every 6 hours as needed for Wheezing  Multiple Vitamins-Minerals (MULTIVITAMIN ADULT) TABS, Take by mouth daily  [DISCONTINUED] METOPROLOL SUCCINATE ER PO, Take 12.5 mg by mouth 2 times daily   ondansetron (ZOFRAN ODT) 8 MG TBDP disintegrating tablet, Take 1 tablet by mouth every 8 hours as needed for Nausea  [DISCONTINUED] furosemide (LASIX) 20 MG tablet, Take 1 tablet by mouth daily  nitroGLYCERIN (NITROSTAT) 0.3 MG SL tablet, Place 1 tablet under the tongue every 5 minutes as needed for Chest pain up to max of 3 total doses. If no relief after 1 dose, call 911. CURRENT MEDS :  Scheduled Meds:   furosemide  40 mg Oral Daily    mexiletine  200 mg Oral 3 times per day    metoprolol succinate  25 mg Oral BID    spironolactone  12.5 mg Oral Daily    aspirin  81 mg Oral Daily    sacubitril-valsartan  1 tablet Oral BID    montelukast  10 mg Oral Nightly    rosuvastatin  5 mg Oral Nightly    tamsulosin  0.4 mg Oral Daily    sodium chloride flush  5-40 mL IntraVENous 2 times per day    enoxaparin  40 mg SubCUTAneous Daily       Continuous Infusions:      No Known Allergies    REVIEW OF SYSTEMS:  Constitutional: Denies fever, weight loss, night sweats, and fatigue  Skin: Denies pigmentation, dark lesions, and rashes   HEENT: Denies hearing loss, tinnitus, ear drainage, epistaxis, sore throat, and hoarseness. Cardiovascular: Denies palpitations, chest pain, and chest pressure.   Respiratory: Positive for shortness of breath with ambulation otherwise no dyspnea at rest gastrointestinal: Denies nausea, vomiting, poor appetite, diarrhea, heartburn or reflux  Genitourinary: Denies dysuria, frequency, urgency or hematuria  Musculoskeletal: Denies myalgias, muscle weakness, and bone pain  Neurological: Denies dizziness, vertigo, headache, and focal weakness  Psychological: Denies anxiety and depression  Endocrine: Denies heat intolerance and cold intolerance  Hematopoietic/Lymphatic: Denies bleeding problems and blood transfusions    OBJECTIVE:   BP (!) 109/55   Pulse 64   Temp 98.6 °F (37 °C) (Oral)   Resp 16   Ht 5' 10\" (1.778 m)   Wt 141 lb (64 kg)   SpO2 93%   BMI 20.23 kg/m²   SpO2 Readings from Last 1 Encounters:   09/17/21 93%        I/O:    Intake/Output Summary (Last 24 hours) at 9/17/2021 1244  Last data filed at 9/17/2021 0728  Gross per 24 hour   Intake 200 ml   Output 800 ml   Net -600 ml     Vent Information  SpO2: 93 %                Physical Exam:  General: The patient is lying in bed comfortably without any distress. Breathing is not labored  HEENT: Pupils are equal round and reactive to light, there are no oral lesions and no post-nasal drip   Neck: supple without adenopathy  Cardiovascular: regular rate and rhythm without murmur or gallop  Respiratory: Bibasilar crackles   abdomen: soft, non-tender, non-distended, normal bowel sounds  Extremities: warm, no edema, no clubbing  Skin: no rash or lesion  Neurologic: CN II-XII grossly intact, no focal deficits    Pulmonary Function Testing personally reviewed and interpreted , last PFTs from 2019    INTERPRETATION   This is a good patient effort. FEV1/FVC ratio is 84%.  The FEV 1 is 2.08L, 77 % predicted.  The FVC is  2.47L, 67 % predicted.  Mid-flows are 120%.  Forced expiratory time is 7.13s.  The total lung capacity is  4.63L, 67 % predicted.  The RV/TLC ratio is 103% predicted.  The DLCOc is 8.79ml/min/mmHg, 37% predicted.  This does CALCIUM 9.0 09/17/2021    GFRAA >60 09/17/2021    LABGLOM >60 09/17/2021     Lab Results   Component Value Date    PROTIME 12.8 09/14/2021    INR 1.2 09/14/2021     Recent Labs     09/14/21  1704   PROBNP 2,913*     No results for input(s): TROPONINI in the last 72 hours. No results for input(s): PROCAL in the last 72 hours. This SmartLink has not been configured with any valid records. Micro:  No results for input(s): CULTRESP in the last 72 hours. No results for input(s): LABGRAM in the last 72 hours. No results for input(s): LEGUR in the last 72 hours. No results for input(s): STREPNEUMAGU in the last 72 hours. No results for input(s): LP1UAG in the last 72 hours. Assessment:  1. Acute on chronic hypoxic respiratory failure  2. Patient with advanced pulmonary fibrosis  3. Elevated proBNP  4. Mild pulmonary hypertension    Plan:  1. We performed ambulatory pulse oximetry. Patient will need 6 L of oxygen with ambulation. 2. His last CT scan was in April 2020 at that time he had significant right lower lobe infiltrates. We will go ahead and order him a CT of chest without contrast  3. Continue Breo  4. Continue Ofev  5. Patient's vaccinations are up-to-date  6. Will need PFTs can be done as outpatient. Thank you for allowing me to participate in the care of Eugenia Valencia. Please feel free to call with questions. Electronically signed by Jam Thornton MD on 9/17/2021 at 12:44 PM      Note: This report was completed utilizing computer voice recognition software. Every effort has been made to ensure accuracy, however; inadvertent computerized transcription errors may be present      Addendum:    CT of the chest reviewed. In comparison with 2020 although his infiltrates mostly have resolved but he does have a small cavitary lesion versus infected bulla. Discussed with Dr. Steven Crane. We will check a procalcitonin level.   If elevated can start patient on treatment for aspiration pneumonia. With a follow-up CT scan in 4 to 6-week if.  Given patient's history CODE STATUS DNR CC he is not the best candidate to consider bronchoscopy at this point to the high risk of respiratory failure and requiring intubation.     Nivia Lopez MD

## 2021-09-17 NOTE — PROGRESS NOTES
SpO2 on rest at 6L NC: 96% HR 76  Ambulating on 6L NC: 87%  Ambulating at 6L NC: 87-94% HR 98  Recovery SpO2 at 6L NC: 90% in 1 minute  SpO2 on rest at 6L NC: 94%

## 2021-09-17 NOTE — PROGRESS NOTES
INPATIENT CARDIOLOGY FOLLOW-UP    Name: Lee Bishop    Age: 80 y.o. Date of Admission: 9/14/2021  4:19 PM    Date of Service: 9/17/2021    Chief Complaint: Follow-up for coronary atherosclerosis, ischemic cardiomyopathy, acute superimposed upon chronic systolic heart failure, ventricular tachycardia, chronic obstructive lung disease with associated chronic hypoxic respiratory failure, chronic kidney disease    Interim History: The patient continues to report subjective improvement from admission with additional fluid mobilization documented. Persistent desaturations noted upon activity in the absence of supplemental oxygen. Review of Systems: The remainder of a complete multisystem review including consitutional, central nervous, respiratory, circulatory, gastrointestinal, genitourinary, endocrinologic, hematologic, musculoskeletal and psychiatric are negative.     Problem List:  Patient Active Problem List   Diagnosis    Automatic implantable cardioverter-defibrillator in situ    [facility] ([facility]    Iliac artery aneurysm, bilateral ([facility])    IPF (idiopathic pulmonary fibrosis) ([facility])    Peripheral vascular disease ([address]    Coronary artery disease involving native coronary artery without angina pectoris    Hyperlipidemia    CKD (chronic kidney disease) stage 3, GFR 30-59 ml/min ([facility])    Gastroesophageal reflux disease    Insomnia    Vitamin D deficiency    Acute-on-chronic respiratory failure ([address]    Ventricular fibrillation ([address]    Moderate protein-calorie malnutrition ([address]    Chest pain    Cardiomyopathy ([address]    Chronic respiratory failure with hypoxia ([facility])    Acute on chronic systolic HF (heart failure) ([facility])       Allergies:  No Known Allergies    Current Medications:  Current Facility-Administered Medications   Medication Dose Route Frequency Provider Last Rate Last Admin    furosemide (LASIX) tablet 40 mg  40 mg Oral Daily Ellis Miller MD   40 mg at 09/17/21 0625    [Held by provider] potassium chloride (KLOR-CON M) extended release tablet 40 mEq  40 mEq Oral Daily Tabitha Anguianoon, DO   40 mEq at 09/15/21 1255    mexiletine (MEXITIL) capsule 200 mg  200 mg Oral 3 times per day Toshia Lopez MD   200 mg at 09/17/21 3308    metoprolol succinate (TOPROL XL) extended release tablet 25 mg  25 mg Oral BID Toshia Lopez MD   25 mg at 09/16/21 2109    spironolactone (ALDACTONE) tablet 12.5 mg  12.5 mg Oral Daily Toshia Lopez MD   12.5 mg at 09/17/21 3003    aspirin EC tablet 81 mg  81 mg Oral Daily Alejandro Turner MD   81 mg at 09/17/21 7828    sacubitril-valsartan (ENTRESTO) 24-26 MG per tablet 1 tablet  1 tablet Oral BID Tosiha Lopez MD   1 tablet at 09/17/21 0821    montelukast (SINGULAIR) tablet 10 mg  10 mg Oral Nightly Alejandro Turner MD   10 mg at 09/16/21 2109    rosuvastatin (CRESTOR) tablet 5 mg  5 mg Oral Nightly Alejandro Turner MD   5 mg at 09/16/21 2109    tamsulosin (FLOMAX) capsule 0.4 mg  0.4 mg Oral Daily [facility] Lay Higgins MD   0.4 mg at 09/17/21 7777    sodium chloride flush 0.9 % injection 5-40 mL  5-40 mL IntraVENous 2 times per day Michael Vazquez MD   10 mL at 09/17/21 0825    sodium chloride flush 0.9 % injection 5-40 mL  5-40 mL IntraVENous PRN Alejandro Turner MD        ondansetron (ZOFRAN-ODT) disintegrating tablet 4 mg  4 mg Oral Q8H PRN Alejandro Turner MD        Or    [facility]ron [facility] injection 4 mg  4 mg IntraVENous Q6H PRN Alejandro James MD        polyethylene glycol (GLYCOLAX) packet 17 g  17 g Oral Daily PRN Alejandro Turner, MD        acetaminophen (TYLENOL) tablet 650 mg  650 mg Oral Q6H PRN Alejandro Turner MD        Or    acetaminophen (TYLENOL) suppository 650 mg  650 mg Rectal Q6H PRN Alejandro James MD        potassium chloride 10 mEq/100 mL IVPB (Peripheral Line)  10 mEq IntraVENous PRN Michael Vazquez MD       [address] [place] [address] magnesium sulfate 2000 mg in 50 mL IVPB premix  2,000 mg IntraVENous PRN Alejandro Pennington MD        enoxaparin (LOVENOX) injection 40 mg  40 mg SubCUTAneous Daily Alejandro Pennington MD   40 mg at 09/17/21 0824         Physical Exam:  BP (!) 109/55   Pulse 64   Temp 98.6 °F (37 °C) (Oral)   Resp 16   Ht 5' 10\" (1.778 m)   Wt 141 lb (64 kg)   SpO2 93%   BMI 20.23 kg/m²   Weight change: Wt Readings from Last 3 Encounters:   09/17/21 141 lb (64 kg)   09/09/21 148 lb (67.1 kg)   09/06/21 150 lb (68 kg)     The patient is awake, alert and in no discomfort or distress. No gross musculoskeletal deformity is present. No significant skin or nail changes are present. Gross examination of head, eyes, nose and throat are negative. Jugular venous pressure is 6 to 7 cm and no carotid bruits are present. Normal respiratory effort is noted with no accessory muscle usage present. Lung fields demonstrate basilar fibrotic rales to ascultation. Cardiac examination is notable for a regular rate and rhythm with no palpable thrill. No gallop rhythm and a soft apical holosystolic murmur are identified. A benign abdominal examination is present with no masses or organomegaly. Intact pulses are present throughout all extremities and no peripheral edema is present. No focal neurologic deficits are present. Intake/Output:    Intake/Output Summary (Last 24 hours) at 9/17/2021 0829  Last data filed at 9/17/2021 0728  Gross per 24 hour   Intake 380 ml   Output 1050 ml   Net -670 ml     I/O this shift:  In: -   Out: 150 [Urine:150]    Laboratory Tests:  Lab Results   Component Value Date    CREATININE 1.1 09/16/2021    BUN 17 09/16/2021     09/16/2021    K 3.7 09/16/2021     09/16/2021    CO2 29 09/16/2021     No results for input(s): CKTOTAL, CKMB in the last 72 hours.     Invalid input(s): TROPONONI  Lab Results   Component Value Date    BNP 35 05/26/2011     Lab Results   Component Value Date    WBC 11.8 09/15/2021    RBC 3.22 09/15/2021    HGB 9.9 09/15/2021    HCT 31.2 09/15/2021    MCV 96.9 09/15/2021    MCH 30.7 09/15/2021    MCHC 31.7 09/15/2021    RDW 15.3 09/15/2021     09/15/2021    MPV 8.3 09/15/2021     Recent Labs     09/14/21  1704 09/15/21  0542   ALKPHOS 42 45   ALT 11 12   AST 19 17   PROT 6.2* 6.3*   BILITOT 0.3 0.4   LABALBU 3.2* 3.3*     Lab Results   Component Value Date    MG 1.9 09/15/2021     Lab Results   Component Value Date    PROTIME 12.8 09/14/2021    INR 1.2 09/14/2021     Lab Results   Component Value Date    TSH 5.060 08/11/2021     No components found for: CHLPL  Lab Results   Component Value Date    TRIG 79 08/12/2021    TRIG 110 08/08/2019     Lab Results   Component Value Date    HDL 28 08/12/2021    HDL 57 08/08/2019     Lab Results   Component Value Date    LDLCALC 63 08/12/2021    LDLCALC 101 (H) 08/08/2019       Cardiac Tests:  Telemetry findings reviewed: sinus rhythm, no new tachy/bradyarrhythmias overnight      ASSESSMENT / PLAN: On a clinical basis, the patient remains subjectively improved with persistent hypoxic respiratory failure and desaturation upon activity in the face of concomitant systolic heart failure and chronic pulmonary fibrosis. He requests assessment by the pulmonary service with additional referral deferred to primary care. From a cardiovascular standpoint, as previously outlined his prognosis appears guarded with further optimization of his medical regimen limited by relative hypotension.   It is presently uncertain if his implantable cardioverter defibrillator has been deactivated with this issue discussed with nursing staff and additional contact of the [employer] [facility] representative to be performed to accomplish this at his request.  He otherwise request discharge which would be acceptable from a cardiovascular standpoint and at present plans of continuation of his existing medical regimen with needs of careful monitoring on an outpatient basis potentially including the involvement of the heart failure clinic to assist in reducing risk of recurrent hospitalizations. Ongoing appropriate risk factor modification of blood pressure and serum lipids will be essential to reducing risk of future atherosclerotic development. Arrangements were made for outpatient follow-up with his primary cardiologist, Tomy Brewer in approximately 1 week. Note: This report was completed utilizing computer voice recognition software. Every effort has been made to ensure accuracy, however; inadvertent computerized transcription errors may be present. Jeannette Amador.  [facility], [address] [facility] Cardiology

## 2021-09-18 NOTE — PROGRESS NOTES
[facility] Cardiology Inpatient Progress Note    Patient is a 80 y.o. male of Kurt Diaz DO seen in hospital follow up. Chief complaint: Follow-up for CAD/ICMP/Acute on chronic systolic CHF/VT/COPD/CKD    HPI: Some hypotension.     Patient Active Problem List   Diagnosis    Automatic implantable cardioverter-defibrillator in situ    Dilatation of aorta (HCC)    Iliac artery aneurysm, bilateral (HCC)    [facility]) ([facility])    [facility]    Coronary artery disease involving native coronary artery without angina pectoris    Hyperlipidemia    CKD (chronic kidney disease) stage 3, GFR 30-59 ml/min ([facility])    Gastroesophageal reflux disease    Insomnia    Vitamin D deficiency    Acute-on-chronic respiratory failure ([facility]    Ventricular fibrillation ([facility]    Moderate protein-calorie malnutrition ([facility]    Chest pain    Cardiomyopathy ([facility]    Chronic respiratory failure with hypoxia (HCC)    Acute on chronic systolic HF (heart failure) ([facility])       No Known Allergies    Current Facility-Administered Medications   Medication Dose Route Frequency Provider Last Rate Last Admin    Nintedanib Esylate CAPS 150 mg  150 mg Oral BID Tabitha Smith DO   150 mg at 09/18/21 0917    furosemide (LASIX) tablet 40 mg  40 mg Oral Daily Judd Nina MD   40 mg at 09/18/21 0617    mexiletine (MEXITIL) capsule 200 mg  200 mg Oral 3 times per day Judd Nina MD   200 mg at 09/18/21 1349    metoprolol succinate (TOPROL XL) extended release tablet 25 mg  25 mg Oral BID Judd Nina MD   25 mg at 09/17/21 2043    spironolactone (ALDACTONE) tablet 12.5 mg  12.5 mg Oral Daily Judd Nina MD   12.5 mg at 09/18/21 0914    aspirin EC tablet 81 mg  81 mg Oral Daily Alejandro Perkins MD   81 mg at 09/18/21 0914    sacubitril-valsartan (ENTRESTO) 24-26 MG per tablet 1 tablet  1 tablet Oral BID Judd Nina MD   1 tablet at 09/17/21 2043    montelukast (SINGULAIR) tablet 10 mg  10 mg Oral Nightly Alejandro Lei MD   10 mg at 09/17/21 2043    rosuvastatin (CRESTOR) tablet 5 mg  5 mg Oral Nightly Alejandro Lei MD   5 mg at 09/17/21 2043    tamsulosin (FLOMAX) capsule 0.4 mg  0.4 mg Oral Daily Alejandro Lei MD   0.4 mg at 09/18/21 1504    sodium chloride flush 0.9 % injection 5-40 mL  5-40 mL IntraVENous 2 times per day Lucio Michelle MD   10 mL at 09/18/21 0918    sodium chloride flush 0.9 % injection 5-40 mL  5-40 mL IntraVENous PRN Alejandro Lei MD        ondansetron (ZOFRAN-ODT) disintegrating tablet 4 mg  4 mg Oral Q8H PRN Alejandro Lei MD        Or    ondansetron [facility]) injection 4 mg  4 mg IntraVENous Q6H PRN Alejandro James MD        polyethylene glycol (GLYCOLAX) packet 17 g  17 g Oral Daily PRN Alejandro Lei MD        acetaminophen (TYLENOL) tablet 650 mg  650 mg Oral Q6H PRN Alejandro Lei MD        Or    acetaminophen (TYLENOL) suppository 650 mg  650 mg Rectal Q6H PRN Alejandro Lei MD        potassium chloride 10 mEq/100 mL IVPB (Peripheral Line)  10 mEq IntraVENous PRN Alejandro Lei MD        magnesium sulfate 2000 mg in 50 mL IVPB premix  2,000 mg IntraVENous PRN Alejandro James MD        enoxaparin (LOVENOX) injection 40 mg  40 mg SubCUTAneous Daily Alejandro Lei MD   40 mg at 09/18/21 9540       Review of systems:   Heart: as above   Lungs: as above   Eyes: denies changes in vision or discharge. Ears: denies changes in hearing or pain. Nose: denies epistaxis or masses   Throat: denies sore throat or trouble swallowing. Neuro: denies numbness, tingling, tremors. Skin: denies rashes or itching. : denies hematuria, dysuria   GI: denies vomiting, diarrhea   Psych: denies mood changed, anxiety, depression.     Physical Exam   BP (!) 105/52 Comment: manual  Pulse 68   Temp 97.9 °F (36.6 °C) (Axillary)   Resp 16   Ht 5' 10\" (1.778 m)   Wt 142 lb (64.4 kg)   SpO2 97%   BMI 20.37 kg/m²   Constitutional: Oriented to person, place, and time. No distress. Well developed. Head: Normocephalic and atraumatic. Neck: Neck supple. No hepatojugular reflux. No JVD present. Carotid bruit is not present. No tracheal deviation present. No thyromegaly present. Cardiovascular: Normal rate, regular rhythm, normal heart sounds. intact distal pulses. No gallop and no friction rub. No murmur heard. Pulmonary: Breath sounds normal. No respiratory distress. No wheezes. No rales. Chest: Effort normal. No tenderness. Abdominal: Soft. Bowel sounds are normal. No distension or mass. No tenderness, rebound or guarding. Musculoskeletal: . No tenderness. No clubbing or cyanosis. Extremitites: Intact distal pulses. No edema  Neurological: Alert and oriented to person, place, and time. Skin: Skin is warm and dry. No rash noted. Not diaphoretic. No erythema. Psychiatric: Normal mood and affect. Behavior is normal.   Lymphadenopathy: No cervical adenopathy. No groin adenopathy.     CBC:   Lab Results   Component Value Date    WBC 11.8 09/15/2021    RBC 3.22 09/15/2021    HGB 9.9 09/15/2021    HCT 31.2 09/15/2021    MCV 96.9 09/15/2021    MCH 30.7 09/15/2021    MCHC 31.7 09/15/2021    RDW 15.3 09/15/2021     09/15/2021    MPV 8.3 09/15/2021     BMP:   Lab Results   Component Value Date     09/18/2021    K 4.0 09/18/2021    K 3.4 09/15/2021     09/18/2021    CO2 30 09/18/2021    BUN 19 09/18/2021    LABALBU 3.3 09/15/2021    LABALBU 4.2 05/26/2011    CREATININE 1.1 09/18/2021    CALCIUM 8.8 09/18/2021    GFRAA >60 09/18/2021    LABGLOM >60 09/18/2021     Magnesium:    Lab Results   Component Value Date    MG 1.9 09/15/2021     Cardiac Enzymes:   Lab Results   Component Value Date    CKTOTAL 58 01/10/2015    CKTOTAL 51 07/21/2014    CKTOTAL 58 07/21/2014    CKMB 1.7 01/10/2015    CKMB 3.0 07/21/2014    CKMB 3.2 07/21/2014    TROPHS 51 (H) 09/15/2021    TROPHS 51 (H) 09/15/2021    TROPHS 47 (H) 09/14/2021      PT/INR:    Lab Results   Component Value Date    PROTIME 12.8 09/14/2021    INR 1.2 09/14/2021     TSH:    Lab Results   Component Value Date    TSH 5.060 08/11/2021     Rhythm Strip: normal sinus rhythm. ASSESSMENT & PLAN:    Patient Active Problem List   Diagnosis    Automatic implantable cardioverter-defibrillator in situ    [facility])    Iliac artery aneurysm, bilateral (HCC)    IPF (idiopathic pulmonary fibrosis) ([facility])    [facility]    Coronary artery disease involving native coronary artery without angina pectoris    Hyperlipidemia    CKD (chronic kidney disease) stage 3, GFR 30-59 ml/min ([facility])    Gastroesophageal reflux disease    Insomnia    Vitamin D deficiency    Acute-on-chronic respiratory failure ([facility]    Ventricular fibrillation ([facility]    Moderate protein-calorie malnutrition ([facility]    Chest pain    Cardiomyopathy ([address]    Chronic respiratory failure with hypoxia ([facility])    Acute on chronic systolic HF (heart failure) ([address]     1. ICMP/Acute on chronic systolic CHF:    Some hypotension. Parameters added. Observe. BB/entresto/aldactone/PO lasix. 2. CAD: ASA/BB/statin. 3. VT: Monitor. BB/mexilitine. 4. COPD    5. CKD: Follow labs. Garth Yi D.O.   Cardiologist  Cardiology, [address] [facility]

## 2021-09-18 NOTE — PROGRESS NOTES
Spoke to Dr Luis Daniel Del Angel, relayed the message okay to d/c per cardiology, updated on the orthostatic vitals, discharge order discontinued. Patient will stay. New order received. Patient orthos (+), patient asymptomatic. Sitting up in chair with alarm on.

## 2021-09-18 NOTE — DISCHARGE INSTR - DIET
 Good nutrition is important when healing from an illness, injury, or surgery. Follow any nutrition recommendations given to you during your hospital stay.  If you were given an oral nutrition supplement while in the hospital, continue to take this supplement at home. You can take it with meals, in-between meals, and/or before bedtime. These supplements can be purchased at most local grocery stores, pharmacies, and chain super-stores. If you have any questions about your diet or nutrition, call the hospital at [phone]      Nutrition Guidelines for Congestive Heart Failure: This nutrition therapy will help you feel better and support your heart. This plan focuses on:   Limiting sodium in your diet. Salt (sodium) makes your body hold water. When your body holds too much water, you can feel shortness of breath and swelling. You can prevent these symptoms by eating less salt.  Limiting fluid in your diet. For some patients, drinking too much fluid can make heart failure worse. It can cause symptoms such as shortness of breath and swelling. Limiting fluids can help relieve some of your symptoms.  Managing your weight. Your registered dietitian nutritionist (RDN) can help you choose a healthy weight for your body type. You can achieve these goals by:   Reading food labels to keep track of how much sodium is in the foods you eat.  Limiting foods that are high in sodium.  Checking your weight to make sure youre not retaining too much fluid. Reading the Food Label: How Much Sodium Is Too Much? The nutrition plan for heart failure usually limits the sodium you get from food and drinks to 2,000 milligrams per day. Salt is the main source of sodium. Read the nutrition label to find out how much sodium is in 1 serving of a food.  Select foods with 140 milligrams of sodium or less per serving.    Foods with more than 300 milligrams of sodium per serving may not fit into a reduced-sodium meal plan.   Check serving sizes. If you eat more than 1 serving, you will get more sodium than the amount listed. Cutting Back on Sodium   Avoid processed foods. Eat more fresh foods. o Fresh and frozen fruits and vegetables without added juices or sauces are naturally low in sodium. o Fresh meats are lower in sodium than processed meats, such as hill, sausage, and hot dogs. Read the nutrition label or ask your  to help you find a fresh meat that is low in sodium.  Eat less salt, at the table and when cooking. o Just 1 teaspoon of table salt has 2,300 milligrams of sodium. o Leave the salt out of recipes for pasta, casseroles, and soups. o Ask your RDN how to cook your favorite recipes without sodium.  Be a smart . o Look for food packages that say salt-free or sodium-free.  These items contain less than 5 milligrams of sodium per serving. o Very-low-sodium products contain less than 35 milligrams of sodium per serving. o Low-sodium products contain less than 140 milligrams of sodium per serving. o Unsalted or no added salt products may still be high in sodium. Check the nutrition label.  Add flavors to your food without adding sodium. o Try lemon juice, lime juice, fruit juice, or vinegar. o Dry or fresh herbs add flavor. Try basil, bay leaf, dill, rosemary, parsley, soraya, dry mustard, nutmeg, thyme, and paprika.  o Pepper, red pepper flakes, and cayenne pepper can add spice to your meals without adding sodium. Hot sauce contains sodium, but if you use just a drop or two, it will not add up to much.  o Buy a sodium-free seasoning blend or make your own at home.  Use caution when you eat outside your home.   o Restaurant foods can be very high in sodium. o Ask for nutrition information. Many restaurants provide nutrition facts on their menus or websites. o Let your  know that you want your food to be cooked without salt.  Ask for your salad dressing and sauces to come on the side.   Fluid Restriction  Your doctor may ask you to follow a fluid restriction in addition to taking diuretics (water pills). Ask your doctor how much fluid you can have. Foods that are liquid at room temperature are considered a fluid, such as popsicles, soup, ice cream, and Jell-O. Here are some common conversions that will help you measure your fluid intake every day:   1,000 milliliters = 1 liter or 4 cups  1 fluid ounce = 30 milliliters    1 cup = 240 milliliters  2,000 milliliters = 2 liters or 8 cups    1,500 milliliters = 1½ liters or 6 cups       Weight Monitoring  Weigh yourself each day. Sudden weight gain is a sign that fluid is building up in your body. Follow these guidelines:   Weigh yourself every morning. If you gain 3 or more pounds in 1-2 days or 5 or more pounds within 1 week, call your doctor. Your doctor may adjust your medicine to get rid of the extra fluid.  Talk with your doctor or RDN about what a healthy weight is for you.  Talk with your doctor to find out what type of physical activity is best for you.               Foods Recommended  Food Group Recommended Foods   Grains Bread with less than 80 milligrams sodium per slice (yeast breads usually have less sodium than those made with baking soda)  Homemade bread made with reduced-sodium baking soda  Many cold cereals, especially shredded wheat and puffed rice  Oats, grits, or cream of wheat  Dry pastas, noodles, quinoa, and rice   Vegetables Fresh and frozen vegetables without added sauces, salt, or sodium  Homemade soups (salt free or low sodium)  Low-sodium or sodium-free canned vegetables and soups   Fruits Fresh and canned fruits  Dried fruits, such as raisins, cranberries, and prunes   Dairy (Milk and Milk Products) Milk or milk powder  Rice milk and soy milk  Yogurt, including Greek yogurt  Small amounts of natural, block cheese or reduced-sodium cheese (Swiss, ricotta, and fresh mozzarella are lower in sodium than others)  Regular or soft cream cheese and low-sodium cottage cheese   Protein Foods (Meat, Poultry, Fish, Beans) Fresh meats and fish  [place] ([facility]) hill (except if packaged in a sodium solution)  Canned or packed tuna (no more than 4 ounces at 1 serving)  Dried beans and peas; edamame (fresh soybeans)  Eggs or egg beaters (if  less than 200 mg per serving)  Unsalted nuts or peanut butter   Desserts and Snacks Fresh fruit or applesauce  Joshua food cake  Granola bars  Unsalted pretzels, popcorn, or nuts  Pudding or gelatin with whipped cream topping  Homemade rice-crispy treats  Vanilla wafers  Frozen fruit bars   Fats Tub or liquid margarine  Unsaturated fat oils (canola, olive, corn, sunflower, safflower, peanut)   Condiments Fresh or dried herbs; low-sodium ketchup; vinegar; lemon or lime juice; pepper; salt-free seasoning mixes and marinades (salt-free seasoning blend); simple salad dressings (vinegar and oil); salt-free sauces     Foods Not Recommended  Food Group Foods Not Recommended   Grains Breads or crackers topped with salt  Cereals (hot/cold) with more than 300 milligrams sodium per serving  Biscuits, cornbread, and other quick breads prepared with baking soda  Prepackaged bread crumbs  Self-rising flours   Vegetables Canned vegetables (unless they are salt free or low sodium)  Frozen vegetables with seasoning and sauces  Sauerkraut and pickled vegetables  Canned or dried soups (unless they are salt free or low  sodium)  [place] fries and onion rings   Fruits Dried fruits preserved with sodium-containing additives   Dairy (Milk and Milk Products) Buttermilk  Processed cheeses   Cottage cheese (unless a low-sodium variety)  Feta cheese; shredded cheese (has more sodium than block cheese); singles slices and string cheese   Protein Foods (Meat, Poultry, Fish, Beans) Cured meats: hill, ham, sausage, pepperoni, and hot dogs  Canned meats: chili, [place] sausage, sardines, and ham  Smoked fish and meats  Frozen meals that have more than 600 milligrams sodium   Fats Salted butter or margarine   Condiments Salt, sea salt, kosher salt, onion salt, and garlic salt  Seasoning mixes containing salt (Lemon Pepper or Bouillon cubes)  Catsup or ketchup, BBQ sauce, Worcestershire and soy sauce  Salsa, pickles, olives, relish  Salad dressings: ranch, blue cheese, [place], and [place]    Alcohol Check with your doctor.      Heart Failure  Sample 1-Day Menu  Breakfast 1 cup regular oatmeal made with water or milk  1 cup reduced-fat (2%) milk  1 medium banana  1 slice whole wheat bread   1 tablespoon salt-free peanut butter   Morning Snack 1/2 cup dried cranberries   Lunch 3 ounces grilled chicken breast  1 cup salad greens   Olive oil and vinegar dressing (for greens)  5 unsalted or low-sodium crackers  Fruit plate with 1/4 cup strawberries  1/2 sliced orange (for fruit plate)  1 peach half (for fruit plate)   Afternoon Snack 1 ounce low-sodium turkey   1 piece whole wheat bread   Evening Meal 3 ounces herb-baked fish  1 baked potato  2 teaspoons soft margarine (trans fat-free) (for potato)  Sliced tomatoes  1/2 cup steamed spinach drizzled with lemon juice  3-inch square of janelle food cake  Fresh strawberries (2) (for cake)   Evening Snack 2 tablespoons salt-free peanut butter  5 low-sodium crackers

## 2021-09-18 NOTE — PROGRESS NOTES
Spoke with Dr. Charanjit Garcia regarding patients most recent BP, updated Mariama Kearns with cardiology of patients condition per his request.

## 2021-09-18 NOTE — PROGRESS NOTES
[facility] Progress Note    Admitting Date and Time: 9/14/2021  4:19 PM  Admit Dx: Acute on chronic systolic HF (heart failure) (HCC) [I50.23]  Acute respiratory failure with hypoxia (HCC) [J96.01]  Congestive heart failure, unspecified HF chronicity, unspecified heart failure type ([address] [I50.9]    Subjective:  Patient is being followed for Acute on chronic systolic HF (heart failure) (HCC) [I50.23]  Acute respiratory failure with hypoxia (HCC) [J96.01]  Congestive heart failure, unspecified HF chronicity, unspecified heart failure type ([address] [I50.9]   Pt feels good and wants to go home, BP ear;ier this morning was 60 over doppler  O2 requirements down to 3L home dose    ROS: denies fever, chills, cp, n/v, HA unless stated above.       Nintedanib Esylate  150 mg Oral BID    furosemide  40 mg Oral Daily    mexiletine  200 mg Oral 3 times per day    metoprolol succinate  25 mg Oral BID    spironolactone  12.5 mg Oral Daily    aspirin  81 mg Oral Daily    sacubitril-valsartan  1 tablet Oral BID    montelukast  10 mg Oral Nightly    rosuvastatin  5 mg Oral Nightly    tamsulosin  0.4 mg Oral Daily    sodium chloride flush  5-40 mL IntraVENous 2 times per day    enoxaparin  40 mg SubCUTAneous Daily     sodium chloride flush, 5-40 mL, PRN  ondansetron, 4 mg, Q8H PRN   Or  ondansetron, 4 mg, Q6H PRN  polyethylene glycol, 17 g, Daily PRN  acetaminophen, 650 mg, Q6H PRN   Or  acetaminophen, 650 mg, Q6H PRN  potassium chloride, 10 mEq, PRN  magnesium sulfate, 2,000 mg, PRN         Objective:    BP (!) 103/59   Pulse 68   Temp 97.7 °F (36.5 °C) (Oral)   Resp 16   Ht 5' 10\" (1.778 m)   Wt 142 lb (64.4 kg)   SpO2 93%   BMI 20.37 kg/m²     General Appearance: alert and oriented to person, place and time and in no acute distress  Skin: warm and dry  Head: normocephalic and atraumatic  Eyes: pupils equal, round, and reactive to light, extraocular eye movements intact, conjunctivae normal  Neck:

## 2021-09-18 NOTE — DISCHARGE SUMMARY
[facility] Physician Discharge Summary        [address] [facility] [address]  [address] [address]  [phone]            Activity level: As tolerated     Dispo: home      Condition on discharge: Stable     Patient ID:  Fernando Merritt  [id number]  19 y.o.  1935    Admit date: 9/14/2021    Discharge date and time:  9/19/2021  4:28 PM    Admission Diagnoses: Active Problems:    Acute on chronic systolic HF (heart failure) (HCC)    Congestive heart failure (HCC)  Resolved Problems:    * No resolved hospital problems. *      Discharge Diagnoses: Active Problems:    Acute on chronic systolic HF (heart failure) (HCC)    Congestive heart failure (HCC)  Resolved Problems:    * No resolved hospital problems. *      Consults:  IP CONSULT TO CARDIOLOGY  IP CONSULT TO HEART FAILURE NURSE/COORDINATOR  IP CONSULT TO PULMONOLOGY      Hospital Course:   Patient Fernando Merritt is a 80 y.o. presented with Acute on chronic systolic HF (heart failure) ([facility]) [I50.23]  Acute respiratory failure with hypoxia ([facility]) [J96.01]  Congestive heart failure, unspecified HF chronicity, unspecified heart failure type ([facility] [I50.9]    1. Acute on chronic hypoxic respiratory failure, baseline 3 L at home, O2 sat was 86% on his 3 L, currently requiring 6 L will wean down on oxygen. Due to acute decompensation of chronic systolic heart failure. Treating underlying process. We consulted pulmonology who obtained a CAT scan of the chest that showed some cavitary changes thought to be due to chronic aspiration. We will discharge patient on Augmentin  2. Acute decompensation of chronic systolic heart failure ejection fraction 25 to 30%, started the patient on IV Lasix, switch to oral, fluid balance -2.5 L,. Patient became hypotensive, we had to hold toprol Xl and entresto  3.   Hx of CAD, no chest pain, continue toprol, asa, statin and imdur  4. Hypotension,  ? Over diuresis, holding toprol and entresto, gave a bolus of 500 cc NS, BP improved, discussed with cardiology who was okay with discharging patient home to take Toprol-XL and Entresto 2 hours apart and to place parameters to hold medication if systolic blood pressure is below 90. Patient exam asymptomatic and wants to go home, will discharge patient home to follow-up with his PCP and cardiology    Discharge Exam:    General Appearance: alert and oriented to person, place and time and in no acute distress  Skin: warm and dry  Head: normocephalic and atraumatic  Eyes: pupils equal, round, and reactive to light, extraocular eye movements intact, conjunctivae normal  Neck: neck supple and non tender without mass   Pulmonary/Chest: clear to auscultation bilaterally- no wheezes, rales or rhonchi, normal air movement, no respiratory distress  Cardiovascular: normal rate, normal S1 and S2 and no carotid bruits  Abdomen: soft, non-tender, non-distended, normal bowel sounds, no masses or organomegaly  Extremities: no cyanosis, no clubbing and no edema  Neurologic: no cranial nerve deficit and speech normal    I/O last 3 completed shifts: In: 300 [P.O.:300]  Out: 725 [Urine:725]  No intake/output data recorded. LABS:  Recent Labs     09/17/21  0737 09/18/21  0615 09/19/21  0313    140 140   K 4.0 4.0 3.8    102 103   CO2 28 30* 29   BUN 18 19 20   CREATININE 1.1 1.1 1.1   GLUCOSE 94 91 98   CALCIUM 9.0 8.8 8.6       No results for input(s): WBC, RBC, HGB, HCT, MCV, MCH, MCHC, RDW, PLT, MPV in the last 72 hours. No results for input(s): POCGLU in the last 72 hours. Imaging:  XR CHEST PORTABLE    Result Date: 9/14/2021  EXAMINATION: ONE XRAY VIEW OF THE CHEST  9/14/2021 17:01 COMPARISON: AP portable chest radiograph 08/11/2021 HISTORY: Dyspnea FINDINGS: This exam is slightly limited by patient body habitus, as well as AP portable, semi-upright technique. Given these factors: Multiple cardiac monitoring leads  overlie the patient's chest.  Multiple sternotomy wires/sternoplasty devices, as well as coronary ostial markers/surgical clips, and a left-subclavian-approach multi-lead AICD, all appear grossly unchanged. There is moderate bibasilar reticulonodular coarsening of interstitial markings, lessening peripherally to both lung apices, and with relative partial sparing of the left upper lobe, not significantly changed. The above processes partially obscure the cardiomediastinal silhouette, although there is at least borderline cardiomegaly, and equivalent central pulmonary vascular congestion, not significantly changed. No other clinically-significant changes are noted. .     1. Slightly limited exam, with grossly unchanged background chronic interstitial lung disease, and at least borderline cardiomegaly, and equivalent central pulmonary vascular congestion, partially obscured by the above processes, but not significantly changed. [facility] RECOMMENDATION: 1. If further characterization is clinically required, consider chest CT. [facility] DUP LOWER EXTREMITY RIGHT ANNA    Result Date: 9/15/2021  EXAMINATION: DUPLEX VENOUS ULTRASOUND OF THE RIGHT LOWER EXTREMITY, 9/15/2021 11:25 am TECHNIQUE: Duplex ultrasound using B-mode/gray scaled imaging and Doppler spectral analysis and color flow was obtained of the right lower extremity. COMPARISON: None. HISTORY: ORDERING SYSTEM PROVIDED HISTORY: r/o dvt TECHNOLOGIST PROVIDED HISTORY: Reason for exam:->r/o dvt What reading provider will be dictating this exam?->CRC Swelling FINDINGS: The visualized lower extremity veins appear patent and free of echogenic thrombus. There is satisfactory compressibility and phasic flow where studied. No lower extremity DVT is identified.        Patient Instructions:      Medication List      START taking these medications    amoxicillin-clavulanate 1000-62.5 MG per extended release tablet  Commonly known as: Augmentin XR  Take 1 tablet by mouth 2 times daily for 7 days     spironolactone 25 MG tablet  Commonly known as: ALDACTONE  Take 0.5 tablets by mouth daily        CHANGE how you take these medications    * albuterol sulfate  (90 Base) MCG/ACT inhaler  Inhale 2 puffs into the lungs every 6 hours as needed for Wheezing  What changed: Another medication with the same name was changed. Make sure you understand how and when to take each. * albuterol (2.5 MG/3ML) 0.083% nebulizer solution  Commonly known as: PROVENTIL  USE 1 VIAL IN NEBULIZER 4 TIMES DAILY  What changed:   · how much to take  · how to take this  · when to take this  · reasons to take this     furosemide 40 MG tablet  Commonly known as: LASIX  Take 1 tablet by mouth daily  What changed:   · medication strength  · how much to take     metoprolol succinate 25 MG extended release tablet  Commonly known as: TOPROL XL  Take 1 tablet by mouth 2 times daily  What changed:   · medication strength  · how much to take         * This list has 2 medication(s) that are the same as other medications prescribed for you. Read the directions carefully, and ask your doctor or other care provider to review them with you. CONTINUE taking these medications    aspirin 81 MG EC tablet  Take 1 tablet by mouth daily     CoQ10 100 MG Caps  Take 100 mg by mouth daily     Entresto 24-26 MG per tablet  Generic drug: sacubitril-valsartan  TAKE 1 TABLET BY MOUTH 2 TIMES A DAY     isosorbide mononitrate 30 MG extended release tablet  Commonly known as: IMDUR     mexiletine 200 MG capsule  Commonly known as: MEXITIL  TAKE ONE CAPSULE BY MOUTH 3 TIMES A DAY     montelukast 10 MG tablet  Commonly known as: SINGULAIR  Take 1 tablet by mouth nightly     Multivitamin Adult Tabs     nitroGLYCERIN 0.3 MG SL tablet  Commonly known as: Nitrostat  Place 1 tablet under the tongue every 5 minutes as needed for Chest pain up to max of 3 total doses.  If no relief after 1 dose, call 911. Ofev 150 MG Caps  Generic drug: Nintedanib Esylate  TAKE 1 CAPSULE BY MOUTH TWICE A DAY (EVERY 12 HOURS) AS DIRECTED WITH FOOD.     ondansetron 8 MG Tbdp disintegrating tablet  Commonly known as: Zofran ODT  Take 1 tablet by mouth every 8 hours as needed for Nausea     rosuvastatin 5 MG tablet  Commonly known as: CRESTOR  Take 1 tablet by mouth nightly     tamsulosin 0.4 MG capsule  Commonly known as: Flomax  Take 1 capsule by mouth daily for 15 days        STOP taking these medications    LORazepam 0.5 MG tablet  Commonly known as: ATIVAN           Where to Get Your Medications      These medications were sent to The [address] [place] [address], [address] [place] [address]    Phone: [phone]   · amoxicillin-clavulanate 1000-62.5 MG per extended release tablet  · furosemide 40 MG tablet  · metoprolol succinate 25 MG extended release tablet  · spironolactone 25 MG tablet           Note that more than 30 minutes was spent in preparing discharge papers, discussing discharge with patient, medication review, etc.    Signed:  Electronically signed by Austin Betancourt MD on 9/19/2021 at 4:28 PM

## 2021-09-18 NOTE — PROGRESS NOTES
9/18/2021   12:44 PM           Nutrition Note    Provided written diet information on CHF Nutrition Guidelines in Discharge Instructions. Will continue inpatient monitoring and reinforce diet principles.     Electronically signed by Belen Mcfarland RD, WALE, LD on 9/18/21 at 12:44 PM EDT    Contact: [phone]

## 2021-09-19 NOTE — PROGRESS NOTES
[facility] Cardiology Inpatient Progress Note    Patient is a 80 y.o. male of Janeen Braun DO seen in hospital follow up. Chief complaint: Follow-up for CAD/ICMP/Acute on chronic systolic CHF/VT/COPD/CKD    HPI: Some hypotension.     Patient Active Problem List   Diagnosis    Automatic implantable cardioverter-defibrillator in situ    Dilatation of aorta (HCC)    Iliac artery aneurysm, bilateral (HCC)    IPF [facility]idi[facility] pulmonary fibrosis) ([facility])    Peripheral vascular disease [facility]    Coronary artery disease involving native coronary artery without angina pectoris    Hyperlipidemia    CKD (chronic kidney disease) stage 3, GFR 30-59 ml/min ([facility])    Gastroesophageal reflux disease    Insomnia    Vitamin D deficiency    Acute-on-chronic respiratory failure ([facility]    Ventricular fibrillation ([facility]    Moderate protein-calorie malnutrition ([facility]    Chest pain    Cardiomyopathy ([facility]    Chronic respiratory failure with hypoxia (HCC)    Acute on chronic systolic HF (heart failure) ([facility])    Congestive heart failure (HCC)       No Known Allergies    Current Facility-Administered Medications   Medication Dose Route Frequency Provider Last Rate Last Admin    Nintedanib Esylate CAPS 150 mg  150 mg Oral BID Tabitha Smith DO   150 mg at 09/19/21 0808    furosemide (LASIX) tablet 40 mg  40 mg Oral Daily Enrique Carrillo MD   40 mg at 09/19/21 0558    mexiletine (MEXITIL) capsule 200 mg  200 mg Oral 3 times per day Enrique Carrillo MD   200 mg at 09/19/21 0558    metoprolol succinate (TOPROL XL) extended release tablet 25 mg  25 mg Oral BID WILFRIDO Vanessa - CNS   25 mg at 09/18/21 1937    spironolactone (ALDACTONE) tablet 12.5 mg  12.5 mg Oral Daily Enrique Carrillo MD   12.5 mg at 09/19/21 0807    aspirin EC tablet 81 mg  81 mg Oral Daily Alejandro Navas MD   81 mg at 09/19/21 0808    sacubitril-valsartan (ENTRESTO) 24-26 MG per tablet 1 tablet  1 tablet Oral BID Genny Grossman Natanael Rodas MD   1 tablet at 09/19/21 0808    montelukast (SINGULAIR) tablet 10 mg  10 mg Oral Nightly Alejandro Ferrara MD   10 mg at 09/18/21 2135    rosuvastatin (CRESTOR) tablet 5 mg  5 mg Oral Nightly Alejandro Ferrara MD   5 mg at 09/18/21 1942    tamsulosin (FLOMAX) capsule 0.4 mg  0.4 mg Oral Daily Alejandro Ferrara MD   0.4 mg at 09/19/21 1147    sodium chloride flush 0.9 % injection 5-40 mL  5-40 mL IntraVENous 2 times per day Norm London MD   10 mL at 09/19/21 0809    sodium chloride flush 0.9 % injection 5-40 mL  5-40 mL IntraVENous PRN Alejandro Ferrara MD        ondansetron (ZOFRAN-ODT) disintegrating tablet 4 mg  4 mg Oral Q8H PRN Alejandro Ferrara MD        Or    ondansetron [facility]) injection 4 mg  4 mg IntraVENous Q6H PRN Alejandro Ferrara MD        polyethylene glycol (GLYCOLAX) packet 17 g  17 g Oral Daily PRN Alejandro Ferrara MD        acetaminophen (TYLENOL) tablet 650 mg  650 mg Oral Q6H PRN Alejandro Ferrara MD        Or    acetaminophen (TYLENOL) suppository 650 mg  650 mg Rectal Q6H PRN Alejandro Ferrara MD        potassium chloride 10 mEq/100 mL IVPB (Peripheral Line)  10 mEq IntraVENous PRN Alejandro Ferrara MD        magnesium sulfate 2000 mg in 50 mL IVPB premix  2,000 mg IntraVENous PRN Alejandro James MD        enoxaparin (LOVENOX) injection 40 mg  40 mg SubCUTAneous Daily Alejandro Ferrara MD   40 mg at 09/19/21 2914       Review of systems:   Heart: as above   Lungs: as above   Eyes: denies changes in vision or discharge. Ears: denies changes in hearing or pain. Nose: denies epistaxis or masses   Throat: denies sore throat or trouble swallowing. Neuro: denies numbness, tingling, tremors. Skin: denies rashes or itching. : denies hematuria, dysuria   GI: denies vomiting, diarrhea   Psych: denies mood changed, anxiety, depression.     Physical Exam   BP (!) 97/56   Pulse 65   Temp 97.6 °F (36.4 °C) (Oral)   Resp 16   Ht 5' 10\" (1.778 m)   Wt 142 lb 4.8 oz (64.5 kg)   SpO2 99%   BMI 20.42 kg/m²   Constitutional: Oriented to person, place, and time. No distress. Well developed. Head: Normocephalic and atraumatic. Neck: Neck supple. No hepatojugular reflux. No JVD present. Carotid bruit is not present. No tracheal deviation present. No thyromegaly present. Cardiovascular: Normal rate, regular rhythm, normal heart sounds. intact distal pulses. No gallop and no friction rub. No murmur heard. Pulmonary: Breath sounds normal. No respiratory distress. No wheezes. No rales. Chest: Effort normal. No tenderness. Abdominal: Soft. Bowel sounds are normal. No distension or mass. No tenderness, rebound or guarding. Musculoskeletal: . No tenderness. No clubbing or cyanosis. Extremitites: Intact distal pulses. No edema  Neurological: Alert and oriented to person, place, and time. Skin: Skin is warm and dry. No rash noted. Not diaphoretic. No erythema. Psychiatric: Normal mood and affect. Behavior is normal.   Lymphadenopathy: No cervical adenopathy. No groin adenopathy.     CBC:   Lab Results   Component Value Date    WBC 11.8 09/15/2021    RBC 3.22 09/15/2021    HGB 9.9 09/15/2021    HCT 31.2 09/15/2021    MCV 96.9 09/15/2021    MCH 30.7 09/15/2021    MCHC 31.7 09/15/2021    RDW 15.3 09/15/2021     09/15/2021    MPV 8.3 09/15/2021     BMP:   Lab Results   Component Value Date     09/19/2021    K 3.8 09/19/2021    K 3.4 09/15/2021     09/19/2021    CO2 29 09/19/2021    BUN 20 09/19/2021    LABALBU 3.3 09/15/2021    LABALBU 4.2 05/26/2011    CREATININE 1.1 09/19/2021    CALCIUM 8.6 09/19/2021    GFRAA >60 09/19/2021    LABGLOM >60 09/19/2021     Magnesium:    Lab Results   Component Value Date    MG 1.9 09/15/2021     Cardiac Enzymes:   Lab Results   Component Value Date    CKTOTAL 58 01/10/2015    CKTOTAL 51 07/21/2014    CKTOTAL 58 07/21/2014    CKMB 1.7 01/10/2015    CKMB 3.0 07/21/2014    CKMB 3.2 07/21/2014    TROPHS 51 (H) 09/15/2021    TROPHS 51 (H) 09/15/2021    TROPHS 47 (H) 09/14/2021      PT/INR:    Lab Results   Component Value Date    PROTIME 12.8 09/14/2021    INR 1.2 09/14/2021     TSH:    Lab Results   Component Value Date    TSH 5.060 08/11/2021     Rhythm Strip: normal sinus rhythm. ASSESSMENT & PLAN:    Patient Active Problem List   Diagnosis    Automatic implantable cardioverter-defibrillator in situ    [facility] of [facility]    Iliac artery aneurysm, bilateral ([facility])    IPF (idiopathic pulmonary fibrosis) ([facility])    Peripheral vascular disease [facility]    Coronary artery disease involving native coronary artery without angina pectoris    Hyperlipidemia    CKD (chronic kidney disease) stage 3, GFR 30-59 ml/min ([facility])    Gastroesophageal reflux disease    Insomnia    Vitamin D deficiency    Acute-on-chronic respiratory failure ([facility]    Ventricular fibrillation ([facility]    Moderate protein-calorie malnutrition ([facility]    Chest pain    Cardiomyopathy ([facility]    Chronic respiratory failure with hypoxia ([facility])    Acute on chronic systolic HF (heart failure) ([facility])    Congestive heart failure ([facility]     1. ICMP/Acute on chronic systolic CHF:    Some hypotension. Parameters added. Observe. BB/entresto/aldactone/PO lasix. 2. CAD: ASA/BB/statin. 3. VT: Monitor. BB/mexilitine. 4. COPD    5. CKD: Follow labs. Okay to discharge if BP stable. Patient stable from CV standpoint. Please call if needed. TY. Garth Yi D.O.   Cardiologist  Cardiology, [facility]

## 2021-09-20 NOTE — TELEPHONE ENCOUNTER
Call from Nona Marroquin asking what she can do for her father, Terie Holstein as he is running low BP. Called back and got VM, instructed her to call cardiologist or who ever manages his CHF as he was recently in hospital and medication adjustments wee made then.

## 2021-09-20 NOTE — TELEPHONE ENCOUNTER
----- Message from Renee Licona sent at 9/20/2021 12:06 PM EDT -----  Subject: Message to Provider    QUESTIONS  Information for Provider? Patient is calling because he was just   discharged from hospital yesterday for low blood pressure. Patient is   wanting to know if he should come in the see Dr. Margaret Monae because he has   appointments with his specialist. Please advise  ---------------------------------------------------------------------------  --------------  [address]  What is the best way for the office to contact you? OK to leave message on   voicemail  Preferred Call Back Phone Number? [phone]  ---------------------------------------------------------------------------  --------------  SCRIPT ANSWERS  Relationship to Patient?  Self

## 2021-09-20 NOTE — CARE COORDINATION
[facility] Transitions Initial Follow Up Call    Call within 2 business days of discharge: Yes    Patient: Erwin Palumbo Patient : 1935   MRN: [id number]  Reason for Admission: Acute on chronic CHF  Discharge Date: 21 RARS: Readmission Risk Score: 19      Last Discharge [address]       Complaint Diagnosis Description Type Department Provider    21 Shortness of Breath Congestive heart failure, unspecified HF chronicity, unspecified heart failure type ([facility] . .. ED to Hosp-Admission (Discharged) (ADMITTED) OLY JJB Polly Renteria MD; Brooklynn De La Paz. .. Transitions of Care Initial Call    Was this an external facility discharge? No Discharge Facility: N/A    Challenges to be reviewed by the provider   Additional needs identified to be addressed with provider: No  none             Method of communication with provider : none      Advance Care Planning:   Does patient have an Advance Directive: reviewed and current. Was this a readmission? No  Patient stated reason for admission: shortness of breath  Patients top risk factors for readmission: medical condition-CHF and CKD and polypharmacy    Care Transition Nurse (CTN) contacted the family by telephone to perform post hospital discharge assessment. Verified name and  with family as identifiers. Provided introduction to self, and explanation of the CTN role. CTN reviewed discharge instructions, medical action plan and red flags with family who verbalized understanding. Family given an opportunity to ask questions and does not have any further questions or concerns at this time. Were discharge instructions available to patient? Yes. Reviewed appropriate site of care based on symptoms and resources available to patient including: PCP, Specialist, Urgent care clinics, Home health, When to call 911 and Condition related references. The family agrees to contact the PCP office for questions related to their healthcare. finished. Establishment or re-establishment of referrals-CTN confirmed with patient DTR aJved Veliz) that patient is scheduled for first visit tomorrow by [facility] and to see [facility] on 9/30/21    Care Transitions 24 Hour Call    Do you have any ongoing symptoms?: No  Do you have a copy of your discharge instructions?: Yes  Do you have all of your prescriptions and are they filled?: No  Have you been contacted by a [address]?: No  Have you scheduled your follow up appointment?: Yes  How are you going to get to your appointment?: Car - family or friend to transport  Were you discharged with any Home Care or Post Acute Services: Yes  Post Acute Services: [address] (Comment: [facility])  Patient DME: Straight cane, Walker, Shower chair  Patient Home Equipment: Nebulizer, Oxygen  Do you have support at home?: Partner/Spouse/SO  Do you feel like you have everything you need to keep you well at home?: No  Are you an active caregiver in your home?: No  Care Transitions Interventions  No Identified Needs       Spoke with patient DTR Javed Veliz) today 9/20/21 per patient verbal consent regarding initial BPCI/hospital discharge follow up for CHF. Levar Owusu with patient in background saying he feels fine and offers no complaints at this time. Denies any shortness of breath, chest pain, chest discomfort, abdominal pain/swelling, worsening LE swelling or sudden weight gain. Levar Owusu reports patient having a trace of right foot swelling which is improving as he is up moving around while at home. States weight while on phone with CTN was 142 lbs which is unchanged from discharge weight. Levar Owusu reports patient is on home oxygen at 3 lpm continuous. States saturation while on phone with CTN is 98%. Provided a complete review of home meds with Levar Owusu and confirmed she will be picking up Augmentin today after 3pm at pharmacy. Advised of importance for patient to take Augmentin as directed until completely finished. 1111F code entered. Confirmed with Kaiden Lim that patient is scheduled for [facility] tomorrow with [facility] and will see Dr. Jasiel Vázquez (cardio) on 9/23/21. CTN discussed CHF zone tool and knowing when to seek medical attention. Denies any needs or concerns at this time. [facility] will continue to follow and expect a subsequent call which Kaiden Lim acknowledges.      Follow Up  Future Appointments   Date Time Provider Nahed Ford   9/23/2021  9:00 AM Марина Aguilar MD [facility]   9/30/2021 12:00 PM [facility] CHF ROOM 1 [facility]   10/11/2021  9:15 AM DO Grace Rapp [facility]   10/13/2022  2:00 PM Pushpa Chavez MD [facility]       WILFRIDO Hsu

## 2021-09-23 NOTE — PROGRESS NOTES
Patient Active Problem List   Diagnosis    Automatic implantable cardioverter-defibrillator in situ    [facility]    Iliac artery aneurysm, bilateral ([facility]    [facility]) [facility]    Peripheral vascular disease ([facility]    Coronary artery disease involving native coronary artery without angina pectoris    Hyperlipidemia    CKD (chronic kidney disease) stage 3, GFR 30-59 ml/min ([facility]    Gastroesophageal reflux disease    Insomnia    Vitamin D deficiency    Acute-on-chronic respiratory failure ([facility]    Ventricular fibrillation ([facility]    Moderate protein-calorie malnutrition ([facility]    Chest pain    Cardiomyopathy ([facility]    Chronic respiratory failure with hypoxia ([facility]    Acute on chronic systolic HF (heart failure) ([facility]    Congestive heart failure ([facility]       Current Outpatient Medications   Medication Sig Dispense Refill    amoxicillin-clavulanate (AUGMENTIN XR) 1000-62.5 MG per extended release tablet Take 1 tablet by mouth 2 times daily for 7 days 14 tablet 0    metoprolol succinate (TOPROL XL) 25 MG extended release tablet Take 1 tablet by mouth 2 times daily 30 tablet 3    furosemide (LASIX) 40 MG tablet Take 1 tablet by mouth daily 60 tablet 3    spironolactone (ALDACTONE) 25 MG tablet Take 0.5 tablets by mouth daily 30 tablet 3    rosuvastatin (CRESTOR) 5 MG tablet Take 1 tablet by mouth nightly 30 tablet 5    isosorbide mononitrate (IMDUR) 30 MG extended release tablet TAKE ONE TABLET BY MOUTH EVERY DAY      tamsulosin (FLOMAX) 0.4 MG capsule Take 1 capsule by mouth daily for 15 days 15 capsule 0    ondansetron (ZOFRAN ODT) 8 MG TBDP disintegrating tablet Take 1 tablet by mouth every 8 hours as needed for Nausea 12 tablet 0    ENTRESTO 24-26 MG per tablet TAKE 1 TABLET BY MOUTH 2 TIMES A DAY 60 tablet 0    mexiletine (MEXITIL) 200 MG capsule TAKE ONE CAPSULE BY MOUTH 3 TIMES A DAY 90 capsule 5    nitroGLYCERIN (NITROSTAT) 0.3 MG SL tablet Place 1 tablet under the tongue every 5 minutes as needed for Chest pain up to max of 3 total doses. If no relief after 1 dose, call 911. 30 tablet 0    albuterol (PROVENTIL) (2.5 MG/3ML) 0.083% nebulizer solution USE 1 VIAL IN NEBULIZER 4 TIMES DAILY (Patient taking differently: Take 2.5 mg by nebulization every 4 hours as needed for Wheezing or Shortness of Breath USE 1 VIAL IN NEBULIZER 4 TIMES DAILY) 120 vial 11    Coenzyme Q10 (COQ10) 100 MG CAPS Take 100 mg by mouth daily 30 capsule 5    montelukast (SINGULAIR) 10 MG tablet Take 1 tablet by mouth nightly 30 tablet 5    OFEV 150 MG CAPS TAKE 1 CAPSULE BY MOUTH TWICE A DAY (EVERY 12 HOURS) AS DIRECTED WITH FOOD. 60 capsule 11    albuterol sulfate  (90 Base) MCG/ACT inhaler Inhale 2 puffs into the lungs every 6 hours as needed for Wheezing 3 Inhaler 0    Multiple Vitamins-Minerals (MULTIVITAMIN ADULT) TABS Take by mouth daily       No current facility-administered medications for this visit. CC:    Patient is seen in follow-up for for:  1. Ischemic cardiomyopathy - EF 36%    2. Coronary artery disease involving native coronary artery of native heart without angina pectoris    3. Dyslipidemia    4. Essential hypertension    5. Dilatation of aorta (HCC)        HPI:  Recent hospitalization for CHF. Has made decision to have ICD turned off. Patient denies any  chest pain, lightheadedness or dizziness. Has chronic shortness of breath secondary to idiopathic pulmonary fibrosis/chronic systolic CHF. Also has muscle aching secondary to polymyalgia rheumatica  Difficulties tolerating high-dose Crestor due to muscle aching. ROS:   General: Weight loss, limited exercise tolerance  Skin: No rash or itching  EENT: No vision changes or nosebleeds  Cardiovascular: No orthopnea or paroxysmal nocturnal dyspnea  Respiratory: (+) cough.  No hemoptysis  Gastrointestinal: No hematemesis or recent changes in bowel habits  Genitourinary: No hematuria, urgency or frequency  Musculoskeletal: No muscular weakness or joint swelling   Neurologic / Psychiatric: No incoordination or convulsions  Allergic / Immunologic/ Lymphatic / Endocrine: No anemia or bleeding tendency    Social History     Socioeconomic History    Marital status:      Spouse name: Not on file    Number of children: 3    Years of education: Not on file    Highest education level: Not on file   Occupational History    Occupation: retired-sales   Tobacco Use    Smoking status: Former Smoker     Packs/day: 1.00     Years: 30.00     Pack years: 30.00     Types: Cigarettes     Start date: 1950     Quit date: 1980     Years since quittin.7    Smokeless tobacco: Never Used    Tobacco comment: quit in 84   Vaping Use    Vaping Use: Never used    Passive vaping exposure Yes   Substance and Sexual Activity    Alcohol use: Yes     Types: 1 - 2 Glasses of wine, 1 - 2 Cans of beer per week     Comment: 1 beer a day    Drug use: No    Sexual activity: Not Currently     Partners: Female   Other Topics Concern    Not on file   Social History Narrative    Not on file     Social Determinants of Health     Financial Resource Strain: Low Risk     Difficulty of Paying Living Expenses: Not hard at all   Food Insecurity: No Food Insecurity    Worried About [facility] in the Last Year: Never true    Sachin of Food in the Last Year: Never true   Transportation Needs:     Lack of Transportation (Medical):      Lack of Transportation (Non-Medical):    Physical Activity:     Days of Exercise per Week:     Minutes of Exercise per Session:    Stress:     Feeling of Stress :    Social Connections:     Frequency of Communication with Friends and Family:     Frequency of Social Gatherings with Friends and Family:     Attends Nondenominational Services:     Active Member of Clubs or Organizations:     Attends Club or Organization Meetings:     Marital Status:    Intimate Partner Violence:     Ischemic cardiomyopathy - EF 36%  EKG 12 lead   2. Coronary artery disease involving native coronary artery of native heart without angina pectoris  EKG 12 lead   3. Dyslipidemia  EKG 12 lead   4. Essential hypertension  EKG 12 lead   5. Dilatation of aorta (HCC)       Above assessment cardiac issues stable. PLAN:   See above orders. . Assessment of medication compliance. Reviewed 9/19/2021 labs. Creatinine 1.1 and BUN of 22 noted. Enroll in CHF clinic  Discussed issues that would prompt earlier evaluation. Follow-up office visit in 2 mos.

## 2021-09-23 NOTE — TELEPHONE ENCOUNTER
----- Message from [employer] sent at 9/22/2021  4:30 PM EDT -----  Subject: Message to Provider    QUESTIONS  Information for Provider? Cameron Reddy called to see if Dr. Roya Castellon could give   verbal okay to have patient start speech therapy. She will then add that   to the patient's home care orders. Thank you.  ---------------------------------------------------------------------------  --------------  CALL BACK INFO  What is the best way for the office to contact you? OK to leave message on   voicemail  Preferred Call Back Phone Number? [phone]  ---------------------------------------------------------------------------  --------------  SCRIPT ANSWERS  Relationship to Patient? Third Party  Representative Name?  Cameron Reddy from [facility]

## 2021-10-07 NOTE — PROGRESS NOTES
[address] [place] [address] Way   1935          Referring Provider:Dr Olga Sanford  Primary Care Physician: Dr Yael Caldera  Cardiologist: Dr Olga Sanford  Nephrologist:          History of Present Illness:     Yola Valdez is a 80 y.o. male with a history of HFrEF   , most recent EF 25-30%     Patient Story:    He does  have dyspnea with exertion, shortness of breath, or decline in overall functional capacity. He does not have orthopnea, PND, nocturnal cough or hemoptysis. He does not have abdominal distention or bloating, early satiety, anorexia/change in appetite. He does has a good urinary response to  oral diuretic. He does not have  lower extremity edema. He denies lightheadedness, dizziness. He denies palpitations, syncope or near syncope. He does not complain of chest pain, pressure, discomfort.          No Known Allergies      Outpatient Medications Marked as Taking for the 10/7/21 encounter [facility] Encounter) with [facility] CH[facility] ROOM 1   Medication Sig Dispense Refill    Multiple Vitamins-Minerals (CERTAVITE SENIOR PO) Take 1 capsule by mouth daily      sacubitril-valsartan (ENTRESTO) 24-26 MG per tablet TAKE 1 TABLET BY MOUTH 2 TIMES A DAY 60 tablet 2    metoprolol succinate (TOPROL XL) 25 MG extended release tablet Take 1 tablet by mouth 2 times daily 30 tablet 3    furosemide (LASIX) 40 MG tablet Take 1 tablet by mouth daily 60 tablet 3    spironolactone (ALDACTONE) 25 MG tablet Take 0.5 tablets by mouth daily 30 tablet 3    rosuvastatin (CRESTOR) 5 MG tablet Take 1 tablet by mouth nightly 30 tablet 5    isosorbide mononitrate (IMDUR) 30 MG extended release tablet TAKE ONE TABLET BY MOUTH EVERY DAY      tamsulosin (FLOMAX) 0.4 MG capsule Take 1 capsule by mouth daily for 15 days 15 capsule 0    mexiletine (MEXITIL) 200 MG capsule TAKE ONE CAPSULE BY MOUTH 3 TIMES A DAY 90 capsule 5    nitroGLYCERIN (NITROSTAT) 0.3 MG SL tablet Place 1 tablet under the tongue every 5 minutes as needed for Chest pain up to max of 3 total doses. If no relief after 1 dose, call 911. 30 tablet 0    albuterol (PROVENTIL) (2.5 MG/3ML) 0.083% nebulizer solution USE 1 VIAL IN NEBULIZER 4 TIMES DAILY (Patient taking differently: Take 2.5 mg by nebulization every 4 hours as needed for Wheezing or Shortness of Breath USE 1 VIAL IN NEBULIZER 4 TIMES DAILY) 120 vial 11    Coenzyme Q10 (COQ10) 100 MG CAPS Take 100 mg by mouth daily 30 capsule 5    montelukast (SINGULAIR) 10 MG tablet Take 1 tablet by mouth nightly 30 tablet 5    OFEV 150 MG CAPS TAKE 1 CAPSULE BY MOUTH TWICE A DAY (EVERY 12 HOURS) AS DIRECTED WITH FOOD. 60 capsule 11           Guideline directed medical:  ARNI/ACE I/ARB: Yes  Beta blocker: Yes  Aldosterone antagonist:  Yes        Physical Examination:     BP (!) 87/52   Pulse 80   Resp 20   Wt 135 lb (61.2 kg)   SpO2 93%   BMI 19.37 kg/m²          Neurological  Level of Consciousness: Alert (0)              Respiratory  Respiratory Pattern: Regular  L Breath Sounds: Clear  R Breath Sounds: Clear    Breath Sounds  Right Lower Lobe: Clear  Left Lower Lobe: Clear         Cardiac  Cardiac Regularity: Regular    Rhythm Interpretation  Additional Details: NSR  Pulse: 80         Gastrointestinal  Abdominal (WDL): Within Defined Limits  Abdomen Inspection: Soft               Peripheral Vascular  RLE Edema: None  LLE Edema: None                                                 Pulse: 80                   LAB DATA:    BNP  No results for input(s): BNP in the last 72 hours.     proBNP  Recent Labs     10/07/21  1400   PROBNP 948*       BMP  Recent Labs     10/07/21  1400      K 4.2      CO2 25   BUN 36*   CREATININE 1.4*   GLUCOSE 115*   CALCIUM 9.3         WEIGHTS:  Wt Readings from Last 3 Encounters:   10/07/21 135 lb (61.2 kg)   09/23/21 145 lb (65.8 kg)   09/19/21 142 lb 4.8 oz (64.5 kg)         TELEMETRY:  Cardiac Regularity: Regular  Cardiac Rhythm/Interpretation: NSR        ASSESSMENT:  Ken Hartman is evolemic  With Weight loss          Interventions completed this visit:  IV diuretics given NO  Lab work obtained YES  BMP. BNP   Reviewed currently prescribed medications with patient, educated on importance of compliance and answered any questions regarding their medication  Educated on signs and symptoms of HF  Educated on low sodium diet    PLAN:  Scheduled to follow up in CHF clinic 10/14/21, 10/21/21, 10/29/21  Given clinic phone number and aware of signs and symptoms to call with any HF change in symptoms. First visit with CHF clinic today. Patient presented to CHF clinic Discussed with patient   the purpose of CHF clinic and importance of daily weights and doing a self check every day to monitor for changes. Went over the three heart failure zones and to call cardiologist if in yellow zone immediately to prevent any further decline. Patient has a working scale. Patient is agreeable to future CHF clinic visits. Next 3 consecutive weekly appointments made today so that patient has a total of 4 visits within first 30 days.

## 2021-10-07 NOTE — PLAN OF CARE
Problem:  Activity Intolerance  Goal: Improved activity tolerance  Outcome: Ongoing   Continue with plan of care

## 2021-10-11 NOTE — PROGRESS NOTES
Nate Valencia, a male of 80 y.o. came to the office 10/11/2021. Patient Active Problem List   Diagnosis    Automatic implantable cardioverter-defibrillator in situ    Dilatation of aorta (HCC)    Iliac artery aneurysm, bilateral (HCC)    IPF [facility] pulmonary fibrosis) ([facility])    Peripheral vascular disease [facility]    Coronary artery disease involving native coronary artery without angina pectoris    Hyperlipidemia    CKD (chronic kidney disease) stage 3, GFR 30-59 ml/min ([facility])    Gastroesophageal reflux disease    Insomnia    Vitamin D deficiency    Acute-on-chronic respiratory failure ([facility]    Ventricular fibrillation ([facility]    Moderate protein-calorie malnutrition ([facility]    Chest pain    Cardiomyopathy ([facility] [facility]    Chronic respiratory failure with hypoxia ([facility])    Acute on chronic systolic HF (heart failure) ([facility])    Congestive heart failure ([facility])          HPI chf: now in chf clinic q wk. Has labs done there. Down 9 lbs since 9/23. Gen: not eating as well or taking Ensure. Missing lunch sometime. HEENT: having speech and PT to help him swallow     Nephrolithiasis: in Sept. He passed it. Due to get KUB in several days.      No Known Allergies    Current Outpatient Medications on File Prior to Visit   Medication Sig Dispense Refill    BREO ELLIPTA 200-25 MCG/INH AEPB inhaler       Multiple Vitamins-Minerals (CERTAVITE SENIOR PO) Take 1 capsule by mouth daily      sacubitril-valsartan (ENTRESTO) 24-26 MG per tablet TAKE 1 TABLET BY MOUTH 2 TIMES A DAY 60 tablet 2    metoprolol succinate (TOPROL XL) 25 MG extended release tablet Take 1 tablet by mouth 2 times daily 30 tablet 3    furosemide (LASIX) 40 MG tablet Take 1 tablet by mouth daily 60 tablet 3    spironolactone (ALDACTONE) 25 MG tablet Take 0.5 tablets by mouth daily 30 tablet 3    rosuvastatin (CRESTOR) 5 MG tablet Take 1 tablet by mouth nightly 30 tablet 5    isosorbide mononitrate (IMDUR) 30 MG extended release tablet TAKE ONE TABLET BY MOUTH EVERY DAY      tamsulosin (FLOMAX) 0.4 MG capsule Take 1 capsule by mouth daily for 15 days 15 capsule 0    mexiletine (MEXITIL) 200 MG capsule TAKE ONE CAPSULE BY MOUTH 3 TIMES A DAY 90 capsule 5    nitroGLYCERIN (NITROSTAT) 0.3 MG SL tablet Place 1 tablet under the tongue every 5 minutes as needed for Chest pain up to max of 3 total doses. If no relief after 1 dose, call 911. 30 tablet 0    albuterol (PROVENTIL) (2.5 MG/3ML) 0.083% nebulizer solution USE 1 VIAL IN NEBULIZER 4 TIMES DAILY (Patient taking differently: Take 2.5 mg by nebulization every 4 hours as needed for Wheezing or Shortness of Breath USE 1 VIAL IN NEBULIZER 4 TIMES DAILY) 120 vial 11    Coenzyme Q10 (COQ10) 100 MG CAPS Take 100 mg by mouth daily 30 capsule 5    montelukast (SINGULAIR) 10 MG tablet Take 1 tablet by mouth nightly 30 tablet 5    OFEV 150 MG CAPS TAKE 1 CAPSULE BY MOUTH TWICE A DAY (EVERY 12 HOURS) AS DIRECTED WITH FOOD. 60 capsule 11    albuterol sulfate  (90 Base) MCG/ACT inhaler Inhale 2 puffs into the lungs every 6 hours as needed for Wheezing 3 Inhaler 0     No current facility-administered medications on file prior to visit. Review of Systems   Constitutional: Positive for appetite change (decreased). Negative for chills and fever. Respiratory: Positive for shortness of breath (when walks. ). Cardiovascular: Negative for chest pain, palpitations and leg swelling. other review of systems reviewed and are negative    OBJECTIVE:  BP (!) 94/52   Pulse 63   Temp 96.8 °F (36 °C)   Wt 136 lb (61.7 kg)   SpO2 90%   BMI 19.51 kg/m²      Physical Exam  Vitals reviewed. Constitutional:       Appearance: He is underweight. Comments: Wearing oxygen    Eyes:      General: No scleral icterus. Conjunctiva/sclera: Conjunctivae normal.   Neck:      Thyroid: No thyromegaly. Vascular: No carotid bruit. Cardiovascular:      Rate and Rhythm: Normal rate and regular rhythm. Heart sounds: No murmur heard. Pulmonary:      Effort: Pulmonary effort is normal.      Breath sounds: Normal breath sounds. No wheezing or rales. Abdominal:      General: Bowel sounds are normal.      Palpations: Abdomen is soft. There is no mass. Tenderness: There is no abdominal tenderness. There is no guarding or rebound. Musculoskeletal:         General: Normal range of motion. Cervical back: Neck supple. Lymphadenopathy:      Cervical: No cervical adenopathy. Skin:     General: Skin is warm and dry. Neurological:      Mental Status: He is alert and oriented to person, place, and time. Psychiatric:         Mood and Affect: Mood normal.         ASSESSMENT AND PLAN:    Kathy Valentine was seen today for hyperlipidemia and coronary artery disease. Diagnoses and all orders for this visit:    Anorexia    Congestive heart failure, unspecified HF chronicity, unspecified heart failure type ([facility] [address]    Need for influenza vaccination  -     INFLUENZA, QUADV, ADJUVANTED, 65 YRS =, IM, PF, PREFILL SYR, 0.5ML (FLUAD)    - continue chf clinic   - restart Megace daily  - continue speech and pt. - continue low salt diet   - follow up with Cardiology in Nov.     Return in about 4 months (around 2/11/2022) for as needed, or for acute problem.     Etelvina Banuelos,

## 2021-10-12 NOTE — TELEPHONE ENCOUNTER
Spoke with the doctor he wants patient to check with his cardiologist to see if he wants him on potassium and have him send it in.

## 2021-10-12 NOTE — TELEPHONE ENCOUNTER
Spoke with patient he called cardiologist and said they did not stop it that you stopped the potassium due to patient was having a hard time swallowing the tablet? Now what?

## 2021-10-21 NOTE — TELEPHONE ENCOUNTER
Spoke with patient, he says the diarrhea occurs once a day. He does not feel as if he is getting dehydrated or losing weight. But he does want to talk to you. Please call at your earliest convenience. Thanks.

## 2021-10-21 NOTE — TELEPHONE ENCOUNTER
Caller disconnected due to long wait times to reach nurse/make an appointment. Called patient back on both numbers provided with no answer. Left voicemail. Received call from [facility] with Red Flag Complaint. Brief description of triage: diarrhea    Triage indicates for patient to unable to complete triage. Care advice provided, patient verbalizes understanding; denies any other questions or concerns; instructed to call back for any new or worsening symptoms. Attention Provider: Thank you for allowing me to participate in the care of your patient. The patient was connected to triage in response to information provided to the ECC/PSC. Please do not respond through this encounter as the response is not directed to a shared pool.         Reason for Disposition   Message left on identified voice mail    Protocols used: NO CONTACT OR DUPLICATE CONTACT CALL-ADULT-

## 2021-10-21 NOTE — TELEPHONE ENCOUNTER
----- Message from Camilla Gil sent at 10/20/2021  1:27 PM EDT -----  Subject: Message to Provider    QUESTIONS  Information for Provider? Patient stated he has been experiencing diarrhea   for the past week . patent stated he would like pcp to look at his   medications and determine which one could be causing this symptom. Patient   would like a call back to discuss. ---------------------------------------------------------------------------  --------------  [employer] INFO  What is the best way for the office to contact you? OK to leave message on   voicemail  Preferred Call Back Phone Number? [phone]  ---------------------------------------------------------------------------  --------------  SCRIPT ANSWERS  Relationship to Patient?  Self

## 2021-10-21 NOTE — PROGRESS NOTES
[address] [place] [address] Way   1935          Referring Provider:Dr More Iglesias  Primary Care Physician: Dr Sayra Pan  Cardiologist: Dr More Iglesias  Nephrologist:          History of Present Illness:     Luís Vences is a 80 y.o. male with a history of HFrEF   , most recent EF 25-30%     Patient Story:    He does  have dyspnea with exertion, shortness of breath, or decline in overall functional capacity. He does not have orthopnea, PND, nocturnal cough or hemoptysis. He does not have abdominal distention or bloating, early satiety, anorexia/change in appetite. He does has a good urinary response to  oral diuretic. He does not have  lower extremity edema. He denies lightheadedness, dizziness. He denies palpitations, syncope or near syncope. He does not complain of chest pain, pressure, discomfort. No Known Allergies      No outpatient medications have been marked as taking for the 10/21/21 encounter [facility] Encounter) with [facility] CH[facility] ROOM 1.           Guideline directed medical:  ARNI/ACE I/ARB: Yes  Beta blocker:   Yes  Aldosterone antagonist:  Yes        Physical Examination:     BP 98/60   Pulse 72   Resp 18   Wt 140 lb (63.5 kg)   SpO2 92%   BMI 20.09 kg/m²          Neurological  Level of Consciousness: Alert (0)              Respiratory  Respiratory Pattern: Regular  L Breath Sounds: Fine Crackles  R Breath Sounds: Clear    Breath Sounds  Right Lower Lobe: Clear  Left Upper Lobe: Fine Crackles  Left Lower Lobe: Diminished         Cardiac  Cardiac Regularity: Regular  Cardiac Rhythm: Sinus rhythm    Rhythm Interpretation  Pulse: 72         Gastrointestinal  Abdominal (WDL): Within Defined Limits  Abdomen Inspection: Soft               Peripheral Vascular  RLE Edema: None  LLE Edema: None    Skin Color/Condition  Skin Color: Appropriate for ethnicity         Musculoskeletal  RUE: Full movement, Weakness  LUE: Full movement, Weakness  RL Extremity: Full movement, Weakness  LL Extremity: Full movement, Weakness                                  Pulse: 72                   LAB DATA:    BNP  No results for input(s): BNP in the last 72 hours. proBNP  No results for input(s): PROBNP in the last 72 hours. BMP  No results for input(s): NA, K, CL, CO2, BUN, CREATININE, GLUCOSE, CALCIUM in the last 72 hours. WEIGHTS:  Wt Readings from Last 3 Encounters:   10/21/21 140 lb (63.5 kg)   10/18/21 138 lb 6.4 oz (62.8 kg)   10/11/21 136 lb (61.7 kg)         TELEMETRY:  Cardiac Regularity: Regular  Cardiac Rhythm/Interpretation: NSR        ASSESSMENT:  Corrinne Berthold is evolemic  With STABLE WEIGHTS          Interventions completed this visit:  IV diuretics given NO  Lab work obtained NO Reviewed currently prescribed medications with patient, educated on importance of compliance and answered any questions regarding their medication  Educated on signs and symptoms of HF  Educated on low sodium diet    PLAN:  Scheduled to follow up in CHF clinic , 10/29/21  Given clinic phone number and aware of signs and symptoms to call with any HF change in symptoms.

## 2021-10-26 NOTE — CARE COORDINATION
[facility] Transitions Follow Up Call    10/26/2021    Patient: Marce Vigil  Patient : 1935   MRN: [id number]  Reason for Admission: Acute on chronic systolic CHF  Discharge Date: 21 RARS: Readmission Risk Score: 19         Spoke with: Nick Ethanraphael 33 Transitions Subsequent and Final Call    Subsequent and Final Calls  Do you have any ongoing symptoms?: No  Have your medications changed?: No  Do you have any questions related to your medications?: No  Do you currently have any active services?: Yes  Are you currently active with any services?: [address] you have any needs or concerns that I can assist you with?: No  Identified Barriers: Lack of Education  Care Transitions Interventions    Pharmacist: Completed      Social Work: Completed    Other Interventions:         Spoke with patient today 10/26/21 for BPCI/hospital discharge follow up sub call. Patient states he continues doing well and offers no complaints at this time. Denies any shortness of breath, chest pain, chest discomfort, LE swelling or sudden weight gain. Patient states he continues wearing home oxygen at 3 lpm continuous. States oxygen saturation while on phone with this CTN is 98% and HR at 74. Patient states he will be getting a new oxygen system soon that weighs 1 lb and is excited to receive it. States weight today was 137.2 lbs. States he is compliant with following a low sodium diet and taking Lasix. Reiterated CHF zone tool and knowing when to seek medial attention. Patient states he continues with [facility] which is going well. Denies any complaints or concerns at this time. CTN will continue to follow.      Follow Up  Future Appointments   Date Time Provider Nahed Ford   10/29/2021  1:30 PM [facility] ROOM 1 [facility] HOD   2021  1:40 PM MD Theo Perales [facility]   2/10/2022 11:00 AM DO Grace Wilkins [facility]   10/13/2022  2:00 PM Jeffrey Sanchez Azalea Dominguez MD [facility]       Clarisse Ceballos, APRN

## 2021-11-03 NOTE — CARE COORDINATION
[facility] Transitions Follow Up Call    11/3/2021    Patient: Jeremy Lai  Patient : 1935   MRN: [id number]  Reason for Admission: Acute on chronic systolic CHF  Discharge Date: 21 RARS: Readmission Risk Score: 19    Attempted to contact patient today 11/3/21 for BPCI/hospital discharge follow up sub call. Left message on both home/mobile numbers requesting a return call back to CTN and provided contact information.        Satish Arciniega, APRN

## 2021-11-05 NOTE — PROGRESS NOTES
Congestive Heart Failure [address] [facility]   1935          Referring Provider: Henrik  Primary Care Physician: Monica Samuel  Cardiologist: Humera Herrera  Nephrologist:n/a  pulmonogist WILFRIDO Weeks CNP    History of Present Illness:     Ledy Burns is a 80 y.o. male with a history of HFpEF, most recent EF 3-1-21    Estimated left ventricular ejection fraction is 25-30%. There is doppler evidence of stage I diastolic dysfunction  Patient Story:    He does  have dyspnea with exertion mild,  No shortness of breath at rest, or decline in overall functional capacity. Chronic tired and fatigue ( anemia managed by PCP per patient)  He does not have orthopnea, PND, nocturnal cough or hemoptysis. Occasional cough with tan thin sputum. He does not have abdominal distention or bloating, early satiety, anorexia/change in appetite. He does has a good urinary response to  oral diuretic. He does not have  lower extremity edema. Teach back for how to assess leg edema. Reinforced daily weights. Forgets sometimes. Goal: daily weights with HF zones use and daily leg edema assessment. He says he swells right leg 1st when he starts to swell. He denies lightheadedness, dizziness. Will keep hydrated and change position slowly. He denies palpitations, syncope or near syncope. He does not complain of chest pain, pressure, discomfort. History of advanced pulmonary fibrosis, 02 dependant follows with Pulm. Was recently seen 10 25 24. By Zoe Rodriguez. Mail delivery pill packs. Weighs self mostly  daily. He Does not feel breathing is worse. Uses inhalers/nebulizer as instructed. No edema. Chronic crackles to lungs. Wife cooks and shops. He is adjusting to low sodium diet. He has been evaluated for a lightweight 02 tank, pending approval. Using tanks now that is hardship to get out of the house. BNP is coming back down this visit.  Patient says he had his ICD turned off that he is 80 y.o. and does not want shocked again. Completed weekly visits 4/4. Will set every 2 weeks x 2 visits and PRN for symptomatic chf. Patient understands to call for PRN add on visit if needed. No Known Allergies      Outpatient Medications Marked as Taking for the 11/5/21 encounter [facility] Encounter) with [facility] CHF ROOM 1   Medication Sig Dispense Refill    aspirin 81 MG EC tablet Take 81 mg by mouth daily      metoprolol succinate (TOPROL XL) 25 MG extended release tablet Take 1 tablet by mouth 2 times daily 60 tablet 3    potassium chloride (KLOR-CON M) 20 MEQ extended release tablet Take 1 tablet by mouth daily 90 tablet 1    BREO ELLIPTA 200-25 MCG/INH AEPB inhaler Inhale 1 puff into the lungs daily       Multiple Vitamins-Minerals (CERTAVITE SENIOR PO) Take 1 capsule by mouth daily      sacubitril-valsartan (ENTRESTO) 24-26 MG per tablet TAKE 1 TABLET BY MOUTH 2 TIMES A DAY 60 tablet 2    furosemide (LASIX) 40 MG tablet Take 1 tablet by mouth daily 60 tablet 3    spironolactone (ALDACTONE) 25 MG tablet Take 0.5 tablets by mouth daily 30 tablet 3    rosuvastatin (CRESTOR) 5 MG tablet Take 1 tablet by mouth nightly 30 tablet 5    isosorbide mononitrate (IMDUR) 30 MG extended release tablet TAKE ONE TABLET BY MOUTH EVERY DAY      mexiletine (MEXITIL) 200 MG capsule TAKE ONE CAPSULE BY MOUTH 3 TIMES A DAY 90 capsule 5    nitroGLYCERIN (NITROSTAT) 0.3 MG SL tablet Place 1 tablet under the tongue every 5 minutes as needed for Chest pain up to max of 3 total doses.  If no relief after 1 dose, call 911. 30 tablet 0    albuterol (PROVENTIL) (2.5 MG/3ML) 0.083% nebulizer solution USE 1 VIAL IN NEBULIZER 4 TIMES DAILY (Patient taking differently: Take 2.5 mg by nebulization every 4 hours as needed for Wheezing or Shortness of Breath USE 1 VIAL IN NEBULIZER 4 TIMES DAILY) 120 vial 11    Coenzyme Q10 (COQ10) 100 MG CAPS Take 100 mg by mouth daily 30 capsule 5  montelukast (SINGULAIR) 10 MG tablet Take 1 tablet by mouth nightly 30 tablet 5    OFEV 150 MG CAPS TAKE 1 CAPSULE BY MOUTH TWICE A DAY (EVERY 12 HOURS) AS DIRECTED WITH FOOD. 60 capsule 11    albuterol sulfate  (90 Base) MCG/ACT inhaler Inhale 2 puffs into the lungs every 6 hours as needed for Wheezing 3 Inhaler 0       WEIGHTS:         Wt Readings from Last 3 Encounters:   11/05/21 136 lb 3.2 oz (61.8 kg)   10/29/21 137 lb 6.4 oz (62.3 kg)   10/21/21 140 lb (63.5 kg)          Guideline directed medical:  ARNI/ACE I/ARB: Yes  entresto  Beta blocker:  Yes  Metoprolol   Aldosterone antagonist:  Yes  Spironolactone  Diuretic: lasix         Physical Examination:     BP (!) 90/54   Pulse 73   Resp 16   Ht 5' 10\" (1.778 m)   Wt 136 lb 3.2 oz (61.8 kg)   SpO2 98%   BMI 19.54 kg/m²   bp checked sitting and standing no dizzy/lightheaded. Took meds at 815am (few hrs prior to visit)  This is his normal trending bp     Assessment  Charting Type: Shift assessment (NYHA Class II-III. ambulette transport. ambulates to chair)  Reassessment Performed: No change to previous assessment   Moves slow and steady from w/c to clinic chair.       Neurological  Level of Consciousness: Alert (0)         HEENT  HEENT (WDL): Within Defined Limits    Respiratory  Respiratory Pattern: Regular  L Breath Sounds: Fine Crackles (crackles 1/2 up post. after Cough and deep breathing.)  R Breath Sounds: Crackles (1/2 up fine crackles after cough and deep breathing)    Breath Sounds  Right Upper Lobe: Clear  Right Middle Lobe: Diminished  Right Lower Lobe: Clear  Left Upper Lobe: Fine Crackles  Left Lower Lobe: Diminished    Cough/Sputum  Cough: Moist, Productive    Cardiac  Cardiac Regularity: Regular    Rhythm Interpretation  Pulse: 73         Gastrointestinal  Abdominal (WDL): Within Defined Limits (no swelling to abd)  Abdomen Inspection: Soft               Peripheral Vascular  Peripheral Vascular (WDL): Within Defined Limits (diabetic socks on. return demonstrated edema assessment legs)  RLE Edema: None  LLE Edema: None              Musculoskeletal  RUE: Full movement, Weakness  LUE: Full movement, Weakness  RL Extremity: Full movement, Weakness  LL Extremity: Full movement, Weakness    Genitourinary  Genitourinary (WDL): Within Defined Limits (voids 6x daily. no nocturia)         Pain Assessment  Pain Level: 0  Patient's Stated Pain Goal: No pain                   Pulse: 73              Urine Assessment  Incontinence: No  Urine Color: Yellow/straw    Current Outpatient Medications   Medication Sig Dispense Refill    aspirin 81 MG EC tablet Take 81 mg by mouth daily      metoprolol succinate (TOPROL XL) 25 MG extended release tablet Take 1 tablet by mouth 2 times daily 60 tablet 3    potassium chloride (KLOR-CON M) 20 MEQ extended release tablet Take 1 tablet by mouth daily 90 tablet 1    BREO ELLIPTA 200-25 MCG/INH AEPB inhaler Inhale 1 puff into the lungs daily       Multiple Vitamins-Minerals (CERTAVITE SENIOR PO) Take 1 capsule by mouth daily      sacubitril-valsartan (ENTRESTO) 24-26 MG per tablet TAKE 1 TABLET BY MOUTH 2 TIMES A DAY 60 tablet 2    furosemide (LASIX) 40 MG tablet Take 1 tablet by mouth daily 60 tablet 3    spironolactone (ALDACTONE) 25 MG tablet Take 0.5 tablets by mouth daily 30 tablet 3    rosuvastatin (CRESTOR) 5 MG tablet Take 1 tablet by mouth nightly 30 tablet 5    isosorbide mononitrate (IMDUR) 30 MG extended release tablet TAKE ONE TABLET BY MOUTH EVERY DAY      mexiletine (MEXITIL) 200 MG capsule TAKE ONE CAPSULE BY MOUTH 3 TIMES A DAY 90 capsule 5    nitroGLYCERIN (NITROSTAT) 0.3 MG SL tablet Place 1 tablet under the tongue every 5 minutes as needed for Chest pain up to max of 3 total doses.  If no relief after 1 dose, call 911. 30 tablet 0    albuterol (PROVENTIL) (2.5 MG/3ML) 0.083% nebulizer solution USE 1 VIAL IN NEBULIZER 4 TIMES DAILY (Patient taking differently: Take 2.5 mg by nebulization every 4 hours as needed for Wheezing or Shortness of Breath USE 1 VIAL IN NEBULIZER 4 TIMES DAILY) 120 vial 11    Coenzyme Q10 (COQ10) 100 MG CAPS Take 100 mg by mouth daily 30 capsule 5    montelukast (SINGULAIR) 10 MG tablet Take 1 tablet by mouth nightly 30 tablet 5    OFEV 150 MG CAPS TAKE 1 CAPSULE BY MOUTH TWICE A DAY (EVERY 12 HOURS) AS DIRECTED WITH FOOD. 60 capsule 11    albuterol sulfate  (90 Base) MCG/ACT inhaler Inhale 2 puffs into the lungs every 6 hours as needed for Wheezing 3 Inhaler 0     No current facility-administered medications for this encounter. LAB DATA:  Results for Molly Wells (MRN [id number]) as of 11/5/2021 12:23   10/7/2021 14:00 10/29/2021 14:00 11/5/2021 10:29   Sodium 136 139 140   Potassium 4.2 4.2 4.3   Chloride 100 103 104   CO2 25 27 26   BUN 36 (H) 34 (H) 29 (H)   Creatinine 1.4 (H) 1.5 (H) 1.4 (H)   Anion Gap 11 9 10   GFR Non- 48 44 48   GFR African American 58 54 58   Glucose 115 (H) 164 (H) 118 (H)   Calcium 9.3 9.2 8.9   Pro- (H) 1,773 (H) 1,394 (H)       Pro-BNP   Date Value Ref Range Status   11/05/2021 1,394 (H) 0 - 450 pg/mL Final   10/29/2021 1,773 (H) 0 - 450 pg/mL Final   10/07/2021 948 (H) 0 - 450 pg/mL Final       PLAN:  Scheduled to follow up in CHF clinic on      Given clinic phone number and aware of signs and symptoms to call with any HF change in symptoms. [facility]  Is no longer transporting for free after 11/5/2021 . Therefore will utilize [facility] transport with Gila Dang  1pm  for a 130pm visit 11 17 21.     11/5/2021 12:27 PM have attempted x 4 to reach Markie with luz elena Serrato to set 1month f/u (his q2 2nd visit . Patient prefers latest apt available week of 12-29 .   m-w-f (for chf clinic)     Patient is set for his next 2 week.

## 2021-11-12 NOTE — CARE COORDINATION
[facility] Transitions Follow Up Call    2021    Patient: Ed Benitez  Patient : 1935   MRN: [id number]  Reason for Admission: Acute on chronic systolic CHF  Discharge Date: 21 RARS: Readmission Risk Score: 19         Spoke with: Nick Carrasco 33 Transitions Subsequent and Final Call    Subsequent and Final Calls  Do you have any ongoing symptoms?: No  Have your medications changed?: No  Do you have any questions related to your medications?: No  Do you currently have any active services?: Yes  Are you currently active with any services?: [address] you have any needs or concerns that I can assist you with?: No  Identified Barriers: None  Care Transitions Interventions    Pharmacist: Completed      Social Work: Completed    Other Interventions:         Spoke with patient today 21 for BPCI/hospital discharge follow up sub call for CHF. Patient states he is doing better since last CTN call and offers no complaints at this time. Denies any shortness of breath, chest pain, chest discomfort, abdominal pain/swelling or LE swelling. States weight today was 137.4 lbs. States he remains on home oxygen at 4 lpm continuous and admits therapist from [facility] is currently at home doing visit. States saturation per [facility] is 100% with oxygen on. Patient states he is complaint with following a  low sodium diet and taking Lasix. CTN reiterated CHF zone tool and knowing when to call doctor or seek medial attention. Denies any concerns or needs at this time. CTN will continue to follow.      Follow Up  Future Appointments   Date Time Provider Nahed Ford   2021  1:30 PM [facility] CHF ROOM 1 [facility]   2021  1:40 PM MD Theo Multani [facility]   2/10/2022 11:00 AM DO Pattie Alcala [facility]   10/13/2022  2:00 PM Lisa Membreno MD [facility]       WILFRIDO Boudreaux

## 2021-11-14 PROBLEM — I50.23 ACUTE ON CHRONIC SYSTOLIC HF (HEART FAILURE) (HCC): Status: RESOLVED | Noted: 2021-01-01 | Resolved: 2021-01-01

## 2021-11-23 NOTE — PROGRESS NOTES
Patient Active Problem List   Diagnosis    Automatic implantable cardioverter-defibrillator in situ    Dilatation of aorta ([facility])    Iliac artery aneurysm, bilateral (HCC)    IPF (idiopathic pulmonary fibrosis) ([facility])    Peripheral vascular disease ([facility]    Coronary artery disease involving native coronary artery without angina pectoris    Hyperlipidemia    CKD (chronic kidney disease) stage 3, GFR 30-59 ml/min ([facility])    Gastroesophageal reflux disease    Insomnia    Vitamin D deficiency    Acute-on-chronic respiratory failure ([facility]    Ventricular fibrillation ([facility]    Moderate protein-calorie malnutrition ([facility]    Chest pain    Cardiomyopathy ([facility]    Chronic respiratory failure with hypoxia ([facility])    Congestive heart failure ([facility])       Current Outpatient Medications   Medication Sig Dispense Refill    potassium chloride (KLOR-CON M) 20 MEQ extended release tablet Take 1 tablet by mouth daily 30 tablet 2    isosorbide mononitrate (IMDUR) 30 MG extended release tablet Take 1 tablet by mouth daily 30 tablet 2    aspirin 81 MG EC tablet Take 81 mg by mouth daily      metoprolol succinate (TOPROL XL) 25 MG extended release tablet Take 1 tablet by mouth 2 times daily 60 tablet 3    BREO ELLIPTA 200-25 MCG/INH AEPB inhaler Inhale 1 puff into the lungs daily       Multiple Vitamins-Minerals (CERTAVITE SENIOR PO) Take 1 capsule by mouth daily      sacubitril-valsartan (ENTRESTO) 24-26 MG per tablet TAKE 1 TABLET BY MOUTH 2 TIMES A DAY 60 tablet 2    furosemide (LASIX) 40 MG tablet Take 1 tablet by mouth daily 60 tablet 3    spironolactone (ALDACTONE) 25 MG tablet Take 0.5 tablets by mouth daily 30 tablet 3    rosuvastatin (CRESTOR) 5 MG tablet Take 1 tablet by mouth nightly 30 tablet 5    mexiletine (MEXITIL) 200 MG capsule TAKE ONE CAPSULE BY MOUTH 3 TIMES A DAY 90 capsule 5    nitroGLYCERIN (NITROSTAT) 0.3 MG SL tablet Place 1 tablet under the tongue every 5 minutes as needed for Chest pain up to max of 3 total doses. If no relief after 1 dose, call 911. 30 tablet 0    albuterol (PROVENTIL) (2.5 MG/3ML) 0.083% nebulizer solution USE 1 VIAL IN NEBULIZER 4 TIMES DAILY (Patient taking differently: Take 2.5 mg by nebulization every 4 hours as needed for Wheezing or Shortness of Breath USE 1 VIAL IN NEBULIZER 4 TIMES DAILY) 120 vial 11    Coenzyme Q10 (COQ10) 100 MG CAPS Take 100 mg by mouth daily 30 capsule 5    montelukast (SINGULAIR) 10 MG tablet Take 1 tablet by mouth nightly 30 tablet 5    OFEV 150 MG CAPS TAKE 1 CAPSULE BY MOUTH TWICE A DAY (EVERY 12 HOURS) AS DIRECTED WITH FOOD. 60 capsule 11    albuterol sulfate  (90 Base) MCG/ACT inhaler Inhale 2 puffs into the lungs every 6 hours as needed for Wheezing 3 Inhaler 0     No current facility-administered medications for this visit. CC:    Patient is seen in follow-up for for:  1. Chronic systolic CHF (congestive heart failure) ([facility]    2. Ischemic cardiomyopathy - EF 36%    3. Coronary artery disease involving native coronary artery of native heart without angina pectoris    4. Essential hypertension    5. Dilatation of aorta (HCC)        HPI:  Seen post establishment at CHF clinic. Patient denies any  chest pain, lightheadedness or dizziness. Has chronic shortness of breath secondary to idiopathic pulmonary fibrosis/chronic systolic CHF. Also has muscle aching secondary to polymyalgia rheumatica  Difficulties tolerating high-dose Crestor due to muscle aching. ROS:   General: Weight loss, limited exercise tolerance  Skin: No rash or itching  EENT: No vision changes or nosebleeds  Cardiovascular: No orthopnea or paroxysmal nocturnal dyspnea  Respiratory: (+) cough.  No hemoptysis  Gastrointestinal: No hematemesis or recent changes in bowel habits  Genitourinary: No hematuria, urgency or frequency  Musculoskeletal: No muscular weakness or joint swelling   Neurologic / Psychiatric: No incoordination or convulsions  Allergic / Immunologic/ Lymphatic / Endocrine: No anemia or bleeding tendency    Social History     Socioeconomic History    Marital status:      Spouse name: Not on file    Number of children: 3    Years of education: Not on file    Highest education level: Not on file   Occupational History    Occupation: retired-sales   Tobacco Use    Smoking status: Former Smoker     Packs/day: 1.00     Years: 30.00     Pack years: 30.00     Types: Cigarettes     Start date: 1950     Quit date: 1980     Years since quittin.8    Smokeless tobacco: Never Used    Tobacco comment: quit in 84   Vaping Use    Vaping Use: Never used    Passive vaping exposure: Yes   Substance and Sexual Activity    Alcohol use: Yes     Types: 1 - 2 Glasses of wine, 1 - 2 Cans of beer per week     Comment: 1 beer a day with thicket added (swallowing issues)    Drug use: No    Sexual activity: Not Currently     Partners: Female   Other Topics Concern    Not on file   Social History Narrative    Not on file     Social Determinants of Health     Financial Resource Strain: Low Risk     Difficulty of Paying Living Expenses: Not hard at all   Food Insecurity: No Food Insecurity    Worried About Running Out of Food in the Last Year: Never true    Sachin of Food in the Last Year: Never true   Transportation Needs:     Lack of Transportation (Medical): Not on file    Lack of Transportation (Non-Medical):  Not on file   Physical Activity:     Days of Exercise per Week: Not on file    Minutes of Exercise per Session: Not on file   Stress:     Feeling of Stress : Not on file   Social Connections:     Frequency of Communication with Friends and Family: Not on file    Frequency of Social Gatherings with Friends and Family: Not on file    Attends Yazdanism Services: Not on file    Active Member of Clubs or Organizations: Not on file    Attends Club or Organization Meetings: Not on file    Marital Status: Not on file   Intimate Partner Violence:     Fear of Current or Ex-Partner: Not on file    Emotionally Abused: Not on file    Physically Abused: Not on file    Sexually Abused: Not on file   Housing Stability:     Unable to Pay for Housing in the Last Year: Not on file    Number of [place] in the Last Year: Not on file    Unstable Housing in the Last Year: Not on file       Past Medical History:   Diagnosis Date    Aneurysm ([facility]     iliacs    Asthma     CAD (coronary artery disease)     Cardiomyopathy ([facility]     Centrilobular emphysema ([facility]     CHF (congestive heart failure) ([facility]     Dilatation of aorta ([facility] 10/04/2018    Hyperlipidemia     Idiopathic pulmonary fibrosis ([facility]     Iliac artery aneurysm, bilateral ([facility] 10/04/2018    Inferoposterior myocardial infarction ([facility]     Kidney stone on right side 09/07/2021    [place]    NSVT (nonsustained ventricular tachycardia) ([facility]        PHYSICAL EXAM:  CONSTITUTIONAL:  Well developed, well nourished    Vitals:    11/23/21 1345   BP: 118/78   Pulse: 67   Resp: 16   Weight: 138 lb (62.6 kg)   Height: 5' 10\" (1.778 m)     HEAD & FACE: Normocephalic. Symmetric. EYES: No xanthelasma. Conjunctivae not injected. EARS, NOSE, MOUTH & THROAT: Good dentition. No oral pallor or cyanosis. NECK: No JVD at 30 degrees. No thyromegaly. RESPIRATORY: Scattered crackles. No use of accessory muscle or intercostal retractions. CARDIOVASCULAR: Regular rate and rhythm. No lifts or thrills on palpitation. Auscultation with normal S1-S2 in intensity and splitting. No carotid bruits. Abdominal aorta not enlarged. Femoral arteries without bruits. Pedal pulses 1+. 1+ bilateral ankle edema. ABDOMEN: Soft without hepatic or splenic enlargement. No tenderness. MUSCULOSKELETAL: No kyphosis or scoliosis of the back. Slow gait and inability to undergo exercise stress testing. EXTREMITIES: No clubbing or cyanosis. SKIN: No Xanthomas or ulcerations.   NEUROLOGIC: Oriented to time, place and person. Normal mood and affect. LYMPHATIC:  No palpable neck or supraclavicular nodes. No splenomegaly. EKG: Sinus   ms. No change compared to prior tracing. ASSESSMENT:                                                     ORDERS:       Diagnosis Orders   1. Chronic systolic CHF (congestive heart failure) (HCC)  EKG 12 lead   2. Ischemic cardiomyopathy - EF 36%  EKG 12 lead   3. Coronary artery disease involving native coronary artery of native heart without angina pectoris  EKG 12 lead   4. Essential hypertension  EKG 12 lead   5. Dilatation of aorta (HCC)  EKG 12 lead     Above assessment cardiac issues stable. PLAN:   See above orders. . Assessment of medication compliance. Reviewed 11/17/2021 labs. Creatinine 1.2 and BUN of 28 noted. Continue in CHF Clinic  Discussed issues that would prompt earlier evaluation. Follow-up office visit in 3 mos.

## 2021-11-23 NOTE — CARE COORDINATION
[facility] Transitions Follow Up Call    2021    Patient: Luís Vences  Patient : 1935   MRN: [id number]  Reason for Admission: Acute on chronic systolic CHF  Discharge Date: 21 RARS: Readmission Risk Score: 19    Attempted to contact patient today 21 for BPCI/A/hospital discharge follow up sub call. Left messages on both home/mobile numbers requesting a return call back to CTN and provided contact information.      Elise Nova, APRN

## 2021-12-03 NOTE — PROGRESS NOTES
Dante Waller, a male of 80 y.o. came to the office 12/3/2021. Patient Active Problem List   Diagnosis    Automatic implantable cardioverter-defibrillator in situ    Dilatation of aorta (HCC)    Iliac artery aneurysm, bilateral (HCC)    IPF [facility]idiopathic pulmonary fibrosis) ([facility]    Peripheral vascular disease ([facility]    Coronary artery disease involving native coronary artery without angina pectoris    Hyperlipidemia    CKD (chronic kidney disease) stage 3, GFR 30-59 ml/min ([facility])    Gastroesophageal reflux disease    Insomnia    Vitamin D deficiency    Acute-on-chronic respiratory failure ([facility]    [facility]    Moderate protein-calorie malnutrition ([facility]    Chest pain    Cardiomyopathy ([facility]    Chronic respiratory failure with hypoxia (HCC)    Congestive heart failure ([facility])          HPI insomnia: tried tylenol pm last pm. Melatonin without relief. Able to lay down without sob. Having some anxiety. When has trouble sleeping bed goes to chair and sleeps ok.      No Known Allergies    Current Outpatient Medications on File Prior to Visit   Medication Sig Dispense Refill    potassium chloride (KLOR-CON M) 20 MEQ extended release tablet Take 1 tablet by mouth daily 30 tablet 2    isosorbide mononitrate (IMDUR) 30 MG extended release tablet Take 1 tablet by mouth daily 30 tablet 2    aspirin 81 MG EC tablet Take 81 mg by mouth daily      metoprolol succinate (TOPROL XL) 25 MG extended release tablet Take 1 tablet by mouth 2 times daily 60 tablet 3    BREO ELLIPTA 200-25 MCG/INH AEPB inhaler Inhale 1 puff into the lungs daily       Multiple Vitamins-Minerals (CERTAVITE SENIOR PO) Take 1 capsule by mouth daily      sacubitril-valsartan (ENTRESTO) 24-26 MG per tablet TAKE 1 TABLET BY MOUTH 2 TIMES A DAY 60 tablet 2    furosemide (LASIX) 40 MG tablet Take 1 tablet by mouth daily 60 tablet 3    spironolactone (ALDACTONE) 25 MG tablet Take 0.5 tablets by mouth daily 30 tablet 3 right-lower field reveals rales. Rales present. Musculoskeletal:      Cervical back: Neck supple. Lymphadenopathy:      Cervical: No cervical adenopathy. Skin:     General: Skin is warm and dry. Neurological:      Mental Status: He is alert and oriented to person, place, and time. ASSESSMENT AND PLAN:    Kathy Valentine was seen today for insomnia. Diagnoses and all orders for this visit:    Primary insomnia  -     Discontinue: traZODone (DESYREL) 50 MG tablet; Take 1 tablet by mouth nightly  -     traZODone (DESYREL) 50 MG tablet; Take 1 tablet by mouth nightly    Anxiety    - see if by sleeping better then anxiety gets better. If not then wilkl add ssri for anxiety. Return if symptoms worsen or fail to improve.     Williams Banuelos, DO

## 2021-12-03 NOTE — CARE COORDINATION
Titus 45 Transitions Follow Up Call    12/3/2021    Patient: Arjun Templeton  Patient : 1935   MRN: [id number]  Reason for Admission: Acute on chronic systolic CHF  Discharge Date: 21 RARS: Readmission Risk Score: 23         Spoke with: Nick Carrasco 33 Transitions Subsequent and Final Call    Subsequent and Final Calls  Do you have any ongoing symptoms?: Yes  Onset of Patient-reported symptoms: Other  Patient-reported symptoms: Shortness of Breath  Have your medications changed?: Yes  Patient Reports: CTN confiremd with patient he seenPCP today who prescribed to take at HS  Do you have any questions related to your medications?: No  Do you currently have any active services?: Yes  Are you currently active with any services?: Home Health  Do you have any needs or concerns that I can assist you with?: No  Identified Barriers: Lack of Education  Care Transitions Interventions    Pharmacist: Completed      Social Work: Completed    Other Interventions:         Spoke with patient today 12/3/21 for BPCI/hospital discharge follow up sub call. Patient states having ongoing shortness of breath with exertion which is not new and chronic. States wearing home oxygen at 3 lpm continuous. Denies any recent use of ALbuterol rescue inhaler. Patient unable to get saturation reading while on phone with this CTN d/t cold hands. Denies any chest pain, chest discomfort, abdominal pain/swelling, LE swelling, sudden weight gain, nausea, vomiting, chills or fever. Patient states he was seen today by PCP in office. Noted weight today was 137 which is unchanged. States following a low sodium diet and compliant with taking Lasix/Aldactone as part of HF management. Confirmed with patient he is scheduled for next CHF Clinic appt at [facility] on 12/15/21. Reiterated CHF zone tool and knowing when to contact doctor or seek medical attention.      Confirmed with patient that PCP prescribed Trazodone 50 mg at HS to help with sleep. Patient aware of medication causing drowsiness. Denies any other complaints or concerns at this time. CTN will continue to follow for BPCI.      Follow Up  Future Appointments   Date Time Provider Nahed Ford   12/15/2021 12:30 PM OLY [facility] ROOM 1 OLY [facility]   2/10/2022 11:00 AM DO Pattie Ferreira [facility]   2/15/2022 12:40 PM Juli Traylor MD [address] [place] [address]   10/13/2022  2:00 PM Raquel Valentino MD [facility]       Tyrel Silverman APRN

## 2021-12-10 NOTE — TELEPHONE ENCOUNTER
----- Message from Abby Allan sent at 12/9/2021  4:53 PM EST -----  Subject: Message to Provider    QUESTIONS  Information for Provider? Patient had questions about his rx he just   picked up; Sertraline 50 MG.   ---------------------------------------------------------------------------  --------------  CALL BACK INFO  What is the best way for the office to contact you? OK to leave message on   voicemail  Preferred Call Back Phone Number?  [phone]  ---------------------------------------------------------------------------  --------------  SCRIPT ANSWERS  undefined

## 2021-12-15 NOTE — TELEPHONE ENCOUNTER
Thomas Janet  12/15/2021 11:12 AM  Spoke with patient states he is unable to make his appointment at the CHF clinic today d/t he had a nose bleed intermittently this morning. He states he will not have time to get ready and make it to his appointment. He also stated he has physical therapy coming to his house today as well. He states he not having any s/s of symptomatic CHF. Currently sleeping on pillow as usual. He denies any swelling on his bilateral legs or bloating in his abdomen. Pt reported medication and dietary compliance. His wife prepares his meals. Taking his entresto and lasix as prescribed. He has good urine response and urine is pale in color. Currently still using 3 1/2 L of oxygen. Patient states he dowill try to weight himself daily. es not weight himself daily but states he will attempt to. Contacted Markie from the [facility] transportation regarding an appointment for next week.

## 2021-12-21 NOTE — TELEPHONE ENCOUNTER
----- Message from Leora sent at 12/21/2021  1:09 PM EST -----  Subject: Message to Provider    QUESTIONS  Information for Provider? Patient has swelling in his left leg it is worse   than normal after speaking with nurse triage he is wondering if he should   take another pill in addition to the one he already took. patient of [place]   ---------------------------------------------------------------------------  --------------  CALL BACK INFO  What is the best way for the office to contact you? OK to leave message on   voicemail  Preferred Call Back Phone Number? [phone]  ---------------------------------------------------------------------------  --------------  SCRIPT ANSWERS  Relationship to Patient?  Self

## 2021-12-21 NOTE — TELEPHONE ENCOUNTER
Received call from AUDELIA ALCALA [facility] at [facility] with [employer] Complaint. Subjective: Caller states \"his leg was swollen a little bit last night and has swollen a little bit more since then its not painful or red\"     Current Symptoms: Just L Leg swelling (pt has hx of L leg swelling)    Onset: 1 day ago; gradual    Associated Symptoms: NA    Pain Severity: 0/10; N/A; none    Temperature: Denies by parent's tactile estimate    What has been tried: Pt put a stocking on and takes furosemide    LMP: NA Pregnant: NA    Recommended disposition: See PCP within 2 weeks    Care advice provided, patient verbalizes understanding; denies any other questions or concerns; instructed to call back for any new or worsening symptoms. Writer provided warm transfer to [place] at [facility] for appointment scheduling     Attention Provider: Thank you for allowing me to participate in the care of your patient. The patient was connected to triage in response to information provided to the ECC/PSC. Please do not respond through this encounter as the response is not directed to a shared pool.       Reason for Disposition   Mild swelling of both ankles and chronic (unchanged)    Protocols used: LEG SWELLING AND EDEMA-ADULT-OH

## 2021-12-28 NOTE — CARE COORDINATION
Titus 45 Transitions Follow Up Call    2021    Patient: Minerva Trammell  Patient : 1935   MRN: [id number]  Reason for Admission: Acute on chronic systolic CHF  Discharge Date: 21 RARS: Readmission Risk Score: 19         Spoke with: Nick Longraphael 33 Transitions Subsequent and Final Call    Subsequent and Final Calls  Do you have any ongoing symptoms?: Yes  Onset of Patient-reported symptoms: In the past 7 days  Patient-reported symptoms: Shortness of Breath  Have your medications changed?: No  Do you have any questions related to your medications?: No  Do you currently have any active services?: Yes  Are you currently active with any services?: Home Health  Do you have any needs or concerns that I can assist you with?: No  Identified Barriers: None  Care Transitions Interventions  No Identified Needs  Other Interventions:         Spoke with patient today 21 for final BPCI/hospital discharge follow up for HF. Patient states he continues doing well and offers no complaints at this time. States having shortness of breath with exertion which is ongoing and no worse. States he continues wearing home oxygen continuous and admits saturation is 97%-98% at rest. Denies any cough/congestion, chest pain, chest discomfort, LE swelling or sudden weight gain. Patient Donnie Edin 78 nurse had visit today and advises selling in legs \"is good today\". States last weight was 142.8 lbs. Patient states he is consistent with following a low sodium diet. Confirmed with patient he is scheduled for next visit with CHF Clinic appt tomorrow at 12 noon. Reiterated CHF zone tool and knowing when to call doctor or seek medical attention. Denies any complaints, concerns or needs at this time. Patient in agreement to final call. CTN signing off.          Follow Up  Future Appointments   Date Time Provider Nahed Ford   2021 12:00 PM OLY CHF ROOM 1 OLY [facility]   1/3/2022  3:15 PM Gian Carpenter DO [facility]   2/10/2022 11:00 AM Yohana Hinkle [facility]   2/15/2022 12:40 PM Juli Traylor MD [facility]   10/13/2022  2:00 PM Raquel Valentino MD [facility]       Tyrel Silverman, APRN

## 2021-12-29 NOTE — PLAN OF CARE
Problem: OXYGENATION/RESPIRATORY FUNCTION  Goal: Patient will maintain patent airway  Outcome: Ongoing  Goal: Patient will achieve/maintain normal respiratory rate/effort  Outcome: Ongoing     Problem: ACTIVITY INTOLERANCE/IMPAIRED MOBILITY  Goal: Mobility/activity is maintained at optimum level for patient  Outcome: Ongoing    Continue plan of care

## 2021-12-29 NOTE — TELEPHONE ENCOUNTER
----- Message from Normaantony Zarate sent at 12/29/2021 11:29 AM EST -----  Subject: Refill Request    QUESTIONS  Name of Medication? LORazepam (ATIVAN) 0.5 MG tablet  Patient-reported dosage and instructions? 0.5MG Tablet 1 tablet per day   How many days do you have left? 2  [facility]? [facility] phone number (if available)? [phone]  ---------------------------------------------------------------------------  --------------  CALL BACK INFO  What is the best way for the office to contact you? OK to leave message on   voicemail  Preferred Call Back Phone Number?  [phone] patient and Dr. Khna

## 2021-12-29 NOTE — PROGRESS NOTES
Within Defined Limits  Abdomen Inspection: Soft,Flat  RUQ Bowel Sounds: Present  LUQ Bowel Sounds: Present  RLQ Bowel Sounds: Present  LLQ Bowel Sounds: Present          Bowel Sounds  RUQ Bowel Sounds: Present  LUQ Bowel Sounds: Present  RLQ Bowel Sounds: Present  LLQ Bowel Sounds: Present    Peripheral Vascular  Peripheral Vascular (WDL): Within Defined Limits  Edema: None              Musculoskeletal  RL Extremity: Unsteady  LL Extremity: Unsteady    Genitourinary  Genitourinary (WDL): Within Defined Limits    Psychosocial  Psychosocial (WDL): Within Defined Limits                        Pulse: 60                      LAB DATA:    Last 3 BMP      Sodium (mmol/L)   Date Value   12/29/2021 139   11/17/2021 137   11/05/2021 140     Potassium (mmol/L)   Date Value   12/29/2021 4.0   11/17/2021 4.5   11/05/2021 4.3     Potassium reflex Magnesium (mmol/L)   Date Value   09/15/2021 3.4 (L)   09/14/2021 3.9   09/06/2021 3.7     Chloride (mmol/L)   Date Value   12/29/2021 103   11/17/2021 101   11/05/2021 104     CO2 (mmol/L)   Date Value   12/29/2021 24   11/17/2021 26   11/05/2021 26     BUN (mg/dL)   Date Value   12/29/2021 23   11/17/2021 28 (H)   11/05/2021 29 (H)     Glucose (mg/dL)   Date Value   12/29/2021 124 (H)   11/17/2021 107 (H)   11/05/2021 118 (H)   05/26/2011 156 (H)     Calcium (mg/dL)   Date Value   12/29/2021 8.9   11/17/2021 9.4   11/05/2021 8.9       Last 3 BNP       Pro-BNP (pg/mL)   Date Value   12/29/2021 1,778 (H)   11/17/2021 2,274 (H)   11/05/2021 1,394 (H)          CBC: No results for input(s): WBC, HGB, PLT in the last 72 hours. BMP:    Recent Labs     12/29/21  1235      K 4.0      CO2 24   BUN 23   CREATININE 1.3*   GLUCOSE 124*     Hepatic: No results for input(s): AST, ALT, ALB, BILITOT, ALKPHOS in the last 72 hours. Troponin: No results for input(s): TROPONINI in the last 72 hours. BNP: No results for input(s): BNP in the last 72 hours.   Lipids: No results for input(s): CHOL, HDL in the last 72 hours. Invalid input(s): LDLCALCU  INR: No results for input(s): INR in the last 72 hours. Pro-BNP   Date Value Ref Range Status   12/29/2021 1,778 (H) 0 - 450 pg/mL Final   11/17/2021 2,274 (H) 0 - 450 pg/mL Final   11/05/2021 1,394 (H) 0 - 450 pg/mL Final       PLAN:  Mr. Christina Gonzalez is euvolemic with stable weights and using his normal 3 liters on his nasal cannula. He was started on megace and says that has helped his appetite improve. Son Jaden Maloney has accompanied him to today's visit. Scheduled to follow up in CHF clinic on: Future Appointments   Date Time Provider Nahed Ford   1/3/2022  3:15 PM Mindi Pina DO [facility]   [facility]   2/10/2022 11:00 AM Mindi Pina DO [facility]   2/15/2022 12:40 PM Ruben Garrison MD [address] [facility]   10/13/2022  2:00 PM Billy Clifford MD [facility]     Given clinic phone number [phone]  and aware of signs and symptoms to call with any HF change in symptoms.   Electronically signed by Lg James RN on 12/29/2021 at 4:27 PM

## 2021-12-30 NOTE — TELEPHONE ENCOUNTER
----- Message from Everton Scar sent at 12/29/2021  3:45 PM EST -----  Subject: Refill Request    QUESTIONS  Name of Medication? megestrol (MEGACE) 40 MG/ML suspension  Patient-reported dosage and instructions? 40 mg once daily   How many days do you have left? 2  [facility]? [facility] phone number (if available)? [phone]  ---------------------------------------------------------------------------  --------------  CALL BACK INFO  What is the best way for the office to contact you? OK to leave message on   voicemail  Preferred Call Back Phone Number?  [phone]

## 2022-01-01 ENCOUNTER — HOSPITAL ENCOUNTER (EMERGENCY)
Age: 87
End: 2022-01-17
Attending: EMERGENCY MEDICINE
Payer: MEDICARE

## 2022-01-01 DIAGNOSIS — Z78.9 CARDIOPULMONARY RESUSCITATION (CPR)-ONLY RESUSCITATION STATUS: ICD-10-CM

## 2022-01-01 DIAGNOSIS — I46.9 CARDIAC ARREST (HCC): Primary | ICD-10-CM

## 2022-01-01 PROCEDURE — 6360000002 HC RX W HCPCS

## 2022-01-01 PROCEDURE — 96375 TX/PRO/DX INJ NEW DRUG ADDON: CPT

## 2022-01-01 PROCEDURE — 31500 INSERT EMERGENCY AIRWAY: CPT

## 2022-01-01 PROCEDURE — 92950 HEART/LUNG RESUSCITATION CPR: CPT

## 2022-01-01 PROCEDURE — 2580000003 HC RX 258

## 2022-01-01 PROCEDURE — 2500000003 HC RX 250 WO HCPCS

## 2022-01-01 PROCEDURE — 6360000002 HC RX W HCPCS: Performed by: EMERGENCY MEDICINE

## 2022-01-01 PROCEDURE — 96374 THER/PROPH/DIAG INJ IV PUSH: CPT

## 2022-01-01 PROCEDURE — 2500000003 HC RX 250 WO HCPCS: Performed by: EMERGENCY MEDICINE

## 2022-01-01 PROCEDURE — 2580000003 HC RX 258: Performed by: EMERGENCY MEDICINE

## 2022-01-01 PROCEDURE — 99282 EMERGENCY DEPT VISIT SF MDM: CPT

## 2022-01-01 RX ORDER — EPINEPHRINE 1 MG/ML
INJECTION, SOLUTION, CONCENTRATE INTRAVENOUS DAILY PRN
Status: COMPLETED | OUTPATIENT
Start: 2022-01-01 | End: 2022-01-01

## 2022-01-01 RX ORDER — SODIUM CHLORIDE 9 MG/ML
INJECTION, SOLUTION INTRAVENOUS CONTINUOUS PRN
Status: COMPLETED | OUTPATIENT
Start: 2022-01-01 | End: 2022-01-01

## 2022-01-01 RX ORDER — EPINEPHRINE 0.1 MG/ML
SYRINGE (ML) INJECTION DAILY PRN
Status: COMPLETED | OUTPATIENT
Start: 2022-01-01 | End: 2022-01-01

## 2022-01-01 RX ORDER — CALCIUM GLUCONATE 94 MG/ML
INJECTION, SOLUTION INTRAVENOUS DAILY PRN
Status: COMPLETED | OUTPATIENT
Start: 2022-01-01 | End: 2022-01-01

## 2022-01-01 RX ADMIN — EPINEPHRINE 1 MG: 0.1 INJECTION INTRACARDIAC; INTRAVENOUS at 19:05

## 2022-01-01 RX ADMIN — SODIUM CHLORIDE 1000 ML: 9 INJECTION, SOLUTION INTRAVENOUS at 19:07

## 2022-01-01 RX ADMIN — EPINEPHRINE 1 MG: 1 INJECTION, SOLUTION, CONCENTRATE INTRAVENOUS at 19:10

## 2022-01-01 RX ADMIN — SODIUM BICARBONATE 50 MEQ: 84 INJECTION, SOLUTION INTRAVENOUS at 19:09

## 2022-01-01 RX ADMIN — CALCIUM GLUCONATE 1000 MG: 94 INJECTION, SOLUTION INTRAVENOUS at 19:11

## 2022-01-17 NOTE — ED NOTES
Jennifer Gotti from [address] [place] [address] [phone] notified of need for .        Gwendolyn Batista RN  01/16/22 3426

## 2022-01-17 NOTE — ED NOTES
Ang Walker notified of patient death. [phone]. Awaiting call back.      Priscilla Varma RN  01/16/22 2025

## 2022-01-17 NOTE — ED PROVIDER NOTES
[facility]  Department of Emergency Medicine   ED  Encounter Note  Admit Date/RoomTime: 2022  7:10 PM  ED Room:     NAME: Kvng Jo  : 1935  MRN: [id number]     Chief Complaint:  No chief complaint on file. History of Present Illness   Source of history provided by:  EMS personnel. History/Exam Limitations: presenting condition. Kvng Jo is a 80 y.o. old male presents to the emergency department by ambulance where the patient received oxygen, IV and GCS at scene 3 prior to arrival., for cardiac arrest, which occured unknown time prior to arrival.  The event was witnessed by son. EMS arrival patient was in cardiac arrest, CPR was ongoing with [facility] airway was placed. Patient got 800 mL of fluid through IO left tibia. EMS had several rounds of ACLS several rounds of shock due to intermittent V. fib but without any ROSC. On EMS arrival patient initial rhythm was: Vfib. Available History:  none at this time. Prehospital Treatment:      [x]   CPR     [x]   Intubation     [x]   IV Established     [x]   Defibrillation     [x]   External Pacer Placed     [x]   Epinephrine Given     []   Atropine Given     []   Glucose Given     []   Narcan Given       Response to Treatment:  Transient return of pulse: Yes. Sustained return of pulse:  No.    ROS    Pertinent positives and negatives are stated within HPI, all other systems reviewed and are negative.     Past Medical History:  has a past medical history of Aneurysm ([address] Asthma, CAD (coronary artery disease), Cardiomyopathy ([address] Centrilobular emphysema ([address] CHF (congestive heart failure) ([address] Dilatation of aorta ([address] Hyperlipidemia, Idiopathic pulmonary fibrosis ([address] Iliac artery aneurysm, bilateral ([facility] Inferoposterior myocardial infarction [facility]), Kidney stone on right side, and NSVT (nonsustained ventricular tachycardia) [facility]).    Surgical History:  has a past surgical history that includes Cardiac catheterization (4-); Coronary artery bypass graft ( 4/16/2014); Coronary angioplasty (4/15/2014); Cardiac defibrillator placement; and hernia repair. Social History:  reports that he quit smoking about 42 years ago. His smoking use included cigarettes. He started smoking about 72 years ago. He has a 30.00 pack-year smoking history. He has never used smokeless tobacco. He reports current alcohol use. He reports that he does not use drugs. Family History: family history includes High Cholesterol in his sister; Hypertension in his mother. Allergies: Patient has no known allergies. Physical Exam           ED Triage Vitals   BP Temp Temp src Pulse Resp SpO2 Height Weight   -- -- -- -- -- -- -- --         General: Unresponsive. Head: Atraumatic. Eyes: Fixed and dilated. ENT: Airway patent. ETT in place- paris airway. Cardiovascular: Heart sounds are Absent. Pulses are Absent. Respiratory: No spontaneous respirations. Equal breath sounds with controlled ventilation. Abdominal: distended. Musculoskeletal: No deformities. Skin: Pallor. Neurological: Unresponsive. Neurological exam limited due to clinical condition. Lab / Imaging Results   (All laboratory and radiology results have been personally reviewed by myself)  Labs:  No results found for this visit on 01/16/22. Imaging: All Radiology results interpreted by Radiologist unless otherwise noted. No orders to display   . ED Course / Medical Decision Making   Darvin Doan is a 80 y.o. old male presents to the emergency department by ambulance where the patient received oxygen, IV and GCS at scene 3 prior to arrival.  Due to cardiac arrest.  Most were able to place a Domonique Lints airway and CPR CPR was ongoing with Mary Sinha. She had several rounds of ACLS, he was shocked 4 times due to intermittent V. Fib.  On EMS arrival patient initial rhythm was: Vfib, he was shocked and ACLS continued. EMS was unsure of patient's CODE STATUS however he was told family mentioned the patient might be DNR. Chart review was reviewed and noticed that patient had orders for DNR CCA. On the next pulse check patient was PEA and time of death called 1912. Patient's PCP is aware, he will sign a death certificate. Family at bedside. Medications   EPINEPHrine 1 MG/10ML injection (1 mg IntraVENous Given 1/16/22 1905)   EPINEPHrine PF 1 MG/ML injection (1 mg IntraVENous Given 1/16/22 1910)   sodium bicarbonate 8.4 % injection (50 mEq IntraVENous Given 1/16/22 1909)   calcium gluconate 10 % injection (1,000 mg IntraVENous Given 1/16/22 1911)   0.9 % sodium chloride infusion (0 mL/hr IntraVENous Stopped 1/16/22 1912)     ED Course as of 01/16/22 2130   [place] Jan 16, 2022 1924 TOD - 1912  Spoke with Dr. Leanne Nuno who will sign death certificate [CF]      ED Course User Index  [CF] Leta Degroot DO       Procedures:       PROCEDURE  1/16/22       Time: 1905    INTUBATION  Risks, benefits and alternatives if able (for applicable procedures below) described. Performed By: EM Attending Physician and EM Resident. Indication:  airway protection. Informed consent: Consent unable to be obtained due to the emergent nature of this procedure. .  Procedure: Following Preoxygenation the patient was pretreated with None followed by None. Intubation was performed after single attempt(s) by direct laryngoscopy using a Glidescope and 8.0mm cuffed endotracheal tube was inserted . Initial post procedure placement:  confirmed by bilateral breath sounds, ETCO2 detection, and absence of sounds over stomach. Tube Secured @ 25cm at the Lip. Post procedure chest x-ray: not indicated. Procedural Complications: None.    Anesthesia Consult:  No.      and Cardioversion Procedure Note    Indication: ventricular fib    Consent: Unable to be obtained due to the emergent nature of this procedure. Pre-Medication: unable to premedicate due to the emergent nature of the procedure    Procedure: The patient was placed in the supine position and the chest area was exposed. The cardioversion pads were applied in the standard manner and configuration. Attempt #1: The defibrillator was set on the asynchronous mode and charged to 360 joules. A charge was then delivered which resulted in conversion to pulseless electrical activity. Attempt #2: Not necessary    Attempt #3: Not necessary    The patient tolerated the procedure poorly and the procedure was terminated prior to completion as patient was found to be DNR-CCA  Complications: None      Counseling:   I have spoken with the members of the family that were present and informed them of the patients status. Assessment      1. Cardiac arrest [facility])    2. Cardiopulmonary resuscitation (CPR)-only resuscitation status       Plan   Patient  after arrival (DAA). Electronically signed by Jorge Shelley MD   DD: 22  **This report was transcribed using voice recognition software. Every effort was made to ensure accuracy; however, inadvertent computerized transcription errors may be present.   END OF PROVIDER NOTE                 Matthieu Norton MD  Resident  22 [phone]

## 2022-01-17 NOTE — ED NOTES
Elsie Romero from [facility] notified of patient death. Ok to release to [facility] per Nevin.        [facility]  01/16/22 2028

## 2022-01-17 NOTE — ED NOTES
Arrives to ED via EMS in cardiac arrest.   CPR is ongoing with [facility] machine. Jesse airway in place. Approximately 800ml of normal saline has been given through left tibia IO. EMS reports that several rounds of ACLS have been given without ROSC.         Hanna Ayala RN  01/16/22 1949

## 2022-01-17 NOTE — ED NOTES
[facility] notified of patient death. Patient is not a candidate for donation. Referral number [id number]. OK to release body to [facility].      Cayden Allen RN  01/16/22 2037       Cayden Allen RN  01/16/22 2135
